# Patient Record
Sex: MALE | Race: WHITE | Employment: UNEMPLOYED | URBAN - METROPOLITAN AREA
[De-identification: names, ages, dates, MRNs, and addresses within clinical notes are randomized per-mention and may not be internally consistent; named-entity substitution may affect disease eponyms.]

---

## 2017-02-13 ENCOUNTER — OFFICE VISIT (OUTPATIENT)
Dept: INTERNAL MEDICINE CLINIC | Age: 37
End: 2017-02-13

## 2017-02-13 VITALS
BODY MASS INDEX: 37.57 KG/M2 | SYSTOLIC BLOOD PRESSURE: 124 MMHG | HEART RATE: 114 BPM | DIASTOLIC BLOOD PRESSURE: 80 MMHG | RESPIRATION RATE: 18 BRPM | WEIGHT: 277 LBS | OXYGEN SATURATION: 98 %

## 2017-02-13 DIAGNOSIS — F41.9 ANXIETY: Primary | ICD-10-CM

## 2017-02-13 PROCEDURE — 99213 OFFICE O/P EST LOW 20 MIN: CPT | Performed by: INTERNAL MEDICINE

## 2017-02-13 RX ORDER — VENLAFAXINE HYDROCHLORIDE 37.5 MG/1
37.5 CAPSULE, EXTENDED RELEASE ORAL DAILY
Qty: 7 CAPSULE | Refills: 0 | Status: SHIPPED | OUTPATIENT
Start: 2017-02-13 | End: 2017-04-10 | Stop reason: DRUGHIGH

## 2017-02-13 RX ORDER — VENLAFAXINE HYDROCHLORIDE 75 MG/1
75 CAPSULE, EXTENDED RELEASE ORAL DAILY
Qty: 30 CAPSULE | Refills: 1 | Status: SHIPPED | OUTPATIENT
Start: 2017-02-13 | End: 2017-04-10 | Stop reason: SDUPTHER

## 2017-04-10 ENCOUNTER — OFFICE VISIT (OUTPATIENT)
Dept: INTERNAL MEDICINE CLINIC | Age: 37
End: 2017-04-10

## 2017-04-10 VITALS
HEART RATE: 116 BPM | SYSTOLIC BLOOD PRESSURE: 144 MMHG | OXYGEN SATURATION: 98 % | RESPIRATION RATE: 18 BRPM | DIASTOLIC BLOOD PRESSURE: 100 MMHG

## 2017-04-10 DIAGNOSIS — I10 ESSENTIAL HYPERTENSION: ICD-10-CM

## 2017-04-10 DIAGNOSIS — F41.9 ANXIETY: ICD-10-CM

## 2017-04-10 DIAGNOSIS — J40 BRONCHITIS: Primary | ICD-10-CM

## 2017-04-10 PROCEDURE — 99213 OFFICE O/P EST LOW 20 MIN: CPT | Performed by: INTERNAL MEDICINE

## 2017-04-10 RX ORDER — LISINOPRIL AND HYDROCHLOROTHIAZIDE 25; 20 MG/1; MG/1
1 TABLET ORAL DAILY
Qty: 30 TABLET | Refills: 11 | Status: SHIPPED | OUTPATIENT
Start: 2017-04-10 | End: 2018-05-02 | Stop reason: SDUPTHER

## 2017-04-10 RX ORDER — AZITHROMYCIN 250 MG/1
TABLET, FILM COATED ORAL
Qty: 6 TABLET | Refills: 0 | Status: SHIPPED | OUTPATIENT
Start: 2017-04-10 | End: 2018-01-24 | Stop reason: ALTCHOICE

## 2017-04-10 RX ORDER — VENLAFAXINE HYDROCHLORIDE 150 MG/1
150 CAPSULE, EXTENDED RELEASE ORAL DAILY
Qty: 30 CAPSULE | Refills: 11 | Status: SHIPPED | OUTPATIENT
Start: 2017-04-10 | End: 2018-01-24

## 2017-04-10 RX ORDER — PREDNISONE 10 MG/1
TABLET ORAL
Qty: 20 TABLET | Refills: 0 | Status: SHIPPED | OUTPATIENT
Start: 2017-04-10 | End: 2017-04-20

## 2017-04-10 RX ORDER — FLUTICASONE PROPIONATE 50 MCG
2 SPRAY, SUSPENSION (ML) NASAL DAILY
Qty: 1 BOTTLE | Refills: 5 | Status: SHIPPED | OUTPATIENT
Start: 2017-04-10 | End: 2018-01-24 | Stop reason: ALTCHOICE

## 2017-05-09 ENCOUNTER — HOSPITAL ENCOUNTER (OUTPATIENT)
Dept: OTHER | Age: 37
Discharge: HOME | End: 2017-05-09
Attending: CLINIC/CENTER | Admitting: CLINIC/CENTER

## 2017-05-09 DIAGNOSIS — S60.222A CONTUSION OF LEFT HAND, INITIAL ENCOUNTER: ICD-10-CM

## 2018-01-24 ENCOUNTER — OFFICE VISIT (OUTPATIENT)
Dept: INTERNAL MEDICINE CLINIC | Age: 38
End: 2018-01-24

## 2018-01-24 VITALS
WEIGHT: 270.2 LBS | RESPIRATION RATE: 16 BRPM | BODY MASS INDEX: 36.6 KG/M2 | OXYGEN SATURATION: 93 % | HEART RATE: 108 BPM | HEIGHT: 72 IN | DIASTOLIC BLOOD PRESSURE: 80 MMHG | SYSTOLIC BLOOD PRESSURE: 134 MMHG

## 2018-01-24 DIAGNOSIS — B35.3 TINEA PEDIS OF RIGHT FOOT: ICD-10-CM

## 2018-01-24 DIAGNOSIS — I10 ESSENTIAL HYPERTENSION: ICD-10-CM

## 2018-01-24 DIAGNOSIS — Z00.00 PREVENTATIVE HEALTH CARE: Primary | ICD-10-CM

## 2018-01-24 DIAGNOSIS — F41.9 ANXIETY: ICD-10-CM

## 2018-01-24 DIAGNOSIS — G47.33 OBSTRUCTIVE SLEEP APNEA: ICD-10-CM

## 2018-01-24 DIAGNOSIS — K76.0 FATTY LIVER: ICD-10-CM

## 2018-01-24 PROCEDURE — 99395 PREV VISIT EST AGE 18-39: CPT | Performed by: INTERNAL MEDICINE

## 2018-01-24 RX ORDER — PAROXETINE 10 MG/1
10 TABLET, FILM COATED ORAL DAILY
Qty: 30 TABLET | Refills: 11 | Status: SHIPPED | OUTPATIENT
Start: 2018-01-24 | End: 2018-02-26 | Stop reason: SINTOL

## 2018-01-24 RX ORDER — VENLAFAXINE HYDROCHLORIDE 75 MG/1
75 CAPSULE, EXTENDED RELEASE ORAL DAILY
Qty: 7 CAPSULE | Refills: 0 | Status: SHIPPED | OUTPATIENT
Start: 2018-01-24 | End: 2018-02-26 | Stop reason: ALTCHOICE

## 2018-01-24 RX ORDER — FLUCONAZOLE 100 MG/1
100 TABLET ORAL DAILY
Qty: 10 TABLET | Refills: 0 | Status: SHIPPED | OUTPATIENT
Start: 2018-01-24 | End: 2018-02-03

## 2018-01-24 ASSESSMENT — PATIENT HEALTH QUESTIONNAIRE - PHQ9
SUM OF ALL RESPONSES TO PHQ QUESTIONS 1-9: 0
SUM OF ALL RESPONSES TO PHQ9 QUESTIONS 1 & 2: 0
2. FEELING DOWN, DEPRESSED OR HOPELESS: 0
1. LITTLE INTEREST OR PLEASURE IN DOING THINGS: 0

## 2018-01-24 NOTE — PROGRESS NOTES
Cecelia Aschoff  1980 01/24/18    SUBJECTIVE:    Pt is concerned that the effexor is causing twitching and somnolence. The twitching tends to occur when he is sleeping, not when he is awake. This is beneficial for the insomnia. The patient is taking hypertensive medications compliantly without side effects. Denies chest pain, dyspnea, edema, or TIA's. Pt with fatty liver. Pt is not using his CPAP. Pt with dry cracking skin on his feet. OBJECTIVE:    /80   Pulse 108   Resp 16   Ht 6' (1.829 m)   Wt 270 lb 3.2 oz (122.6 kg)   SpO2 93%   BMI 36.65 kg/m²     Physical Exam   Constitutional: He is oriented to person, place, and time. He appears well-developed and well-nourished. Eyes: Conjunctivae are normal. No scleral icterus. Neck: Neck supple. No JVD present. No tracheal deviation present. No thyromegaly present. Cardiovascular: Normal rate, regular rhythm, normal heart sounds and intact distal pulses. Pulmonary/Chest: Effort normal and breath sounds normal. No respiratory distress. Abdominal: Soft. He exhibits no distension and no mass. There is no hepatosplenomegaly. There is no tenderness. There is no rebound, no guarding and no CVA tenderness. Musculoskeletal: He exhibits no edema. Lymphadenopathy:     He has no cervical adenopathy. Neurological: He is alert and oriented to person, place, and time. Nonfocal   Skin: No cyanosis. Nails show no clubbing. Psychiatric: He has a normal mood and affect. His behavior is normal. Judgment normal.       ASSESSMENT:    1. Preventative health care    2. Essential hypertension    3. Fatty liver    4. Obstructive sleep apnea    5. Anxiety    6. Tinea pedis of right foot        PLAN:    Rachel Kaur was seen today for discuss medications and other.     Diagnoses and all orders for this visit:    Preventative health care - encourage exercise; BP at goal; encourage decreased alcohol use; check labs; encourage wt loss  - Comprehensive Metabolic Panel; Future  -     Lipid Panel; Future  -     CBC Auto Differential; Future  -     Hemoglobin A1C; Future    Essential hypertension - at goal, check labs  -     Comprehensive Metabolic Panel; Future  -     Lipid Panel; Future    Fatty liver - discussed importance of wt loss and avoiding alcohol for the fatty liver  -     Comprehensive Metabolic Panel; Future    Obstructive sleep apnea - pt not interested in going back to sleep eval. Encourage wt loss    Anxiety - stop effexor, start paxil  -     venlafaxine (EFFEXOR XR) 75 MG extended release capsule; Take 1 capsule by mouth daily  -     PARoxetine (PAXIL) 10 MG tablet; Take 1 tablet by mouth daily    Tinea pedis of right foot  -     fluconazole (DIFLUCAN) 100 MG tablet;  Take 1 tablet by mouth daily for 10 days

## 2018-01-29 ENCOUNTER — OFFICE VISIT (OUTPATIENT)
Dept: INTERNAL MEDICINE CLINIC | Age: 38
End: 2018-01-29

## 2018-01-29 VITALS
OXYGEN SATURATION: 97 % | RESPIRATION RATE: 18 BRPM | SYSTOLIC BLOOD PRESSURE: 136 MMHG | TEMPERATURE: 98.3 F | DIASTOLIC BLOOD PRESSURE: 80 MMHG | HEART RATE: 120 BPM

## 2018-01-29 DIAGNOSIS — K52.9 GASTROENTERITIS: Primary | ICD-10-CM

## 2018-01-29 PROCEDURE — 99213 OFFICE O/P EST LOW 20 MIN: CPT | Performed by: INTERNAL MEDICINE

## 2018-01-29 RX ORDER — PROMETHAZINE HYDROCHLORIDE 25 MG/1
25 TABLET ORAL EVERY 6 HOURS PRN
Qty: 20 TABLET | Refills: 1 | Status: SHIPPED | OUTPATIENT
Start: 2018-01-29 | End: 2018-02-05

## 2018-01-29 RX ORDER — DIPHENOXYLATE HYDROCHLORIDE AND ATROPINE SULFATE 2.5; .025 MG/1; MG/1
1 TABLET ORAL 4 TIMES DAILY PRN
Qty: 15 TABLET | Refills: 0 | Status: SHIPPED | OUTPATIENT
Start: 2018-01-29 | End: 2018-02-05

## 2018-01-29 NOTE — PROGRESS NOTES
Cecelia Aschoff  1980 01/29/18    SUBJECTIVE:      Pt woke last night with vomiting productive of food without blood, dry heaves, diarrhea x 10 productive of brown watery stool without blood, subjective F/C, diffuse abd pain. He has been able to drink a small amount of liquid today. He denies sick contacts. OBJECTIVE:    /80   Pulse 120   Temp 98.3 °F (36.8 °C) (Oral)   Resp 18   SpO2 97%     Physical Exam   Constitutional: He appears well-developed and well-nourished. Eyes: Conjunctivae are normal. No scleral icterus. Cardiovascular: Normal rate, regular rhythm and normal heart sounds. No murmur heard. Pulmonary/Chest: Effort normal and breath sounds normal. No respiratory distress. He has no wheezes. Abdominal: Soft. Normal appearance. There is generalized tenderness. There is no CVA tenderness. ASSESSMENT:    1. Gastroenteritis        PLAN:    Rachel Kaur was seen today for nausea & vomiting and chills. Diagnoses and all orders for this visit:    Gastroenteritis - no red flags. Tx with phenergan, lomotil, and fluids  -     promethazine (PHENERGAN) 25 MG tablet; Take 1 tablet by mouth every 6 hours as needed for Nausea  -     diphenoxylate-atropine (DIPHENATOL) 2.5-0.025 MG per tablet; Take 1 tablet by mouth 4 times daily as needed for Diarrhea for up to 7 days.

## 2018-02-26 ENCOUNTER — TELEPHONE (OUTPATIENT)
Dept: INTERNAL MEDICINE CLINIC | Age: 38
End: 2018-02-26

## 2018-02-26 RX ORDER — CITALOPRAM 10 MG/1
10 TABLET ORAL DAILY
Qty: 30 TABLET | Refills: 1 | Status: SHIPPED | OUTPATIENT
Start: 2018-02-26 | End: 2018-05-30

## 2018-03-09 ENCOUNTER — OFFICE VISIT (OUTPATIENT)
Dept: INTERNAL MEDICINE CLINIC | Age: 38
End: 2018-03-09

## 2018-03-09 VITALS — SYSTOLIC BLOOD PRESSURE: 130 MMHG | HEART RATE: 88 BPM | DIASTOLIC BLOOD PRESSURE: 80 MMHG | RESPIRATION RATE: 18 BRPM

## 2018-03-09 DIAGNOSIS — M79.671 RIGHT FOOT PAIN: Primary | ICD-10-CM

## 2018-03-09 PROCEDURE — 99213 OFFICE O/P EST LOW 20 MIN: CPT | Performed by: INTERNAL MEDICINE

## 2018-03-09 RX ORDER — TRAMADOL HYDROCHLORIDE 50 MG/1
50 TABLET ORAL EVERY 6 HOURS PRN
Qty: 28 TABLET | Refills: 0 | Status: SHIPPED | OUTPATIENT
Start: 2018-03-09 | End: 2018-03-16

## 2018-05-02 DIAGNOSIS — I10 ESSENTIAL HYPERTENSION: ICD-10-CM

## 2018-05-02 RX ORDER — LISINOPRIL AND HYDROCHLOROTHIAZIDE 25; 20 MG/1; MG/1
1 TABLET ORAL DAILY
Qty: 30 TABLET | Refills: 1 | Status: SHIPPED | OUTPATIENT
Start: 2018-05-02 | End: 2018-06-21 | Stop reason: SDUPTHER

## 2018-05-04 ENCOUNTER — OFFICE VISIT (OUTPATIENT)
Dept: INTERNAL MEDICINE CLINIC | Age: 38
End: 2018-05-04

## 2018-05-04 VITALS — DIASTOLIC BLOOD PRESSURE: 84 MMHG | SYSTOLIC BLOOD PRESSURE: 136 MMHG | HEART RATE: 110 BPM | OXYGEN SATURATION: 95 %

## 2018-05-04 DIAGNOSIS — R20.2 NUMBNESS AND TINGLING OF BOTH LOWER EXTREMITIES: ICD-10-CM

## 2018-05-04 DIAGNOSIS — M79.604 PAIN IN BOTH LOWER EXTREMITIES: ICD-10-CM

## 2018-05-04 DIAGNOSIS — R20.2 PARESTHESIA OF BOTH HANDS: ICD-10-CM

## 2018-05-04 DIAGNOSIS — R20.2 NUMBNESS AND TINGLING OF BOTH LOWER EXTREMITIES: Primary | ICD-10-CM

## 2018-05-04 DIAGNOSIS — M79.605 PAIN IN BOTH LOWER EXTREMITIES: ICD-10-CM

## 2018-05-04 DIAGNOSIS — R20.0 NUMBNESS AND TINGLING OF BOTH LOWER EXTREMITIES: ICD-10-CM

## 2018-05-04 DIAGNOSIS — R20.0 NUMBNESS AND TINGLING OF BOTH LOWER EXTREMITIES: Primary | ICD-10-CM

## 2018-05-04 LAB
C-REACTIVE PROTEIN: 21.1 MG/L (ref 0–5.1)
PROTEIN PROTEIN: 0.04 G/DL
PROTEIN PROTEIN: 35 MG/DL
RHEUMATOID FACTOR: <10 IU/ML
TSH SERPL DL<=0.05 MIU/L-ACNC: 1.18 UIU/ML (ref 0.27–4.2)

## 2018-05-04 PROCEDURE — 99214 OFFICE O/P EST MOD 30 MIN: CPT | Performed by: NURSE PRACTITIONER

## 2018-05-04 RX ORDER — GABAPENTIN 100 MG/1
100 CAPSULE ORAL 3 TIMES DAILY
Qty: 21 CAPSULE | Refills: 0 | Status: SHIPPED | OUTPATIENT
Start: 2018-05-04 | End: 2018-05-24 | Stop reason: SDUPTHER

## 2018-05-05 LAB
SEDIMENTATION RATE, ERYTHROCYTE: 93 MM/HR (ref 0–15)
VITAMIN B-12: 695 PG/ML (ref 211–911)

## 2018-05-07 DIAGNOSIS — M79.605 BILATERAL LOWER EXTREMITY PAIN: ICD-10-CM

## 2018-05-07 DIAGNOSIS — R79.82 ELEVATED C-REACTIVE PROTEIN (CRP): ICD-10-CM

## 2018-05-07 DIAGNOSIS — M79.604 BILATERAL LOWER EXTREMITY PAIN: ICD-10-CM

## 2018-05-07 DIAGNOSIS — R20.0 NUMBNESS IN BOTH HANDS: ICD-10-CM

## 2018-05-07 DIAGNOSIS — R20.2 NUMBNESS AND TINGLING OF BOTH LEGS: ICD-10-CM

## 2018-05-07 DIAGNOSIS — R70.0 ELEVATED SED RATE: Primary | ICD-10-CM

## 2018-05-07 DIAGNOSIS — R20.0 NUMBNESS AND TINGLING OF BOTH LEGS: ICD-10-CM

## 2018-05-07 LAB
ALBUMIN SERPL-MCNC: 3.9 G/DL (ref 3.1–4.9)
ALPHA-1-GLOBULIN: 0.3 G/DL (ref 0.2–0.4)
ALPHA-2-GLOBULIN: 0.8 G/DL (ref 0.4–1.1)
BETA GLOBULIN: 2.1 G/DL (ref 0.9–1.6)
GAMMA GLOBULIN: 1.6 G/DL (ref 0.6–1.8)
SPE/IFE INTERPRETATION: NORMAL
TOTAL PROTEIN: 8.8 G/DL (ref 6.4–8.2)
URINE ELECTROPHORESIS INTERP: NORMAL

## 2018-05-10 ENCOUNTER — TELEPHONE (OUTPATIENT)
Dept: INTERNAL MEDICINE CLINIC | Age: 38
End: 2018-05-10

## 2018-05-24 ENCOUNTER — TELEPHONE (OUTPATIENT)
Dept: INTERNAL MEDICINE CLINIC | Age: 38
End: 2018-05-24

## 2018-05-24 DIAGNOSIS — R20.0 NUMBNESS AND TINGLING OF BOTH LOWER EXTREMITIES: ICD-10-CM

## 2018-05-24 DIAGNOSIS — R20.2 NUMBNESS AND TINGLING OF BOTH LOWER EXTREMITIES: ICD-10-CM

## 2018-05-24 RX ORDER — GABAPENTIN 300 MG/1
300 CAPSULE ORAL 3 TIMES DAILY
Qty: 90 CAPSULE | Refills: 3 | Status: SHIPPED | OUTPATIENT
Start: 2018-05-24 | End: 2018-06-11 | Stop reason: SDUPTHER

## 2018-05-30 ENCOUNTER — TELEPHONE (OUTPATIENT)
Dept: INTERNAL MEDICINE CLINIC | Age: 38
End: 2018-05-30

## 2018-05-30 ENCOUNTER — OFFICE VISIT (OUTPATIENT)
Dept: INTERNAL MEDICINE CLINIC | Age: 38
End: 2018-05-30

## 2018-05-30 VITALS
WEIGHT: 246.2 LBS | RESPIRATION RATE: 18 BRPM | BODY MASS INDEX: 33.39 KG/M2 | SYSTOLIC BLOOD PRESSURE: 138 MMHG | HEART RATE: 106 BPM | OXYGEN SATURATION: 94 % | DIASTOLIC BLOOD PRESSURE: 82 MMHG

## 2018-05-30 DIAGNOSIS — R73.9 HYPERGLYCEMIA: Primary | ICD-10-CM

## 2018-05-30 PROCEDURE — 99214 OFFICE O/P EST MOD 30 MIN: CPT | Performed by: INTERNAL MEDICINE

## 2018-05-30 RX ORDER — BLOOD-GLUCOSE METER
1 KIT MISCELLANEOUS DAILY PRN
Qty: 1 KIT | Refills: 0 | Status: SHIPPED | OUTPATIENT
Start: 2018-05-30

## 2018-05-30 RX ORDER — LANCETS
1 EACH MISCELLANEOUS DAILY
Qty: 100 EACH | Refills: 3 | Status: SHIPPED | OUTPATIENT
Start: 2018-05-30

## 2018-05-31 DIAGNOSIS — R73.9 HYPERGLYCEMIA: ICD-10-CM

## 2018-06-06 ENCOUNTER — OFFICE VISIT (OUTPATIENT)
Dept: INTERNAL MEDICINE CLINIC | Age: 38
End: 2018-06-06

## 2018-06-06 VITALS
RESPIRATION RATE: 18 BRPM | WEIGHT: 247.9 LBS | DIASTOLIC BLOOD PRESSURE: 74 MMHG | HEART RATE: 101 BPM | BODY MASS INDEX: 33.62 KG/M2 | OXYGEN SATURATION: 95 % | SYSTOLIC BLOOD PRESSURE: 116 MMHG

## 2018-06-06 DIAGNOSIS — E11.40 TYPE 2 DIABETES, CONTROLLED, WITH NEUROPATHY (HCC): Primary | ICD-10-CM

## 2018-06-06 PROCEDURE — 99213 OFFICE O/P EST LOW 20 MIN: CPT | Performed by: INTERNAL MEDICINE

## 2018-06-06 RX ORDER — ASPIRIN 81 MG/1
81 TABLET ORAL DAILY
Qty: 30 TABLET | Refills: 3 | COMMUNITY
Start: 2018-06-06 | End: 2022-03-01

## 2018-06-06 RX ORDER — ATORVASTATIN CALCIUM 40 MG/1
40 TABLET, FILM COATED ORAL DAILY
Qty: 30 TABLET | Refills: 5 | Status: SHIPPED | OUTPATIENT
Start: 2018-06-06 | End: 2019-01-07 | Stop reason: SDUPTHER

## 2018-06-11 ENCOUNTER — TELEPHONE (OUTPATIENT)
Dept: INTERNAL MEDICINE CLINIC | Age: 38
End: 2018-06-11

## 2018-06-11 DIAGNOSIS — R20.0 NUMBNESS AND TINGLING OF BOTH LOWER EXTREMITIES: ICD-10-CM

## 2018-06-11 DIAGNOSIS — R20.2 NUMBNESS AND TINGLING: Primary | ICD-10-CM

## 2018-06-11 DIAGNOSIS — R20.2 NUMBNESS AND TINGLING OF BOTH LOWER EXTREMITIES: ICD-10-CM

## 2018-06-11 DIAGNOSIS — R20.0 NUMBNESS AND TINGLING: Primary | ICD-10-CM

## 2018-06-11 RX ORDER — GABAPENTIN 300 MG/1
300 CAPSULE ORAL 3 TIMES DAILY
Qty: 90 CAPSULE | Refills: 3
Start: 2018-06-11 | End: 2018-06-14 | Stop reason: ALTCHOICE

## 2018-06-11 RX ORDER — GABAPENTIN 400 MG/1
400 CAPSULE ORAL 3 TIMES DAILY
Qty: 90 CAPSULE | Refills: 3 | Status: SHIPPED | OUTPATIENT
Start: 2018-06-11 | End: 2018-06-11 | Stop reason: SDUPTHER

## 2018-06-14 RX ORDER — PREGABALIN 25 MG/1
25 CAPSULE ORAL 3 TIMES DAILY
Qty: 21 CAPSULE | Refills: 0 | Status: SHIPPED | OUTPATIENT
Start: 2018-06-14 | End: 2018-06-26 | Stop reason: SDUPTHER

## 2018-06-21 DIAGNOSIS — I10 ESSENTIAL HYPERTENSION: ICD-10-CM

## 2018-06-21 RX ORDER — LISINOPRIL AND HYDROCHLOROTHIAZIDE 25; 20 MG/1; MG/1
1 TABLET ORAL DAILY
Qty: 30 TABLET | Refills: 1 | Status: SHIPPED | OUTPATIENT
Start: 2018-06-21 | End: 2018-08-15 | Stop reason: SDUPTHER

## 2018-06-26 ENCOUNTER — OFFICE VISIT (OUTPATIENT)
Dept: INTERNAL MEDICINE CLINIC | Age: 38
End: 2018-06-26

## 2018-06-26 VITALS
HEART RATE: 108 BPM | WEIGHT: 244 LBS | SYSTOLIC BLOOD PRESSURE: 128 MMHG | DIASTOLIC BLOOD PRESSURE: 84 MMHG | BODY MASS INDEX: 33.09 KG/M2 | OXYGEN SATURATION: 94 % | RESPIRATION RATE: 16 BRPM

## 2018-06-26 DIAGNOSIS — R20.0 NUMBNESS AND TINGLING: ICD-10-CM

## 2018-06-26 DIAGNOSIS — R20.2 NUMBNESS AND TINGLING: ICD-10-CM

## 2018-06-26 DIAGNOSIS — E11.40 TYPE 2 DIABETES, CONTROLLED, WITH NEUROPATHY (HCC): Primary | ICD-10-CM

## 2018-06-26 PROCEDURE — 99213 OFFICE O/P EST LOW 20 MIN: CPT | Performed by: INTERNAL MEDICINE

## 2018-06-26 RX ORDER — PREGABALIN 50 MG/1
50 CAPSULE ORAL 3 TIMES DAILY
Qty: 90 CAPSULE | Refills: 2 | Status: SHIPPED | OUTPATIENT
Start: 2018-06-26 | End: 2018-11-15 | Stop reason: SDUPTHER

## 2018-07-17 ENCOUNTER — OFFICE VISIT (OUTPATIENT)
Dept: INTERNAL MEDICINE CLINIC | Age: 38
End: 2018-07-17

## 2018-07-17 VITALS
BODY MASS INDEX: 33.74 KG/M2 | WEIGHT: 248.8 LBS | RESPIRATION RATE: 16 BRPM | OXYGEN SATURATION: 97 % | HEART RATE: 96 BPM | SYSTOLIC BLOOD PRESSURE: 146 MMHG | DIASTOLIC BLOOD PRESSURE: 96 MMHG

## 2018-07-17 DIAGNOSIS — Z00.00 PREVENTATIVE HEALTH CARE: ICD-10-CM

## 2018-07-17 DIAGNOSIS — Z23 NEED FOR VACCINATION FOR PNEUMOCOCCUS: ICD-10-CM

## 2018-07-17 DIAGNOSIS — E11.40 TYPE 2 DIABETES, CONTROLLED, WITH NEUROPATHY (HCC): Primary | ICD-10-CM

## 2018-07-17 DIAGNOSIS — I10 ESSENTIAL HYPERTENSION: ICD-10-CM

## 2018-07-17 PROCEDURE — 90471 IMMUNIZATION ADMIN: CPT | Performed by: INTERNAL MEDICINE

## 2018-07-17 PROCEDURE — 90732 PPSV23 VACC 2 YRS+ SUBQ/IM: CPT | Performed by: INTERNAL MEDICINE

## 2018-07-17 PROCEDURE — 99213 OFFICE O/P EST LOW 20 MIN: CPT | Performed by: INTERNAL MEDICINE

## 2018-08-15 ENCOUNTER — OFFICE VISIT (OUTPATIENT)
Dept: INTERNAL MEDICINE CLINIC | Age: 38
End: 2018-08-15

## 2018-08-15 VITALS
DIASTOLIC BLOOD PRESSURE: 90 MMHG | SYSTOLIC BLOOD PRESSURE: 142 MMHG | OXYGEN SATURATION: 95 % | WEIGHT: 252.2 LBS | HEART RATE: 102 BPM | BODY MASS INDEX: 34.2 KG/M2 | RESPIRATION RATE: 16 BRPM

## 2018-08-15 DIAGNOSIS — E11.40 TYPE 2 DIABETES, CONTROLLED, WITH NEUROPATHY (HCC): Primary | ICD-10-CM

## 2018-08-15 PROCEDURE — 99213 OFFICE O/P EST LOW 20 MIN: CPT | Performed by: INTERNAL MEDICINE

## 2018-08-15 RX ORDER — LISINOPRIL AND HYDROCHLOROTHIAZIDE 25; 20 MG/1; MG/1
1 TABLET ORAL DAILY
Qty: 30 TABLET | Refills: 11 | Status: SHIPPED | OUTPATIENT
Start: 2018-08-15 | End: 2019-08-27 | Stop reason: SDUPTHER

## 2018-08-15 NOTE — PROGRESS NOTES
Stephen Triplett  1980  08/15/18    SUBJECTIVE:    Pt has had more swelling, tingling and pain in the feet. Pain is worse today than normal. He is on lyrica 50 TID.     sugars have been 150. OBJECTIVE:    BP (!) 142/90   Pulse 102   Resp 16   Wt 252 lb 3.2 oz (114.4 kg)   SpO2 95%   BMI 34.20 kg/m²     Physical Exam    ASSESSMENT:    1. Type 2 diabetes, controlled, with neuropathy ([address]    2. Essential hypertension        PLAN:    Merikameronmargaret Clayton was seen today for foot swelling. Diagnoses and all orders for this visit:    Type 2 diabetes, controlled, with neuropathy ([address] - Tx with aspercreme with lidocaine. Await final EMG. If not improving pt will call next week for increase in lyrica    Essential hypertension  -     lisinopril-hydrochlorothiazide (PRINZIDE;ZESTORETIC) 20-25 MG per tablet;  Take 1 tablet by mouth daily

## 2018-11-15 ENCOUNTER — OFFICE VISIT (OUTPATIENT)
Dept: INTERNAL MEDICINE CLINIC | Age: 38
End: 2018-11-15
Payer: COMMERCIAL

## 2018-11-15 VITALS
RESPIRATION RATE: 18 BRPM | SYSTOLIC BLOOD PRESSURE: 126 MMHG | DIASTOLIC BLOOD PRESSURE: 84 MMHG | HEART RATE: 110 BPM | OXYGEN SATURATION: 94 %

## 2018-11-15 DIAGNOSIS — E11.40 TYPE 2 DIABETES, CONTROLLED, WITH NEUROPATHY (HCC): Primary | ICD-10-CM

## 2018-11-15 DIAGNOSIS — I10 ESSENTIAL HYPERTENSION: ICD-10-CM

## 2018-11-15 DIAGNOSIS — F41.9 ANXIETY: ICD-10-CM

## 2018-11-15 DIAGNOSIS — R20.2 NUMBNESS AND TINGLING: ICD-10-CM

## 2018-11-15 DIAGNOSIS — R20.0 NUMBNESS AND TINGLING: ICD-10-CM

## 2018-11-15 DIAGNOSIS — F51.01 PRIMARY INSOMNIA: ICD-10-CM

## 2018-11-15 PROCEDURE — 90688 IIV4 VACCINE SPLT 0.5 ML IM: CPT | Performed by: INTERNAL MEDICINE

## 2018-11-15 PROCEDURE — 90471 IMMUNIZATION ADMIN: CPT | Performed by: INTERNAL MEDICINE

## 2018-11-15 PROCEDURE — 99214 OFFICE O/P EST MOD 30 MIN: CPT | Performed by: INTERNAL MEDICINE

## 2018-11-15 RX ORDER — PREGABALIN 75 MG/1
75 CAPSULE ORAL 3 TIMES DAILY
Qty: 90 CAPSULE | Refills: 2 | Status: SHIPPED | OUTPATIENT
Start: 2018-11-15 | End: 2019-04-08 | Stop reason: SDUPTHER

## 2018-11-15 RX ORDER — BUSPIRONE HYDROCHLORIDE 10 MG/1
10 TABLET ORAL 2 TIMES DAILY
Qty: 60 TABLET | Refills: 5 | Status: SHIPPED | OUTPATIENT
Start: 2018-11-15 | End: 2018-12-15

## 2018-11-15 RX ORDER — TRAZODONE HYDROCHLORIDE 50 MG/1
50-100 TABLET ORAL NIGHTLY PRN
Qty: 60 TABLET | Refills: 5 | Status: SHIPPED | OUTPATIENT
Start: 2018-11-15 | End: 2019-08-27 | Stop reason: SDUPTHER

## 2018-11-15 NOTE — PROGRESS NOTES
Kailash Arias  1980  11/15/18    SUBJECTIVE:    Pt is having more pain in his feet - this varies day to day, described as pins and needles. He is taking the lyrica. He is drinking 4-5 beers daily. He has not been sleeping well - he is staying up late, has EMA. He has been having more anxiety recently. Sugars have been 115-140. Patient denies any chest pain, shortness of breath, myalgias. Patient is tolerating cholesterol medications without difficulty. The patient is taking hypertensive medications compliantly without side effects. Denies chest pain, dyspnea, edema, or TIA's. OBJECTIVE:    /84   Pulse 110   Resp 18   SpO2 94%     Physical Exam   Constitutional: He is oriented to person, place, and time. He appears well-developed and well-nourished. Eyes: Conjunctivae are normal. No scleral icterus. Neck: Neck supple. No JVD present. No tracheal deviation present. No thyromegaly present. Cardiovascular: Normal rate, regular rhythm, normal heart sounds and intact distal pulses. Pulmonary/Chest: Effort normal and breath sounds normal. No respiratory distress. Abdominal: Soft. He exhibits no distension and no mass. There is no hepatosplenomegaly. There is no tenderness. There is no rebound, no guarding and no CVA tenderness. Musculoskeletal: He exhibits no edema. Lymphadenopathy:     He has no cervical adenopathy. Neurological: He is alert and oriented to person, place, and time. Nonfocal   Skin: No cyanosis. Nails show no clubbing. Psychiatric: He has a normal mood and affect. His behavior is normal. Judgment normal.       ASSESSMENT:    1. Type 2 diabetes, controlled, with neuropathy ([address]    2. Numbness and tingling    3. Essential hypertension    4. Anxiety    5. Primary insomnia        PLAN:    Varun Herr was seen today for foot pain and discuss medications.     Diagnoses and all orders for this visit:    Type 2 diabetes, controlled, with neuropathy ([facility]) - a1c 7.0

## 2018-11-30 ENCOUNTER — TELEPHONE (OUTPATIENT)
Dept: INTERNAL MEDICINE CLINIC | Age: 38
End: 2018-11-30

## 2018-11-30 DIAGNOSIS — M79.672 BILATERAL FOOT PAIN: Primary | ICD-10-CM

## 2018-11-30 DIAGNOSIS — M79.671 BILATERAL FOOT PAIN: Primary | ICD-10-CM

## 2018-11-30 RX ORDER — AMITRIPTYLINE HYDROCHLORIDE 10 MG/1
10 TABLET, FILM COATED ORAL EVERY MORNING
Qty: 30 TABLET | Refills: 6 | Status: SHIPPED | OUTPATIENT
Start: 2018-11-30 | End: 2019-05-22

## 2019-01-07 RX ORDER — ATORVASTATIN CALCIUM 40 MG/1
TABLET, FILM COATED ORAL
Qty: 90 TABLET | Refills: 4 | Status: SHIPPED | OUTPATIENT
Start: 2019-01-07 | End: 2020-01-08

## 2019-02-18 ENCOUNTER — OFFICE VISIT (OUTPATIENT)
Dept: INTERNAL MEDICINE CLINIC | Age: 39
End: 2019-02-18
Payer: COMMERCIAL

## 2019-02-18 VITALS
DIASTOLIC BLOOD PRESSURE: 80 MMHG | OXYGEN SATURATION: 95 % | SYSTOLIC BLOOD PRESSURE: 118 MMHG | RESPIRATION RATE: 18 BRPM | WEIGHT: 261 LBS | HEART RATE: 108 BPM | BODY MASS INDEX: 35.4 KG/M2

## 2019-02-18 DIAGNOSIS — R79.89 ELEVATED LFTS: ICD-10-CM

## 2019-02-18 DIAGNOSIS — I10 ESSENTIAL HYPERTENSION: ICD-10-CM

## 2019-02-18 DIAGNOSIS — E11.40 TYPE 2 DIABETES, CONTROLLED, WITH NEUROPATHY (HCC): Primary | ICD-10-CM

## 2019-02-18 DIAGNOSIS — K76.0 FATTY LIVER: ICD-10-CM

## 2019-02-18 PROBLEM — E78.00 HYPERCHOLESTEROLEMIA: Status: ACTIVE | Noted: 2019-02-18

## 2019-02-18 PROCEDURE — 99214 OFFICE O/P EST MOD 30 MIN: CPT | Performed by: INTERNAL MEDICINE

## 2019-02-18 ASSESSMENT — PATIENT HEALTH QUESTIONNAIRE - PHQ9
SUM OF ALL RESPONSES TO PHQ QUESTIONS 1-9: 1
2. FEELING DOWN, DEPRESSED OR HOPELESS: 0
1. LITTLE INTEREST OR PLEASURE IN DOING THINGS: 1
SUM OF ALL RESPONSES TO PHQ9 QUESTIONS 1 & 2: 1
SUM OF ALL RESPONSES TO PHQ QUESTIONS 1-9: 1

## 2019-03-04 ENCOUNTER — TELEPHONE (OUTPATIENT)
Dept: INTERNAL MEDICINE CLINIC | Age: 39
End: 2019-03-04

## 2019-04-08 DIAGNOSIS — R20.0 NUMBNESS AND TINGLING: ICD-10-CM

## 2019-04-08 DIAGNOSIS — R20.2 NUMBNESS AND TINGLING: ICD-10-CM

## 2019-05-20 DIAGNOSIS — R20.0 NUMBNESS AND TINGLING: Primary | ICD-10-CM

## 2019-05-20 DIAGNOSIS — R20.2 NUMBNESS AND TINGLING: Primary | ICD-10-CM

## 2019-05-20 RX ORDER — BUSPIRONE HYDROCHLORIDE 10 MG/1
TABLET ORAL
Qty: 60 TABLET | Refills: 3 | Status: SHIPPED | OUTPATIENT
Start: 2019-05-20 | End: 2019-05-22

## 2019-05-22 ENCOUNTER — OFFICE VISIT (OUTPATIENT)
Dept: INTERNAL MEDICINE CLINIC | Age: 39
End: 2019-05-22
Payer: COMMERCIAL

## 2019-05-22 VITALS
BODY MASS INDEX: 34.25 KG/M2 | RESPIRATION RATE: 18 BRPM | DIASTOLIC BLOOD PRESSURE: 80 MMHG | WEIGHT: 258.4 LBS | OXYGEN SATURATION: 96 % | SYSTOLIC BLOOD PRESSURE: 120 MMHG | HEART RATE: 116 BPM | HEIGHT: 73 IN

## 2019-05-22 DIAGNOSIS — E11.40 TYPE 2 DIABETES, CONTROLLED, WITH NEUROPATHY (HCC): Primary | ICD-10-CM

## 2019-05-22 DIAGNOSIS — M79.672 BILATERAL FOOT PAIN: ICD-10-CM

## 2019-05-22 DIAGNOSIS — E78.00 HYPERCHOLESTEROLEMIA: ICD-10-CM

## 2019-05-22 DIAGNOSIS — F41.9 ANXIETY: ICD-10-CM

## 2019-05-22 DIAGNOSIS — M79.671 BILATERAL FOOT PAIN: ICD-10-CM

## 2019-05-22 DIAGNOSIS — I10 ESSENTIAL HYPERTENSION: ICD-10-CM

## 2019-05-22 PROCEDURE — 99214 OFFICE O/P EST MOD 30 MIN: CPT | Performed by: INTERNAL MEDICINE

## 2019-05-22 RX ORDER — BUSPIRONE HYDROCHLORIDE 15 MG/1
15 TABLET ORAL 3 TIMES DAILY
Qty: 90 TABLET | Refills: 5 | Status: SHIPPED | OUTPATIENT
Start: 2019-05-22 | End: 2019-12-31

## 2019-05-22 RX ORDER — AMITRIPTYLINE HYDROCHLORIDE 25 MG/1
25 TABLET, FILM COATED ORAL NIGHTLY PRN
Qty: 30 TABLET | Refills: 5 | Status: SHIPPED | OUTPATIENT
Start: 2019-05-22 | End: 2020-01-27 | Stop reason: ALTCHOICE

## 2019-08-27 DIAGNOSIS — F51.01 PRIMARY INSOMNIA: ICD-10-CM

## 2019-08-27 RX ORDER — LISINOPRIL AND HYDROCHLOROTHIAZIDE 25; 20 MG/1; MG/1
1 TABLET ORAL DAILY
Qty: 30 TABLET | Refills: 2 | Status: SHIPPED | OUTPATIENT
Start: 2019-08-27 | End: 2019-12-05 | Stop reason: SDUPTHER

## 2019-08-27 RX ORDER — TRAZODONE HYDROCHLORIDE 50 MG/1
50-100 TABLET ORAL NIGHTLY PRN
Qty: 60 TABLET | Refills: 2 | Status: SHIPPED | OUTPATIENT
Start: 2019-08-27 | End: 2019-12-03 | Stop reason: SDUPTHER

## 2019-09-23 ENCOUNTER — OFFICE VISIT (OUTPATIENT)
Dept: INTERNAL MEDICINE CLINIC | Age: 39
End: 2019-09-23
Payer: COMMERCIAL

## 2019-09-23 VITALS
WEIGHT: 254 LBS | OXYGEN SATURATION: 94 % | RESPIRATION RATE: 18 BRPM | BODY MASS INDEX: 33.51 KG/M2 | DIASTOLIC BLOOD PRESSURE: 100 MMHG | HEART RATE: 99 BPM | SYSTOLIC BLOOD PRESSURE: 132 MMHG

## 2019-09-23 DIAGNOSIS — E11.40 TYPE 2 DIABETES, CONTROLLED, WITH NEUROPATHY (HCC): Primary | ICD-10-CM

## 2019-09-23 DIAGNOSIS — K76.0 FATTY LIVER: ICD-10-CM

## 2019-09-23 DIAGNOSIS — I10 ESSENTIAL HYPERTENSION: ICD-10-CM

## 2019-09-23 DIAGNOSIS — R20.0 NUMBNESS AND TINGLING: ICD-10-CM

## 2019-09-23 DIAGNOSIS — E78.00 HYPERCHOLESTEROLEMIA: ICD-10-CM

## 2019-09-23 DIAGNOSIS — S12.9XXD CLOSED FRACTURE OF CERVICAL VERTEBRA, UNSPECIFIED CERVICAL VERTEBRAL LEVEL, SUBSEQUENT ENCOUNTER: ICD-10-CM

## 2019-09-23 DIAGNOSIS — Z00.00 ENCOUNTER FOR PREVENTIVE CARE: ICD-10-CM

## 2019-09-23 DIAGNOSIS — R20.2 NUMBNESS AND TINGLING: ICD-10-CM

## 2019-09-23 PROCEDURE — 99214 OFFICE O/P EST MOD 30 MIN: CPT | Performed by: INTERNAL MEDICINE

## 2019-09-23 RX ORDER — PREGABALIN 100 MG/1
100 CAPSULE ORAL 2 TIMES DAILY
Qty: 60 CAPSULE | Refills: 5 | Status: SHIPPED | OUTPATIENT
Start: 2019-09-23 | End: 2020-05-22

## 2019-10-11 DIAGNOSIS — Z00.00 ENCOUNTER FOR PREVENTIVE CARE: Primary | ICD-10-CM

## 2019-10-16 ENCOUNTER — TELEPHONE (OUTPATIENT)
Dept: INTERNAL MEDICINE CLINIC | Age: 39
End: 2019-10-16

## 2019-10-16 DIAGNOSIS — S12.9XXD CLOSED FRACTURE OF CERVICAL VERTEBRA, UNSPECIFIED CERVICAL VERTEBRAL LEVEL, SUBSEQUENT ENCOUNTER: Primary | ICD-10-CM

## 2019-12-03 DIAGNOSIS — F51.01 PRIMARY INSOMNIA: ICD-10-CM

## 2019-12-03 RX ORDER — TRAZODONE HYDROCHLORIDE 50 MG/1
50-100 TABLET ORAL NIGHTLY PRN
Qty: 60 TABLET | Refills: 11 | Status: SHIPPED | OUTPATIENT
Start: 2019-12-03 | End: 2021-08-04

## 2019-12-05 RX ORDER — LISINOPRIL AND HYDROCHLOROTHIAZIDE 25; 20 MG/1; MG/1
1 TABLET ORAL DAILY
Qty: 30 TABLET | Refills: 2 | Status: SHIPPED | OUTPATIENT
Start: 2019-12-05 | End: 2020-01-27 | Stop reason: SINTOL

## 2019-12-20 ENCOUNTER — TELEPHONE (OUTPATIENT)
Dept: INTERNAL MEDICINE CLINIC | Age: 39
End: 2019-12-20

## 2019-12-20 RX ORDER — INSULIN GLARGINE 100 [IU]/ML
15 INJECTION, SOLUTION SUBCUTANEOUS NIGHTLY
Qty: 1 VIAL | Refills: 0
Start: 2019-12-20 | End: 2020-01-27

## 2019-12-23 ENCOUNTER — TELEPHONE (OUTPATIENT)
Dept: INTERNAL MEDICINE CLINIC | Age: 39
End: 2019-12-23

## 2019-12-23 DIAGNOSIS — E11.40 CONTROLLED TYPE 2 DIABETES WITH NEUROPATHY (HCC): Primary | ICD-10-CM

## 2019-12-31 DIAGNOSIS — F41.9 ANXIETY: ICD-10-CM

## 2019-12-31 RX ORDER — BUSPIRONE HYDROCHLORIDE 15 MG/1
TABLET ORAL
Qty: 90 TABLET | Refills: 5 | Status: SHIPPED | OUTPATIENT
Start: 2019-12-31 | End: 2020-06-19 | Stop reason: SDUPTHER

## 2020-01-08 RX ORDER — ATORVASTATIN CALCIUM 40 MG/1
TABLET, FILM COATED ORAL
Qty: 90 TABLET | Refills: 3 | Status: SHIPPED | OUTPATIENT
Start: 2020-01-08 | End: 2020-06-19 | Stop reason: SDUPTHER

## 2020-01-27 ENCOUNTER — OFFICE VISIT (OUTPATIENT)
Dept: INTERNAL MEDICINE CLINIC | Age: 40
End: 2020-01-27
Payer: COMMERCIAL

## 2020-01-27 VITALS
WEIGHT: 254.8 LBS | HEART RATE: 100 BPM | BODY MASS INDEX: 33.62 KG/M2 | RESPIRATION RATE: 16 BRPM | DIASTOLIC BLOOD PRESSURE: 76 MMHG | OXYGEN SATURATION: 96 % | SYSTOLIC BLOOD PRESSURE: 120 MMHG

## 2020-01-27 LAB
A/G RATIO: 1.4 (ref 1.1–2.2)
ALBUMIN SERPL-MCNC: 5 G/DL (ref 3.4–5)
ALP BLD-CCNC: 118 U/L (ref 40–129)
ALT SERPL-CCNC: 62 U/L (ref 10–40)
ANION GAP SERPL CALCULATED.3IONS-SCNC: 21 MMOL/L (ref 3–16)
AST SERPL-CCNC: 62 U/L (ref 15–37)
BASOPHILS ABSOLUTE: 0.1 K/UL (ref 0–0.2)
BASOPHILS RELATIVE PERCENT: 1.8 %
BILIRUB SERPL-MCNC: 1.1 MG/DL (ref 0–1)
BUN BLDV-MCNC: 6 MG/DL (ref 7–20)
CALCIUM SERPL-MCNC: 10.3 MG/DL (ref 8.3–10.6)
CHLORIDE BLD-SCNC: 73 MMOL/L (ref 99–110)
CHOLESTEROL, TOTAL: 172 MG/DL (ref 0–199)
CO2: 26 MMOL/L (ref 21–32)
CREAT SERPL-MCNC: 0.8 MG/DL (ref 0.9–1.3)
EOSINOPHILS ABSOLUTE: 0.2 K/UL (ref 0–0.6)
EOSINOPHILS RELATIVE PERCENT: 2.2 %
GFR AFRICAN AMERICAN: >60
GFR NON-AFRICAN AMERICAN: >60
GLOBULIN: 3.6 G/DL
GLUCOSE BLD-MCNC: 203 MG/DL (ref 70–99)
HCT VFR BLD CALC: 34.6 % (ref 40.5–52.5)
HDLC SERPL-MCNC: 58 MG/DL (ref 40–60)
HEMOGLOBIN: 12.5 G/DL (ref 13.5–17.5)
LDL CHOLESTEROL CALCULATED: 80 MG/DL
LYMPHOCYTES ABSOLUTE: 1.8 K/UL (ref 1–5.1)
LYMPHOCYTES RELATIVE PERCENT: 21.3 %
MCH RBC QN AUTO: 31.2 PG (ref 26–34)
MCHC RBC AUTO-ENTMCNC: 36.1 G/DL (ref 31–36)
MCV RBC AUTO: 86.4 FL (ref 80–100)
MONOCYTES ABSOLUTE: 1.1 K/UL (ref 0–1.3)
MONOCYTES RELATIVE PERCENT: 13.1 %
NEUTROPHILS ABSOLUTE: 5.1 K/UL (ref 1.7–7.7)
NEUTROPHILS RELATIVE PERCENT: 61.6 %
PDW BLD-RTO: 14.4 % (ref 12.4–15.4)
PLATELET # BLD: 341 K/UL (ref 135–450)
PMV BLD AUTO: 7.3 FL (ref 5–10.5)
POTASSIUM SERPL-SCNC: 3.7 MMOL/L (ref 3.5–5.1)
RBC # BLD: 4 M/UL (ref 4.2–5.9)
SODIUM BLD-SCNC: 120 MMOL/L (ref 136–145)
TRIGL SERPL-MCNC: 170 MG/DL (ref 0–150)
TSH SERPL DL<=0.05 MIU/L-ACNC: 2.05 UIU/ML (ref 0.27–4.2)
VITAMIN B-12: 368 PG/ML (ref 211–911)
VLDLC SERPL CALC-MCNC: 34 MG/DL
WBC # BLD: 8.3 K/UL (ref 4–11)

## 2020-01-27 PROCEDURE — 36415 COLL VENOUS BLD VENIPUNCTURE: CPT | Performed by: INTERNAL MEDICINE

## 2020-01-27 PROCEDURE — 99214 OFFICE O/P EST MOD 30 MIN: CPT | Performed by: INTERNAL MEDICINE

## 2020-01-27 PROCEDURE — 93000 ELECTROCARDIOGRAM COMPLETE: CPT | Performed by: INTERNAL MEDICINE

## 2020-01-27 RX ORDER — BUSPIRONE HYDROCHLORIDE 15 MG/1
TABLET ORAL 4 TIMES DAILY
Status: CANCELLED | OUTPATIENT
Start: 2020-01-27

## 2020-01-27 ASSESSMENT — PATIENT HEALTH QUESTIONNAIRE - PHQ9
SUM OF ALL RESPONSES TO PHQ9 QUESTIONS 1 & 2: 0
SUM OF ALL RESPONSES TO PHQ QUESTIONS 1-9: 0
SUM OF ALL RESPONSES TO PHQ QUESTIONS 1-9: 0
2. FEELING DOWN, DEPRESSED OR HOPELESS: 0
1. LITTLE INTEREST OR PLEASURE IN DOING THINGS: 0

## 2020-01-27 NOTE — PROGRESS NOTES
48 hour holter monitor is scheduled for Friday Jan 31st @ 11:30 @ [facility]     ECHO 2D WO Color Doppler Complete is scheduled for Friday Jan 31st @ 10 @[facility].

## 2020-01-27 NOTE — PROGRESS NOTES
Maddie Rg  1980 01/27/20    SUBJECTIVE:    \"Every time I sit up I've been blacking out. \" this has been present for one month, loreta severe the last week. At times he has a full syncopal episode - this has occurred 6 times. He is unconscious for seconds. He has noticed that he has no energy and is always tired. He denies CP, SOB, palpitations, F/C, focal N/W. He has had \"flat footed walking\" recently. He denies any blood in the stool, black stools, bruising/bleeding. He is drinking 6-8 beers daily. Sugars have been ~230. OBJECTIVE:    /76   Pulse 100   Resp 16   Wt 254 lb 12.8 oz (115.6 kg)   SpO2 96%   BMI 33.62 kg/m²     Physical Exam  Constitutional:       Appearance: He is well-developed. Eyes:      General: No scleral icterus. Conjunctiva/sclera: Conjunctivae normal.   Neck:      Musculoskeletal: Neck supple. Thyroid: No thyromegaly. Vascular: No JVD. Trachea: No tracheal deviation. Cardiovascular:      Rate and Rhythm: Normal rate and regular rhythm. Heart sounds: Normal heart sounds. Pulmonary:      Effort: Pulmonary effort is normal. No respiratory distress. Breath sounds: Normal breath sounds. Abdominal:      General: There is no distension. Palpations: Abdomen is soft. There is hepatomegaly. There is no mass. Tenderness: There is no abdominal tenderness. There is no guarding or rebound. Lymphadenopathy:      Cervical: No cervical adenopathy. Skin:     Nails: There is no clubbing. Neurological:      Mental Status: He is alert and oriented to person, place, and time. Comments: Nonfocal   Psychiatric:         Behavior: Behavior normal.         Judgment: Judgment normal.         ASSESSMENT:    1. Syncope, unspecified syncope type    2. Anxiety    3. Fatigue, unspecified type    4. Type 2 diabetes, controlled, with neuropathy ([address]    5.  Neuropathy        PLAN:    Sukhwinder Perry was seen today for loss of consciousness, dizziness,

## 2020-01-28 LAB
ESTIMATED AVERAGE GLUCOSE: 203 MG/DL
HBA1C MFR BLD: 8.7 %

## 2020-01-29 ENCOUNTER — HOSPITAL ENCOUNTER (OUTPATIENT)
Age: 40
Setting detail: SPECIMEN
Discharge: HOME | End: 2020-01-29
Payer: COMMERCIAL

## 2020-01-29 LAB
ALBUMIN SERPL-MCNC: 4.1 G/DL (ref 3.1–4.9)
ALPHA-1-GLOBULIN: 0.3 G/DL (ref 0.2–0.4)
ALPHA-2-GLOBULIN: 1 G/DL (ref 0.4–1.1)
ANION GAP SERPL CALCULATED.3IONS-SCNC: 18 MMOL/L (ref 4–16)
BETA GLOBULIN: 1.6 G/DL (ref 0.9–1.6)
BUN BLDV-MCNC: 6 MG/DL (ref 6–23)
CALCIUM SERPL-MCNC: 10.1 MG/DL (ref 8.3–10.6)
CHLORIDE BLD-SCNC: 81 MMOL/L (ref 99–110)
CO2: 27 MMOL/L (ref 21–32)
CREAT SERPL-MCNC: 0.9 MG/DL (ref 0.9–1.3)
GAMMA GLOBULIN: 1.6 G/DL (ref 0.6–1.8)
GFR AFRICAN AMERICAN: >60 ML/MIN/1.73M2
GFR NON-AFRICAN AMERICAN: >60 ML/MIN/1.73M2
GLUCOSE BLD-MCNC: 221 MG/DL (ref 70–99)
POTASSIUM SERPL-SCNC: 3.5 MMOL/L (ref 3.5–5.1)
PROTEIN PROTEIN: 0.01 G/DL
PROTEIN PROTEIN: 6 MG/DL
SODIUM BLD-SCNC: 126 MMOL/L (ref 135–145)
SPE/IFE INTERPRETATION: NORMAL
TOTAL PROTEIN: 8.6 G/DL (ref 6.4–8.2)

## 2020-01-29 PROCEDURE — 80048 BASIC METABOLIC PNL TOTAL CA: CPT

## 2020-01-31 LAB — URINE ELECTROPHORESIS INTERP: NORMAL

## 2020-06-19 ENCOUNTER — VIRTUAL VISIT (OUTPATIENT)
Dept: INTERNAL MEDICINE CLINIC | Age: 40
End: 2020-06-19
Payer: COMMERCIAL

## 2020-06-19 PROCEDURE — 99214 OFFICE O/P EST MOD 30 MIN: CPT | Performed by: INTERNAL MEDICINE

## 2020-06-19 PROCEDURE — 3052F HG A1C>EQUAL 8.0%<EQUAL 9.0%: CPT | Performed by: INTERNAL MEDICINE

## 2020-06-19 RX ORDER — PREGABALIN 100 MG/1
CAPSULE ORAL
Qty: 180 CAPSULE | Refills: 1 | Status: SHIPPED | OUTPATIENT
Start: 2020-06-19 | End: 2021-08-04

## 2020-06-19 RX ORDER — ATORVASTATIN CALCIUM 40 MG/1
TABLET, FILM COATED ORAL
Qty: 90 TABLET | Refills: 1 | Status: SHIPPED | OUTPATIENT
Start: 2020-06-19 | End: 2021-08-04

## 2020-06-19 RX ORDER — BUSPIRONE HYDROCHLORIDE 15 MG/1
TABLET ORAL
Qty: 270 TABLET | Refills: 1 | Status: SHIPPED | OUTPATIENT
Start: 2020-06-19 | End: 2021-08-04

## 2020-06-19 RX ORDER — CEPHALEXIN 500 MG/1
500 CAPSULE ORAL 3 TIMES DAILY
Qty: 21 CAPSULE | Refills: 0 | Status: SHIPPED | OUTPATIENT
Start: 2020-06-19 | End: 2020-08-14 | Stop reason: ALTCHOICE

## 2020-06-19 NOTE — PROGRESS NOTES
MISC 1 each by Does not apply route daily Yes Kirstie Anderson MD   aspirin EC 81 MG EC tablet Take 1 tablet by mouth daily Yes Kirstie Anderson MD   glucose blood VI test strips (GLUCOSE METER TEST) strip 1 each by In Vitro route daily As needed. Yes Kirstie Anderson MD   glucose monitoring kit (FREESTYLE) monitoring kit 1 kit by Does not apply route daily as needed (If blood sugars running high or low) Yes Kirstie Anderson MD   MICROLET LANCETS MISC 1 each by Does not apply route daily Yes Kirstie Anderson MD       Social History     Tobacco Use    Smoking status: Never Smoker    Smokeless tobacco: Never Used   Substance Use Topics    Alcohol use: Yes     Alcohol/week: 12.0 standard drinks     Types: 12 Standard drinks or equivalent per week     Comment: occasional    Drug use: No        PHYSICAL EXAMINATION:    Constitutional: [x] Appears well-developed and well-nourished [x] No apparent distress      [] Abnormal-   Mental status  [x] Alert and awake  [x] Oriented to person/place/time [x]Able to follow commands             Psychiatric:       [x] Normal Affect [x] No Hallucinations      Scabbing linear cracking of the foot; no drainage, minimal erythema. ASSESSMENT/PLAN:  1. Type 2 diabetes, controlled, with neuropathy ([facility] - check labs; because of the DM aqnd the foot sore I will start keflex, but if it does not continue to improve he will come to the office.   - Hemoglobin A1C; Future  - MICROALBUMIN / CREATININE URINE RATIO; Future    2. Essential hypertension - encourage pt to check BP at home  - Comprehensive Metabolic Panel; Future  - Lipid Panel; Future  - CBC Auto Differential; Future    3. Hypercholesterolemia - cont statin  - Comprehensive Metabolic Panel; Future  - Lipid Panel; Future    4. Fatty liver - chck labs  - Comprehensive Metabolic Panel; Future    5. Encounter for preventive care - check hiv  - HIV-1 AND HIV-2 ANTIBODIES; Future    6.  Anxiety - cont buspar; encourage him to avoid drinking to excess  - busPIRone (BUSPAR) 15 MG tablet; TAKE 1 TABLET BY MOUTH THREE TIMES DAILY  Dispense: 270 tablet; Refill: 1    7. Numbness and tingling - cont lyrica  - pregabalin (LYRICA) 100 MG capsule; TAKE 1 CAPSULE BY MOUTH TWICE DAILY  Dispense: 180 capsule; Refill: 1      Return in about 6 months (around 12/19/2020). Margy Melendez is a 42 y.o. male being evaluated by a Virtual Visit (video visit) encounter to address concerns as mentioned above. A caregiver was present when appropriate. Due to this being a TeleHealth encounter (During [address] emergency), evaluation of the following organ systems was limited: Vitals/Constitutional/EENT/Resp/CV/GI//MS/Neuro/Skin/Heme-Lymph-Imm. Pursuant to the emergency declaration under the [address] [place] [address] and the [employer] and [employer] Act, this Virtual Visit was conducted with patient's (and/or legal guardian's) consent, to reduce the patient's risk of exposure to COVID-19 and provide necessary medical care. The patient (and/or legal guardian) has also been advised to contact this office for worsening conditions or problems, and seek emergency medical treatment and/or call 911 if deemed necessary. Patient identification was verified at the start of the visit: Yes    Total time spent on this encounter: Not billed by time    Services were provided through a video synchronous discussion virtually to substitute for in-person clinic visit. Patient and provider were located at their individual homes. --Fabián Diane MD on 6/19/2020 at 12:55 PM    An electronic signature was used to authenticate this note.

## 2020-08-14 ENCOUNTER — TELEPHONE (OUTPATIENT)
Dept: INTERNAL MEDICINE CLINIC | Age: 40
End: 2020-08-14

## 2020-08-14 NOTE — TELEPHONE ENCOUNTER
The pt called in stating that he have tried a sample one time from [facility] and it had vials and needles and he picked this up from [facility] on [address] and is wanting to know can he try it again he said he has not had any insulin in two weeks and his feet are starting to feel tingling.

## 2021-07-31 ENCOUNTER — HOSPITAL ENCOUNTER (INPATIENT)
Age: 41
LOS: 2 days | Discharge: HOME | DRG: 897 | End: 2021-08-03
Attending: EMERGENCY MEDICINE
Payer: COMMERCIAL

## 2021-07-31 DIAGNOSIS — F14.10 COCAINE ABUSE (HCC): ICD-10-CM

## 2021-07-31 DIAGNOSIS — E80.6 HYPERBILIRUBINEMIA: ICD-10-CM

## 2021-07-31 DIAGNOSIS — F10.939 ALCOHOL WITHDRAWAL SYNDROME WITH COMPLICATION (HCC): Primary | ICD-10-CM

## 2021-07-31 DIAGNOSIS — R77.8 TROPONIN I ABOVE REFERENCE RANGE: ICD-10-CM

## 2021-07-31 PROBLEM — F10.931 ALCOHOL WITHDRAWAL DELIRIUM (HCC): Status: ACTIVE | Noted: 2021-07-31

## 2021-07-31 LAB
ALBUMIN SERPL-MCNC: 3.5 GM/DL (ref 3.4–5)
ALP BLD-CCNC: 173 IU/L (ref 40–129)
ALT SERPL-CCNC: 35 U/L (ref 10–40)
ANION GAP SERPL CALCULATED.3IONS-SCNC: 15 MMOL/L (ref 4–16)
AST SERPL-CCNC: 93 IU/L (ref 15–37)
BASOPHILS ABSOLUTE: 0.2 K/CU MM
BASOPHILS RELATIVE PERCENT: 1.3 % (ref 0–1)
BILIRUB SERPL-MCNC: 3.9 MG/DL (ref 0–1)
BILIRUB SERPL-MCNC: 4 MG/DL (ref 0–1)
BILIRUBIN DIRECT: 1.9 MG/DL (ref 0–0.3)
BILIRUBIN, INDIRECT: 2 MG/DL (ref 0–0.7)
BUN BLDV-MCNC: 3 MG/DL (ref 6–23)
CALCIUM SERPL-MCNC: 8.6 MG/DL (ref 8.3–10.6)
CHLORIDE BLD-SCNC: 96 MMOL/L (ref 99–110)
CO2: 23 MMOL/L (ref 21–32)
CREAT SERPL-MCNC: 0.6 MG/DL (ref 0.9–1.3)
DIFFERENTIAL TYPE: ABNORMAL
EOSINOPHILS ABSOLUTE: 0.2 K/CU MM
EOSINOPHILS RELATIVE PERCENT: 1.7 % (ref 0–3)
ESTIMATED AVERAGE GLUCOSE: 114 MG/DL
GFR AFRICAN AMERICAN: >60 ML/MIN/1.73M2
GFR NON-AFRICAN AMERICAN: >60 ML/MIN/1.73M2
GLUCOSE BLD-MCNC: 143 MG/DL (ref 70–99)
GLUCOSE BLD-MCNC: 158 MG/DL (ref 70–99)
HBA1C MFR BLD: 5.6 % (ref 4.2–6.3)
HCT VFR BLD CALC: 28.9 % (ref 42–52)
HEMOGLOBIN: 9.7 GM/DL (ref 13.5–18)
IMMATURE NEUTROPHIL %: 1.8 % (ref 0–0.43)
LIPASE: 34 IU/L (ref 13–60)
LYMPHOCYTES ABSOLUTE: 1.7 K/CU MM
LYMPHOCYTES RELATIVE PERCENT: 12.5 % (ref 24–44)
MAGNESIUM: 1.7 MG/DL (ref 1.8–2.4)
MCH RBC QN AUTO: 34.3 PG (ref 27–31)
MCHC RBC AUTO-ENTMCNC: 33.6 % (ref 32–36)
MCV RBC AUTO: 102.1 FL (ref 78–100)
MONOCYTES ABSOLUTE: 1 K/CU MM
MONOCYTES RELATIVE PERCENT: 7.7 % (ref 0–4)
NUCLEATED RBC %: 0 %
PDW BLD-RTO: 16.6 % (ref 11.7–14.9)
PLATELET # BLD: 85 K/CU MM (ref 140–440)
PMV BLD AUTO: 8.7 FL (ref 7.5–11.1)
POTASSIUM SERPL-SCNC: 3.7 MMOL/L (ref 3.5–5.1)
PRO-BNP: 40.83 PG/ML
RBC # BLD: 2.83 M/CU MM (ref 4.6–6.2)
SEGMENTED NEUTROPHILS ABSOLUTE COUNT: 10.1 K/CU MM
SEGMENTED NEUTROPHILS RELATIVE PERCENT: 75 % (ref 36–66)
SODIUM BLD-SCNC: 134 MMOL/L (ref 135–145)
TOTAL IMMATURE NEUTOROPHIL: 0.24 K/CU MM
TOTAL NUCLEATED RBC: 0 K/CU MM
TOTAL PROTEIN: 10 GM/DL (ref 6.4–8.2)
TROPONIN T: 0.01 NG/ML
WBC # BLD: 13.5 K/CU MM (ref 4–10.5)

## 2021-07-31 PROCEDURE — 96374 THER/PROPH/DIAG INJ IV PUSH: CPT

## 2021-07-31 PROCEDURE — 96376 TX/PRO/DX INJ SAME DRUG ADON: CPT

## 2021-07-31 PROCEDURE — G0378 HOSPITAL OBSERVATION PER HR: HCPCS

## 2021-07-31 PROCEDURE — 6360000002 HC RX W HCPCS: Performed by: EMERGENCY MEDICINE

## 2021-07-31 PROCEDURE — 85025 COMPLETE CBC W/AUTO DIFF WBC: CPT

## 2021-07-31 PROCEDURE — 96361 HYDRATE IV INFUSION ADD-ON: CPT

## 2021-07-31 PROCEDURE — 96375 TX/PRO/DX INJ NEW DRUG ADDON: CPT

## 2021-07-31 PROCEDURE — 84484 ASSAY OF TROPONIN QUANT: CPT

## 2021-07-31 PROCEDURE — 6360000002 HC RX W HCPCS: Performed by: INTERNAL MEDICINE

## 2021-07-31 PROCEDURE — 83036 HEMOGLOBIN GLYCOSYLATED A1C: CPT

## 2021-07-31 PROCEDURE — 93005 ELECTROCARDIOGRAM TRACING: CPT | Performed by: EMERGENCY MEDICINE

## 2021-07-31 PROCEDURE — 82962 GLUCOSE BLOOD TEST: CPT

## 2021-07-31 PROCEDURE — 6370000000 HC RX 637 (ALT 250 FOR IP): Performed by: INTERNAL MEDICINE

## 2021-07-31 PROCEDURE — 82248 BILIRUBIN DIRECT: CPT

## 2021-07-31 PROCEDURE — 83880 ASSAY OF NATRIURETIC PEPTIDE: CPT

## 2021-07-31 PROCEDURE — 80053 COMPREHEN METABOLIC PANEL: CPT

## 2021-07-31 PROCEDURE — 83690 ASSAY OF LIPASE: CPT

## 2021-07-31 PROCEDURE — 83735 ASSAY OF MAGNESIUM: CPT

## 2021-07-31 PROCEDURE — 82247 BILIRUBIN TOTAL: CPT

## 2021-07-31 PROCEDURE — 36415 COLL VENOUS BLD VENIPUNCTURE: CPT

## 2021-07-31 PROCEDURE — 2580000003 HC RX 258: Performed by: INTERNAL MEDICINE

## 2021-07-31 PROCEDURE — 2580000003 HC RX 258: Performed by: EMERGENCY MEDICINE

## 2021-07-31 PROCEDURE — 99285 EMERGENCY DEPT VISIT HI MDM: CPT

## 2021-07-31 RX ORDER — ASPIRIN 81 MG/1
81 TABLET ORAL DAILY
Status: DISCONTINUED | OUTPATIENT
Start: 2021-07-31 | End: 2021-08-03 | Stop reason: HOSPADM

## 2021-07-31 RX ORDER — ACETAMINOPHEN 325 MG/1
650 TABLET ORAL EVERY 6 HOURS PRN
Status: DISCONTINUED | OUTPATIENT
Start: 2021-07-31 | End: 2021-08-03 | Stop reason: HOSPADM

## 2021-07-31 RX ORDER — LORAZEPAM 1 MG/1
2 TABLET ORAL
Status: DISCONTINUED | OUTPATIENT
Start: 2021-07-31 | End: 2021-08-03 | Stop reason: HOSPADM

## 2021-07-31 RX ORDER — LORAZEPAM 2 MG/ML
1 INJECTION INTRAMUSCULAR
Status: DISCONTINUED | OUTPATIENT
Start: 2021-07-31 | End: 2021-08-03 | Stop reason: HOSPADM

## 2021-07-31 RX ORDER — SODIUM CHLORIDE 0.9 % (FLUSH) 0.9 %
5-40 SYRINGE (ML) INJECTION PRN
Status: DISCONTINUED | OUTPATIENT
Start: 2021-07-31 | End: 2021-08-03 | Stop reason: HOSPADM

## 2021-07-31 RX ORDER — SODIUM CHLORIDE 0.9 % (FLUSH) 0.9 %
5-40 SYRINGE (ML) INJECTION EVERY 12 HOURS SCHEDULED
Status: DISCONTINUED | OUTPATIENT
Start: 2021-07-31 | End: 2021-08-03 | Stop reason: HOSPADM

## 2021-07-31 RX ORDER — POLYETHYLENE GLYCOL 3350 17 G/17G
17 POWDER, FOR SOLUTION ORAL DAILY PRN
Status: DISCONTINUED | OUTPATIENT
Start: 2021-07-31 | End: 2021-08-03 | Stop reason: HOSPADM

## 2021-07-31 RX ORDER — SODIUM CHLORIDE 0.9 % (FLUSH) 0.9 %
5-40 SYRINGE (ML) INJECTION PRN
Status: DISCONTINUED | OUTPATIENT
Start: 2021-07-31 | End: 2021-07-31 | Stop reason: SDUPTHER

## 2021-07-31 RX ORDER — LORAZEPAM 2 MG/ML
2 INJECTION INTRAMUSCULAR
Status: DISCONTINUED | OUTPATIENT
Start: 2021-07-31 | End: 2021-08-03 | Stop reason: HOSPADM

## 2021-07-31 RX ORDER — LORAZEPAM 2 MG/ML
4 INJECTION INTRAMUSCULAR
Status: DISCONTINUED | OUTPATIENT
Start: 2021-07-31 | End: 2021-08-03 | Stop reason: HOSPADM

## 2021-07-31 RX ORDER — INSULIN GLARGINE 100 [IU]/ML
20 INJECTION, SOLUTION SUBCUTANEOUS NIGHTLY
Status: DISCONTINUED | OUTPATIENT
Start: 2021-07-31 | End: 2021-07-31 | Stop reason: SDUPTHER

## 2021-07-31 RX ORDER — ONDANSETRON 2 MG/ML
4 INJECTION INTRAMUSCULAR; INTRAVENOUS EVERY 6 HOURS PRN
Status: DISCONTINUED | OUTPATIENT
Start: 2021-07-31 | End: 2021-08-03 | Stop reason: HOSPADM

## 2021-07-31 RX ORDER — ACETAMINOPHEN 650 MG/1
650 SUPPOSITORY RECTAL EVERY 6 HOURS PRN
Status: DISCONTINUED | OUTPATIENT
Start: 2021-07-31 | End: 2021-08-03 | Stop reason: HOSPADM

## 2021-07-31 RX ORDER — NICOTINE POLACRILEX 4 MG
15 LOZENGE BUCCAL PRN
Status: DISCONTINUED | OUTPATIENT
Start: 2021-07-31 | End: 2021-08-03 | Stop reason: HOSPADM

## 2021-07-31 RX ORDER — DEXTROSE MONOHYDRATE 25 G/50ML
12.5 INJECTION, SOLUTION INTRAVENOUS PRN
Status: DISCONTINUED | OUTPATIENT
Start: 2021-07-31 | End: 2021-08-03 | Stop reason: HOSPADM

## 2021-07-31 RX ORDER — THIAMINE HYDROCHLORIDE 100 MG/ML
100 INJECTION, SOLUTION INTRAMUSCULAR; INTRAVENOUS DAILY
Status: DISCONTINUED | OUTPATIENT
Start: 2021-07-31 | End: 2021-08-03

## 2021-07-31 RX ORDER — LORAZEPAM 1 MG/1
3 TABLET ORAL
Status: DISCONTINUED | OUTPATIENT
Start: 2021-07-31 | End: 2021-08-03 | Stop reason: HOSPADM

## 2021-07-31 RX ORDER — ATORVASTATIN CALCIUM 40 MG/1
40 TABLET, FILM COATED ORAL DAILY
Status: DISCONTINUED | OUTPATIENT
Start: 2021-07-31 | End: 2021-08-03 | Stop reason: HOSPADM

## 2021-07-31 RX ORDER — SODIUM CHLORIDE 9 MG/ML
25 INJECTION, SOLUTION INTRAVENOUS PRN
Status: DISCONTINUED | OUTPATIENT
Start: 2021-07-31 | End: 2021-08-03 | Stop reason: HOSPADM

## 2021-07-31 RX ORDER — LORAZEPAM 2 MG/ML
3 INJECTION INTRAMUSCULAR
Status: DISCONTINUED | OUTPATIENT
Start: 2021-07-31 | End: 2021-08-03 | Stop reason: HOSPADM

## 2021-07-31 RX ORDER — DEXTROSE MONOHYDRATE 50 MG/ML
100 INJECTION, SOLUTION INTRAVENOUS PRN
Status: DISCONTINUED | OUTPATIENT
Start: 2021-07-31 | End: 2021-08-03 | Stop reason: HOSPADM

## 2021-07-31 RX ORDER — 0.9 % SODIUM CHLORIDE 0.9 %
1000 INTRAVENOUS SOLUTION INTRAVENOUS ONCE
Status: COMPLETED | OUTPATIENT
Start: 2021-07-31 | End: 2021-07-31

## 2021-07-31 RX ORDER — INSULIN GLARGINE 100 [IU]/ML
15 INJECTION, SOLUTION SUBCUTANEOUS NIGHTLY
Status: DISCONTINUED | OUTPATIENT
Start: 2021-07-31 | End: 2021-08-03 | Stop reason: HOSPADM

## 2021-07-31 RX ORDER — SODIUM CHLORIDE 0.9 % (FLUSH) 0.9 %
5-40 SYRINGE (ML) INJECTION EVERY 12 HOURS SCHEDULED
Status: DISCONTINUED | OUTPATIENT
Start: 2021-07-31 | End: 2021-07-31 | Stop reason: SDUPTHER

## 2021-07-31 RX ORDER — LORAZEPAM 1 MG/1
1 TABLET ORAL
Status: DISCONTINUED | OUTPATIENT
Start: 2021-07-31 | End: 2021-08-03 | Stop reason: HOSPADM

## 2021-07-31 RX ORDER — ONDANSETRON 4 MG/1
4 TABLET, ORALLY DISINTEGRATING ORAL EVERY 8 HOURS PRN
Status: DISCONTINUED | OUTPATIENT
Start: 2021-07-31 | End: 2021-08-03 | Stop reason: HOSPADM

## 2021-07-31 RX ORDER — BUSPIRONE HYDROCHLORIDE 7.5 MG/1
15 TABLET ORAL 3 TIMES DAILY
Status: DISCONTINUED | OUTPATIENT
Start: 2021-07-31 | End: 2021-08-03 | Stop reason: HOSPADM

## 2021-07-31 RX ORDER — PREGABALIN 100 MG/1
100 CAPSULE ORAL 2 TIMES DAILY
Status: DISCONTINUED | OUTPATIENT
Start: 2021-07-31 | End: 2021-08-01

## 2021-07-31 RX ORDER — LORAZEPAM 1 MG/1
4 TABLET ORAL
Status: DISCONTINUED | OUTPATIENT
Start: 2021-07-31 | End: 2021-08-03 | Stop reason: HOSPADM

## 2021-07-31 RX ADMIN — THIAMINE HYDROCHLORIDE 100 MG: 100 INJECTION, SOLUTION INTRAMUSCULAR; INTRAVENOUS at 16:54

## 2021-07-31 RX ADMIN — ASPIRIN 81 MG: 81 TABLET, COATED ORAL at 22:15

## 2021-07-31 RX ADMIN — ATORVASTATIN CALCIUM 40 MG: 40 TABLET, FILM COATED ORAL at 22:15

## 2021-07-31 RX ADMIN — LORAZEPAM 2 MG: 2 INJECTION INTRAMUSCULAR; INTRAVENOUS at 18:10

## 2021-07-31 RX ADMIN — PREGABALIN 100 MG: 100 CAPSULE ORAL at 22:15

## 2021-07-31 RX ADMIN — SODIUM CHLORIDE, PRESERVATIVE FREE 10 ML: 5 INJECTION INTRAVENOUS at 22:17

## 2021-07-31 RX ADMIN — LORAZEPAM 2 MG: 2 INJECTION INTRAMUSCULAR; INTRAVENOUS at 22:15

## 2021-07-31 RX ADMIN — INSULIN GLARGINE 15 UNITS: 100 INJECTION, SOLUTION SUBCUTANEOUS at 23:17

## 2021-07-31 RX ADMIN — BUSPIRONE HYDROCHLORIDE 15 MG: 7.5 TABLET ORAL at 22:15

## 2021-07-31 RX ADMIN — LORAZEPAM 2 MG: 2 INJECTION INTRAMUSCULAR; INTRAVENOUS at 19:23

## 2021-07-31 RX ADMIN — LORAZEPAM 2 MG: 2 INJECTION INTRAMUSCULAR; INTRAVENOUS at 16:55

## 2021-07-31 RX ADMIN — SODIUM CHLORIDE 1000 ML: 9 INJECTION, SOLUTION INTRAVENOUS at 16:54

## 2021-07-31 NOTE — CARE COORDINATION
CM received a consult on patient. Dr. Chandan Cleveland reported that patient is a 47 y.o. male that presents with alcohol concerns. Patient reports that he is \"drinking way too much\". Patient is requesting help with detox. Patient reports that he is drinking approximately 14 beers and innumerable shots of Florentin every day. Patient has been doing this for the last several weeks. Patient lost his sister unexpectedly proximally 2 months ago because her \"heart exploded\". Patient reports a stressor has caused him significant distress. Patient has history of alcohol abuse as well as prior rehab for this. Most recent rehabilitation was 10 years ago. Patient had scheduled himself rehab in [place] but missed his flight today. Patient currently reports his last drink was this morning at 5 AM and he is currently feeling tremulous and anxious. Patient reports \"I have not eaten anything in 3 days because of how much alcohol drinking\". CM went to patients' room and introduced self to patient and spouse that was at patients' bedside. Gained permission to speak to patient in front of spouse. Patient stated that he has been involved with almost all of the [facility] substance abuse treatment programs in the area. Patient tried to get in [facility] not long ago, but they turned him down b/c patient had cocaine residue in his nostrils and had been drinking at same time. [facility] had been planning on sending patient to [place] for rehab, but patient had the above happen and patient missed his flight. Patient stated that he would like to go to rehab and perhaps focus on the [facility], but patient and spouse did not have any idea where they would like to try or where they would really like to go. CM gave patient and spouse a list of Treatment and Substance Abuse centers in [facility] and [address] in [facility].  Patient and Spouse reported that they will decide over the evening where patient would like to go and they will let case management know tomorrow. CM went back to room and discussed with patient about talking to [employer] Phone: [phone] and fax: [phone]. Patient interested in [address] [place] [address]. CM called [facility] and faxed over referral to [phone]. [facility] will review referral and get back with CM once approved.

## 2021-07-31 NOTE — ED NOTES
1721 paged hospitalist     Sera Breaux  07/31/21 1721  1730 hospitalist returned call      Sera Breaux  07/31/21 1730

## 2021-07-31 NOTE — ED PROVIDER NOTES
Triage Chief Complaint:   Alcohol Intoxication    Nikolai:  Beckie Zepeda is a 47 y.o. male that presents with alcohol concerns. Patient reports that he is \"drinking way too much\". Patient is requesting help with detox. Patient reports that he is drinking approximately 14 beers and innumerable shots of Florentin every day. Patient has been doing this for the last several weeks. Patient lost his sister unexpectedly proximally 2 months ago because her \"heart exploded\". Patient reports a stressor has caused him significant distress. Patient has history of alcohol abuse as well as prior rehab for this. Most recent rehabilitation was 10 years ago. Patient had scheduled himself rehab in [place] but missed his flight today. Patient currently reports his last drink was this morning at 5 AM and he is currently feeling tremulous and anxious. Patient reports \"I have not eaten anything in 3 days because of how much alcohol drinking\". No abdominal pain. Patient does have bilateral foot swelling throughout the day. No known cirrhosis or hepatitis. Patient is diabetic and is not checking his blood sugars. Patient also uses daily cocaine.     ROS:  General:  No fevers, no chills, no weakness  Eyes:  No recent vison changes, no discharge  ENT:  No sore throat, no nasal congestion, no hearing changes  Cardiovascular:  No chest pain, no palpitations  Respiratory:  No shortness of breath, no cough, no wheezing  Gastrointestinal:  No pain, no nausea, no vomiting, no diarrhea  Musculoskeletal:  No muscle pain, no joint pain  Skin:  No rash, no pruritis, no easy bruising  Neurologic:  No speech problems, no headache, no extremity numbness, no extremity tingling, no extremity weakness, + tremors  Psychiatric:  + anxiety  Genitourinary:  No dysuria, no hematuria  Endocrine:  No unexpected weight gain, no unexpected weight loss  Extremities:  + edema, no pain    Past Medical History:   Diagnosis Date    Hypercholesterolemia 2/18/2019    Hypertension     Type 2 diabetes, controlled, with neuropathy ([facility] [address] 6/6/2018     History reviewed. No pertinent surgical history. Family History   Problem Relation Age of Onset    High Blood Pressure Sister      Social History     Socioeconomic History    Marital status:      Spouse name: Not on file    Number of children: Not on file    Years of education: Not on file    Highest education level: Not on file   Occupational History    Not on file   Tobacco Use    Smoking status: Never Smoker    Smokeless tobacco: Never Used   Substance and Sexual Activity    Alcohol use: Yes     Alcohol/week: 12.0 standard drinks     Types: 12 Standard drinks or equivalent per week     Comment: occasional    Drug use: No    Sexual activity: Yes     Partners: Male   Other Topics Concern    Not on file   Social History Narrative    [address] and receiving        Diet:Unrestricted    Exercise:work    Seat belts:Sometimes     Social Determinants of Health     Financial Resource Strain:     Difficulty of Paying Living Expenses:    Food Insecurity:     Worried About Running Out of Food in the Last Year:     Ran Out of Food in the Last Year:    Transportation Needs:     Lack of Transportation (Medical):      Lack of Transportation (Non-Medical):    Physical Activity:     Days of Exercise per Week:     Minutes of Exercise per Session:    Stress:     Feeling of Stress :    Social Connections:     Frequency of Communication with Friends and Family:     Frequency of Social Gatherings with Friends and Family:     Attends Pentecostalism Services:     Active Member of Clubs or Organizations:     Attends Club or Organization Meetings:     Marital Status:    Intimate Partner Violence:     Fear of Current or Ex-Partner:     Emotionally Abused:     Physically Abused:     Sexually Abused:      Current Facility-Administered Medications   Medication Dose Route Frequency Provider Last Rate Last Admin    0.9 % sodium chloride bolus  1,000 mL Intravenous Once Lazarus Avena,  mL/hr at 07/31/21 1654 1,000 mL at 07/31/21 1654    sodium chloride flush 0.9 % injection 5-40 mL  5-40 mL Intravenous 2 times per day Lazarus Avena, MD        sodium chloride flush 0.9 % injection 5-40 mL  5-40 mL Intravenous PRN Lazarus Avena, MD        0.9 % sodium chloride infusion  25 mL Intravenous PRN Lazarus Avena, MD        thiamine (B-1) injection 100 mg  100 mg Intravenous Daily Lazarus Avena, MD   100 mg at 07/31/21 1654    LORazepam (ATIVAN) tablet 1 mg  1 mg Oral Q1H PRN Lazarus Avena, MD        Or    LORazepam (ATIVAN) injection 1 mg  1 mg Intravenous Q1H PRN Lazarus Avena, MD        Or    LORazepam (ATIVAN) tablet 2 mg  2 mg Oral Q1H PRN Lazarus Avena, MD        Or    LORazepam (ATIVAN) injection 2 mg  2 mg Intravenous Q1H PRN Lazarus Avena, MD   2 mg at 07/31/21 1655    Or    LORazepam (ATIVAN) tablet 3 mg  3 mg Oral Q1H PRN Lazarus Avena, MD        Or    LORazepam (ATIVAN) injection 3 mg  3 mg Intravenous Q1H PRN Lazarus Avena, MD        Or    LORazepam (ATIVAN) tablet 4 mg  4 mg Oral Q1H PRN Lazarus Avena, MD        Or    LORazepam (ATIVAN) injection 4 mg  4 mg Intravenous Q1H PRN Lazarus Avena, MD         Current Outpatient Medications   Medication Sig Dispense Refill    insulin glargine (LANTUS;BASAGLAR) 100 UNIT/ML injection pen Inject 15 Units into the skin nightly 5 pen 2    metFORMIN (GLUCOPHAGE) 500 MG tablet TAKE 2 TABLETS BY MOUTH TWO TIMES A DAY WITH MEALS 360 tablet 1    busPIRone (BUSPAR) 15 MG tablet TAKE 1 TABLET BY MOUTH THREE TIMES DAILY 270 tablet 1    pregabalin (LYRICA) 100 MG capsule TAKE 1 CAPSULE BY MOUTH TWICE DAILY 180 capsule 1    atorvastatin (LIPITOR) 40 MG tablet TAKE 1 TABLET BY MOUTH ONE TIME A DAY 90 tablet 1    traZODone (DESYREL) 50 MG tablet Take 1-2 tablets by mouth nightly as needed for Sleep 60 tablet 11    Insulin Pen Needle (ADVOCATE INSULIN PEN NEEDLES) 31G X 5 MM MISC 1 each by Does not apply route daily 100 each 3    aspirin EC 81 MG EC tablet Take 1 tablet by mouth daily 30 tablet 3    glucose blood VI test strips (GLUCOSE METER TEST) strip 1 each by In Vitro route daily As needed. 100 each 3    glucose monitoring kit (FREESTYLE) monitoring kit 1 kit by Does not apply route daily as needed (If blood sugars running high or low) 1 kit 0    MICROLET LANCETS MISC 1 each by Does not apply route daily 100 each 3     Allergies   Allergen Reactions    Paxil [Paroxetine Hcl]      Caused \"weird\" things to happen in his sleep       Nursing Notes Reviewed    Physical Exam:  ED Triage Vitals [07/31/21 1542]   Enc Vitals Group      BP (!) 163/100      Pulse 108      Resp 18      Temp 97.9 °F (36.6 °C)      Temp Source Oral      SpO2 94 %      Weight 200 lb (90.7 kg)      Height 5' 8\" (1.727 m)      Head Circumference       Peak Flow       Pain Score       Pain Loc       Pain Edu? Excl. in [address]? My pulse ox interpretation is - normal    General appearance:   appears anxious. Appears deconditioned. Pleasant. Skin:  Warm. Dry. No diaphoresis. Eye:  Extraocular movements intact. Ears, nose, mouth and throat: Dry mucous membranes. Scalloping to edges of tongue. Neck:  Trachea midline. Extremity: 1+ pedal edema bilaterally that is symmetric. No calf tenderness to palpation. No palpable cords. Normal ROM     Heart: Tachycardic but regular, normal S1 & S2, no extra heart sounds. Perfusion:  intact  Respiratory:  Lungs clear to auscultation bilaterally. Respirations nonlabored. Abdominal:  Normal bowel sounds. Soft. Nontender. slightly distended/obese. Completely nontender. Back:  No CVA tenderness to palpation     Neurological:  Alert and oriented times 3. No focal neuro deficits. High-frequency low amplitude tremor that is worse with intention and is to bilateral arms and legs. No clonus. Psychiatric: Anxious. [place] thoughts.     I have reviewed and interpreted all of the currently available lab results from this visit (if applicable):  Results for orders placed or performed during the hospital encounter of 07/31/21   CBC auto diff   Result Value Ref Range    WBC 13.5 (H) 4.0 - 10.5 K/CU MM    RBC 2.83 (L) 4.6 - 6.2 M/CU MM    Hemoglobin 9.7 (L) 13.5 - 18.0 GM/DL    Hematocrit 28.9 (L) 42 - 52 %    .1 (H) 78 - 100 FL    MCH 34.3 (H) 27 - 31 PG    MCHC 33.6 32.0 - 36.0 %    RDW 16.6 (H) 11.7 - 14.9 %    Platelets 85 (L) 103 - 440 K/CU MM    MPV 8.7 7.5 - 11.1 FL    Differential Type AUTOMATED DIFFERENTIAL     Segs Relative 75.0 (H) 36 - 66 %    Lymphocytes % 12.5 (L) 24 - 44 %    Monocytes % 7.7 (H) 0 - 4 %    Eosinophils % 1.7 0 - 3 %    Basophils % 1.3 (H) 0 - 1 %    Segs Absolute 10.1 K/CU MM    Lymphocytes Absolute 1.7 K/CU MM    Monocytes Absolute 1.0 K/CU MM    Eosinophils Absolute 0.2 K/CU MM    Basophils Absolute 0.2 K/CU MM    Nucleated RBC % 0.0 %    Total Nucleated RBC 0.0 K/CU MM    Total Immature Neutrophil 0.24 K/CU MM    Immature Neutrophil % 1.8 (H) 0 - 0.43 %   CMP   Result Value Ref Range    Sodium 134 (L) 135 - 145 MMOL/L    Potassium 3.7 3.5 - 5.1 MMOL/L    Chloride 96 (L) 99 - 110 mMol/L    CO2 23 21 - 32 MMOL/L    BUN 3 (L) 6 - 23 MG/DL    CREATININE 0.6 (L) 0.9 - 1.3 MG/DL    Glucose 158 (H) 70 - 99 MG/DL    Calcium 8.6 8.3 - 10.6 MG/DL    Albumin 3.5 3.4 - 5.0 GM/DL    Total Protein 10.0 (H) 6.4 - 8.2 GM/DL    Total Bilirubin 4.0 (H) 0.0 - 1.0 MG/DL    ALT 35 10 - 40 U/L    AST 93 (H) 15 - 37 IU/L    Alkaline Phosphatase 173 (H) 40 - 129 IU/L    GFR Non-African American >60 >60 mL/min/1.73m2    GFR African American >60 >60 mL/min/1.73m2    Anion Gap 15 4 - 16   Lipase   Result Value Ref Range    Lipase 34 13 - 60 IU/L   Troponin   Result Value Ref Range    Troponin T 0.014 (H) <0.01 NG/ML   EKG 12 Lead   Result Value Ref Range    Ventricular Rate 103 BPM    Atrial Rate 103 BPM    P-R Interval 144 ms    QRS Duration 96 ms    Q-T Interval 382 ms    QTc Calculation (Bazett) 500 ms    P Axis 56 degrees    R Axis 58 degrees    T Axis 29 degrees    Diagnosis       Sinus tachycardia  Otherwise normal ECG  No previous ECGs available        Radiographs (if obtained):  [] The following radiograph was interpreted by myself in the absence of a radiologist:   [] Radiologist's Report Reviewed:  No orders to display         EKG (if obtained): (All EKG's are interpreted by myself in the absence of a cardiologist)  12 lead EKG per my interpretation:  Sinus Tachycardia at 103  Axis is   Normal  QTc is  prolonged at 500  There is no specific T wave changes appreciated. There is no specific ST wave changes appreciated. No STEMI    Prior EKG to compare with was available and no clinically significant change over morphology compared to prior. Chart review shows recent radiographs:  No results found. MDM:  Pt presents as above. Emergent conditions considered. Presentation prompted initial labs, EKG. IV fluid boluses initiated. Patient is placed on CIWA scale with as needed Ativan. Thiamine and folate are given. CBC is with leukocytosis, mild anemia and mild thrombocytopenia. CMP is with hyperglycemia without elevated anion gap metabolic acidosis. There is an alcohol transaminitis and there is a hyperbilirubinemia 4. Lipase not suggestive of pancreatitis. Troponin is abnormal at 0.014. Patient is with a CIWA of 14. Patient is requiring IV Ativan. Patient will be admitted for alcohol withdrawal, trending of troponin and further observation. Case management is involved in planning/assisting with discharge planning. Questions sought and answered with the patient and wife. They voice understanding and agree with plan.        CRITICAL CARE NOTE:  There was a high probability of clinically significant life-threatening deterioration of the patient's condition requiring my urgent intervention due to alcohol withdrawal and abnormal troponin. IV fluid resuscitation, IV Ativan administration, telemetry monitoring, and frequent reassessments patient counseling was performed to address this. Total critical care time is 35 minutes. This includes vital sign monitoring, pulse oximetry monitoring, telemetry monitoring, clinical response to the IV medications, reviewing the nursing notes, consultation time, dictation/documentation time, and interpretation of the lab work. This time excludes time spent performing procedures and separately billable procedures and family discussion time. Clinical Impression:  1. Alcohol withdrawal syndrome with complication ([facility] [address]    2. Cocaine abuse ([facility] [address]    3. Hyperbilirubinemia    4. Troponin I above reference range      Disposition referral (if applicable):  No follow-up provider specified. Disposition medications (if applicable):  New Prescriptions    No medications on file       Comment: Please note this report has been produced using speech recognition software and may contain errors related to that system including errors in grammar, punctuation, and spelling, as well as words and phrases that may be inappropriate. If there are any questions or concerns please feel free to contact the dictating provider for clarification.        Ira Castillo MD  07/31/21 5528

## 2021-07-31 NOTE — H&P
History and Physical      Name:  Chantell Gonsalez /Age/Sex: 1980  (47 y.o. male)   MRN & CSN:  [id number] & [id number] Admission Date/Time: 2021  3:35 PM   Location:  [place] [address] PCP: Antionette Albarran MD       Hospital Day: 1    Assessment and Plan:   Chantell Gonsalez is a 47 y.o.  male  who presents with alcohol intoxication, requesting help with detox. 1. Alcohol dependence, going through withdrawal. Pt is placed on CIWA protocol, ativan to be given per protocol. On iv hydration, multivitamin daily. Neurocheck. 2. Cocaine use. 3. Mildly elevated troponin, likely type II 2/2 alcohol withdrawal and tachycardia. Will follow with repeat trop levels. 4. Sinus tachycardia, likely related to alcohol withdrawal. Hydrate with normal saline, close monitoring. 5. DM II, none compliant with taking medications. Will order ISS, hgba1c, and will start on low dose lantus ( 15 units) nightly. 6. Diabetic neuropathy, on neurontin. 7. Thrombocytopenia and anemia, could be related to alcohol abuse and liver injury. Close monitoring. Daily check. 8. Leukocytosis, likely reactive, follow. Diet No diet orders on file   DVT Prophylaxis [] Lovenox, []  Heparin, [] SCDs, [] Ambulation   GI Prophylaxis [] PPI,  [] H2 Blocker,  [] Carafate,  [] Diet/Tube Feeds   Code Status No Order   Disposition Patient requires continued admission due to alcoholism   MDM [] Low, [x] Moderate,[]  High  Patient's risk as above due to alcoholism     History of Present Illness:     Chief Complaint: Chantell Gonsalez is a 47 y.o.  male with a history of alcohol dependence, cocaine use, history of diabetes mellitus type 2 and not compliant with taking medications, diabetic neuropathy, was supposed to fly to [place] to be enrolled in an alcohol rehab program but he missed a flight given that he was drinking alcohol overnight. Thus, patient presented to the ER requesting help with detoxification.   He did not have any specific symptoms or complaints on presentation. Vital signs were showing sinus tachycardia. Blood work was done showing marginally elevated troponin of 0.014 thrombocytopenia with a platelet of 85, hemoglobin of 9.7 and WBC of 13.5. Patient drinks over 10 beers a day, and drinks liquor [Amanda], and has been doing so since his sister passed away 2 months ago, but even prior to the patient has a history of recurrent alcoholism and rehabilitation. He denies suicidal ideation, no homicidal thoughts. No depression, no nausea or vomiting, no chest pain, no fever or chills, no constipation or diarrhea and no dyspnea. Ten point ROS reviewed negative, unless as noted above    Objective:   No intake or output data in the 24 hours ending 07/31/21 1740   Vitals:   Vitals:    07/31/21 1652   BP: (!) 163/100   Pulse: 108   Resp:    Temp:    SpO2:      Physical Exam:   GEN Obese, drowsy, arousable, no confusion. EYES Pupils are equally round. No scleral erythema, discharge, or conjunctivitis. HENT Mucous membranes are moist. Oral pharynx without exudates, no evidence of thrush. NECK Supple, no apparent thyromegaly or masses. RESP diminished to auscultation b/l, no wheezes, rales or rhonchi. Symmetric chest movement while on room air. CARDIO: sinus tachycardia without appreciable murmurs, rubs, or gallops. No JVD or carotid bruits. Peripheral pulses equal bilaterally and palpable. No peripheral edema. GI Abdomen is significantly distended, no tenderness, + hepatomegaly, no guarding. Bowel sounds are normoactive. .    No costovertebral angle tenderness. HEME: No palpable cervical lymphadenopathy and no hepatosplenomegaly. No petechiae or ecchymoses. MSK No gross joint deformities. SKIN Normal coloration, warm, dry. NEURO normal speech, no lateralizing weakness. PSYCH drowsy but arousable, no distress but fatigued.      Past Medical History:      Past Medical History:   Diagnosis Date    Hypercholesterolemia 2/18/2019    Hypertension     Type 2 diabetes, controlled, with neuropathy ([facility] 6/6/2018     PSHX:  has no past surgical history on file. Allergies: Allergies   Allergen Reactions    Paxil [Paroxetine Hcl]      Caused \"weird\" things to happen in his sleep       FAM HX: family history includes High Blood Pressure in his sister. Soc HX:   Social History     Socioeconomic History    Marital status:      Spouse name: None    Number of children: None    Years of education: None    Highest education level: None   Occupational History    None   Tobacco Use    Smoking status: Never Smoker    Smokeless tobacco: Never Used   Substance and Sexual Activity    Alcohol use: Yes     Alcohol/week: 12.0 standard drinks     Types: 12 Standard drinks or equivalent per week     Comment: occasional    Drug use: No    Sexual activity: Yes     Partners: Male   Other Topics Concern    None   Social History Narrative    [employer] and receiving        Diet:Unrestricted    Exercise:work    Seat belts:Sometimes     Social Determinants of Health     Financial Resource Strain:     Difficulty of Paying Living Expenses:    Food Insecurity:     Worried About Running Out of Food in the Last Year:     Ran Out of Food in the Last Year:    Transportation Needs:     Lack of Transportation (Medical):      Lack of Transportation (Non-Medical):    Physical Activity:     Days of Exercise per Week:     Minutes of Exercise per Session:    Stress:     Feeling of Stress :    Social Connections:     Frequency of Communication with Friends and Family:     Frequency of Social Gatherings with Friends and Family:     Attends Anabaptism Services:     Active Member of Clubs or Organizations:     Attends Club or Organization Meetings:     Marital Status:    Intimate Partner Violence:     Fear of Current or Ex-Partner:     Emotionally Abused:     Physically Abused:     Sexually Abused:        Medications: Medications:    sodium chloride  1,000 mL Intravenous Once    sodium chloride flush  5-40 mL Intravenous 2 times per day    thiamine  100 mg Intravenous Daily      Infusions:    sodium chloride       PRN Meds: sodium chloride flush, 5-40 mL, PRN  sodium chloride, 25 mL, PRN  LORazepam, 1 mg, Q1H PRN   Or  LORazepam, 1 mg, Q1H PRN   Or  LORazepam, 2 mg, Q1H PRN   Or  LORazepam, 2 mg, Q1H PRN   Or  LORazepam, 3 mg, Q1H PRN   Or  LORazepam, 3 mg, Q1H PRN   Or  LORazepam, 4 mg, Q1H PRN   Or  LORazepam, 4 mg, Q1H PRN          Electronically signed by Trung De La Cruz DO on 7/31/2021 at 5:40 PM

## 2021-08-01 ENCOUNTER — APPOINTMENT (OUTPATIENT)
Dept: GENERAL RADIOLOGY | Age: 41
DRG: 897 | End: 2021-08-01
Payer: COMMERCIAL

## 2021-08-01 ENCOUNTER — APPOINTMENT (OUTPATIENT)
Dept: ULTRASOUND IMAGING | Age: 41
DRG: 897 | End: 2021-08-01
Payer: COMMERCIAL

## 2021-08-01 LAB
ALBUMIN SERPL-MCNC: 3.3 GM/DL (ref 3.4–5)
ALP BLD-CCNC: 173 IU/L (ref 40–128)
ALT SERPL-CCNC: 32 U/L (ref 10–40)
AMMONIA: 85 UMOL/L (ref 16–60)
ANION GAP SERPL CALCULATED.3IONS-SCNC: 11 MMOL/L (ref 4–16)
AST SERPL-CCNC: 84 IU/L (ref 15–37)
BILIRUB SERPL-MCNC: 6.7 MG/DL (ref 0–1)
BUN BLDV-MCNC: 6 MG/DL (ref 6–23)
CALCIUM SERPL-MCNC: 8.9 MG/DL (ref 8.3–10.6)
CHLORIDE BLD-SCNC: 95 MMOL/L (ref 99–110)
CO2: 26 MMOL/L (ref 21–32)
CREAT SERPL-MCNC: 0.6 MG/DL (ref 0.9–1.3)
EKG ATRIAL RATE: 103 BPM
EKG DIAGNOSIS: NORMAL
EKG P AXIS: 56 DEGREES
EKG P-R INTERVAL: 144 MS
EKG Q-T INTERVAL: 382 MS
EKG QRS DURATION: 96 MS
EKG QTC CALCULATION (BAZETT): 500 MS
EKG R AXIS: 58 DEGREES
EKG T AXIS: 29 DEGREES
EKG VENTRICULAR RATE: 103 BPM
FOLATE: 7.8 NG/ML (ref 3.1–17.5)
GFR AFRICAN AMERICAN: >60 ML/MIN/1.73M2
GFR NON-AFRICAN AMERICAN: >60 ML/MIN/1.73M2
GLUCOSE BLD-MCNC: 118 MG/DL (ref 70–99)
GLUCOSE BLD-MCNC: 140 MG/DL (ref 70–99)
GLUCOSE BLD-MCNC: 146 MG/DL (ref 70–99)
GLUCOSE BLD-MCNC: 147 MG/DL (ref 70–99)
GLUCOSE BLD-MCNC: 176 MG/DL (ref 70–99)
HCT VFR BLD CALC: 29.5 % (ref 42–52)
HEMOGLOBIN: 9.8 GM/DL (ref 13.5–18)
LACTATE: 1 MMOL/L (ref 0.4–2)
MAGNESIUM: 1.6 MG/DL (ref 1.8–2.4)
MCH RBC QN AUTO: 34.1 PG (ref 27–31)
MCHC RBC AUTO-ENTMCNC: 33.2 % (ref 32–36)
MCV RBC AUTO: 102.8 FL (ref 78–100)
PDW BLD-RTO: 16.3 % (ref 11.7–14.9)
PLATELET # BLD: 87 K/CU MM (ref 140–440)
PMV BLD AUTO: 9.4 FL (ref 7.5–11.1)
POTASSIUM SERPL-SCNC: 3.7 MMOL/L (ref 3.5–5.1)
RBC # BLD: 2.87 M/CU MM (ref 4.6–6.2)
SODIUM BLD-SCNC: 132 MMOL/L (ref 135–145)
TOTAL PROTEIN: 10.2 GM/DL (ref 6.4–8.2)
TSH HIGH SENSITIVITY: 3.13 UIU/ML (ref 0.27–4.2)
VITAMIN B-12: 729.1 PG/ML (ref 211–911)
WBC # BLD: 17 K/CU MM (ref 4–10.5)

## 2021-08-01 PROCEDURE — 82140 ASSAY OF AMMONIA: CPT

## 2021-08-01 PROCEDURE — 1200000000 HC SEMI PRIVATE

## 2021-08-01 PROCEDURE — 36415 COLL VENOUS BLD VENIPUNCTURE: CPT

## 2021-08-01 PROCEDURE — 82746 ASSAY OF FOLIC ACID SERUM: CPT

## 2021-08-01 PROCEDURE — 6360000002 HC RX W HCPCS: Performed by: HOSPITALIST

## 2021-08-01 PROCEDURE — 94761 N-INVAS EAR/PLS OXIMETRY MLT: CPT

## 2021-08-01 PROCEDURE — 84443 ASSAY THYROID STIM HORMONE: CPT

## 2021-08-01 PROCEDURE — 82607 VITAMIN B-12: CPT

## 2021-08-01 PROCEDURE — 87040 BLOOD CULTURE FOR BACTERIA: CPT

## 2021-08-01 PROCEDURE — 83735 ASSAY OF MAGNESIUM: CPT

## 2021-08-01 PROCEDURE — 96376 TX/PRO/DX INJ SAME DRUG ADON: CPT

## 2021-08-01 PROCEDURE — 85027 COMPLETE CBC AUTOMATED: CPT

## 2021-08-01 PROCEDURE — 6370000000 HC RX 637 (ALT 250 FOR IP): Performed by: HOSPITALIST

## 2021-08-01 PROCEDURE — 2580000003 HC RX 258: Performed by: HOSPITALIST

## 2021-08-01 PROCEDURE — 6360000002 HC RX W HCPCS: Performed by: INTERNAL MEDICINE

## 2021-08-01 PROCEDURE — 6370000000 HC RX 637 (ALT 250 FOR IP): Performed by: INTERNAL MEDICINE

## 2021-08-01 PROCEDURE — G0378 HOSPITAL OBSERVATION PER HR: HCPCS

## 2021-08-01 PROCEDURE — 71045 X-RAY EXAM CHEST 1 VIEW: CPT

## 2021-08-01 PROCEDURE — 83605 ASSAY OF LACTIC ACID: CPT

## 2021-08-01 PROCEDURE — 82962 GLUCOSE BLOOD TEST: CPT

## 2021-08-01 PROCEDURE — 76705 ECHO EXAM OF ABDOMEN: CPT

## 2021-08-01 PROCEDURE — 80053 COMPREHEN METABOLIC PANEL: CPT

## 2021-08-01 PROCEDURE — 93010 ELECTROCARDIOGRAM REPORT: CPT | Performed by: INTERNAL MEDICINE

## 2021-08-01 RX ORDER — SODIUM CHLORIDE 9 MG/ML
INJECTION, SOLUTION INTRAVENOUS CONTINUOUS
Status: DISCONTINUED | OUTPATIENT
Start: 2021-08-01 | End: 2021-08-03 | Stop reason: HOSPADM

## 2021-08-01 RX ORDER — MAGNESIUM SULFATE 1 G/100ML
1000 INJECTION INTRAVENOUS PRN
Status: DISCONTINUED | OUTPATIENT
Start: 2021-08-01 | End: 2021-08-03 | Stop reason: HOSPADM

## 2021-08-01 RX ORDER — MAGNESIUM OXIDE 400 MG/1
400 TABLET ORAL 2 TIMES DAILY
Status: DISCONTINUED | OUTPATIENT
Start: 2021-08-01 | End: 2021-08-03 | Stop reason: HOSPADM

## 2021-08-01 RX ORDER — GABAPENTIN 100 MG/1
100 CAPSULE ORAL 3 TIMES DAILY
Status: DISCONTINUED | OUTPATIENT
Start: 2021-08-01 | End: 2021-08-03 | Stop reason: HOSPADM

## 2021-08-01 RX ORDER — LACTULOSE 10 G/15ML
10 SOLUTION ORAL 3 TIMES DAILY
Status: DISCONTINUED | OUTPATIENT
Start: 2021-08-01 | End: 2021-08-03 | Stop reason: HOSPADM

## 2021-08-01 RX ADMIN — SODIUM CHLORIDE: 9 INJECTION, SOLUTION INTRAVENOUS at 17:18

## 2021-08-01 RX ADMIN — BUSPIRONE HYDROCHLORIDE 15 MG: 7.5 TABLET ORAL at 08:17

## 2021-08-01 RX ADMIN — BUSPIRONE HYDROCHLORIDE 15 MG: 7.5 TABLET ORAL at 14:05

## 2021-08-01 RX ADMIN — ATORVASTATIN CALCIUM 40 MG: 40 TABLET, FILM COATED ORAL at 08:17

## 2021-08-01 RX ADMIN — THIAMINE HYDROCHLORIDE 100 MG: 100 INJECTION, SOLUTION INTRAMUSCULAR; INTRAVENOUS at 08:18

## 2021-08-01 RX ADMIN — CEFTRIAXONE 1000 MG: 1 INJECTION, POWDER, FOR SOLUTION INTRAMUSCULAR; INTRAVENOUS at 14:05

## 2021-08-01 RX ADMIN — LORAZEPAM 1 MG: 2 INJECTION INTRAMUSCULAR; INTRAVENOUS at 08:18

## 2021-08-01 RX ADMIN — LORAZEPAM 2 MG: 2 INJECTION INTRAMUSCULAR; INTRAVENOUS at 02:42

## 2021-08-01 RX ADMIN — LORAZEPAM 2 MG: 2 INJECTION INTRAMUSCULAR; INTRAVENOUS at 19:10

## 2021-08-01 RX ADMIN — PREGABALIN 100 MG: 100 CAPSULE ORAL at 08:17

## 2021-08-01 RX ADMIN — LACTULOSE 10 G: 10 SOLUTION ORAL at 17:19

## 2021-08-01 RX ADMIN — LORAZEPAM 3 MG: 2 INJECTION INTRAMUSCULAR; INTRAVENOUS at 07:14

## 2021-08-01 RX ADMIN — INSULIN LISPRO 2 UNITS: 100 INJECTION, SOLUTION INTRAVENOUS; SUBCUTANEOUS at 18:33

## 2021-08-01 RX ADMIN — ASPIRIN 81 MG: 81 TABLET, COATED ORAL at 08:18

## 2021-08-01 RX ADMIN — GABAPENTIN 100 MG: 100 CAPSULE ORAL at 17:19

## 2021-08-01 RX ADMIN — MAGNESIUM OXIDE 400 MG: 400 TABLET ORAL at 08:17

## 2021-08-01 RX ADMIN — LORAZEPAM 1 MG: 1 TABLET ORAL at 17:26

## 2021-08-01 NOTE — DISCHARGE INSTR - COC
Continuity of Care Form    Patient Name: Beckie Zepeda   :  1980  MRN:  [id number]    Admit date:  2021  Discharge date:  ***    Code Status Order: Full Code   Advance Directives:      Admitting Physician:  No admitting provider for patient encounter. PCP: Micheline Ahn MD    Discharging Nurse: [facility] Unit/Room#: 4107/4107-A  Discharging Unit Phone Number: ***    Emergency Contact:   Extended Emergency Contact Information  Primary Emergency Contact: Vivienne Vaughn  Address: [address], [address] Phone: [phone]  Relation: Spouse  Secondary Emergency Contact: Sanjiv Mckay  Mobile Phone: [phone]  Relation: Parent    Past Surgical History:  History reviewed. No pertinent surgical history.     Immunization History:   Immunization History   Administered Date(s) Administered    Influenza, Quadv, IM, (6 mo and older Fluzone, Flulaval, Fluarix and 3 yrs and older Afluria) 11/15/2018    Pneumococcal Polysaccharide (Tiokzaozt37) 2018    Tdap (Boostrix, Adacel) 2012       Active Problems:  Patient Active Problem List   Diagnosis Code    HTN (hypertension) I10    Fatty liver K76.0    Obstructive sleep apnea G47.33    Type 2 diabetes, controlled, with neuropathy (HCC) E11.40    Anxiety F41.9    Hypercholesterolemia E78.00    Alcohol withdrawal delirium (HCC) F10.231       Isolation/Infection:   Isolation            No Isolation          Patient Infection Status       None to display            Nurse Assessment:  Last Vital Signs: BP (!) 145/88   Pulse 111   Temp 98.5 °F (36.9 °C) (Oral)   Resp 18   Ht 5' 8\" (1.727 m)   Wt 200 lb (90.7 kg)   SpO2 91%   BMI 30.41 kg/m²     Last documented pain score (0-10 scale):    Last Weight:   Wt Readings from Last 1 Encounters:   21 200 lb (90.7 kg)     Mental Status:  {IP PT MENTAL STATUS::::0}    IV Access:  { WILMER IV ACCESS:588811329:::0}    Nursing Mobility/ADLs:  Walking   {CHP DME ADLs:350200320:::0}  Transfer  {CHP DME ADLs:484953418:::0}  Bathing  {CHP DME ADLs:652713883:::0}  Dressing  {CHP DME ADLs:943496485:::0}  Toileting  {CHP DME ADLs:403041174:::0}  Feeding  {CHP DME ADLs:591127580:::0}  Med Admin  {CHP DME ADLs:979340809:::0}  Med Delivery   { WILMER MED Delivery:016324289:::0}    Wound Care Documentation and Therapy:        Elimination:  Continence: Bowel: {YES / XD:32522}  Bladder: {YES / CW:28294}  Urinary Catheter: {Urinary Catheter:817673203:::0}   Colostomy/Ileostomy/Ileal Conduit: {YES / AM:89612}       Date of Last BM: ***    Intake/Output Summary (Last 24 hours) at 2021 1145  Last data filed at 2021 2217  Gross per 24 hour   Intake 10 ml   Output --   Net 10 ml     I/O last 3 completed shifts:   In: 10 [I.V.:10]  Out: -     Safety Concerns:     [address] [employer] Safety Concerns:590825894:::0}    Impairments/Disabilities:      [address] [employer] Impairments/Disabilities:440038227:::0}    Nutrition Therapy:  Current Nutrition Therapy:   [address] [employer] Diet List:238269784:::0}    Routes of Feeding: {CHP DME Other Feedings:802740193:::0}  Liquids: {Slp liquid thickness:25010}  Daily Fluid Restriction: {CHP DME Yes amt example:801510404:::0}  Last Modified Barium Swallow with Video (Video Swallowing Test): {Done Not Done WUTY:100210088:::8}    Treatments at the Time of Hospital Discharge:   Respiratory Treatments: ***  Oxygen Therapy:  {Therapy; copd oxygen:81913:::0}  Ventilator:    { CC Vent List:427450014:::0}    Rehab Therapies: {THERAPEUTIC INTERVENTION:8834404003}  Weight Bearing Status/Restrictions: [address] Weight Bearin:::0}  Other Medical Equipment (for information only, NOT a DME order):  {EQUIPMENT:669940343}  Other Treatments: ***    Patient's personal belongings (please select all that are sent with patient):  {CHP DME Belongings:273702583:::0}    RN SIGNATURE:  {Esignature:571511895:::0}    CASE MANAGEMENT/SOCIAL WORK SECTION    Inpatient Status Date: ***    Readmission Risk Assessment Score:  Readmission Risk              Risk of Unplanned Readmission:  0           Discharging to Facility/ Agency   Name:   Address:  Phone:  Fax:    Dialysis Facility (if applicable)   Name:  Address:  Dialysis Schedule:  Phone:  Fax:    / signature: {Esignature:911092866:::0}    PHYSICIAN SECTION    Prognosis: {Prognosis:8987842041:::0}    Condition at Discharge: [address] [place] [address] Patient Condition:046905009:::0}    Rehab Potential (if transferring to Rehab): {Prognosis:0552998272:::0}    Recommended Labs or Other Treatments After Discharge: ***    Physician Certification: I certify the above information and transfer of Camilo Alarcon  is necessary for the continuing treatment of the diagnosis listed and that he requires {Admit to Appropriate Level of Care:10870:::0} for {GREATER/LESS:687490566} 30 days.      Update Admission H&P: {CHP DME Changes in HandP:032162674:::0}    PHYSICIAN SIGNATURE:  {Esignature:692073020:::0}

## 2021-08-01 NOTE — PROGRESS NOTES
Progress Note  Date:2021       UCGX:2471/2895-G  Patient Hilda Wang     YOB: 1980     Age:40 y.o. Pt resting and on CIWA. Sleeping most of the time  Subjective    Subjective:  Diet:  Poor intake. Review of Systems  Objective         Vitals Last 24 Hours:  TEMPERATURE:  Temp  Av.2 °F (36.8 °C)  Min: 97.7 °F (36.5 °C)  Max: 98.7 °F (37.1 °C)  RESPIRATIONS RANGE: Resp  Av.6  Min: 16  Max: 18  PULSE OXIMETRY RANGE: SpO2  Av.8 %  Min: 91 %  Max: 96 %  PULSE RANGE: Pulse  Av.4  Min: 106  Max: 117  BLOOD PRESSURE RANGE: Systolic (36UKB), OKS:501 , Min:139 , AHH:973   ; Diastolic (27PBK), QTV:46, Min:69, Max:100    I/O (24Hr): Intake/Output Summary (Last 24 hours) at 2021 0730  Last data filed at 2021 2217  Gross per 24 hour   Intake 10 ml   Output --   Net 10 ml     Objective:  General Appearance:  Comfortable. Vital signs: (most recent): Blood pressure (!) 145/88, pulse 111, temperature 98.5 °F (36.9 °C), temperature source Oral, resp. rate 18, height 5' 8\" (1.727 m), weight 200 lb (90.7 kg), SpO2 91 %. (Tachy). Lungs:  Normal effort. Heart: Tachycardia. Abdomen: Abdomen is soft. Bowel sounds are normal.     Extremities: Decreased range of motion. Neurological: (Sleeping and resting). Pupils:  Pupils are equal.   Skin:  Warm. Labs/Imaging/Diagnostics    Labs:  CBC:  Recent Labs     21  1626   WBC 13.5*   RBC 2.83*   HGB 9.7*   HCT 28.9*   .1*   RDW 16.6*   PLT 85*     CHEMISTRIES:  Recent Labs     21  1626   *   K 3.7   CL 96*   CO2 23   BUN 3*   CREATININE 0.6*   GLUCOSE 158*   MG 1.7*     PT/INR:No results for input(s): PROTIME, INR in the last 72 hours. APTT:No results for input(s): APTT in the last 72 hours. LIVER PROFILE:  Recent Labs     21  1626   AST 93*   ALT 35   BILIDIR 1.9*   BILITOT 3.9*  4.0*   ALKPHOS 173*       Imaging Last 24 Hours:  No results found.   Assessment//Plan Hospital Problems         Last Modified POA    Alcohol withdrawal delirium ([facility] [address] 7/31/2021 Yes        Assessment & Plan  Etoh withdrawal  -CIWA  -thiamine  Hypomag   -suppment and replacement  Hyponatemia  -IVF  Elevated trop  -not in ACS range  -echo   -ASA, statin  Sinus tachy  -has leukocytosis also   -TSH wnl  -with current state as on CIWA will get bld cx, UA   -place on abx as fits SIRS criteria  -abx and lactic acid  DM  -SSI  Thrombocytoepnai and anemia  -B12 wnl    Has worsening leukocytosis and still tachy and could reflect etoh withdrawal but would not expect leukocytosis to worsen. Check CXR, UA, lactic acid, bld cx and UA. Place on rocephin. US with no cholecystitis.  Lipase wnl  Electronically signed by Bravo Keen MD on 8/1/21 at 7:30 AM EDT

## 2021-08-02 LAB
ANION GAP SERPL CALCULATED.3IONS-SCNC: 7 MMOL/L (ref 4–16)
BASOPHILS ABSOLUTE: 0.2 K/CU MM
BASOPHILS RELATIVE PERCENT: 1.3 % (ref 0–1)
BUN BLDV-MCNC: 8 MG/DL (ref 6–23)
CALCIUM SERPL-MCNC: 8.5 MG/DL (ref 8.3–10.6)
CHLORIDE BLD-SCNC: 99 MMOL/L (ref 99–110)
CO2: 23 MMOL/L (ref 21–32)
CREAT SERPL-MCNC: 0.6 MG/DL (ref 0.9–1.3)
DIFFERENTIAL TYPE: ABNORMAL
EOSINOPHILS ABSOLUTE: 0.3 K/CU MM
EOSINOPHILS RELATIVE PERCENT: 2 % (ref 0–3)
GFR AFRICAN AMERICAN: >60 ML/MIN/1.73M2
GFR NON-AFRICAN AMERICAN: >60 ML/MIN/1.73M2
GLUCOSE BLD-MCNC: 129 MG/DL (ref 70–99)
GLUCOSE BLD-MCNC: 131 MG/DL (ref 70–99)
GLUCOSE BLD-MCNC: 136 MG/DL (ref 70–99)
GLUCOSE BLD-MCNC: 143 MG/DL (ref 70–99)
GLUCOSE BLD-MCNC: 219 MG/DL (ref 70–99)
HCT VFR BLD CALC: 28.2 % (ref 42–52)
HEMOGLOBIN: 9.3 GM/DL (ref 13.5–18)
IMMATURE NEUTROPHIL %: 1.5 % (ref 0–0.43)
LV EF: 53 %
LVEF MODALITY: NORMAL
LYMPHOCYTES ABSOLUTE: 1.8 K/CU MM
LYMPHOCYTES RELATIVE PERCENT: 11.6 % (ref 24–44)
MAGNESIUM: 1.5 MG/DL (ref 1.8–2.4)
MCH RBC QN AUTO: 33.7 PG (ref 27–31)
MCHC RBC AUTO-ENTMCNC: 33 % (ref 32–36)
MCV RBC AUTO: 102.2 FL (ref 78–100)
MONOCYTES ABSOLUTE: 1.4 K/CU MM
MONOCYTES RELATIVE PERCENT: 9 % (ref 0–4)
NUCLEATED RBC %: 0.2 %
PDW BLD-RTO: 16.7 % (ref 11.7–14.9)
PLATELET # BLD: 75 K/CU MM (ref 140–440)
PMV BLD AUTO: 9.7 FL (ref 7.5–11.1)
POTASSIUM SERPL-SCNC: 3.2 MMOL/L (ref 3.5–5.1)
RBC # BLD: 2.76 M/CU MM (ref 4.6–6.2)
SEGMENTED NEUTROPHILS ABSOLUTE COUNT: 11.8 K/CU MM
SEGMENTED NEUTROPHILS RELATIVE PERCENT: 74.6 % (ref 36–66)
SODIUM BLD-SCNC: 129 MMOL/L (ref 135–145)
TOTAL IMMATURE NEUTOROPHIL: 0.23 K/CU MM
TOTAL NUCLEATED RBC: 0 K/CU MM
WBC # BLD: 15.8 K/CU MM (ref 4–10.5)

## 2021-08-02 PROCEDURE — 2580000003 HC RX 258: Performed by: HOSPITALIST

## 2021-08-02 PROCEDURE — 94761 N-INVAS EAR/PLS OXIMETRY MLT: CPT

## 2021-08-02 PROCEDURE — 85025 COMPLETE CBC W/AUTO DIFF WBC: CPT

## 2021-08-02 PROCEDURE — 2580000003 HC RX 258: Performed by: INTERNAL MEDICINE

## 2021-08-02 PROCEDURE — 36415 COLL VENOUS BLD VENIPUNCTURE: CPT

## 2021-08-02 PROCEDURE — 6360000002 HC RX W HCPCS: Performed by: STUDENT IN AN ORGANIZED HEALTH CARE EDUCATION/TRAINING PROGRAM

## 2021-08-02 PROCEDURE — 82962 GLUCOSE BLOOD TEST: CPT

## 2021-08-02 PROCEDURE — 93306 TTE W/DOPPLER COMPLETE: CPT

## 2021-08-02 PROCEDURE — 6370000000 HC RX 637 (ALT 250 FOR IP): Performed by: STUDENT IN AN ORGANIZED HEALTH CARE EDUCATION/TRAINING PROGRAM

## 2021-08-02 PROCEDURE — 83735 ASSAY OF MAGNESIUM: CPT

## 2021-08-02 PROCEDURE — 6360000002 HC RX W HCPCS: Performed by: HOSPITALIST

## 2021-08-02 PROCEDURE — 1200000000 HC SEMI PRIVATE

## 2021-08-02 PROCEDURE — 6370000000 HC RX 637 (ALT 250 FOR IP): Performed by: HOSPITALIST

## 2021-08-02 PROCEDURE — 6360000002 HC RX W HCPCS: Performed by: INTERNAL MEDICINE

## 2021-08-02 PROCEDURE — 6370000000 HC RX 637 (ALT 250 FOR IP): Performed by: INTERNAL MEDICINE

## 2021-08-02 PROCEDURE — 80048 BASIC METABOLIC PNL TOTAL CA: CPT

## 2021-08-02 RX ORDER — POTASSIUM CHLORIDE 20 MEQ/1
40 TABLET, EXTENDED RELEASE ORAL EVERY 4 HOURS
Status: COMPLETED | OUTPATIENT
Start: 2021-08-02 | End: 2021-08-02

## 2021-08-02 RX ORDER — MAGNESIUM SULFATE IN WATER 40 MG/ML
2000 INJECTION, SOLUTION INTRAVENOUS ONCE
Status: COMPLETED | OUTPATIENT
Start: 2021-08-02 | End: 2021-08-02

## 2021-08-02 RX ORDER — IBUPROFEN 400 MG/1
400 TABLET ORAL EVERY 8 HOURS PRN
Status: DISCONTINUED | OUTPATIENT
Start: 2021-08-02 | End: 2021-08-03 | Stop reason: HOSPADM

## 2021-08-02 RX ADMIN — BUSPIRONE HYDROCHLORIDE 15 MG: 7.5 TABLET ORAL at 15:02

## 2021-08-02 RX ADMIN — LORAZEPAM 2 MG: 2 INJECTION INTRAMUSCULAR; INTRAVENOUS at 12:54

## 2021-08-02 RX ADMIN — SODIUM CHLORIDE: 9 INJECTION, SOLUTION INTRAVENOUS at 01:21

## 2021-08-02 RX ADMIN — MAGNESIUM OXIDE 400 MG: 400 TABLET ORAL at 08:42

## 2021-08-02 RX ADMIN — GABAPENTIN 100 MG: 100 CAPSULE ORAL at 22:17

## 2021-08-02 RX ADMIN — BUSPIRONE HYDROCHLORIDE 15 MG: 7.5 TABLET ORAL at 22:17

## 2021-08-02 RX ADMIN — POTASSIUM CHLORIDE 40 MEQ: 1500 TABLET, EXTENDED RELEASE ORAL at 19:19

## 2021-08-02 RX ADMIN — LORAZEPAM 2 MG: 2 INJECTION INTRAMUSCULAR; INTRAVENOUS at 01:21

## 2021-08-02 RX ADMIN — MAGNESIUM SULFATE HEPTAHYDRATE 2000 MG: 40 INJECTION, SOLUTION INTRAVENOUS at 19:18

## 2021-08-02 RX ADMIN — MAGNESIUM OXIDE 400 MG: 400 TABLET ORAL at 22:18

## 2021-08-02 RX ADMIN — LORAZEPAM 2 MG: 2 INJECTION INTRAMUSCULAR; INTRAVENOUS at 19:19

## 2021-08-02 RX ADMIN — THIAMINE HYDROCHLORIDE 100 MG: 100 INJECTION, SOLUTION INTRAMUSCULAR; INTRAVENOUS at 09:10

## 2021-08-02 RX ADMIN — SODIUM CHLORIDE, PRESERVATIVE FREE 10 ML: 5 INJECTION INTRAVENOUS at 22:23

## 2021-08-02 RX ADMIN — INSULIN GLARGINE 15 UNITS: 100 INJECTION, SOLUTION SUBCUTANEOUS at 22:23

## 2021-08-02 RX ADMIN — LACTULOSE 10 G: 10 SOLUTION ORAL at 08:42

## 2021-08-02 RX ADMIN — GABAPENTIN 100 MG: 100 CAPSULE ORAL at 08:42

## 2021-08-02 RX ADMIN — LORAZEPAM 4 MG: 2 INJECTION INTRAMUSCULAR; INTRAVENOUS at 09:10

## 2021-08-02 RX ADMIN — POTASSIUM CHLORIDE 40 MEQ: 1500 TABLET, EXTENDED RELEASE ORAL at 22:17

## 2021-08-02 RX ADMIN — ATORVASTATIN CALCIUM 40 MG: 40 TABLET, FILM COATED ORAL at 08:42

## 2021-08-02 RX ADMIN — LORAZEPAM 1 MG: 1 TABLET ORAL at 22:19

## 2021-08-02 RX ADMIN — LORAZEPAM 2 MG: 2 INJECTION INTRAMUSCULAR; INTRAVENOUS at 06:05

## 2021-08-02 RX ADMIN — LACTULOSE 10 G: 10 SOLUTION ORAL at 15:02

## 2021-08-02 RX ADMIN — GABAPENTIN 100 MG: 100 CAPSULE ORAL at 15:02

## 2021-08-02 RX ADMIN — BUSPIRONE HYDROCHLORIDE 15 MG: 7.5 TABLET ORAL at 08:42

## 2021-08-02 RX ADMIN — CEFTRIAXONE 1000 MG: 1 INJECTION, POWDER, FOR SOLUTION INTRAMUSCULAR; INTRAVENOUS at 15:02

## 2021-08-02 RX ADMIN — ASPIRIN 81 MG: 81 TABLET, COATED ORAL at 08:42

## 2021-08-02 ASSESSMENT — ENCOUNTER SYMPTOMS
VOMITING: 0
SHORTNESS OF BREATH: 0
COUGH: 0
DIARRHEA: 0
ABDOMINAL PAIN: 0
ABDOMINAL DISTENTION: 1
WHEEZING: 0

## 2021-08-02 ASSESSMENT — PAIN DESCRIPTION - ONSET: ONSET: ON-GOING

## 2021-08-02 ASSESSMENT — PAIN DESCRIPTION - DESCRIPTORS: DESCRIPTORS: ACHING

## 2021-08-02 ASSESSMENT — PAIN DESCRIPTION - ORIENTATION: ORIENTATION: RIGHT;LEFT

## 2021-08-02 ASSESSMENT — PAIN - FUNCTIONAL ASSESSMENT: PAIN_FUNCTIONAL_ASSESSMENT: PREVENTS OR INTERFERES SOME ACTIVE ACTIVITIES AND ADLS

## 2021-08-02 ASSESSMENT — PAIN SCALES - GENERAL: PAINLEVEL_OUTOF10: 7

## 2021-08-02 ASSESSMENT — PAIN DESCRIPTION - FREQUENCY: FREQUENCY: CONTINUOUS

## 2021-08-02 ASSESSMENT — PAIN DESCRIPTION - PAIN TYPE: TYPE: CHRONIC PAIN

## 2021-08-02 ASSESSMENT — PAIN DESCRIPTION - LOCATION: LOCATION: FOOT

## 2021-08-02 NOTE — PLAN OF CARE
Problem: Discharge Planning:  Goal: Discharged to appropriate level of care  Description: Discharged to appropriate level of care  Outcome: Ongoing     Problem: Fluid Volume - Deficit:  Goal: Absence of fluid volume deficit signs and symptoms  Description: Absence of fluid volume deficit signs and symptoms  Outcome: Ongoing     Problem: Nutrition Deficit:  Goal: Ability to achieve adequate nutritional intake will improve  Description: Ability to achieve adequate nutritional intake will improve  Outcome: Ongoing     Problem: Sleep Pattern Disturbance:  Goal: Appears well-rested  Description: Appears well-rested  Outcome: Ongoing     Problem: Violence - Risk of, Self/Other-Directed:  Goal: Knowledge of developmental care interventions  Description: Absence of violence  Outcome: Ongoing     Problem: Pain:  Goal: Pain level will decrease  Description: Pain level will decrease  Outcome: Ongoing  Goal: Control of acute pain  Description: Control of acute pain  Outcome: Ongoing  Goal: Control of chronic pain  Description: Control of chronic pain  Outcome: Ongoing

## 2021-08-02 NOTE — CARE COORDINATION
CM called Lacie Oppenheim with [facility] @ [phone] and left  requesting a return call. CM received call back from Lacie Oppenheim w/[facility]. [facility] has been in touch with patient. Patient is interested in going to their facility. [facility] still needs to complete an Intake Application over the phone w/patient once patient is feeling better.

## 2021-08-02 NOTE — CARE COORDINATION
Pt's father stopped but Case Management desk stating pt needs assistance to get to a good rehab facility. He states pt has been to [facility] in past and does not feel they are a good facility. He wanted know where to go to find out if a facility is a good place or not. Encourage him to work with pt/wife with plan, that they already had a facility in [place] set up but he is not sure where that stands. I spoke with pt and he is not sure of plan. He states wife should be here shortly and I can discuss with her. CM will continue to follow.

## 2021-08-02 NOTE — PROGRESS NOTES
Progress Note  Date:2021       CYKK:0187/7413-U  Patient Ira Fee     YOB: 1980     Age:40 y.o. Subjective    Subjective:  Symptoms:  Improved. No shortness of breath, cough, chest pain, weakness or diarrhea. Diet:  Adequate intake. No vomiting. Activity level: Normal.    Pain:  He complains of pain that is mild. Pain is requiring pain medication. Review of Systems   Constitutional: Negative for fever. Respiratory: Negative for cough, shortness of breath and wheezing. Cardiovascular: Negative for chest pain and leg swelling. Gastrointestinal: Positive for abdominal distention. Negative for abdominal pain, diarrhea and vomiting. Genitourinary: Negative for difficulty urinating, dysuria and frequency. Neurological: Negative for weakness and headaches. Psychiatric/Behavioral: Negative for agitation. Objective         Vitals Last 24 Hours:  TEMPERATURE:  Temp  Av.8 °F (36.6 °C)  Min: 97.2 °F (36.2 °C)  Max: 98.6 °F (37 °C)  RESPIRATIONS RANGE: Resp  Av  Min: 18  Max: 20  PULSE OXIMETRY RANGE: SpO2  Av %  Min: 94 %  Max: 97 %  PULSE RANGE: Pulse  Av.5  Min: 96  Max: 119  BLOOD PRESSURE RANGE: Systolic (33KRT), ESX:498 , Min:121 , ZVE:635   ; Diastolic (90CMI), NVQ:91, Min:72, Max:96    I/O (24Hr): Intake/Output Summary (Last 24 hours) at 2021 1727  Last data filed at 2021 0916  Gross per 24 hour   Intake 120 ml   Output --   Net 120 ml     Objective:  General Appearance:  Comfortable and ill-appearing. Vital signs: (most recent): Blood pressure (!) 142/92, pulse 100, temperature 98.6 °F (37 °C), temperature source Oral, resp. rate 20, height 5' 8\" (1.727 m), weight 200 lb (90.7 kg), SpO2 97 %. Vital signs are normal.  No fever. Output: Producing urine. Lungs:  Normal effort. He is not in respiratory distress. No rhonchi. Heart: S1 normal and S2 normal.  No murmur. Abdomen: Abdomen is soft and distended. There is no abdominal tenderness. There is a mass. Extremities: Normal range of motion. Neurological: Patient is alert and oriented to person, place and time. Labs/Imaging/Diagnostics    Labs:  CBC:  Recent Labs     07/31/21 1626 08/01/21 0727 08/02/21  1147   WBC 13.5* 17.0* 15.8*   RBC 2.83* 2.87* 2.76*   HGB 9.7* 9.8* 9.3*   HCT 28.9* 29.5* 28.2*   .1* 102.8* 102.2*   RDW 16.6* 16.3* 16.7*   PLT 85* 87* 75*     CHEMISTRIES:  Recent Labs     07/31/21 1626 08/01/21 0727 08/02/21  1147   * 132* 129*   K 3.7 3.7 3.2*   CL 96* 95* 99   CO2 23 26 23   BUN 3* 6 8   CREATININE 0.6* 0.6* 0.6*   GLUCOSE 158* 140* 131*   MG 1.7* 1.6* 1.5*     PT/INR:No results for input(s): PROTIME, INR in the last 72 hours. APTT:No results for input(s): APTT in the last 72 hours. LIVER PROFILE:  Recent Labs     07/31/21 1626 08/01/21 0727   AST 93* 84*   ALT 35 32   BILIDIR 1.9*  --    BILITOT 3.9*  4.0* 6.7*   ALKPHOS 173* 173*       Imaging Last 24 Hours:  Echocardiogram complete 2D with doppler with color    Result Date: 8/2/2021  Transthoracic Echocardiography Report (TTE)  Demographics   Patient Name        Caryle Amato    Date of Study        08/02/2021   Date of Birth       1980      Gender               Male   Age                 47 year(s)      Race                    Patient Number      [id number]      Room Number          8196   Visit Number        [id number]   Corporate ID        [id number]   Accession Number    [id number]      Sasha Dawkins RVT, RDMS   Ordering Physician  Russell Lee MD  Interpreting         Matthew Bush MD  Procedure Type of Study   TTE procedure:ECHOCARDIOGRAM COMPLETE 2D W DOPPLER W COLOR.   Procedure Date Date: 08/02/2021 Start: 11:06 AM Study Location: Portable Technical Quality: Adequate visualization Indications:Elevated troponin. Patient Status: Routine Height: 68 inches Weight: 200 pounds BSA: 2.04 m2 BMI: 30.41 kg/m2 HR: 106 bpm BP: 136/86 mmHg  Conclusions   Summary  Left ventricular systolic function is normal.  Ejection fraction is visually estimated at 50-55%. No significant valvular abnormalities. Dilated aortic root (4.5 cm). No evidence of any pericardial effusion. Signature   ------------------------------------------------------------------  Electronically signed by Michael Bhandari MD  (Interpreting physician) on 08/02/2021 at 02:27 PM  ------------------------------------------------------------------   Findings   Left Ventricle  Left ventricular systolic function is normal.  Ejection fraction is visually estimated at 50-55%. Mild left ventricular hypertrophy. Left Atrium  Mildly dilated left atrium. Right Atrium  Essentially normal right atrium. Right Ventricle  Essentially normal right ventricle. Aortic Valve  Structurally normal aortic valve. Mitral Valve  Trace mitral regurgitation. Tricuspid Valve  Tricuspid valve is structurally normal.   Pulmonic Valve  The pulmonic valve was not well visualized. Pericardial Effusion  No evidence of any pericardial effusion. Pleural Effusion  No evidence of pleural effusion. Miscellaneous  Dilated aortic root (4.5 cm).   M-Mode/2D Measurements & Calculations   LV Diastolic Dimension:  LV Systolic Dimension:  LA Dimension: 3.5 cmAO Root  4.89 cm                  3.49 cm                 Dimension: 4.4 cmLA Area:  LV FS:28.6 %             LV Volume Diastolic: 74 51.8 cm2  LV PW Diastolic: 0.24 cm ml  LV PW Systolic: 4.39 cm  LV Volume Systolic: 45  Septum Diastolic: 9.15   ml  cm                       LV EDV/LV EDV Index: 74 RV Diastolic Dimension:  Septum Systolic: 1.19 cm ZP/08 D8ZV ESV/LV ESV   2.74 cm  CO: 9.18 l/min           Index: 45 ml/22 m2  CI: 4.5 l/m*m2           EF Calculated (A4C):    LA/Aorta: 0.8 39.2 %  LV Area Diastolic: 59.2  EF Calculated (2D):     LA volume/Index: 73 ml  cm2                      54.9 %                  /31D4  LV Area Systolic: 97.7  cm2                      LV Length: 9.19 cm                            LVOT: 2.2 cm  Doppler Measurements & Calculations   MV Peak E-Wave: 131     AV Peak Velocity: 183 cm/s   LVOT Peak Velocity: 138  cm/s                    AV Peak Gradient: 13.4 mmHg  cm/s  MV Peak A-Wave: 116     AV Mean Velocity: 140 cm/s   LVOT Mean Velocity:  cm/s                    AV Mean Gradient: 8 mmHg     98.6 cm/s  MV E/A Ratio: 1.13      AV VTI: 33.9 cm              LVOT Peak Gradient: 8  MV Peak Gradient: 6.86  AV Area (Continuity):2.56    mmHgLVOT Mean Gradient:  mmHg                    cm2                          4 mmHg                           LVOT VTI: 22.8 cm   MV E' Septal Velocity:  6.69 cm/s   MV E/E' septal: 19.58      XR CHEST 1 VIEW    Result Date: 8/1/2021  EXAMINATION: ONE XRAY VIEW OF THE CHEST 8/1/2021 1:47 pm COMPARISON: None HISTORY: ORDERING SYSTEM PROVIDED HISTORY: tachcyardia, leukocytosis TECHNOLOGIST PROVIDED HISTORY: Reason for exam:->tachcyardia, leukocytosis Reason for Exam: tachcyardia, leukocytosis Acuity: Acute Type of Exam: Initial FINDINGS: Overlying heart monitor leads. Patient rotation and angulation to the right. Questionable left perihilar airspace opacity. Bibasilar airspace opacities more prominent on the right. Diffuse interstitial prominence with indistinct pulmonary vasculature but no definite interlobular septal thickening. No definite findings of pneumothorax or pleural effusion. Prominence of the pulmonary trunk, a finding that can be seen pulmonary hypertension. Mildly to moderately prominent hilar and cardiac contours. No acute fracture. 1. Bibasilar airspace opacities appear more prominent on the right and may be due to atelectasis, pneumonia, and/or aspiration.  2. Pulmonary vascular congestion with questionable left perihilar edema, potentially congestive heart failure given mild to moderate cardiomegaly. US ABDOMEN LIMITED    Result Date: 8/1/2021  EXAMINATION: RIGHT UPPER QUADRANT ULTRASOUND 8/1/2021 6:58 am COMPARISON: CT abdomen/pelvis 09/16/2014 HISTORY: ORDERING SYSTEM PROVIDED HISTORY: abdominal distension and pain TECHNOLOGIST PROVIDED HISTORY: Reason for exam:->abdominal distension and pain Reason for exam:->check for liver cirrhosis, evaluate presence and amount of ascites if present. Reason for Exam: DISTENDED ABDOMEN Acuity: Acute Type of Exam: Initial FINDINGS: LIVER:  Liver is slightly heterogeneous in its appearance without focal lesion noted. No intrahepatic biliary ductal dilation noted. BILIARY SYSTEM:  Mild gallbladder wall thickening measuring 3-5 mm noted. No pericholecystic fluid noted. Polyps measuring up to 6-7 mm in size noted. No definite stones or sludge noted. No pericholecystic fluid noted. Patient reports no sonographic Lee's sign. Common bile duct is within normal limits measuring 6.1 mm. RIGHT KIDNEY: The right kidney is grossly unremarkable without evidence of hydronephrosis. PANCREAS:  Obscured by bowel gas. OTHER: No evidence of ascites. No evidence of ascites. Abnormal echogenicity of the liver compatible with diffuse hepatocellular disease. Please correlate with liver function test. Mild gallbladder wall thickening and polyps noted within the gallbladder. Polyps measure 6-7 mm in size. Recommend annual follow-up. No sonographic evidence of acute cholecystitis by sonographic criteria. Assessment//Plan           Hospital Problems         Last Modified POA    Alcohol withdrawal delirium ([facility] [address] 7/31/2021 Yes        Assessment:    Condition: Improving. Plan:   Consults: gastroenterology.   (Assessment & Plan  Alcohol withdrawal  -Continue CIWA  -Continue thiamine  -Cessation advised    Alcoholic liver disease with hepatomegaly and jaundice  -Total bili 6  -Enlarged and palpable liver  -Ultrasound abdomen shows diffuse hepatomegaly  -We will check alpha-fetoprotein  -GI consult    Hypomag   -suppment and replacement    Hypokalemia  -Replace KCl orally  -Monitor daily    Hyponatremia  -Mild  -IVF    Elevated trop  -not in ACS range  -echo normal ejection fraction  -Continue ASA, statin    Leukocytosis  X-ray no infiltrates  Plan urine sample  WBC 19 => 15  Continue Rocephin  Ultrasound shows no evidence of intra-abdominal infection    DM  -SSI  -Bilateral pain feet, likely 2ry diabetic neuropathy  -Gabapentin 200 mg 2 times daily    Thrombocytoepenia and anemia  -Likely due to EtOH abuse  -B12 wnl     Check CXR, UA, lactic acid, bld cx and UA. Place on rocephin. US with no cholecystitis. Lipase wnl).        Electronically signed by Price Meza MD on 8/2/21 at 5:27 PM EDT

## 2021-08-03 ENCOUNTER — APPOINTMENT (OUTPATIENT)
Dept: CT IMAGING | Age: 41
DRG: 897 | End: 2021-08-03
Payer: COMMERCIAL

## 2021-08-03 ENCOUNTER — APPOINTMENT (OUTPATIENT)
Dept: GENERAL RADIOLOGY | Age: 41
DRG: 897 | End: 2021-08-03
Payer: COMMERCIAL

## 2021-08-03 VITALS
HEIGHT: 68 IN | DIASTOLIC BLOOD PRESSURE: 106 MMHG | OXYGEN SATURATION: 96 % | HEART RATE: 106 BPM | WEIGHT: 224.1 LBS | SYSTOLIC BLOOD PRESSURE: 120 MMHG | TEMPERATURE: 97.9 F | RESPIRATION RATE: 18 BRPM | BODY MASS INDEX: 33.96 KG/M2

## 2021-08-03 LAB
ALBUMIN SERPL-MCNC: 3.2 GM/DL (ref 3.4–5)
ALP BLD-CCNC: 137 IU/L (ref 40–128)
ALT SERPL-CCNC: 25 U/L (ref 10–40)
AMMONIA: 55 UMOL/L (ref 16–60)
ANION GAP SERPL CALCULATED.3IONS-SCNC: 12 MMOL/L (ref 4–16)
AST SERPL-CCNC: 72 IU/L (ref 15–37)
BACTERIA: NEGATIVE /HPF
BASOPHILS ABSOLUTE: 0.2 K/CU MM
BASOPHILS RELATIVE PERCENT: 1.1 % (ref 0–1)
BILIRUB SERPL-MCNC: 5.5 MG/DL (ref 0–1)
BILIRUBIN URINE: ABNORMAL MG/DL
BLOOD, URINE: ABNORMAL
BUN BLDV-MCNC: 6 MG/DL (ref 6–23)
CALCIUM SERPL-MCNC: 8.6 MG/DL (ref 8.3–10.6)
CHLORIDE BLD-SCNC: 99 MMOL/L (ref 99–110)
CLARITY: CLEAR
CO2: 21 MMOL/L (ref 21–32)
COLOR: ABNORMAL
CREAT SERPL-MCNC: 0.4 MG/DL (ref 0.9–1.3)
DIFFERENTIAL TYPE: ABNORMAL
EOSINOPHILS ABSOLUTE: 0.5 K/CU MM
EOSINOPHILS RELATIVE PERCENT: 2.6 % (ref 0–3)
GFR AFRICAN AMERICAN: >60 ML/MIN/1.73M2
GFR NON-AFRICAN AMERICAN: >60 ML/MIN/1.73M2
GLUCOSE BLD-MCNC: 117 MG/DL (ref 70–99)
GLUCOSE BLD-MCNC: 120 MG/DL (ref 70–99)
GLUCOSE BLD-MCNC: 133 MG/DL (ref 70–99)
GLUCOSE BLD-MCNC: 147 MG/DL (ref 70–99)
GLUCOSE, URINE: NEGATIVE MG/DL
HAV IGM SER IA-ACNC: NON REACTIVE
HBV SURFACE AB TITR SER: <3.5 {TITER}
HCT VFR BLD CALC: 26.8 % (ref 42–52)
HEMOGLOBIN: 9.1 GM/DL (ref 13.5–18)
HEPATITIS B CORE IGM ANTIBODY: NON REACTIVE
HEPATITIS B SURFACE ANTIGEN: NON REACTIVE
HEPATITIS C ANTIBODY: NON REACTIVE
ICTOTEST: POSITIVE
IMMATURE NEUTROPHIL %: 1.1 % (ref 0–0.43)
KETONES, URINE: NEGATIVE MG/DL
LEUKOCYTE ESTERASE, URINE: NEGATIVE
LYMPHOCYTES ABSOLUTE: 2.8 K/CU MM
LYMPHOCYTES RELATIVE PERCENT: 15.6 % (ref 24–44)
MAGNESIUM: 1.8 MG/DL (ref 1.8–2.4)
MCH RBC QN AUTO: 35.3 PG (ref 27–31)
MCHC RBC AUTO-ENTMCNC: 34 % (ref 32–36)
MCV RBC AUTO: 103.9 FL (ref 78–100)
MONOCYTES ABSOLUTE: 1.4 K/CU MM
MONOCYTES RELATIVE PERCENT: 7.9 % (ref 0–4)
NITRITE URINE, QUANTITATIVE: POSITIVE
NUCLEATED RBC %: 0.2 %
PDW BLD-RTO: 16.7 % (ref 11.7–14.9)
PH, URINE: 6 (ref 5–8)
PLATELET # BLD: 90 K/CU MM (ref 140–440)
PMV BLD AUTO: 9.5 FL (ref 7.5–11.1)
POTASSIUM SERPL-SCNC: 3.9 MMOL/L (ref 3.5–5.1)
PROTEIN UA: 30 MG/DL
RBC # BLD: 2.58 M/CU MM (ref 4.6–6.2)
RBC URINE: 1 /HPF (ref 0–3)
SEGMENTED NEUTROPHILS ABSOLUTE COUNT: 12.7 K/CU MM
SEGMENTED NEUTROPHILS RELATIVE PERCENT: 71.7 % (ref 36–66)
SODIUM BLD-SCNC: 132 MMOL/L (ref 135–145)
SPECIFIC GRAVITY UA: 1.01 (ref 1–1.03)
SQUAMOUS EPITHELIAL: <1 /HPF
TOTAL IMMATURE NEUTOROPHIL: 0.19 K/CU MM
TOTAL NUCLEATED RBC: 0 K/CU MM
TOTAL PROTEIN: 9.9 GM/DL (ref 6.4–8.2)
TRICHOMONAS: ABNORMAL /HPF
UROBILINOGEN, URINE: 2 MG/DL (ref 0.2–1)
WBC # BLD: 17.8 K/CU MM (ref 4–10.5)
WBC UA: <1 /HPF (ref 0–2)

## 2021-08-03 PROCEDURE — 6370000000 HC RX 637 (ALT 250 FOR IP): Performed by: INTERNAL MEDICINE

## 2021-08-03 PROCEDURE — 71045 X-RAY EXAM CHEST 1 VIEW: CPT

## 2021-08-03 PROCEDURE — 82105 ALPHA-FETOPROTEIN SERUM: CPT

## 2021-08-03 PROCEDURE — 81001 URINALYSIS AUTO W/SCOPE: CPT

## 2021-08-03 PROCEDURE — 2580000003 HC RX 258: Performed by: STUDENT IN AN ORGANIZED HEALTH CARE EDUCATION/TRAINING PROGRAM

## 2021-08-03 PROCEDURE — 86709 HEPATITIS A IGM ANTIBODY: CPT

## 2021-08-03 PROCEDURE — 86803 HEPATITIS C AB TEST: CPT

## 2021-08-03 PROCEDURE — 6370000000 HC RX 637 (ALT 250 FOR IP): Performed by: STUDENT IN AN ORGANIZED HEALTH CARE EDUCATION/TRAINING PROGRAM

## 2021-08-03 PROCEDURE — 2580000003 HC RX 258: Performed by: HOSPITALIST

## 2021-08-03 PROCEDURE — 80053 COMPREHEN METABOLIC PANEL: CPT

## 2021-08-03 PROCEDURE — 86708 HEPATITIS A ANTIBODY: CPT

## 2021-08-03 PROCEDURE — 36415 COLL VENOUS BLD VENIPUNCTURE: CPT

## 2021-08-03 PROCEDURE — 87340 HEPATITIS B SURFACE AG IA: CPT

## 2021-08-03 PROCEDURE — 6370000000 HC RX 637 (ALT 250 FOR IP): Performed by: HOSPITALIST

## 2021-08-03 PROCEDURE — 83735 ASSAY OF MAGNESIUM: CPT

## 2021-08-03 PROCEDURE — 86705 HEP B CORE ANTIBODY IGM: CPT

## 2021-08-03 PROCEDURE — 6360000002 HC RX W HCPCS: Performed by: INTERNAL MEDICINE

## 2021-08-03 PROCEDURE — 80048 BASIC METABOLIC PNL TOTAL CA: CPT

## 2021-08-03 PROCEDURE — 85025 COMPLETE CBC W/AUTO DIFF WBC: CPT

## 2021-08-03 PROCEDURE — 86706 HEP B SURFACE ANTIBODY: CPT

## 2021-08-03 PROCEDURE — 86704 HEP B CORE ANTIBODY TOTAL: CPT

## 2021-08-03 PROCEDURE — 6360000002 HC RX W HCPCS: Performed by: HOSPITALIST

## 2021-08-03 PROCEDURE — 82962 GLUCOSE BLOOD TEST: CPT

## 2021-08-03 PROCEDURE — 6360000004 HC RX CONTRAST MEDICATION: Performed by: STUDENT IN AN ORGANIZED HEALTH CARE EDUCATION/TRAINING PROGRAM

## 2021-08-03 PROCEDURE — 74178 CT ABD&PLV WO CNTR FLWD CNTR: CPT

## 2021-08-03 PROCEDURE — 82140 ASSAY OF AMMONIA: CPT

## 2021-08-03 PROCEDURE — 94761 N-INVAS EAR/PLS OXIMETRY MLT: CPT

## 2021-08-03 RX ORDER — FOLIC ACID 1 MG/1
1 TABLET ORAL DAILY
Status: DISCONTINUED | OUTPATIENT
Start: 2021-08-03 | End: 2021-08-03 | Stop reason: HOSPADM

## 2021-08-03 RX ORDER — LANOLIN ALCOHOL/MO/W.PET/CERES
100 CREAM (GRAM) TOPICAL DAILY
Status: DISCONTINUED | OUTPATIENT
Start: 2021-08-04 | End: 2021-08-03 | Stop reason: HOSPADM

## 2021-08-03 RX ORDER — AZITHROMYCIN 250 MG/1
250 TABLET, FILM COATED ORAL DAILY
Status: DISCONTINUED | OUTPATIENT
Start: 2021-08-04 | End: 2021-08-03 | Stop reason: HOSPADM

## 2021-08-03 RX ORDER — AZITHROMYCIN 250 MG/1
500 TABLET, FILM COATED ORAL ONCE
Status: COMPLETED | OUTPATIENT
Start: 2021-08-03 | End: 2021-08-03

## 2021-08-03 RX ADMIN — LORAZEPAM 2 MG: 2 INJECTION INTRAMUSCULAR; INTRAVENOUS at 13:50

## 2021-08-03 RX ADMIN — ASPIRIN 81 MG: 81 TABLET, COATED ORAL at 08:34

## 2021-08-03 RX ADMIN — LORAZEPAM 2 MG: 2 INJECTION INTRAMUSCULAR; INTRAVENOUS at 11:39

## 2021-08-03 RX ADMIN — MAGNESIUM OXIDE 400 MG: 400 TABLET ORAL at 08:35

## 2021-08-03 RX ADMIN — SODIUM CHLORIDE: 9 INJECTION, SOLUTION INTRAVENOUS at 05:36

## 2021-08-03 RX ADMIN — AZITHROMYCIN MONOHYDRATE 500 MG: 250 TABLET ORAL at 10:11

## 2021-08-03 RX ADMIN — LORAZEPAM 1 MG: 1 TABLET ORAL at 00:51

## 2021-08-03 RX ADMIN — BUSPIRONE HYDROCHLORIDE 15 MG: 7.5 TABLET ORAL at 13:50

## 2021-08-03 RX ADMIN — GABAPENTIN 100 MG: 100 CAPSULE ORAL at 13:50

## 2021-08-03 RX ADMIN — CEFTRIAXONE 1000 MG: 1 INJECTION, POWDER, FOR SOLUTION INTRAMUSCULAR; INTRAVENOUS at 13:50

## 2021-08-03 RX ADMIN — BUSPIRONE HYDROCHLORIDE 15 MG: 7.5 TABLET ORAL at 08:34

## 2021-08-03 RX ADMIN — THIAMINE HYDROCHLORIDE 100 MG: 100 INJECTION, SOLUTION INTRAMUSCULAR; INTRAVENOUS at 08:35

## 2021-08-03 RX ADMIN — GABAPENTIN 100 MG: 100 CAPSULE ORAL at 08:35

## 2021-08-03 RX ADMIN — LORAZEPAM 2 MG: 2 INJECTION INTRAMUSCULAR; INTRAVENOUS at 10:23

## 2021-08-03 RX ADMIN — IOPAMIDOL 75 ML: 755 INJECTION, SOLUTION INTRAVENOUS at 14:25

## 2021-08-03 RX ADMIN — FOLIC ACID 1 MG: 1 TABLET ORAL at 10:24

## 2021-08-03 RX ADMIN — ATORVASTATIN CALCIUM 40 MG: 40 TABLET, FILM COATED ORAL at 08:35

## 2021-08-03 NOTE — DISCHARGE SUMMARY
Discharge Summary    Date: 8/3/2021  Patient Name: Cathleen Garcia YOB: 1980 Age: 47 y.o. Admit Date: 7/31/2021  Discharge Date:  Discharge Condition: Poor    Admission Diagnosis  Alcohol withdrawal delirium ([facility] (F10.231); Hyperbilirubinemia (E80.6); Cocaine abuse ([facility] [facility] (F14.10); Troponin I above reference range (R77.8); Alcohol withdrawal syndrome with complication (HCC) (R81.569)     Discharge Diagnosis  Active Problems: Alcohol withdrawal delirium (HCC)Resolved Problems: * No resolved hospital problems. [facility] Stay  Narrative of Hospital Course:  Patient has history of alcohol dependence, cocaine use, history of diabetes mellitus type 2 and not compliant with taking medications, diabetic neuropathy. Patient drinks over 10 beers a day, and drinks liquor [employer]s), and has been doing so since his sister passed away 2 months ago, but even prior to the patient has a history of recurrent alcoholism and rehabilitation. he was recently hospitalized and supposed be enrolled in an alcohol rehab program but he missed a flight to [place] given that he was drinking alcohol overnight. Thus, patient presented to the ER with alcohol intoxication and sinus tachycardia. Blood work showed platelet of 85, hemoglobin of 9.7 and WBC of 13.5. Chest imaging showed possible infiltrates likely from aspiration pneumonia and he was getting IV antibiotics. His abdomen is distended with a palpable liver. He is jaundiced with elevated bilirubin and LFTs. GI was consulted who recommended CT scan and viral hepatitis profile. Patient patient decided to go home with signing out AMA. He had the capacity to make the decision and I attempted to dissuade him but he decided to leave. He needs outpatient GI follow-up and help with quitting alcohol altogether to prevent liver cirrhosis. He understands the risks to his health.     Consultants:  IP CONSULT TO SOCIAL WORKIP CONSULT TO HOSPITALISTIP CONSULT TO GI    Surgeries/procedures Performed:       Treatments:            Discharge Plan/Disposition:  [facility]/Incidental Findings Requiring Follow Up:    Patient Instructions:    Diet:    Activity:  For number of days (if applicable): Other Instructions:    Provider Follow-Up:   No follow-ups on file. Significant Diagnostic Studies:    Recent Labs:  Admission on 07/31/2021, Discharged on 08/03/2021No results displayed because visit has over 200 results. ------------    Radiology last 7 days:  CT ABDOMEN PELVIS W WO CONTRASTResult Date: 8/3/86507. Nonspecific inflammatory changes along the left and right pericolic gutters with trace effusion and mild wall thickening of the ascending and descending colon which is nonspecific and may be related to sequela from hepatic cirrhosis versus acute colitis. 2. Hepatosplenomegaly with sequela of portosystemic shunting and portal hypertension. 3. No definite CT morphologic features commonly associated with hepatic cirrhosis. No discrete hepatic lesion evident. 4. Hepatic steatosis. 5. Trace abdominal ascites. 6. Prominent posterior disc osteophyte complex L2-3 result in mild lumbar spinal canal stenosis. 7. Mild lymph node enlargement at the rich hepatis is very likely reactive. XR CHEST PORTABLEResult Date: 8/3/2021Cardiomegaly. Less congestion when compared to the prior study. The right lung base appears clear. There is suspicion for infiltrate in the left lung base. Follow up to resolution is suggested. XR CHEST 1 VIEWResult Date: 8/1/20211. Bibasilar airspace opacities appear more prominent on the right and may be due to atelectasis, pneumonia, and/or aspiration. 2. Pulmonary vascular congestion with questionable left perihilar edema, potentially congestive heart failure given mild to moderate cardiomegaly. US ABDOMEN [facility] Date: 8/1/2021No evidence of ascites. Abnormal echogenicity of the liver compatible with diffuse hepatocellular disease.   Please correlate with liver function test. Mild gallbladder wall thickening and polyps noted within the gallbladder. Polyps measure 6-7 mm in size. Recommend annual follow-up. No sonographic evidence of acute cholecystitis by sonographic criteria. Pending Labs   Order Current Status  Urinalysis Reflex to Culture Collected (08/03/21 0045)  AFP Tumor Marker In process  AFP tumor marker In process  Hepatitis A Antibody, Total In process  Hepatitis B Core Antibody, Total In process  Culture, Blood 1 Preliminary result  Culture, Blood 2 Preliminary result      Discharge Medications    Discharge Medication List as of 8/3/2021  3:42 PM    Discharge Medication List as of 8/3/2021  3:42 PM    Discharge Medication List as of 8/3/2021  3:42 PMCONTINUE these medications which have NOT CHANGEDinsulin glargine (LANTUS;BASAGLAR) 100 UNIT/ML injection penInject 15 Units into the skin nightly, Disp-5 pen, R-2NormalmetFORMIN (GLUCOPHAGE) 500 MG tabletTAKE 2 TABLETS BY MOUTH TWO TIMES A DAY WITH MEALS, Disp-360 tablet, R-1NormalbusPIRone (BUSPAR) 15 MG tabletTAKE 1 TABLET BY MOUTH THREE TIMES DAILY, Disp-270 tablet, R-1Normalpregabalin (LYRICA) 100 MG capsuleTAKE 1 CAPSULE BY MOUTH TWICE DAILY, Disp-180 capsule, R-1Normalatorvastatin (LIPITOR) 40 MG tabletTAKE 1 TABLET BY MOUTH ONE TIME A DAY, Disp-90 tablet, R-1NormaltraZODone (DESYREL) 50 MG tabletTake 1-2 tablets by mouth nightly as needed for Sleep, Disp-60 tablet, R-11NormalInsulin Pen Needle (ADVOCATE INSULIN PEN NEEDLES) 31G X 5 MM MISCDAILY Starting Thu 11/15/2018, Disp-100 each, R-3, Normalaspirin EC 81 MG EC tabletTake 1 tablet by mouth daily, Disp-30 tablet, R-3OTCglucose blood VI test strips (GLUCOSE METER TEST) stripDAILY Starting Wed 5/30/2018, Disp-100 each, R-3, NormalAs needed. glucose monitoring kit (FREESTYLE) monitoring kitDAILY PRN Starting Wed 5/30/2018, Disp-1 kit, R-0, NormalMICROLET LANCETS MISCDAILY Starting Wed 5/30/2018, Disp-100 each, R-3, Normal    Discharge Medication List as

## 2021-08-03 NOTE — CONSULTS
[address], [address] [facility]                                  CONSULTATION    PATIENT NAME: Daryle Greaves                       :        1980  MED REC NO:   [id number]                          ROOM:       [id number]  ACCOUNT NO:   [de-identified]                           ADMIT DATE: 2021  PROVIDER:     Mickey Diego MD    DATE OF CONSULTATION:  2021    A patient of Dr. Alen Key. The patient is in room 4107. CHIEF COMPLAINT:  History of EtOH abuse with abnormal LFTs, rule out  alcoholic liver disease. HISTORY OF PRESENT ILLNESS:  As follows: The patient is a 26-year-old  white gentleman, a patient of Dr. Alen Key with past medical history  significant for EtOH abuse, hypertension, diabetes mellitus,  hyperlipidemia, who presented to the hospital on 2021 for alcohol  intoxication. The patient denies abdominal pain, nausea, vomiting, hematemesis,  melena, or hematochezia. The blood workup done upon admission comprised  a Chem profile which is remarkable for LFTs to be abnormal with a total  bilirubin of 5.5, AST 72, ALT 25, alkaline phosphatase of 137. CBC  shows a WBC count of 17.8, hemoglobin 9.1, platelet count of 01,362. The patient's INR is pending. Ultrasound of the right upper quadrant  was done, which showed an abnormal echogenicity of the liver compatible  with diffuse hepatocellular disease, and there was no evidence of  extrahepatic obstruction, common bile duct was 6.1 mm, but mild  gallbladder wall thickening was noted with polyps noted within the  gallbladder measuring 6 to 7 mm. The patient denies having had an EGD  or colonoscopy in the past.  The patient is hemodynamically stable. The  patient's last drink was, in fact, 1 day prior to coming to the  hospital.  There is also history of recreational drug use. The patient  smokes marijuana.     REVIEW OF SYSTEMS:  CENTRAL NERVOUS SYSTEM:  The

## 2021-08-03 NOTE — PROGRESS NOTES
Pt wife would like to be updated on POC by the doctor. I informed Dr. Thompson Bachelor. Yaneli: [phone].  I

## 2021-08-03 NOTE — CARE COORDINATION
Met c pt to continue discharge planning, advised that I received vm from [place] at [facility] that they have been trying to reach him on cell and hospital phone to complete assessment without answer. She requested he call them directly to complete intake. I provided him with the number ([phone]) and advised he needs to call asap to start intake process if he would like to go there. He states he plans on returning home, getting his affairs in order, then will call them to start intake. Advised he can call from here and we can arrange for direct transfer but he states he will need to return home first.  Pt provided with contact info for [facility] and my card, advised if he calls the [facility], he can provide them with my contact info to send them info if needed. At this time plan remains for pt to return home and will pursue treatment after home.

## 2021-08-03 NOTE — PLAN OF CARE
Problem: Discharge Planning:  Goal: Discharged to appropriate level of care  Description: Discharged to appropriate level of care  8/3/2021 1054 by Deena Seaman RN  Outcome: Ongoing  8/3/2021 0007 by Tila Arredondo RN  Outcome: Not Met This Shift     Problem: Fluid Volume - Deficit:  Goal: Absence of fluid volume deficit signs and symptoms  Description: Absence of fluid volume deficit signs and symptoms  8/3/2021 1054 by Deena Seaman RN  Outcome: Ongoing  8/3/2021 0007 by Tila Arredondo RN  Outcome: Not Met This Shift     Problem: Nutrition Deficit:  Goal: Ability to achieve adequate nutritional intake will improve  Description: Ability to achieve adequate nutritional intake will improve  8/3/2021 1054 by Deena Seaman RN  Outcome: Ongoing  8/3/2021 0007 by Tila Arredondo RN  Outcome: Not Met This Shift     Problem: Sleep Pattern Disturbance:  Goal: Appears well-rested  Description: Appears well-rested  8/3/2021 1054 by Deena Seaman RN  Outcome: Ongoing  8/3/2021 0007 by Tila Arredondo RN  Outcome: Not Met This Shift     Problem: Violence - Risk of, Self/Other-Directed:  Goal: Knowledge of developmental care interventions  Description: Absence of violence  8/3/2021 1054 by Deena Seaman RN  Outcome: Ongoing  8/3/2021 0007 by Tila Arredondo RN  Outcome: Not Met This Shift     Problem: Pain:  Goal: Pain level will decrease  Description: Pain level will decrease  8/3/2021 1054 by Deena Seaman RN  Outcome: Ongoing  8/3/2021 0007 by Tila Arredondo RN  Outcome: Not Met This Shift  Goal: Control of acute pain  Description: Control of acute pain  8/3/2021 1054 by Deena Seaman RN  Outcome: Ongoing  8/3/2021 0007 by Tila Arredondo RN  Outcome: Not Met This Shift  Goal: Control of chronic pain  Description: Control of chronic pain  8/3/2021 1054 by Deena Seaman RN  Outcome: Ongoing  8/3/2021 0007 by Tila Arredondo RN  Outcome: Not Met This Shift

## 2021-08-04 ENCOUNTER — VIRTUAL VISIT (OUTPATIENT)
Dept: INTERNAL MEDICINE CLINIC | Age: 41
End: 2021-08-04
Payer: COMMERCIAL

## 2021-08-04 ENCOUNTER — TELEPHONE (OUTPATIENT)
Dept: INTERNAL MEDICINE CLINIC | Age: 41
End: 2021-08-04

## 2021-08-04 DIAGNOSIS — K70.10 ALCOHOLIC HEPATITIS, UNSPECIFIED WHETHER ASCITES PRESENT: Primary | ICD-10-CM

## 2021-08-04 DIAGNOSIS — D69.6 THROMBOCYTOPENIA (HCC): ICD-10-CM

## 2021-08-04 DIAGNOSIS — D72.829 LEUKOCYTOSIS, UNSPECIFIED TYPE: ICD-10-CM

## 2021-08-04 DIAGNOSIS — F10.931 ALCOHOL WITHDRAWAL DELIRIUM (HCC): ICD-10-CM

## 2021-08-04 DIAGNOSIS — E87.1 HYPONATREMIA: ICD-10-CM

## 2021-08-04 DIAGNOSIS — E11.40 CONTROLLED TYPE 2 DIABETES WITH NEUROPATHY (HCC): ICD-10-CM

## 2021-08-04 LAB
HAV AB SERPL IA-ACNC: POSITIVE
HEPATITIS B CORE TOTAL ANTIBODY: NEGATIVE

## 2021-08-04 PROCEDURE — 1111F DSCHRG MED/CURRENT MED MERGE: CPT | Performed by: INTERNAL MEDICINE

## 2021-08-04 PROCEDURE — 99496 TRANSJ CARE MGMT HIGH F2F 7D: CPT | Performed by: INTERNAL MEDICINE

## 2021-08-04 RX ORDER — PREGABALIN 100 MG/1
CAPSULE ORAL
Qty: 180 CAPSULE | Refills: 1 | Status: CANCELLED | OUTPATIENT
Start: 2021-08-04 | End: 2022-02-03

## 2021-08-04 RX ORDER — TRAZODONE HYDROCHLORIDE 50 MG/1
50-100 TABLET ORAL NIGHTLY PRN
Qty: 60 TABLET | Refills: 11 | Status: CANCELLED | OUTPATIENT
Start: 2021-08-04

## 2021-08-04 RX ORDER — ATORVASTATIN CALCIUM 40 MG/1
TABLET, FILM COATED ORAL
Qty: 90 TABLET | Refills: 1 | Status: CANCELLED | OUTPATIENT
Start: 2021-08-04

## 2021-08-04 RX ORDER — BUSPIRONE HYDROCHLORIDE 15 MG/1
TABLET ORAL
Qty: 270 TABLET | Refills: 1 | Status: CANCELLED | OUTPATIENT
Start: 2021-08-04

## 2021-08-04 NOTE — PROGRESS NOTES
Post-Discharge Transitional Care Management Services or Hospital Follow Up      Karlee Schroeder   YOB: 1980    Date of Office Visit:  8/4/2021  Date of Hospital Admission: 7/31/21  Date of Hospital Discharge: 8/3/21  Risk of hospital readmission (high >=14%. Medium >=10%) :Readmission Risk Score: 12      Care management risk score Rising risk (score 2-5) and Complex Care (Scores >=6): 2     Non face to face  following discharge, date last encounter closed (first attempt may have been earlier): *No documented post hospital discharge outreach found in the last 14 days    Call initiated 2 business days of discharge: *No response recorded in the last 14 days    Patient Active Problem List   Diagnosis    HTN (hypertension)    Fatty liver    Obstructive sleep apnea    Type 2 diabetes, controlled, with neuropathy ([facility] [address]    Anxiety    Hypercholesterolemia    Alcohol withdrawal delirium ([facility] [address]       Allergies   Allergen Reactions    Paxil [Paroxetine Hcl]      Caused \"weird\" things to happen in his sleep       Medications listed as ordered at the time of discharge from hospital   [place], [address] Medication Instructions ERIN:    Printed on:08/04/21 1650   Medication Information                      aspirin EC 81 MG EC tablet  Take 1 tablet by mouth daily             glucose blood VI test strips (GLUCOSE METER TEST) strip  1 each by In Vitro route daily As needed.              glucose monitoring kit (FREESTYLE) monitoring kit  1 kit by Does not apply route daily as needed (If blood sugars running high or low)             insulin glargine (LANTUS;BASAGLAR) 100 UNIT/ML injection pen  Inject 5 Units into the skin nightly             Insulin Pen Needle (ADVOCATE INSULIN PEN NEEDLES) 31G X 5 MM MISC  1 each by Does not apply route daily             MICROLET LANCETS MISC  1 each by Does not apply route daily                   Medications marked \"taking\" at this time  Outpatient Medications Marked as Taking for the 8/4/21 encounter (Virtual Visit) with Chas Gutierrez MD   Medication Sig Dispense Refill    insulin glargine (LANTUS;BASAGLAR) 100 UNIT/ML injection pen Inject 5 Units into the skin nightly 5 pen 2    aspirin EC 81 MG EC tablet Take 1 tablet by mouth daily 30 tablet 3    glucose blood VI test strips (GLUCOSE METER TEST) strip 1 each by In Vitro route daily As needed. 100 each 3    glucose monitoring kit (FREESTYLE) monitoring kit 1 kit by Does not apply route daily as needed (If blood sugars running high or low) 1 kit 0        Medications patient taking as of now reconciled against medications ordered at time of hospital discharge: Yes    Chief Complaint   Patient presents with   [address] from [facility]    Medication Refill    Discuss Medications       History of Present illness - Follow up of Hospital diagnosis(es):     Pt was admitted to the hospital after he ad been drinking heavily for 2 weeks. He was started on CIWA, required ativan. He developed hyponatremia, low magnesium. He had sinus tachy and leukocytosis while admitted    He was found to have low plts, anemia    CT with steatosis, signs of portal HTN. Pt left AMA    Inpatient course: Discharge summary reviewed- see chart. Interval history/Current status:     Pt is planning to leave tomorrow for rehab - he will be going to [facility] to a rehab facility. He has been abusing alcohol,     He has not been drinking since he left the hospital.    He has been taking insulin at home, sugars have been 130s. He denies any hypoglycemia. He has not been taking any of his other meds. States he has been feeling very well since he has been home      A comprehensive review of systems was negative except for what was noted in the HPI. There were no vitals filed for this visit. There is no height or weight on file to calculate BMI.    Wt Readings from Last 3 Encounters:   08/03/21 224 lb 1.6 oz (101.7 kg)   01/27/20 254 lb 12.8 oz (115.6 kg)   09/23/19 254 lb (115.2 kg)     BP Readings from Last 3 Encounters:   08/03/21 (!) 120/106   01/27/20 120/76   09/23/19 (!) 132/100          Assessment/Plan:  1. Alcoholic hepatitis, unspecified whether ascites present - would like to recheck labs to prove that the LFTs are improving, but pt leaving tomorrow morning. I am concerned that he needs to be back in the hospital, but pt refuses this. I am not sure the rehab in [facility] can care for him with his current medical conditions, but he is adamant that he go. - [place] DISCHARGE MEDS RECONCILED W/ CURRENT OUTPATIENT MED LIST    2. Controlled type 2 diabetes with neuropathy (HCC) - a1c low despite only taking insulin - I wonder if he has lost the gluconeogenesis of the liver. Will decrease lantus, watch sugars. Will hold other meds eg metformin, lyrica, lipitor  - insulin glargine (LANTUS;BASAGLAR) 100 UNIT/ML injection pen; Inject 5 Units into the skin nightly  Dispense: 5 pen; Refill: 2  - [place] DISCHARGE MEDS RECONCILED W/ CURRENT OUTPATIENT MED LIST    3. Thrombocytopenia ([address] - likely secondary to hypersplenism; need to follow  - [place] DISCHARGE MEDS RECONCILED W/ CURRENT OUTPATIENT MED LIST    4. Leukocytosis, unspecified type - this was worsening when pt left the hospital. Unclear why. Again advised eval in the ER, pt declines  - [place] DISCHARGE MEDS RECONCILED W/ CURRENT OUTPATIENT MED LIST    5. Hyponatremia - - as above    6. Alcohol withdrawal delirium ([address] - hopefully he is through his withdrawal    Also elevated total protein, low magnesium, anemia. Expressed to pt that I am very concerned about his health. Alcohol cessation is very important, but he first needs to be sure he is medically stable and I am not sure he is. Nevertheless he is planning on going to [address] [place] [address]: high complexity      Justin Lamar is a 47 y.o. male evaluated via telephone on 8/4/2021.       Consent:  He and/or health care decision maker is aware that that he may receive a bill for this telephone service, depending on his insurance coverage, and has provided verbal consent to proceed: Yes    I affirm this is a Patient Initiated Episode with a Patient who has not had a related appointment within my department in the past 7 days or scheduled within the next 24 hours. Patient identification was verified at the start of the visit: Yes    The visit was conducted pursuant to the emergency declaration under the [address] [place] [address], [address] [place] [address] and the [employer] and [employer] General Act. Patient identification was verified, and a caregiver was present when appropriate. The patient was located in a state where the provider was credentialed to provide care.     Note: not billable if this call serves to triage the patient into an appointment for the relevant concern      Osmin Altman MD

## 2021-08-05 LAB
MS ALPHA-FETOPROTEIN: 7 NG/ML (ref 0–9)
MS ALPHA-FETOPROTEIN: 8 NG/ML (ref 0–9)

## 2021-08-06 LAB
CULTURE: NORMAL
CULTURE: NORMAL
Lab: NORMAL
Lab: NORMAL
SPECIMEN: NORMAL
SPECIMEN: NORMAL

## 2021-08-26 ENCOUNTER — TELEPHONE (OUTPATIENT)
Dept: INTERNAL MEDICINE CLINIC | Age: 41
End: 2021-08-26

## 2021-08-26 NOTE — TELEPHONE ENCOUNTER
----- Message from Luiskisha Orion sent at 8/26/2021  1:07 PM EDT -----  Subject: Appointment Request    Reason for Call: Ear Problem    QUESTIONS  Type of Appointment? Established Patient  Reason for appointment request? No appointments available during search  Additional Information for Provider? PT having difficulty hearing. PT   would like his ears cleaned out. PT was told to call Dr. Kristen Stevens when he   got back from [place]. No appts avail. Please call PT to schedule appt.     ---------------------------------------------------------------------------  --------------  CALL BACK INFO  What is the best way for the office to contact you? OK to leave message on   voicemail  Preferred Call Back Phone Number? [phone]  ---------------------------------------------------------------------------  --------------  SCRIPT ANSWERS  Relationship to Patient? Self  Specialty Confirmation? Primary Care  Did you have an injury or trauma within the past three days? No  Is your pain affecting your daily activities? Yes  Have you been diagnosed with, awaiting test results for, or told that you   are suspected of having COVID-19 (Coronavirus)? (If patient has tested   negative or was tested as a requirement for work, school, or travel and   not based on symptoms, answer no)? No  Do you currently have flu-like symptoms including fever or chills, cough,   shortness of breath, difficulty breathing, or new loss of taste or smell? No  Have you had close contact with someone with COVID-19 in the last 14 days? No  (Service Expert  click yes below to proceed with [email or web address] As Usual   Scheduling)?  Yes

## 2021-08-27 ENCOUNTER — OFFICE VISIT (OUTPATIENT)
Dept: INTERNAL MEDICINE CLINIC | Age: 41
End: 2021-08-27
Payer: COMMERCIAL

## 2021-08-27 VITALS
DIASTOLIC BLOOD PRESSURE: 62 MMHG | HEIGHT: 68 IN | WEIGHT: 229 LBS | BODY MASS INDEX: 34.71 KG/M2 | SYSTOLIC BLOOD PRESSURE: 116 MMHG | OXYGEN SATURATION: 98 % | HEART RATE: 110 BPM

## 2021-08-27 DIAGNOSIS — K70.10 ALCOHOLIC HEPATITIS, UNSPECIFIED WHETHER ASCITES PRESENT: Primary | ICD-10-CM

## 2021-08-27 DIAGNOSIS — H61.23 BILATERAL IMPACTED CERUMEN: ICD-10-CM

## 2021-08-27 DIAGNOSIS — F51.01 PRIMARY INSOMNIA: ICD-10-CM

## 2021-08-27 DIAGNOSIS — R20.0 NUMBNESS AND TINGLING: ICD-10-CM

## 2021-08-27 DIAGNOSIS — R20.2 NUMBNESS AND TINGLING: ICD-10-CM

## 2021-08-27 PROCEDURE — 69210 REMOVE IMPACTED EAR WAX UNI: CPT | Performed by: INTERNAL MEDICINE

## 2021-08-27 PROCEDURE — 99213 OFFICE O/P EST LOW 20 MIN: CPT | Performed by: INTERNAL MEDICINE

## 2021-08-27 RX ORDER — PREGABALIN 100 MG/1
CAPSULE ORAL
Qty: 180 CAPSULE | Refills: 1 | Status: SHIPPED | OUTPATIENT
Start: 2021-08-27 | End: 2022-03-09

## 2021-08-27 RX ORDER — TRAZODONE HYDROCHLORIDE 50 MG/1
50-100 TABLET ORAL NIGHTLY PRN
Qty: 60 TABLET | Refills: 11 | Status: SHIPPED | OUTPATIENT
Start: 2021-08-27

## 2021-08-27 NOTE — PROGRESS NOTES
Vanessa Azevedo  1980 08/27/21    SUBJECTIVE:      Foot pain is much improved with stopping alcohol. He did have 2 drinks on his birthday    Pt developed ear fullness after he got out of a pool earlier this week. He used an ear drop with no benefit; H2O2 with no benefit. This is causing him to have a headache. He denies any discharge. Pt with alcoholic hepatitis last month, left AMA and has not had f/u labs    OBJECTIVE:    /62   Pulse 110   Ht 5' 8\" (1.727 m)   Wt 229 lb (103.9 kg)   SpO2 98%   BMI 34.82 kg/m²     Physical Exam  Bilat cerumen impaction  ASSESSMENT:    1. Alcoholic hepatitis, unspecified whether ascites present    2. Primary insomnia    3. Numbness and tingling        PLAN:    Lovejean paule Son was seen today for hearing loss. Diagnoses and all orders for this visit:    Alcoholic hepatitis, unspecified whether ascites present - recheck labs  -     Comprehensive Metabolic Panel; Future  -     CBC Auto Differential; Future    Primary insomnia  -     traZODone (DESYREL) 50 MG tablet; Take 1-2 tablets by mouth nightly as needed for Sleep    Numbness and tingling - resume lyrica  -     pregabalin (LYRICA) 100 MG capsule; TAKE 1 CAPSULE BY MOUTH TWICE DAILY    Cerumen impaction  Patient with a cerumen impaction. External auditory canal cleaned with water under pressure, then cerumen disimpacted with cuvette. Patient tolerated the procedure well.

## 2021-10-05 ENCOUNTER — OFFICE VISIT (OUTPATIENT)
Dept: INTERNAL MEDICINE CLINIC | Age: 41
End: 2021-10-05
Payer: COMMERCIAL

## 2021-10-05 VITALS
OXYGEN SATURATION: 97 % | DIASTOLIC BLOOD PRESSURE: 78 MMHG | RESPIRATION RATE: 16 BRPM | SYSTOLIC BLOOD PRESSURE: 138 MMHG | HEART RATE: 100 BPM

## 2021-10-05 DIAGNOSIS — M79.662 PAIN OF LEFT CALF: Primary | ICD-10-CM

## 2021-10-05 PROCEDURE — 99213 OFFICE O/P EST LOW 20 MIN: CPT | Performed by: INTERNAL MEDICINE

## 2021-10-05 RX ORDER — IBUPROFEN 800 MG/1
800 TABLET ORAL
Qty: 90 TABLET | Refills: 0 | Status: SHIPPED | OUTPATIENT
Start: 2021-10-05 | End: 2021-11-22 | Stop reason: SDUPTHER

## 2021-10-05 RX ORDER — TRAMADOL HYDROCHLORIDE 50 MG/1
50 TABLET ORAL EVERY 6 HOURS PRN
Qty: 28 TABLET | Refills: 0 | Status: SHIPPED | OUTPATIENT
Start: 2021-10-05 | End: 2021-10-12

## 2021-10-05 ASSESSMENT — PATIENT HEALTH QUESTIONNAIRE - PHQ9
1. LITTLE INTEREST OR PLEASURE IN DOING THINGS: 0
SUM OF ALL RESPONSES TO PHQ QUESTIONS 1-9: 0
2. FEELING DOWN, DEPRESSED OR HOPELESS: 0
SUM OF ALL RESPONSES TO PHQ9 QUESTIONS 1 & 2: 0
SUM OF ALL RESPONSES TO PHQ QUESTIONS 1-9: 0
SUM OF ALL RESPONSES TO PHQ QUESTIONS 1-9: 0

## 2021-10-05 NOTE — PROGRESS NOTES
Barrett Gould  1980  10/05/21    SUBJECTIVE:    Last Thursday pt made a turning motion and felt instant pain in the left mid calf. This has progressed, and now he has developed pain in the left knee, swelling in the knee. He denies    He has used ibuprofen with little benefit. OBJECTIVE:    /78   Pulse 100   Resp 16   SpO2 97%     Physical Exam  Severe pain with palpation of mid gastrocnemius; pain with dorsiflexion and plantarflexion of foot. Knee: Pt exhibits normal range of motion, no swelling, no effusion, no erythema. There is no LCL laxity, no lateral joint line tenderness, no LCL tenderness. There is no  MCL laxity, (+) medial joint line tenderness, no MCL tenderness. [facility]'s sign negative. ASSESSMENT:    1. Pain of left calf        PLAN:    Yoseph Rinaldi was seen today for fall, leg pain and knee pain. Diagnoses and all orders for this visit:    Pain of left calf - I suspect a tear of the gastrocnemius. I will refer the pt to ortho; wear a knee brace (he may have an MCL strain as well), minimize foot movement until seen by ortho; cont motrin; use ultram PRN  -     ibuprofen (ADVIL;MOTRIN) 800 MG tablet; Take 1 tablet by mouth 3 times daily (with meals)  -     traMADol (ULTRAM) 50 MG tablet; Take 1 tablet by mouth every 6 hours as needed for Pain for up to 7 days. Intended supply: 7 days.  Take lowest dose possible to manage pain  -     Symone Koo DO, Orthopedic Surgery, [facility]

## 2021-10-14 ENCOUNTER — TELEPHONE (OUTPATIENT)
Dept: INTERNAL MEDICINE CLINIC | Age: 41
End: 2021-10-14

## 2021-10-14 NOTE — TELEPHONE ENCOUNTER
Patient states the swelling has gone down in his calf, his knee is worse. It is 2x as big as his L knee and is starting to bruise. He has an appt with ortho next Wed 10/22. Please advise.

## 2021-10-20 ENCOUNTER — OFFICE VISIT (OUTPATIENT)
Dept: ORTHOPEDIC SURGERY | Age: 41
End: 2021-10-20
Payer: COMMERCIAL

## 2021-10-20 VITALS
WEIGHT: 229 LBS | BODY MASS INDEX: 31.02 KG/M2 | OXYGEN SATURATION: 98 % | HEIGHT: 72 IN | RESPIRATION RATE: 16 BRPM | HEART RATE: 107 BPM

## 2021-10-20 DIAGNOSIS — S82.862A CLOSED DISPLACED MAISONNEUVE FRACTURE OF LEFT LOWER EXTREMITY, INITIAL ENCOUNTER: Primary | ICD-10-CM

## 2021-10-20 PROCEDURE — 99203 OFFICE O/P NEW LOW 30 MIN: CPT | Performed by: STUDENT IN AN ORGANIZED HEALTH CARE EDUCATION/TRAINING PROGRAM

## 2021-10-20 ASSESSMENT — ENCOUNTER SYMPTOMS
VOICE CHANGE: 0
SORE THROAT: 0
COLOR CHANGE: 0
NAUSEA: 0
COUGH: 0
WHEEZING: 0
SHORTNESS OF BREATH: 0
VOMITING: 0
BACK PAIN: 0

## 2021-10-20 NOTE — PROGRESS NOTES
10/20/2021   No chief complaint on file. History of Present Illness:                             Prerna Pierre is a 46 y.o. male currently not working,  referred by PCP for evaluation and treatment of left ankle and knee pain. This is evaluated as a personal injury. Patient states that the initial injury occurred approximately 3 weeks ago. He was turning on a planted left foot and felt a significant pop and painful sensation in the left knee. He states that he developed left ankle pain as well over the following several days. He denies any prior injury to either the knee or ankle. He did not seek immediate treatment but was seen by his primary care who recommended he be seen by orthopedist.  He is here today for his first visit with me. He has had no treatment for the left knee or ankle pain since then. He is not ambulating with any type of assistive device or brace. The pain's location is lateral left knee over the fibular head neck junction as well as the syndesmosis of the left ankle. he describes the symptoms as aching, sharp and stabbing. Symptoms improve with rest. Symptoms worsen with deep knee bending, getting up from a chair, weight bearing, sitting for prolonged periods of time, twisting activities. Patient denies prior injury to knee or ankle, denies numbness, tingling, fever, chills. Mild swelling over lateral knee at known fracture site but patient denies ankle swelling or effusions. No prominent mechanical symptoms. Denies instability. Worse with prolonged ambulation or twisting activities. Better with rest,     Treatment thus far has included rest, activity modifications,  oral medications without significant relief. Here today to discuss diagnosis and treatment options. Is affecting ADLs. Pain is 7/10 at it's worst.    No advanced imaging thus far    Outside reports reviewed: none. MA HPI: Patient is a 41 year old male.  Patient is in the office today with left knee, calf and ankle pain. Patient states that he/she injured themselves by turning while his foot was planted. He felt a pop and had immediate pain. Patient states that the injury happened about 3 weeks ago. Pain scale  7/10. His pain started on the lateral side of his lower leg but in now all over his left lower leg. He denies any previous injuries, surgeries or injections to his left leg  Occupation: none    Patient's medications, allergies, past medical, surgical, social and family histories were reviewed and updated as appropriate. Medical History  Patient's medications, allergies, past medical, surgical, social and family histories were reviewed and updated as appropriate. Past Medical History:   Diagnosis Date    Hypercholesterolemia 2/18/2019    Hypertension     Type 2 diabetes, controlled, with neuropathy ([facility] [address] 6/6/2018     History reviewed. No pertinent surgical history. Family History   Problem Relation Age of Onset    High Blood Pressure Sister      Social History     Socioeconomic History    Marital status:      Spouse name: None    Number of children: None    Years of education: None    Highest education level: None   Occupational History    None   Tobacco Use    Smoking status: Never Smoker    Smokeless tobacco: Never Used   Substance and Sexual Activity    Alcohol use:  Yes     Alcohol/week: 12.0 standard drinks     Types: 12 Standard drinks or equivalent per week     Comment: occasional    Drug use: No    Sexual activity: Yes     Partners: Male   Other Topics Concern    None   Social History Narrative    [employer] and receiving        Diet:Unrestricted    Exercise:work    Seat belts:Sometimes     Social Determinants of Health     Financial Resource Strain:     Difficulty of Paying Living Expenses:    Food Insecurity:     Worried About Running Out of Food in the Last Year:     Ran Out of Food in the Last Year:    Transportation Needs:     Lack of Transportation (Medical):  Lack of Transportation (Non-Medical):    Physical Activity:     Days of Exercise per Week:     Minutes of Exercise per Session:    Stress:     Feeling of Stress :    Social Connections:     Frequency of Communication with Friends and Family:     Frequency of Social Gatherings with Friends and Family:     Attends Jew Services:     Active Member of Clubs or Organizations:     Attends Club or Organization Meetings:     Marital Status:    Intimate Partner Violence:     Fear of Current or Ex-Partner:     Emotionally Abused:     Physically Abused:     Sexually Abused:      Current Outpatient Medications   Medication Sig Dispense Refill    ibuprofen (ADVIL;MOTRIN) 800 MG tablet Take 1 tablet by mouth 3 times daily (with meals) 90 tablet 0    traZODone (DESYREL) 50 MG tablet Take 1-2 tablets by mouth nightly as needed for Sleep 60 tablet 11    pregabalin (LYRICA) 100 MG capsule TAKE 1 CAPSULE BY MOUTH TWICE DAILY 180 capsule 1    insulin glargine (LANTUS;BASAGLAR) 100 UNIT/ML injection pen Inject 5 Units into the skin nightly 5 pen 2    Insulin Pen Needle (ADVOCATE INSULIN PEN NEEDLES) 31G X 5 MM MISC 1 each by Does not apply route daily 100 each 3    aspirin EC 81 MG EC tablet Take 1 tablet by mouth daily (Patient not taking: Reported on 10/5/2021) 30 tablet 3    glucose blood VI test strips (GLUCOSE METER TEST) strip 1 each by In Vitro route daily As needed. 100 each 3    glucose monitoring kit (FREESTYLE) monitoring kit 1 kit by Does not apply route daily as needed (If blood sugars running high or low) 1 kit 0    MICROLET LANCETS MISC 1 each by Does not apply route daily 100 each 3     No current facility-administered medications for this visit. Allergies   Allergen Reactions    Paxil [Paroxetine Hcl]      Caused \"weird\" things to happen in his sleep         Review of Systems   Constitutional: Positive for activity change.  Negative for fatigue and fever.   HENT: Negative for sneezing, sore throat and voice change. Respiratory: Negative for cough, shortness of breath and wheezing. Cardiovascular: Negative for leg swelling. Gastrointestinal: Negative for nausea and vomiting. Musculoskeletal: Positive for arthralgias, joint swelling and myalgias. Negative for back pain, gait problem, neck pain and neck stiffness. Skin: Negative for color change, rash and wound. Neurological: Negative for weakness and numbness. Psychiatric/Behavioral: Negative for behavioral problems, confusion and self-injury. Examination:  General Exam:  Vitals: There were no vitals taken for this visit. Physical Exam  Constitutional:       General: He is not in acute distress. Appearance: Normal appearance. He is obese. HENT:      Head: Normocephalic and atraumatic. Eyes:      General:         Right eye: No discharge. Left eye: No discharge. Extraocular Movements: Extraocular movements intact. Cardiovascular:      Pulses: Normal pulses. Pulmonary:      Effort: Pulmonary effort is normal.      Breath sounds: Normal breath sounds. Musculoskeletal:         General: Swelling, tenderness and signs of injury present. No deformity. Cervical back: Normal range of motion. Right hip: Normal.      Left hip: Normal.      Right upper leg: Normal.      Left upper leg: Normal.      Left knee: Swelling, bony tenderness and crepitus present. No deformity, effusion, erythema, ecchymosis or lacerations. Normal range of motion. Tenderness present over the lateral joint line. No medial joint line, MCL, LCL, ACL, PCL or patellar tendon tenderness. No LCL laxity, MCL laxity, ACL laxity or PCL laxity. Normal alignment, normal meniscus and normal patellar mobility. Normal pulse. Instability Tests: Anterior drawer test negative. Posterior drawer test negative. Anterior Lachman test negative.  Medial Arlin test negative and lateral Arlin test negative. Right lower leg: Normal. No edema. Left lower leg: Normal. No edema. Right ankle: Normal.      Right Achilles Tendon: Normal.      Left ankle: Swelling present. No deformity, ecchymosis or lacerations. Tenderness present over the lateral malleolus. Normal range of motion. Anterior drawer test negative. Normal pulse. Left Achilles Tendon: Normal.      Right foot: Normal.      Left foot: Normal.   Skin:     Capillary Refill: Capillary refill takes less than 2 seconds. Neurological:      General: No focal deficit present. Mental Status: He is alert and oriented to person, place, and time. Mental status is at baseline. Gait: Gait normal.   Psychiatric:         Mood and Affect: Mood normal.         Behavior: Behavior normal.        LEFT KNEE EXAMINATION       OBSERVATION / INSPECTION     Gait: Mildly antalgic    Alignment: Neutral     Scars: None     Muscle atrophy: Mild    Effusion: None     Warmth: None     Discoloration: none       TENDERNESS / CREPITUS (T / C): Patella - / -     Lateral joint line + / -  Medial joint line  - / -     Peripatellar lateral - / -  Peripatellar medial  - / -     Medial plica - / -  Lateral plica - / -    Patellar tendon - / -   Prepatellar Bursa - / -     Popliteal fossa - / -    Gastrocnemius - / -    Quadricep - / -   Quad tendon - / -      Tibial tubercle - / -     Thigh/hamstring - / -  Pes anserine/HS - / -     ITB - / -     Tibia - / -   Fibula + / +    Tib/fib joint + / +     MFC - / -    LFC - / -     MCL: Proximal - / -  Distal - / -    LCL - / -    MCL - / -      ROM: (* = pain)  PASSIVE  ACTIVE     Left :    5 / 0 / 145  5 / 0 / 145      Right :    5 / 0 / 145  5 / 0 / 145      PATELLOFEMORAL EXAMINATION:    See above noted areas of tenderness.      Patella position     Subluxation / dislocation: Centered     Sup. / Inf; Normal     Crepitus (PF): Absent     Patellar Mobility:     Medial-lateral: Normal     Superior-inferior: Normal     Inferior tilt Normal     Patellar tendon: Normal     Lateral tilt: Normal     J-sign: None     Patellofemoral grind: No pain         MENISCAL SIGNS:     Pain on terminal extension: -    Pain on terminal flexion: -    Arlins maneuver: Negative    Squat: -     LIGAMENT EXAMINATION:    ACL / Lachman: normal (-1 to 2mm)      PCL-Post.  drawer: normal 0 to 2mm    MCL- Valgus: normal 0 to 2mm    LCL- Varus:  normal 0 to 2mm        STRENGTH: (* = with pain) PAINFUL SIDE    Quadricep 5/5    Hamstrin/5      EXTREMITY NEURO-VASCULAR EXAMINATION:     Sensation:  Grossly intact to light touch all dermatomal regions. Motor Function:  Fully intact motor function at hip, knee, foot and ankle      DTRs;  quadriceps and  achilles 2+. No clonus and downgoing Babinski. Vascular status:  DP and PT pulses 2+, brisk capillary refill, symmetric. LEFT Foot and Ankle Exam      INSPECTION: ALIGNMENT:    Gait: Antalgic      Alignment:     Hindfoot: Normal       Midfoot: Normal     Forefoot: Normal    Scars: None   Color: Normal     Swelling: Mild swelling over lateral malleolus as well as proximal to the ankle joint at the syndesmosis    Atrophy: None Collective     Heel / Toe Walking: Mildly difficult    Ankle-Hindfoot Alignment:    Good plantigrade (PG), well aligned      TENDERNESS:    Sinus tarsi: None   Anteromedial joint line: none    Syndesmosis: none   Anterolateral joint line: none    ATFL: -    Talonavicular: none     CFL: -     Anterior tibialis: none    Anterolateral gutter: none  Extensor tendons: none    Fibula: Positive at level of ankle joint and just proximal    Peroneal tendons: none     Peroneal tubercle.  None     Medial/lateral achilles: none    Medial/lateral achilles insertion: none      Deltoid: none        Malleolus: none   Retrocalcaneal: none    PTT: none    Medial achilles: none    Navicular: none   Lateral achilles: none    Calcaneal tuberosity: none            RANGE OF MOTION:  RIGHT   LEFT    STRENGTH: (Left) (* = pain)     Ankle DF/PF:    15/45    15/45    Anterior tibialis: 5/5    Eversion/Inversion:   15/25    15/25   Posterior tibialis: 5/5    Midfoot ABD/ADD:   10/10    10/10   Gastroc-soleus: 5/5    First MTP DF/PF:   60/25    60/25   Peroneals: 5/5     EHL: 5/5             FHL: 5/5        SPECIAL TESTS:    Anterior drawer:  Grade 1      (C-W contralateral side)     Inversion: 30°     Eversion 10°      Collective Instability: (Ant-post and varus-valgus)    Stable      PROVOCATIVE TESTING:    Forced DF/ER: Pain and syndesmosis. Mid-leg squeeze: Pain at syndesmosis    Forced DF: No pain anterior joint line. Forced PF: No pain posterior ankle. Forced INV: Pain present laterally    Forced EV: Pinna syndesmosis    Vasquezs sign: Normal ankle plantar flexion. Resisted peroneal No subluxation or pain        NEUROLOGIC TESTING:    All dermatomes foot, ankle and leg have normal sensation light touch    Ankle Reflexes 2+, symmetric     Negative Babinski and No Clonus      VASCULAR:  2+ pulses PT/DT with brisk capillary refill toes. Diagnostic testing:  X-ray images were reviewed by myself and discussed with the patient:  4 view of the left knee in a skeletally mature patient shows healing fibular neck fracture, spiral in nature with abundant callus formation. No significant displacement of fracture site. 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 No other osseous abnormalities. No significant arthritic changes. No sign of any soft tissue avulsion type injury. 4 view of a left ankle including stress view in a skeletally mature patient shows no acute osseous normalities. Patient does appear to have an ossicle versus os formation at the posterior malleolus. No sign of any soft tissue injury. Clear space and tib-fib overlap as well as more to space remain appropriate throughout all views.   No sign of any fracture or OCD injury. Office Procedures:  No orders of the defined types were placed in this encounter. Assessment and Plan    A: Left knee fibular neck fracture with Maisonneuve injury and syndesmotic injury of the left ankle. P:     I had a thorough discussion with the patient regarding his left knee and ankle pathology. I explained that the fracture is healing well at the fibular neck and should not be a long-term issue although it will cause knee pain for several weeks if not months. He should continue ice and over-the-counter anti-inflammatories for pain control. I did encourage range of motion exercises as well. I also explained that the nature of his knee injury is correlated to a syndesmotic injury of the ankle which he does display today. However, he does not gap on stress views and therefore we can treat this injury conservatively at the ankle. He was placed in a walking boot and we will protected weightbearing over next 2 weeks with progression to full weightbearing as tolerated in the boot by follow-up in 2 weeks. He will be made 25% today using crutches which was provided. He will attempt to increase by 25% daily as long as he remains pain-free. He will also be allowed to do gentle range of motion exercises of the ankle which I demonstrated for him in office today. He will also ice and elevate this area to help with swelling and pain. All questions were answered and patient will follow up in 2 weeks for reevaluation.     Electronically signed by Ck Gibbs DO on 10/20/2021 at 1:18 PM

## 2021-10-20 NOTE — PATIENT INSTRUCTIONS
Boot and crutches given today. Partial weight bearing for 1 week, then progress to full weight bearing a pain allows.   Follow up in 2 weeks

## 2021-10-20 NOTE — PROGRESS NOTES
Patient is a 41 year old male. Patient is in the office today with left knee, calf and ankle pain. Patient states that he/she injured themselves by turning while his foot was planted. He felt a pop and had immediate pain. Patient states that the injury happened about 3 weeks ago. Pain scale  7/10. His pain started on the lateral side of his lower leg but in now all over his left lower leg.  He denies any previous injuries, surgeries or injections to his left leg  Occupation: none

## 2021-11-03 ENCOUNTER — OFFICE VISIT (OUTPATIENT)
Dept: ORTHOPEDIC SURGERY | Age: 41
End: 2021-11-03
Payer: COMMERCIAL

## 2021-11-03 VITALS
OXYGEN SATURATION: 98 % | HEIGHT: 72 IN | HEART RATE: 103 BPM | BODY MASS INDEX: 31.02 KG/M2 | RESPIRATION RATE: 16 BRPM | WEIGHT: 229 LBS

## 2021-11-03 DIAGNOSIS — S82.862A CLOSED DISPLACED MAISONNEUVE FRACTURE OF LEFT LOWER EXTREMITY, INITIAL ENCOUNTER: Primary | ICD-10-CM

## 2021-11-03 PROCEDURE — 99213 OFFICE O/P EST LOW 20 MIN: CPT | Performed by: STUDENT IN AN ORGANIZED HEALTH CARE EDUCATION/TRAINING PROGRAM

## 2021-11-03 ASSESSMENT — ENCOUNTER SYMPTOMS
VOICE CHANGE: 0
WHEEZING: 0
SHORTNESS OF BREATH: 0
COUGH: 0
COLOR CHANGE: 0
VOMITING: 0
SORE THROAT: 0
NAUSEA: 0
BACK PAIN: 0

## 2021-11-03 NOTE — PROGRESS NOTES
11/3/2021   Chief Complaint   Patient presents with    Fracture     Closed displaced Maisonneuve fracture of left lower extremity        History of Present Illness:   Patient is here for reevaluation of his left lower extremity injury. He continues with pain around the fibular head but states this is minimally improving. He states his ankle pain has been improving as well. He states he has been compliant in the walking boot and nonweightbearing. He has no new injuries or complaints his last being seen. Previous HPI (10-):                     Tello Orellana is a 46 y.o. male currently not working,  referred by PCP for evaluation and treatment of left ankle and knee pain. This is evaluated as a personal injury. Patient states that the initial injury occurred approximately 3 weeks ago. He was turning on a planted left foot and felt a significant pop and painful sensation in the left knee. He states that he developed left ankle pain as well over the following several days. He denies any prior injury to either the knee or ankle. He did not seek immediate treatment but was seen by his primary care who recommended he be seen by orthopedist.  He is here today for his first visit with me. He has had no treatment for the left knee or ankle pain since then. He is not ambulating with any type of assistive device or brace. The pain's location is lateral left knee over the fibular head neck junction as well as the syndesmosis of the left ankle. he describes the symptoms as aching, sharp and stabbing. Symptoms improve with rest. Symptoms worsen with deep knee bending, getting up from a chair, weight bearing, sitting for prolonged periods of time, twisting activities. Patient denies prior injury to knee or ankle, denies numbness, tingling, fever, chills. Mild swelling over lateral knee at known fracture site but patient denies ankle swelling or effusions. No prominent mechanical symptoms.  Denies instability. Worse with prolonged ambulation or twisting activities. Better with rest,     Treatment thus far has included rest, activity modifications,  oral medications without significant relief. Here today to discuss diagnosis and treatment options. Is affecting ADLs. Pain is 7/10 at it's worst.    No advanced imaging thus far    Outside reports reviewed: none. MA HPI: Patient is a 41 year old male. Patient is in the office today with left knee, calf and ankle pain. Patient states that he/she injured themselves by turning while his foot was planted. He felt a pop and had immediate pain. Patient states that the injury happened about 3 weeks ago. Pain scale  7/10. His pain started on the lateral side of his lower leg but in now all over his left lower leg. He denies any previous injuries, surgeries or injections to his left leg  Occupation: none    Patient's medications, allergies, past medical, surgical, social and family histories were reviewed and updated as appropriate. Medical History  Patient's medications, allergies, past medical, surgical, social and family histories were reviewed and updated as appropriate. Past Medical History:   Diagnosis Date    Hypercholesterolemia 2/18/2019    Hypertension     Type 2 diabetes, controlled, with neuropathy ([facility] [address] 6/6/2018     No past surgical history on file. Family History   Problem Relation Age of Onset    High Blood Pressure Sister      Social History     Socioeconomic History    Marital status:      Spouse name: Not on file    Number of children: Not on file    Years of education: Not on file    Highest education level: Not on file   Occupational History    Not on file   Tobacco Use    Smoking status: Never Smoker    Smokeless tobacco: Never Used   Substance and Sexual Activity    Alcohol use:  Yes     Alcohol/week: 12.0 standard drinks     Types: 12 Standard drinks or equivalent per week     Comment: occasional    Drug use: No    Sexual activity: Yes     Partners: Male   Other Topics Concern    Not on file   Social History Narrative    [address] and receiving        Diet:Unrestricted    Exercise:work    Seat belts:Sometimes     Social Determinants of Health     Financial Resource Strain:     Difficulty of Paying Living Expenses:    Food Insecurity:     Worried About Running Out of Food in the Last Year:     [address] in the Last Year:    Transportation Needs:     Lack of Transportation (Medical):  Lack of Transportation (Non-Medical):    Physical Activity:     Days of Exercise per Week:     Minutes of Exercise per Session:    Stress:     Feeling of Stress :    Social Connections:     Frequency of Communication with Friends and Family:     Frequency of Social Gatherings with Friends and Family:     Attends [employer] Services:     Active Member of Clubs or Organizations:     Attends Club or Organization Meetings:     Marital Status:    Intimate Partner Violence:     Fear of Current or Ex-Partner:     Emotionally Abused:     Physically Abused:     Sexually Abused:      Current Outpatient Medications   Medication Sig Dispense Refill    ibuprofen (ADVIL;MOTRIN) 800 MG tablet Take 1 tablet by mouth 3 times daily (with meals) 90 tablet 0    traZODone (DESYREL) 50 MG tablet Take 1-2 tablets by mouth nightly as needed for Sleep 60 tablet 11    pregabalin (LYRICA) 100 MG capsule TAKE 1 CAPSULE BY MOUTH TWICE DAILY 180 capsule 1    insulin glargine (LANTUS;BASAGLAR) 100 UNIT/ML injection pen Inject 5 Units into the skin nightly 5 pen 2    Insulin Pen Needle (ADVOCATE INSULIN PEN NEEDLES) 31G X 5 MM MISC 1 each by Does not apply route daily 100 each 3    aspirin EC 81 MG EC tablet Take 1 tablet by mouth daily (Patient not taking: Reported on 10/5/2021) 30 tablet 3    glucose blood VI test strips (GLUCOSE METER TEST) strip 1 each by In Vitro route daily As needed.  100 each 3    glucose monitoring kit (FREESTYLE) monitoring kit 1 kit by Does not apply route daily as needed (If blood sugars running high or low) 1 kit 0    MICROLET LANCETS MISC 1 each by Does not apply route daily 100 each 3     No current facility-administered medications for this visit. Allergies   Allergen Reactions    Paxil [Paroxetine Hcl]      Caused \"weird\" things to happen in his sleep         Review of Systems   Constitutional: Positive for activity change. Negative for fatigue and fever. HENT: Negative for sneezing, sore throat and voice change. Respiratory: Negative for cough, shortness of breath and wheezing. Cardiovascular: Negative for leg swelling. Gastrointestinal: Negative for nausea and vomiting. Musculoskeletal: Positive for arthralgias, joint swelling and myalgias. Negative for back pain, gait problem, neck pain and neck stiffness. Skin: Negative for color change, rash and wound. Neurological: Negative for weakness and numbness. Psychiatric/Behavioral: Negative for behavioral problems, confusion and self-injury. Examination:  General Exam:  Vitals: There were no vitals taken for this visit. Physical Exam  Constitutional:       General: He is not in acute distress. Appearance: Normal appearance. He is obese. HENT:      Head: Normocephalic and atraumatic. Eyes:      General:         Right eye: No discharge. Left eye: No discharge. Extraocular Movements: Extraocular movements intact. Cardiovascular:      Pulses: Normal pulses. Pulmonary:      Effort: Pulmonary effort is normal.      Breath sounds: Normal breath sounds. Musculoskeletal:         General: Swelling, tenderness and signs of injury present. No deformity. Cervical back: Normal range of motion.       Right hip: Normal.      Left hip: Normal.      Right upper leg: Normal.      Left upper leg: Normal.      Left knee: Swelling, bony tenderness and crepitus present. No deformity, effusion, erythema, ecchymosis or lacerations. Normal range of motion. Tenderness present over the lateral joint line. No medial joint line, MCL, LCL, ACL, PCL or patellar tendon tenderness. No LCL laxity, MCL laxity, ACL laxity or PCL laxity. Normal alignment, normal meniscus and normal patellar mobility. Normal pulse. Instability Tests: Anterior drawer test negative. Posterior drawer test negative. Anterior Lachman test negative. Medial Arlin test negative and lateral Arlin test negative. Right lower leg: Normal. No edema. Left lower leg: Normal. No edema. Right ankle: Normal.      Right Achilles Tendon: Normal.      Left ankle: Swelling present. No deformity, ecchymosis or lacerations. Tenderness present over the lateral malleolus. Normal range of motion. Anterior drawer test negative. Normal pulse. Left Achilles Tendon: Normal.      Right foot: Normal.      Left foot: Normal.   Skin:     Capillary Refill: Capillary refill takes less than 2 seconds. Neurological:      General: No focal deficit present. Mental Status: He is alert and oriented to person, place, and time. Mental status is at baseline. Gait: Gait normal.   Psychiatric:         Mood and Affect: Mood normal.         Behavior: Behavior normal.        LEFT KNEE EXAMINATION       OBSERVATION / INSPECTION     Gait: Mildly antalgic    Alignment: Neutral     Scars: None     Muscle atrophy: Mild    Effusion: None     Warmth: None     Discoloration: none       TENDERNESS / CREPITUS (T / C):       Patella - / -     Lateral joint line + / -  Medial joint line  - / -     Peripatellar lateral - / -  Peripatellar medial  - / -     Medial plica - / -  Lateral plica - / -    Patellar tendon - / -   Prepatellar Bursa - / -     Popliteal fossa - / -    Gastrocnemius - / -    Quadricep - / -   Quad tendon - / -      Tibial tubercle - / -     Thigh/hamstring - / -  Pes anserine/HS - / -     ITB - / - Tibia - / -   Fibula + / +    Tib/fib joint + / +     MFC - / -    LFC - / -     MCL: Proximal - / -  Distal - / -    LCL - / -    MCL - / -      ROM: (* = pain)  PASSIVE  ACTIVE     Left :    5 / 0 / 145  5 / 0 / 145      Right :    5 / 0 / 145  5 / 0 / 145      PATELLOFEMORAL EXAMINATION:    See above noted areas of tenderness. Patella position     Subluxation / dislocation: Centered     Sup. / Inf; Normal     Crepitus (PF): Absent     Patellar Mobility:     Medial-lateral: Normal     Superior-inferior: Normal     Inferior tilt Normal     Patellar tendon: Normal     Lateral tilt: Normal     J-sign: None     Patellofemoral grind: No pain         MENISCAL SIGNS:     Pain on terminal extension: -    Pain on terminal flexion: -    Arlins maneuver: Negative    Squat: -     LIGAMENT EXAMINATION:    ACL / Lachman: normal (-1 to 2mm)      PCL-Post.  drawer: normal 0 to 2mm    MCL- Valgus: normal 0 to 2mm    LCL- Varus:  normal 0 to 2mm        STRENGTH: (* = with pain) PAINFUL SIDE    Quadricep 5/5    Hamstrin/5      EXTREMITY NEURO-VASCULAR EXAMINATION:     Sensation:  Grossly intact to light touch all dermatomal regions. Motor Function:  Fully intact motor function at hip, knee, foot and ankle      DTRs;  quadriceps and  achilles 2+. No clonus and downgoing Babinski. Vascular status:  DP and PT pulses 2+, brisk capillary refill, symmetric.              LEFT Foot and Ankle Exam      INSPECTION: ALIGNMENT:    Gait: Antalgic      Alignment:     Hindfoot: Normal       Midfoot: Normal     Forefoot: Normal    Scars: None   Color: Normal     Swelling: Mild swelling over lateral malleolus as well as proximal to the ankle joint at the syndesmosis    Atrophy: None Collective     Heel / Toe Walking: Mildly difficult    Ankle-Hindfoot Alignment:    Good plantigrade (PG), well aligned      TENDERNESS:    Sinus tarsi: None   Anteromedial joint line: none    Syndesmosis: none   Anterolateral joint line: none    ATFL: -    Talonavicular: none     CFL: -     Anterior tibialis: none    Anterolateral gutter: none  Extensor tendons: none    Fibula: Positive at level of ankle joint and just proximal    Peroneal tendons: none     Peroneal tubercle. None     Medial/lateral achilles: none    Medial/lateral achilles insertion: none      Deltoid: none        Malleolus: none   Retrocalcaneal: none    PTT: none    Medial achilles: none    Navicular: none   Lateral achilles: none    Calcaneal tuberosity: none            RANGE OF MOTION:  RIGHT   LEFT    STRENGTH: (Left) (* = pain)     Ankle DF/PF:    15/45    15/45    Anterior tibialis: 5/5    Eversion/Inversion:   15/25    15/25   Posterior tibialis: 5/5    Midfoot ABD/ADD:   10/10    10/10   Gastroc-soleus: 5/5    First MTP DF/PF:   60/25 60/25   Peroneals: 5/5     EHL: 5/5             FHL: 5/5        SPECIAL TESTS:    Anterior drawer:  Grade 1      (C-W contralateral side)     Inversion: 30°     Eversion 10°      Collective Instability: (Ant-post and varus-valgus)    Stable      PROVOCATIVE TESTING:    Forced DF/ER: Pain and syndesmosis. Improved    Mid-leg squeeze: Pain at syndesmosis improved    Forced DF: No pain anterior joint line. Forced PF: No pain posterior ankle. Forced INV: Pain present laterally    Forced EV: Pinna syndesmosis    Vasquezs sign: Normal ankle plantar flexion. Resisted peroneal No subluxation or pain        NEUROLOGIC TESTING:    All dermatomes foot, ankle and leg have normal sensation light touch    Ankle Reflexes 2+, symmetric     Negative Babinski and No Clonus      VASCULAR:  2+ pulses PT/DT with brisk capillary refill toes. Diagnostic testing:  X-ray images were reviewed by myself and discussed with the patient:  2V left tib-fib in a skeletally mature patient shows continued healing of fibular neck fracture, spiral in nature with abundant callus formation. No change in alignment from previous imaging.   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 No other osseous abnormalities. No significant arthritic changes. No sign of any soft tissue avulsion type injury. 3 view of a left ankle including stress view in a skeletally mature patient shows no acute osseous normalities. Minor calcification at the deltoid ligament from likely prior injury. No sign of any soft tissue injury. Clear space and tib-fib overlap as well as more to space remain appropriate throughout all views. No sign of any fracture or OCD injury. Office Procedures:  No orders of the defined types were placed in this encounter. Assessment and Plan    A: Left knee fibular neck fracture with Maisonneuve injury and syndesmotic injury of the left ankle. P:   I had a thorough discussion with the patient regarding his left ankle and tib-fib x-rays as well as his treatment course. I explained he can progress his weightbearing by 25% as tolerated. He will begin at 25% and if he remains nonpainful he can progress to 50% and then 75% and then 100% and thereafter. I explained that he should stay at each level of weightbearing for at least 2 days to ensure that he is not having pain. A home exercise program was provided today. Patient will follow-up in 4 weeks for new x-rays and possible out of the boot at that time. He should continue to ice and elevate for pain control use over-the-counter anti-inflammatories. All questions were answered and patient voiced understanding.     Electronically signed by Elisabeth Monroe DO on 11/3/2021 at 1:27 PM

## 2021-11-03 NOTE — PATIENT INSTRUCTIONS
Continue progression to full weight bearing as pain allows. Follow up in 4 weeks. Ankle exercises given today. Patient Education        Ankle: Exercises  Introduction  Here are some examples of exercises for you to try. The exercises may be suggested for a condition or for rehabilitation. Start each exercise slowly. Ease off the exercises if you start to have pain. You will be told when to start these exercises and which ones will work best for you. How to do the exercises  'Alphabet' exercise    1. Trace the alphabet with your toe. This helps your ankle move in all directions. Side-to-side knee swing exercise    1. Sit in a chair with your foot flat on the floor. 2. Slowly move your knee from side to side while keeping your foot pressed flat. 3. Continue this exercise for 2 to 3 minutes. Towel curl    1. While sitting, place your foot on a towel on the floor and scrunch the towel toward you with your toes. 2. Then use your toes to push the towel away from you. 3. Make this exercise more challenging by placing a weighted object, such as a soup can, on the other end of the towel. Towel stretch    1. Sit with your legs extended and knees straight. 2. Place a towel around your foot just under the toes. 3. Hold each end of the towel in each hand, with your hands above your knees. 4. Pull back with the towel so that your foot stretches toward you. 5. Hold the position for at least 15 to 30 seconds. 6. Repeat 2 to 4 times a session, up to 5 sessions a day. Ankle eversion exercise    1. Start by sitting with your foot flat on the floor and pushing it outward against an immovable object such as the wall or heavy furniture. Hold for about 6 seconds, then relax. Repeat 8 to 12 times. 2. After you feel comfortable with this, try using rubber tubing looped around the outside of your feet for resistance.  Push your foot out to the side against the tubing, and then count to 10 as you slowly bring your foot back to the middle. Repeat 8 to 12 times. Isometric opposition exercises    1. While sitting, put your feet together flat on the floor. 2. Press your injured foot inward against your other foot. Hold for about 6 seconds, and relax. Repeat 8 to 12 times. 3. Then place the heel of your other foot on top of the injured one. Push down with the top heel while trying to push up with your injured foot. Hold for about 6 seconds, and relax. Repeat 8 to 12 times. Follow-up care is a key part of your treatment and safety. Be sure to make and go to all appointments, and call your doctor if you are having problems. It's also a good idea to know your test results and keep a list of the medicines you take. Where can you learn more? Go to https://[email or web address].[employer]. org and sign in to your [employer] account. Enter Q302 in the LearnVest box to learn more about \"Ankle: Exercises. \"     If you do not have an account, please click on the \"Sign Up Now\" link. Current as of: July 1, 2021               Content Version: 13.0  © 4904-8545 [employer], [employer]. Care instructions adapted under license by [facility] ([facility]). If you have questions about a medical condition or this instruction, always ask your healthcare professional. [address] any warranty or liability for your use of this information.

## 2021-11-03 NOTE — PROGRESS NOTES
Patient comes in today for a follow up of a left fibula fracture and syndesmosis injury. He rates his pain at 7-8/10 today. He states that his pain is constant. He denies any numbness or tingling. He has developed pain on the medial side of his left ankle and foot. Denies any new falls or injuries since his last visit on 10/20/21. He states that his left leg feels the same. He feels less pain with boot on.    Occupation: none

## 2021-11-22 ENCOUNTER — TELEPHONE (OUTPATIENT)
Dept: INTERNAL MEDICINE CLINIC | Age: 41
End: 2021-11-22

## 2021-11-22 DIAGNOSIS — M79.662 PAIN OF LEFT CALF: ICD-10-CM

## 2021-11-22 RX ORDER — IBUPROFEN 800 MG/1
800 TABLET ORAL
Qty: 90 TABLET | Refills: 0 | Status: SHIPPED | OUTPATIENT
Start: 2021-11-22 | End: 2022-02-07

## 2021-11-22 NOTE — TELEPHONE ENCOUNTER
----- Message from Thang Adamson sent at 11/22/2021  4:27 PM EST -----  Subject: Refill Request    QUESTIONS  Name of Medication? ibuprofen (ADVIL;MOTRIN) 800 MG tablet  Patient-reported dosage and instructions? Take 1 tablet by mouth 3 times   daily (with meals)  How many days do you have left? 0  Preferred Pharmacy? [id number]  Pharmacy phone number (if available)? [phone]  Additional Information for Provider? Patient needs a refill please.  ---------------------------------------------------------------------------  --------------  CALL BACK INFO  What is the best way for the office to contact you? OK to leave message on   voicemail  Preferred Call Back Phone Number?  [phone]

## 2021-11-22 NOTE — TELEPHONE ENCOUNTER
----- Message from Joe Cline sent at 11/22/2021  2:58 PM EST -----  Subject: Message to Provider    QUESTIONS  Information for Provider? PT looking for medication to help with leg pain   from broken leg.  ---------------------------------------------------------------------------  --------------  CALL BACK INFO  What is the best way for the office to contact you? OK to leave message on   voicemail  Preferred Call Back Phone Number? [phone]  ---------------------------------------------------------------------------  --------------  SCRIPT ANSWERS  Relationship to Patient?  Self

## 2021-11-23 ENCOUNTER — TELEPHONE (OUTPATIENT)
Dept: ORTHOPEDIC SURGERY | Age: 41
End: 2021-11-23

## 2021-11-23 ENCOUNTER — TELEPHONE (OUTPATIENT)
Dept: INTERNAL MEDICINE CLINIC | Age: 41
End: 2021-11-23

## 2021-11-23 DIAGNOSIS — S82.862A CLOSED DISPLACED MAISONNEUVE FRACTURE OF LEFT LOWER EXTREMITY, INITIAL ENCOUNTER: Primary | ICD-10-CM

## 2021-11-23 RX ORDER — OXYCODONE HYDROCHLORIDE AND ACETAMINOPHEN 5; 325 MG/1; MG/1
1 TABLET ORAL EVERY 6 HOURS PRN
Qty: 20 TABLET | Refills: 0 | Status: SHIPPED | OUTPATIENT
Start: 2021-11-23 | End: 2021-11-28

## 2021-11-23 NOTE — TELEPHONE ENCOUNTER
Please advise patient wants something called in stronger than ibuprofen to his pharmacy. Patient states that he has taken another fall over the weekend on Sunday. Patient took his boot off and went to tried to get into bed. Patient states that he slipped and fell and felt a pop in the ankle. Please advise     Looked at other Telephone encounter that the patient can not take Tramadol due to a urinary issues.

## 2021-11-23 NOTE — TELEPHONE ENCOUNTER
----- Message from Valery Laurent sent at 11/22/2021  4:30 PM EST -----  Subject: Medication Problem    QUESTIONS  Name of Medication? traMADol (ULTRAM) 50 MG tablet  Patient-reported dosage and instructions? Take 1 tablet by mouth every 6   hours as needed for Pain for up to 7 days. Intended supply? 7 days. Take   lowest dose possible to manage pain  What question or problem do you have with the medication? Patient said   that he did not like this medication, could not control his urination, wet   himself 3 times and has thrown them away so he does need another pain pill   please besides the Ibuprofen. Preferred Pharmacy? [facility] [address] [place] [address], 1 [phone]  Pharmacy phone number (if available)? [phone]  Additional Information for Provider?   ---------------------------------------------------------------------------  --------------  [address]  What is the best way for the office to contact you? OK to leave message on   voicemail  Preferred Call Back Phone Number? [phone]  ---------------------------------------------------------------------------  --------------  SCRIPT ANSWERS  Relationship to Patient?  Self

## 2021-11-23 NOTE — TELEPHONE ENCOUNTER
Percocet prescription provided. Patient should keep his follow-up appointment. He should continue to ice and use the boot and he should use his crutches as needed to limit his weightbearing if his pain occurs when ambulating.

## 2021-11-23 NOTE — TELEPHONE ENCOUNTER
Pt stated that he does not see the orthopedist until December 1st and he said he had another fall over the weekend and is in so much pain .

## 2021-12-01 ENCOUNTER — OFFICE VISIT (OUTPATIENT)
Dept: ORTHOPEDIC SURGERY | Age: 41
End: 2021-12-01
Payer: COMMERCIAL

## 2021-12-01 VITALS
BODY MASS INDEX: 31.02 KG/M2 | HEART RATE: 90 BPM | OXYGEN SATURATION: 98 % | HEIGHT: 72 IN | WEIGHT: 229 LBS | RESPIRATION RATE: 17 BRPM

## 2021-12-01 DIAGNOSIS — S93.432A ANKLE SYNDESMOSIS DISRUPTION, LEFT, INITIAL ENCOUNTER: Primary | ICD-10-CM

## 2021-12-01 PROCEDURE — 99213 OFFICE O/P EST LOW 20 MIN: CPT | Performed by: STUDENT IN AN ORGANIZED HEALTH CARE EDUCATION/TRAINING PROGRAM

## 2021-12-01 ASSESSMENT — ENCOUNTER SYMPTOMS
NAUSEA: 0
COLOR CHANGE: 0
COUGH: 0
SORE THROAT: 0
BACK PAIN: 0
VOMITING: 0
SHORTNESS OF BREATH: 0
WHEEZING: 0
VOICE CHANGE: 0

## 2021-12-01 NOTE — PATIENT INSTRUCTIONS
Continue wearing the boot  Ice and elevate as needed. Tylenol or Motrin for pain. MRI ordered. Central scheduling will call to schedule the MRI.  [phone]  Follow up for MRI results

## 2021-12-01 NOTE — PROGRESS NOTES
12/1/2021   Chief Complaint   Patient presents with    Ankle Injury     Left       Update HPI: Patient is here for reevaluation of his left ankle. He states that he was doing well but then had a tripping and twisting injury to the left ankle several days prior. He states that he has had increased pain since then at the distal fibula as well as at the syndesmosis. He also has pain at the Achilles insertion. No other complaints at this time. Previous HPI (11-3-2021): Patient is here for reevaluation of his left lower extremity injury. He continues with pain around the fibular head but states this is minimally improving. He states his ankle pain has been improving as well. He states he has been compliant in the walking boot and nonweightbearing. He has no new injuries or complaints his last being seen. Previous HPI (10-):                     Tawana Martinez is a 46 y.o. male currently not working,  referred by PCP for evaluation and treatment of left ankle and knee pain. This is evaluated as a personal injury. Patient states that the initial injury occurred approximately 3 weeks ago. He was turning on a planted left foot and felt a significant pop and painful sensation in the left knee. He states that he developed left ankle pain as well over the following several days. He denies any prior injury to either the knee or ankle. He did not seek immediate treatment but was seen by his primary care who recommended he be seen by orthopedist.  He is here today for his first visit with me. He has had no treatment for the left knee or ankle pain since then. He is not ambulating with any type of assistive device or brace. The pain's location is lateral left knee over the fibular head neck junction as well as the syndesmosis of the left ankle. he describes the symptoms as aching, sharp and stabbing.  Symptoms improve with rest. Symptoms worsen with deep knee bending, getting up from a chair, weight bearing, sitting for prolonged periods of time, twisting activities. Patient denies prior injury to knee or ankle, denies numbness, tingling, fever, chills. Mild swelling over lateral knee at known fracture site but patient denies ankle swelling or effusions. No prominent mechanical symptoms. Denies instability. Worse with prolonged ambulation or twisting activities. Better with rest,     Treatment thus far has included rest, activity modifications,  oral medications without significant relief. Here today to discuss diagnosis and treatment options. Is affecting ADLs. Pain is 7/10 at it's worst.    No advanced imaging thus far    Outside reports reviewed: none. MA HPI: Patient is a 41 year old male. Patient is in the office today with left knee, calf and ankle pain. Patient states that he/she injured themselves by turning while his foot was planted. He felt a pop and had immediate pain. Patient states that the injury happened about 3 weeks ago. Pain scale  7/10. His pain started on the lateral side of his lower leg but in now all over his left lower leg. He denies any previous injuries, surgeries or injections to his left leg  Occupation: none    Patient's medications, allergies, past medical, surgical, social and family histories were reviewed and updated as appropriate. Medical History  Patient's medications, allergies, past medical, surgical, social and family histories were reviewed and updated as appropriate. Past Medical History:   Diagnosis Date    Hypercholesterolemia 2/18/2019    Hypertension     Type 2 diabetes, controlled, with neuropathy ([facility] [address] 6/6/2018     No past surgical history on file.   Family History   Problem Relation Age of Onset    High Blood Pressure Sister      Social History     Socioeconomic History    Marital status:      Spouse name: None    Number of children: None    Years of education: None    Highest education level: None   Occupational History    None   Tobacco Use    Smoking status: Never Smoker    Smokeless tobacco: Never Used   Substance and Sexual Activity    Alcohol use: Yes     Alcohol/week: 12.0 standard drinks     Types: 12 Standard drinks or equivalent per week     Comment: occasional    Drug use: No    Sexual activity: Yes     Partners: Male   Other Topics Concern    None   Social History Narrative    [employer] and receiving        Diet:Unrestricted    Exercise:work    Seat belts:Sometimes     Social Determinants of Health     Financial Resource Strain:     Difficulty of Paying Living Expenses: Not on file   Food Insecurity:     Worried About Running Out of Food in the Last Year: Not on file    Desmond of Food in the Last Year: Not on file   Transportation Needs:     Lack of Transportation (Medical): Not on file    Lack of Transportation (Non-Medical):  Not on file   Physical Activity:     Days of Exercise per Week: Not on file    Minutes of Exercise per Session: Not on file   Stress:     Feeling of Stress : Not on file   Social Connections:     Frequency of Communication with Friends and Family: Not on file    Frequency of Social Gatherings with Friends and Family: Not on file    Attends [employer] Services: Not on file    Active Member of [address] [place] [address] [employer] or Organizations: Not on file    Attends Club or Organization Meetings: Not on file    Marital Status: Not on file   Intimate Partner Violence:     Fear of Current or Ex-Partner: Not on file    Emotionally Abused: Not on file    Physically Abused: Not on file    Sexually Abused: Not on file   Housing Stability:     Unable to Pay for Housing in the Last Year: Not on file    Number of [place] in the Last Year: Not on file    Unstable Housing in the Last Year: Not on file     Current Outpatient Medications   Medication Sig Dispense Refill    ibuprofen (ADVIL;MOTRIN) 800 MG tablet Take 1 tablet by mouth 3 times daily (with meals) 90 tablet 0  traZODone (DESYREL) 50 MG tablet Take 1-2 tablets by mouth nightly as needed for Sleep 60 tablet 11    pregabalin (LYRICA) 100 MG capsule TAKE 1 CAPSULE BY MOUTH TWICE DAILY 180 capsule 1    insulin glargine (LANTUS;BASAGLAR) 100 UNIT/ML injection pen Inject 5 Units into the skin nightly 5 pen 2    Insulin Pen Needle (ADVOCATE INSULIN PEN NEEDLES) 31G X 5 MM MISC 1 each by Does not apply route daily 100 each 3    aspirin EC 81 MG EC tablet Take 1 tablet by mouth daily 30 tablet 3    glucose blood VI test strips (GLUCOSE METER TEST) strip 1 each by In Vitro route daily As needed. 100 each 3    glucose monitoring kit (FREESTYLE) monitoring kit 1 kit by Does not apply route daily as needed (If blood sugars running high or low) 1 kit 0    MICROLET LANCETS MISC 1 each by Does not apply route daily 100 each 3     No current facility-administered medications for this visit. Allergies   Allergen Reactions    Paxil [Paroxetine Hcl]      Caused \"weird\" things to happen in his sleep         Review of Systems   Constitutional: Positive for activity change. Negative for fatigue and fever. HENT: Negative for sneezing, sore throat and voice change. Respiratory: Negative for cough, shortness of breath and wheezing. Cardiovascular: Negative for leg swelling. Gastrointestinal: Negative for nausea and vomiting. Musculoskeletal: Positive for arthralgias, joint swelling and myalgias. Negative for back pain, gait problem, neck pain and neck stiffness. Skin: Negative for color change, rash and wound. Neurological: Negative for weakness and numbness. Psychiatric/Behavioral: Negative for behavioral problems, confusion and self-injury. Examination:  General Exam:  Vitals: Resp 17   Ht 6' (1.829 m)   Wt 229 lb (103.9 kg)   BMI 31.06 kg/m²    Physical Exam  Constitutional:       General: He is not in acute distress. Appearance: Normal appearance.  He is obese.   HENT:      Head: Normocephalic and atraumatic. Eyes:      General:         Right eye: No discharge. Left eye: No discharge. Extraocular Movements: Extraocular movements intact. Cardiovascular:      Pulses: Normal pulses. Pulmonary:      Effort: Pulmonary effort is normal.      Breath sounds: Normal breath sounds. Musculoskeletal:         General: Swelling, tenderness and signs of injury present. No deformity. Cervical back: Normal range of motion. Right hip: Normal.      Left hip: Normal.      Right upper leg: Normal.      Left upper leg: Normal.      Left knee: Swelling, bony tenderness and crepitus present. No deformity, effusion, erythema, ecchymosis or lacerations. Normal range of motion. Tenderness present over the lateral joint line. No medial joint line, MCL, LCL, ACL, PCL or patellar tendon tenderness. No LCL laxity, MCL laxity, ACL laxity or PCL laxity. Normal alignment, normal meniscus and normal patellar mobility. Normal pulse. Instability Tests: Anterior drawer test negative. Posterior drawer test negative. Anterior Lachman test negative. Medial Arlin test negative and lateral Arlin test negative. Right lower leg: Normal. No edema. Left lower leg: Normal. No edema. Right ankle: Normal.      Right Achilles Tendon: Normal.      Left ankle: Swelling present. No deformity, ecchymosis or lacerations. Tenderness present over the lateral malleolus. Normal range of motion. Anterior drawer test negative. Normal pulse. Left Achilles Tendon: Normal.      Right foot: Normal.      Left foot: Normal.   Skin:     Capillary Refill: Capillary refill takes less than 2 seconds. Neurological:      General: No focal deficit present. Mental Status: He is alert and oriented to person, place, and time. Mental status is at baseline.       Gait: Gait normal.   Psychiatric:         Mood and Affect: Mood normal.         Behavior: Behavior normal. LEFT KNEE EXAMINATION       OBSERVATION / INSPECTION     Gait: Mildly antalgic    Alignment: Neutral     Scars: None     Muscle atrophy: Mild    Effusion: None     Warmth: None     Discoloration: none       TENDERNESS / CREPITUS (T / C): Patella - / -     Lateral joint line + / -  Medial joint line  - / -     Peripatellar lateral - / -  Peripatellar medial  - / -     Medial plica - / -  Lateral plica - / -    Patellar tendon - / -   Prepatellar Bursa - / -     Popliteal fossa - / -    Gastrocnemius - / -    Quadricep - / -   Quad tendon - / -      Tibial tubercle - / -     Thigh/hamstring - / -  Pes anserine/HS - / -     ITB - / -     Tibia - / -   Fibula + / +    Tib/fib joint + / +     MFC - / -    LFC - / -     MCL: Proximal - / -  Distal - / -    LCL - / -    MCL - / -      ROM: (* = pain)  PASSIVE  ACTIVE     Left :    5 / 0 / 145  5 / 0 / 145      Right :    5 / 0 / 145  5 / 0 / 145      PATELLOFEMORAL EXAMINATION:    See above noted areas of tenderness. Patella position     Subluxation / dislocation: Centered     Sup. / Inf; Normal     Crepitus (PF): Absent     Patellar Mobility:     Medial-lateral: Normal     Superior-inferior: Normal     Inferior tilt Normal     Patellar tendon: Normal     Lateral tilt: Normal     J-sign: None     Patellofemoral grind: No pain         MENISCAL SIGNS:     Pain on terminal extension: -    Pain on terminal flexion: -    Arlins maneuver: Negative    Squat: -     LIGAMENT EXAMINATION:    ACL / Lachman: normal (-1 to 2mm)      PCL-Post.  drawer: normal 0 to 2mm    MCL- Valgus: normal 0 to 2mm    LCL- Varus:  normal 0 to 2mm        STRENGTH: (* = with pain) PAINFUL SIDE    Quadricep 5/5    Hamstrin/5      EXTREMITY NEURO-VASCULAR EXAMINATION:     Sensation:  Grossly intact to light touch all dermatomal regions. Motor Function:  Fully intact motor function at hip, knee, foot and ankle      DTRs;  quadriceps and  achilles 2+.   No clonus and downgoing Babinski. Vascular status:  DP and PT pulses 2+, brisk capillary refill, symmetric. LEFT Foot and Ankle Exam      INSPECTION: ALIGNMENT:    Gait: Antalgic      Alignment:     Hindfoot: Normal       Midfoot: Normal     Forefoot: Normal    Scars: None   Color: Normal     Swelling: Mild swelling over lateral malleolus as well as proximal to the ankle joint at the syndesmosis    Atrophy: None Collective     Heel / Toe Walking: Mildly difficult    Ankle-Hindfoot Alignment:    Good plantigrade (PG), well aligned      TENDERNESS:    Sinus tarsi: None   Anteromedial joint line: none    Syndesmosis: Positive  anterolateral joint line: none    ATFL: Positive   talonavicular: none     CFL: Positive   anterior tibialis: none    Anterolateral gutter: none  Extensor tendons: none    Fibula: Positive at level of ankle joint and just proximal    Peroneal tendons: none     Peroneal tubercle. None     Medial/lateral achilles: Positive    Medial/lateral achilles insertion: Positive without defect      Deltoid: none        Malleolus: none   Retrocalcaneal: none    PTT: none    Medial achilles: Positive    Navicular: none   Lateral achilles: Positive    Calcaneal tuberosity: none            RANGE OF MOTION:  RIGHT   LEFT    STRENGTH: (Left) (* = pain)     Ankle DF/PF:    15/45    15/45    Anterior tibialis: 5/5    Eversion/Inversion:   15/25    15/25   Posterior tibialis: 5/5    Midfoot ABD/ADD:   10/10    10/10   Gastroc-soleus: 5/5    First MTP DF/PF:   60/25    60/25   Peroneals: 5/5     EHL: 5/5             FHL: 5/5        SPECIAL TESTS:    Anterior drawer:  Grade 1      (C-W contralateral side)     Inversion: 30°     Eversion 10°      Collective Instability: (Ant-post and varus-valgus)    Stable      PROVOCATIVE TESTING:    Forced DF/ER: Pain and syndesmosis worsened    Mid-leg squeeze: Pain at syndesmosis worsened    Forced DF: No pain anterior joint line. Forced PF: No pain posterior ankle.      Forced INV: Pain present laterally    Forced EV: Pinna syndesmosis    Vasquezs sign: Normal ankle plantar flexion. Resisted peroneal No subluxation or pain        NEUROLOGIC TESTING:    All dermatomes foot, ankle and leg have normal sensation light touch    Ankle Reflexes 2+, symmetric     Negative Babinski and No Clonus      VASCULAR:  2+ pulses PT/DT with brisk capillary refill toes. Diagnostic testing:  New imaginV left tib-fib in a skeletally mature patient shows continued healing of fibular neck fracture, spiral in nature with abundant callus formation. No change in alignment from previous imaging. No change in tib-fib overlap or clear space at ankle. 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 No other osseous abnormalities. No significant arthritic changes. No sign of any soft tissue avulsion type injury. Previous imaging  X-ray images were reviewed by myself and discussed with the patient:  2V left tib-fib in a skeletally mature patient shows continued healing of fibular neck fracture, spiral in nature with abundant callus formation. No change in alignment from previous imaging. 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 No other osseous abnormalities. No significant arthritic changes. No sign of any soft tissue avulsion type injury. 3 view of a left ankle including stress view in a skeletally mature patient shows no acute osseous normalities. Minor calcification at the deltoid ligament from likely prior injury. No sign of any soft tissue injury. Clear space and tib-fib overlap as well as more to space remain appropriate throughout all views. No sign of any fracture or OCD injury. Office Procedures:  No orders of the defined types were placed in this encounter. Assessment and Plan    A: Left knee fibular neck fracture with Maisonneuve injury and syndesmotic injury of the left ankle.     P:   I had a thorough discussion

## 2021-12-01 NOTE — PROGRESS NOTES
Patient returns to the office with a left fibula fx. Pt states he does not feel like the pain is decreasing and is increasing. Pt states he is feeling the pain on the proximal aspect of his fibula and it is now radiating to his heel. Pt presents using the boot and crutches. Pt states he has been wearing the boot all day and resting to help with the pain. Pt states he has been icing as he needs and was prescribed percocets but medication was to strong so he discontinued.

## 2022-02-07 ENCOUNTER — OFFICE VISIT (OUTPATIENT)
Dept: INTERNAL MEDICINE CLINIC | Age: 42
End: 2022-02-07
Payer: COMMERCIAL

## 2022-02-07 VITALS
RESPIRATION RATE: 18 BRPM | OXYGEN SATURATION: 98 % | SYSTOLIC BLOOD PRESSURE: 164 MMHG | DIASTOLIC BLOOD PRESSURE: 92 MMHG | BODY MASS INDEX: 34.31 KG/M2 | HEART RATE: 109 BPM | WEIGHT: 253 LBS

## 2022-02-07 DIAGNOSIS — L97.521 DIABETIC ULCER OF TOE OF LEFT FOOT ASSOCIATED WITH TYPE 2 DIABETES MELLITUS, LIMITED TO BREAKDOWN OF SKIN (HCC): ICD-10-CM

## 2022-02-07 DIAGNOSIS — E11.621 DIABETIC ULCER OF TOE OF LEFT FOOT ASSOCIATED WITH TYPE 2 DIABETES MELLITUS, LIMITED TO BREAKDOWN OF SKIN (HCC): ICD-10-CM

## 2022-02-07 DIAGNOSIS — E11.40 CONTROLLED TYPE 2 DIABETES WITH NEUROPATHY (HCC): Primary | ICD-10-CM

## 2022-02-07 DIAGNOSIS — F41.9 ANXIETY: ICD-10-CM

## 2022-02-07 DIAGNOSIS — R60.0 LOWER EXTREMITY EDEMA: Primary | ICD-10-CM

## 2022-02-07 DIAGNOSIS — R60.0 LOWER EXTREMITY EDEMA: ICD-10-CM

## 2022-02-07 DIAGNOSIS — E11.40 CONTROLLED TYPE 2 DIABETES WITH NEUROPATHY (HCC): ICD-10-CM

## 2022-02-07 LAB
A/G RATIO: 0.4 (ref 1.1–2.2)
ALBUMIN SERPL-MCNC: 2.9 G/DL (ref 3.4–5)
ALP BLD-CCNC: 173 U/L (ref 40–129)
ALT SERPL-CCNC: 32 U/L (ref 10–40)
ANION GAP SERPL CALCULATED.3IONS-SCNC: 14 MMOL/L (ref 3–16)
AST SERPL-CCNC: 109 U/L (ref 15–37)
BASOPHILS ABSOLUTE: 0.1 K/UL (ref 0–0.2)
BASOPHILS RELATIVE PERCENT: 0.3 %
BILIRUB SERPL-MCNC: 6.9 MG/DL (ref 0–1)
BUN BLDV-MCNC: 4 MG/DL (ref 7–20)
C-REACTIVE PROTEIN: 46.6 MG/L (ref 0–5.1)
CALCIUM SERPL-MCNC: 8.3 MG/DL (ref 8.3–10.6)
CHLORIDE BLD-SCNC: 94 MMOL/L (ref 99–110)
CHOLESTEROL, TOTAL: 106 MG/DL (ref 0–199)
CO2: 23 MMOL/L (ref 21–32)
CREAT SERPL-MCNC: <0.5 MG/DL (ref 0.9–1.3)
D DIMER: 519 NG/ML DDU (ref 0–229)
EOSINOPHILS ABSOLUTE: 0 K/UL (ref 0–0.6)
EOSINOPHILS RELATIVE PERCENT: 0.2 %
GFR AFRICAN AMERICAN: >60
GFR NON-AFRICAN AMERICAN: >60
GLUCOSE BLD-MCNC: 170 MG/DL (ref 70–99)
HCT VFR BLD CALC: 26.6 % (ref 40.5–52.5)
HDLC SERPL-MCNC: 30 MG/DL (ref 40–60)
HEMOGLOBIN: 8.8 G/DL (ref 13.5–17.5)
LDL CHOLESTEROL CALCULATED: 58 MG/DL
LYMPHOCYTES ABSOLUTE: 1.1 K/UL (ref 1–5.1)
LYMPHOCYTES RELATIVE PERCENT: 5.5 %
MCH RBC QN AUTO: 32 PG (ref 26–34)
MCHC RBC AUTO-ENTMCNC: 33.2 G/DL (ref 31–36)
MCV RBC AUTO: 96.4 FL (ref 80–100)
MONOCYTES ABSOLUTE: 1.2 K/UL (ref 0–1.3)
MONOCYTES RELATIVE PERCENT: 6.4 %
NEUTROPHILS ABSOLUTE: 17 K/UL (ref 1.7–7.7)
NEUTROPHILS RELATIVE PERCENT: 87.6 %
PDW BLD-RTO: 19.4 % (ref 12.4–15.4)
PLATELET # BLD: 175 K/UL (ref 135–450)
PMV BLD AUTO: 7.3 FL (ref 5–10.5)
POTASSIUM SERPL-SCNC: 4.1 MMOL/L (ref 3.5–5.1)
RBC # BLD: 2.76 M/UL (ref 4.2–5.9)
SEDIMENTATION RATE, ERYTHROCYTE: 38 MM/HR (ref 0–15)
SODIUM BLD-SCNC: 131 MMOL/L (ref 136–145)
TOTAL PROTEIN: 9.6 G/DL (ref 6.4–8.2)
TRIGL SERPL-MCNC: 91 MG/DL (ref 0–150)
VLDLC SERPL CALC-MCNC: 18 MG/DL
WBC # BLD: 19.4 K/UL (ref 4–11)

## 2022-02-07 PROCEDURE — 99214 OFFICE O/P EST MOD 30 MIN: CPT | Performed by: INTERNAL MEDICINE

## 2022-02-07 PROCEDURE — 36415 COLL VENOUS BLD VENIPUNCTURE: CPT | Performed by: INTERNAL MEDICINE

## 2022-02-07 RX ORDER — NAPROXEN 500 MG/1
500 TABLET ORAL 2 TIMES DAILY WITH MEALS
Qty: 60 TABLET | Refills: 0 | Status: SHIPPED | OUTPATIENT
Start: 2022-02-07 | End: 2022-02-15

## 2022-02-07 RX ORDER — AMOXICILLIN AND CLAVULANATE POTASSIUM 875; 125 MG/1; MG/1
1 TABLET, FILM COATED ORAL 2 TIMES DAILY
Qty: 14 TABLET | Refills: 0 | Status: SHIPPED | OUTPATIENT
Start: 2022-02-07 | End: 2022-02-14

## 2022-02-07 RX ORDER — BUSPIRONE HYDROCHLORIDE 15 MG/1
TABLET ORAL
Qty: 270 TABLET | Refills: 1 | Status: SHIPPED | OUTPATIENT
Start: 2022-02-07

## 2022-02-07 NOTE — PROGRESS NOTES
I think the swelling is from the infx  -     Cancel: D-DIMER, QUANTITATIVE; Future    Anxiety - resume buspar  -     busPIRone (BUSPAR) 15 MG tablet; TAKE 1 TABLET BY MOUTH THREE TIMES DAILY    Other orders  -     naproxen (NAPROSYN) 500 MG tablet;  Take 1 tablet by mouth 2 times daily (with meals)

## 2022-02-08 ENCOUNTER — HOSPITAL ENCOUNTER (OUTPATIENT)
Dept: GENERAL RADIOLOGY | Age: 42
Discharge: HOME | End: 2022-02-08
Payer: COMMERCIAL

## 2022-02-08 ENCOUNTER — HOSPITAL ENCOUNTER (OUTPATIENT)
Dept: ULTRASOUND IMAGING | Age: 42
Discharge: HOME | End: 2022-02-08
Payer: COMMERCIAL

## 2022-02-08 DIAGNOSIS — R60.0 LOWER EXTREMITY EDEMA: ICD-10-CM

## 2022-02-08 DIAGNOSIS — R60.0 LOWER EXTREMITY EDEMA: Primary | ICD-10-CM

## 2022-02-08 DIAGNOSIS — R79.89 POSITIVE D DIMER: ICD-10-CM

## 2022-02-08 LAB
ESTIMATED AVERAGE GLUCOSE: 65.2 MG/DL
HBA1C MFR BLD: 3.9 %

## 2022-02-08 PROCEDURE — 93971 EXTREMITY STUDY: CPT

## 2022-02-08 PROCEDURE — 73630 X-RAY EXAM OF FOOT: CPT

## 2022-02-15 ENCOUNTER — OFFICE VISIT (OUTPATIENT)
Dept: INTERNAL MEDICINE CLINIC | Age: 42
End: 2022-02-15
Payer: COMMERCIAL

## 2022-02-15 VITALS
BODY MASS INDEX: 34.92 KG/M2 | HEART RATE: 108 BPM | DIASTOLIC BLOOD PRESSURE: 76 MMHG | HEIGHT: 72 IN | WEIGHT: 257.8 LBS | OXYGEN SATURATION: 95 % | SYSTOLIC BLOOD PRESSURE: 140 MMHG

## 2022-02-15 DIAGNOSIS — K70.10 ALCOHOLIC HEPATITIS, UNSPECIFIED WHETHER ASCITES PRESENT: ICD-10-CM

## 2022-02-15 DIAGNOSIS — R60.0 LOWER EXTREMITY EDEMA: ICD-10-CM

## 2022-02-15 DIAGNOSIS — R77.9 ELEVATED BLOOD PROTEIN: ICD-10-CM

## 2022-02-15 DIAGNOSIS — R17 ELEVATED BILIRUBIN: ICD-10-CM

## 2022-02-15 DIAGNOSIS — K70.10 ALCOHOLIC HEPATITIS, UNSPECIFIED WHETHER ASCITES PRESENT: Primary | ICD-10-CM

## 2022-02-15 LAB
A/G RATIO: 0.4 (ref 1.1–2.2)
ALBUMIN SERPL-MCNC: 2.8 G/DL (ref 3.4–5)
ALP BLD-CCNC: 186 U/L (ref 40–129)
ALT SERPL-CCNC: 31 U/L (ref 10–40)
ANION GAP SERPL CALCULATED.3IONS-SCNC: 14 MMOL/L (ref 3–16)
AST SERPL-CCNC: 149 U/L (ref 15–37)
BASOPHILS ABSOLUTE: 0.1 K/UL (ref 0–0.2)
BASOPHILS RELATIVE PERCENT: 1.1 %
BILIRUB SERPL-MCNC: 4.4 MG/DL (ref 0–1)
BUN BLDV-MCNC: 2 MG/DL (ref 7–20)
CALCIUM SERPL-MCNC: 8.2 MG/DL (ref 8.3–10.6)
CHLORIDE BLD-SCNC: 100 MMOL/L (ref 99–110)
CO2: 23 MMOL/L (ref 21–32)
CREAT SERPL-MCNC: 0.5 MG/DL (ref 0.9–1.3)
EOSINOPHILS ABSOLUTE: 0.3 K/UL (ref 0–0.6)
EOSINOPHILS RELATIVE PERCENT: 2.8 %
GFR AFRICAN AMERICAN: >60
GFR NON-AFRICAN AMERICAN: >60
GLUCOSE BLD-MCNC: 120 MG/DL (ref 70–99)
HCT VFR BLD CALC: 23.8 % (ref 40.5–52.5)
HEMOGLOBIN: 8.1 G/DL (ref 13.5–17.5)
LYMPHOCYTES ABSOLUTE: 2.1 K/UL (ref 1–5.1)
LYMPHOCYTES RELATIVE PERCENT: 17.4 %
MCH RBC QN AUTO: 32.3 PG (ref 26–34)
MCHC RBC AUTO-ENTMCNC: 33.9 G/DL (ref 31–36)
MCV RBC AUTO: 95.2 FL (ref 80–100)
MONOCYTES ABSOLUTE: 1.3 K/UL (ref 0–1.3)
MONOCYTES RELATIVE PERCENT: 11.2 %
NEUTROPHILS ABSOLUTE: 8 K/UL (ref 1.7–7.7)
NEUTROPHILS RELATIVE PERCENT: 67.5 %
PDW BLD-RTO: 19.5 % (ref 12.4–15.4)
PLATELET # BLD: 176 K/UL (ref 135–450)
PMV BLD AUTO: 7.1 FL (ref 5–10.5)
POTASSIUM SERPL-SCNC: 3.9 MMOL/L (ref 3.5–5.1)
RBC # BLD: 2.5 M/UL (ref 4.2–5.9)
SODIUM BLD-SCNC: 137 MMOL/L (ref 136–145)
WBC # BLD: 11.9 K/UL (ref 4–11)

## 2022-02-15 PROCEDURE — 36415 COLL VENOUS BLD VENIPUNCTURE: CPT | Performed by: INTERNAL MEDICINE

## 2022-02-15 PROCEDURE — 99214 OFFICE O/P EST MOD 30 MIN: CPT | Performed by: INTERNAL MEDICINE

## 2022-02-15 RX ORDER — FUROSEMIDE 40 MG/1
40 TABLET ORAL DAILY
Qty: 60 TABLET | Refills: 3 | Status: ON HOLD | OUTPATIENT
Start: 2022-02-15 | End: 2022-02-21 | Stop reason: HOSPADM

## 2022-02-15 RX ORDER — TRAMADOL HYDROCHLORIDE 50 MG/1
50 TABLET ORAL EVERY 6 HOURS PRN
Qty: 28 TABLET | Refills: 0 | Status: SHIPPED | OUTPATIENT
Start: 2022-02-15 | End: 2022-02-22

## 2022-02-15 RX ORDER — POTASSIUM CHLORIDE 750 MG/1
10 TABLET, EXTENDED RELEASE ORAL DAILY
Qty: 30 TABLET | Refills: 5 | Status: ON HOLD | OUTPATIENT
Start: 2022-02-15 | End: 2022-02-21 | Stop reason: HOSPADM

## 2022-02-15 NOTE — PROGRESS NOTES
Yesenia Hickey  1980  02/15/22    SUBJECTIVE:    Leg does not seem that it is getting any better. He has not been able to see Dr Zulma Lomeli. He continues to have a sore on the toe. He continues to have pain in the calf loreta from the edema. He has been elevating the leg, using ice. Pt had been drinking 10 beers daily, 2 half gallon of jagermeister each weekend. He has decreased the drinking - no more liquor but still drinking beers. He has been taking ibuprofen with naprosyn for the leg pain    OBJECTIVE:    BP (!) 140/76   Pulse 108   Ht 6' (1.829 m)   Wt 257 lb 12.8 oz (116.9 kg)   SpO2 95%   BMI 34.96 kg/m²     3+ bila edema L>R, mild erythema on left. Ulcer left 1st toe with less erythema, minimal tenderness, no exudate    (+) jaundice    ASSESSMENT:    1. Alcoholic hepatitis, unspecified whether ascites present    2. Elevated bilirubin    3. Elevated blood protein    4. Lower extremity edema        PLAN:    Claudeen Grange was seen today for edema. Diagnoses and all orders for this visit:    Alcoholic hepatitis, unspecified whether ascites present - strongly encourage cessation from all alcohol; I am very worried about his liver function, though I have not checked the synthetic function. Albumin not too bad. Will check labs, start lasix and potassium  -     Comprehensive Metabolic Panel; Future  -     CBC Auto Differential; Future  -     Amb External Referral To Gastroenterology    Elevated bilirubin - as above; will refer to Dr Yung Yousif  -     Comprehensive Metabolic Panel; Future  -     CBC Auto Differential; Future  -     Amb External Referral To Gastroenterology    Elevated blood protein - unclear etiology; check labs  -     Electrophoresis Protein, Serum without Reflex to Immunofixation; Future  -     Protein Electrophoresis, Urine; Future    Lower extremity edema - Tx with lasix, ultram for pain. Pt will contact Dr Zulma Lomeli for eval of the ulcer  -     furosemide (LASIX) 40 MG tablet;  Take 1 tablet by mouth daily  -     potassium chloride (KLOR-CON M) 10 MEQ extended release tablet; Take 1 tablet by mouth daily  -     traMADol (ULTRAM) 50 MG tablet; Take 1 tablet by mouth every 6 hours as needed for Pain for up to 7 days. Intended supply: 7 days.  Take lowest dose possible to manage pain      Stop insulin - a1c low

## 2022-02-16 DIAGNOSIS — D64.9 ANEMIA, UNSPECIFIED TYPE: Primary | ICD-10-CM

## 2022-02-16 LAB
ALBUMIN SERPL-MCNC: 2.6 G/DL (ref 3.1–4.9)
ALPHA-1-GLOBULIN: 0.4 G/DL (ref 0.2–0.4)
ALPHA-2-GLOBULIN: 0.5 G/DL (ref 0.4–1.1)
BETA GLOBULIN: 1.7 G/DL (ref 0.9–1.6)
GAMMA GLOBULIN: 4.3 G/DL (ref 0.6–1.8)
SPE/IFE INTERPRETATION: NORMAL
TOTAL PROTEIN: 9.4 G/DL (ref 6.4–8.2)

## 2022-02-17 DIAGNOSIS — D64.9 ANEMIA, UNSPECIFIED TYPE: ICD-10-CM

## 2022-02-17 LAB
FERRITIN: 259.7 NG/ML (ref 30–400)
FOLATE: >20 NG/ML (ref 4.78–24.2)
HCT VFR BLD CALC: 24.6 % (ref 40.5–52.5)
IMMATURE RETIC FRACT: 0.53 (ref 0.21–0.37)
IRON SATURATION: 47 % (ref 20–50)
IRON: 131 UG/DL (ref 59–158)
RETICULOCYTE ABSOLUTE COUNT: 0.13 M/UL
RETICULOCYTE COUNT PCT: 5.27 % (ref 0.5–2.18)
TOTAL IRON BINDING CAPACITY: 281 UG/DL (ref 260–445)
VITAMIN B-12: 793 PG/ML (ref 211–911)

## 2022-02-18 ENCOUNTER — HOSPITAL ENCOUNTER (INPATIENT)
Age: 42
LOS: 2 days | Discharge: HOME | DRG: 603 | End: 2022-02-21
Attending: EMERGENCY MEDICINE | Admitting: INTERNAL MEDICINE
Payer: COMMERCIAL

## 2022-02-18 ENCOUNTER — APPOINTMENT (OUTPATIENT)
Dept: CT IMAGING | Age: 42
DRG: 603 | End: 2022-02-18
Payer: COMMERCIAL

## 2022-02-18 DIAGNOSIS — R00.0 TACHYCARDIA: ICD-10-CM

## 2022-02-18 DIAGNOSIS — A41.9 SEPTICEMIA (HCC): ICD-10-CM

## 2022-02-18 DIAGNOSIS — D72.829 LEUKOCYTOSIS, UNSPECIFIED TYPE: Primary | ICD-10-CM

## 2022-02-18 DIAGNOSIS — L03.116 CELLULITIS OF LEFT LOWER EXTREMITY: ICD-10-CM

## 2022-02-18 DIAGNOSIS — R79.89 ELEVATED LACTIC ACID LEVEL: ICD-10-CM

## 2022-02-18 LAB
ALBUMIN SERPL-MCNC: 2.6 GM/DL (ref 3.4–5)
ALCOHOL SCREEN SERUM: 0.33 %WT/VOL
ALP BLD-CCNC: 212 IU/L (ref 40–129)
ALT SERPL-CCNC: 26 U/L (ref 10–40)
ANION GAP SERPL CALCULATED.3IONS-SCNC: 14 MMOL/L (ref 4–16)
AST SERPL-CCNC: 111 IU/L (ref 15–37)
BASOPHILS ABSOLUTE: 0.2 K/CU MM
BASOPHILS RELATIVE PERCENT: 0.7 % (ref 0–1)
BILIRUB SERPL-MCNC: 5.1 MG/DL (ref 0–1)
BILIRUBIN DIRECT: 3.1 MG/DL (ref 0–0.3)
BILIRUBIN, INDIRECT: 2 MG/DL (ref 0–0.7)
BUN BLDV-MCNC: 2 MG/DL (ref 6–23)
CALCIUM SERPL-MCNC: 7.8 MG/DL (ref 8.3–10.6)
CHLORIDE BLD-SCNC: 87 MMOL/L (ref 99–110)
CO2: 26 MMOL/L (ref 21–32)
CREAT SERPL-MCNC: 0.5 MG/DL (ref 0.9–1.3)
DIFFERENTIAL TYPE: ABNORMAL
EOSINOPHILS ABSOLUTE: 0.1 K/CU MM
EOSINOPHILS RELATIVE PERCENT: 0.3 % (ref 0–3)
GFR AFRICAN AMERICAN: >60 ML/MIN/1.73M2
GFR NON-AFRICAN AMERICAN: >60 ML/MIN/1.73M2
GLUCOSE BLD-MCNC: 117 MG/DL (ref 70–99)
HCT VFR BLD CALC: 26 % (ref 42–52)
HEMOGLOBIN: 8.6 GM/DL (ref 13.5–18)
IMMATURE NEUTROPHIL %: 1.3 % (ref 0–0.43)
LACTIC ACID, SEPSIS: 2.5 MMOL/L (ref 0.5–1.9)
LYMPHOCYTES ABSOLUTE: 3.1 K/CU MM
LYMPHOCYTES RELATIVE PERCENT: 10.3 % (ref 24–44)
MCH RBC QN AUTO: 32.1 PG (ref 27–31)
MCHC RBC AUTO-ENTMCNC: 33.1 % (ref 32–36)
MCV RBC AUTO: 97 FL (ref 78–100)
MONOCYTES ABSOLUTE: 2.1 K/CU MM
MONOCYTES RELATIVE PERCENT: 6.9 % (ref 0–4)
NUCLEATED RBC %: 0.1 %
PDW BLD-RTO: 19.6 % (ref 11.7–14.9)
PLATELET # BLD: 178 K/CU MM (ref 140–440)
PMV BLD AUTO: 9.1 FL (ref 7.5–11.1)
POTASSIUM SERPL-SCNC: 3.2 MMOL/L (ref 3.5–5.1)
RBC # BLD: 2.68 M/CU MM (ref 4.6–6.2)
SEGMENTED NEUTROPHILS ABSOLUTE COUNT: 24.1 K/CU MM
SEGMENTED NEUTROPHILS RELATIVE PERCENT: 80.5 % (ref 36–66)
SODIUM BLD-SCNC: 127 MMOL/L (ref 135–145)
TOTAL IMMATURE NEUTOROPHIL: 0.39 K/CU MM
TOTAL NUCLEATED RBC: 0 K/CU MM
TOTAL PROTEIN: 9.1 GM/DL (ref 6.4–8.2)
WBC # BLD: 29.9 K/CU MM (ref 4–10.5)

## 2022-02-18 PROCEDURE — 83036 HEMOGLOBIN GLYCOSYLATED A1C: CPT

## 2022-02-18 PROCEDURE — 6360000002 HC RX W HCPCS: Performed by: EMERGENCY MEDICINE

## 2022-02-18 PROCEDURE — 96367 TX/PROPH/DG ADDL SEQ IV INF: CPT

## 2022-02-18 PROCEDURE — 99283 EMERGENCY DEPT VISIT LOW MDM: CPT

## 2022-02-18 PROCEDURE — 6360000004 HC RX CONTRAST MEDICATION: Performed by: EMERGENCY MEDICINE

## 2022-02-18 PROCEDURE — 85025 COMPLETE CBC W/AUTO DIFF WBC: CPT

## 2022-02-18 PROCEDURE — 87040 BLOOD CULTURE FOR BACTERIA: CPT

## 2022-02-18 PROCEDURE — 2580000003 HC RX 258: Performed by: EMERGENCY MEDICINE

## 2022-02-18 PROCEDURE — 80053 COMPREHEN METABOLIC PANEL: CPT

## 2022-02-18 PROCEDURE — G0480 DRUG TEST DEF 1-7 CLASSES: HCPCS

## 2022-02-18 PROCEDURE — 82248 BILIRUBIN DIRECT: CPT

## 2022-02-18 PROCEDURE — 96375 TX/PRO/DX INJ NEW DRUG ADDON: CPT

## 2022-02-18 PROCEDURE — 83605 ASSAY OF LACTIC ACID: CPT

## 2022-02-18 PROCEDURE — 82330 ASSAY OF CALCIUM: CPT

## 2022-02-18 PROCEDURE — 73706 CT ANGIO LWR EXTR W/O&W/DYE: CPT

## 2022-02-18 PROCEDURE — 2500000003 HC RX 250 WO HCPCS: Performed by: EMERGENCY MEDICINE

## 2022-02-18 PROCEDURE — 96365 THER/PROPH/DIAG IV INF INIT: CPT

## 2022-02-18 RX ORDER — KETOROLAC TROMETHAMINE 30 MG/ML
15 INJECTION, SOLUTION INTRAMUSCULAR; INTRAVENOUS ONCE
Status: COMPLETED | OUTPATIENT
Start: 2022-02-18 | End: 2022-02-18

## 2022-02-18 RX ORDER — 0.9 % SODIUM CHLORIDE 0.9 %
1000 INTRAVENOUS SOLUTION INTRAVENOUS ONCE
Status: COMPLETED | OUTPATIENT
Start: 2022-02-18 | End: 2022-02-18

## 2022-02-18 RX ORDER — ONDANSETRON 2 MG/ML
4 INJECTION INTRAMUSCULAR; INTRAVENOUS ONCE
Status: COMPLETED | OUTPATIENT
Start: 2022-02-18 | End: 2022-02-18

## 2022-02-18 RX ORDER — CLINDAMYCIN PHOSPHATE 600 MG/50ML
600 INJECTION INTRAVENOUS ONCE
Status: COMPLETED | OUTPATIENT
Start: 2022-02-18 | End: 2022-02-18

## 2022-02-18 RX ADMIN — ONDANSETRON 4 MG: 2 INJECTION INTRAMUSCULAR; INTRAVENOUS at 20:32

## 2022-02-18 RX ADMIN — SODIUM CHLORIDE 1000 ML: 9 INJECTION, SOLUTION INTRAVENOUS at 22:05

## 2022-02-18 RX ADMIN — IOPAMIDOL 100 ML: 755 INJECTION, SOLUTION INTRAVENOUS at 21:19

## 2022-02-18 RX ADMIN — KETOROLAC TROMETHAMINE 15 MG: 30 INJECTION, SOLUTION INTRAMUSCULAR at 20:32

## 2022-02-18 RX ADMIN — PIPERACILLIN AND TAZOBACTAM 4500 MG: 4; .5 INJECTION, POWDER, FOR SOLUTION INTRAVENOUS at 23:25

## 2022-02-18 RX ADMIN — CLINDAMYCIN PHOSPHATE 600 MG: 600 INJECTION, SOLUTION INTRAVENOUS at 22:06

## 2022-02-18 ASSESSMENT — PAIN DESCRIPTION - ORIENTATION: ORIENTATION: RIGHT;LEFT

## 2022-02-18 ASSESSMENT — PAIN SCALES - GENERAL: PAINLEVEL_OUTOF10: 7

## 2022-02-18 ASSESSMENT — PAIN DESCRIPTION - LOCATION: LOCATION: LEG

## 2022-02-18 ASSESSMENT — PAIN - FUNCTIONAL ASSESSMENT: PAIN_FUNCTIONAL_ASSESSMENT: 0-10

## 2022-02-18 NOTE — ED NOTES
This RN asks if pt is suppose to be a direct admit and was told by other triage team members that it was already discussed with pt that he was suppose to come to ED      Jaymie Galeano RN  02/18/22 1535

## 2022-02-18 NOTE — ED PROVIDER NOTES
EMERGENCY DEPARTMENT ENCOUNTER    Patient: Saud Cho  MRN: [id number]  : 1980  Date of Evaluation: 2022  ED Provider:  Edwin Walsh MD    CHIEF COMPLAINT  Chief Complaint   Patient presents with    Other     sent by PCP for IV antibiotics for leg infections       HPI  Saud Cho is a 46 y.o. male who presents with an area of pain, redness and swelling localized to the medial left foot and medial and posterior left leg which is developed over the last couple weeks. He has been seen by his primary care provider for this and had an ultrasound obtained 10 days ago which was negative for DVT. Patient is also alleging that he had been treated with antibiotics recently but is not currently on them. He is alleging that he believes he took Augmentin for this. He has continued to have redness and swelling in pain of the left leg and foot. Denies fever. Denies any loss of sensation or focal weakness. He believes this all began secondary to a blister on the plantar aspect of the left big toe which he sustained while wearing a cast for a tibial fracture within the last several months. The duration has been constant since the onset. The pain is mild to moderate in severity and constant and aggravated with palpation. There are no alleviating factors. Denies any other associated symptoms or complaints or concerns. I have noted and pointed out to the patient that he appears to have scleral icterus with yellowing of the sclera of the eyes. He states that he has had this for the last several weeks and has seen his primary care provider for this who has run laboratory tests allegedly. Initially when I asked him if he drank alcohol he responded with \"no\" however his wife stated that he does. He then admitted that he drinks \"too much\" daily. Last alcohol consumption was about an hour and a half prior to arrival to the emergency department.       REVIEW OF SYSTEMS    Constitutional: negative for fever, chills  Neurological: negative for HA, focal weakness, loss of sensation  Ophthalmic: negative for vision change  ENT: negative for congestion, rhinorrhea  Cardiovascular: negative for chest pain  Respiratory: negative for SOB, cough  GI: negative for abdominal pain, nausea, vomiting, diarrhea, constipation  : negative for dysuria, hematuria  Musculoskeletal: negative for myalgias, decreased ROM  Dermatological: negative for itching  Heme: Negative for bleeding, bruising      PAST MEDICAL HISTORY  Past Medical History:   Diagnosis Date    Hypercholesterolemia 2/18/2019    Hypertension     Type 2 diabetes, controlled, with neuropathy ([facility] 6/6/2018       CURRENT MEDICATIONS  [unfilled]    ALLERGIES  Allergies   Allergen Reactions    Paxil [Paroxetine Hcl]      Caused \"weird\" things to happen in his sleep       SURGICAL HISTORY  History reviewed. No pertinent surgical history. FAMILY HISTORY  Family History   Problem Relation Age of Onset    High Blood Pressure Sister        SOCIAL HISTORY  Social History     Socioeconomic History    Marital status:      Spouse name: None    Number of children: None    Years of education: None    Highest education level: None   Occupational History    None   Tobacco Use    Smoking status: Never Smoker    Smokeless tobacco: Never Used   Substance and Sexual Activity    Alcohol use:  Yes     Alcohol/week: 12.0 standard drinks     Types: 12 Standard drinks or equivalent per week     Comment: occasional    Drug use: No    Sexual activity: Yes     Partners: Male   Other Topics Concern    None   Social History Narrative    [employer] and receiving        Diet:Unrestricted    Exercise:work    Seat belts:Sometimes     Social Determinants of Health     Financial Resource Strain:     Difficulty of Paying Living Expenses: Not on file   Food Insecurity:     Worried About Running Out of Food in the Last Year: Not on file    Desmond disla Food in the Last Year: Not on file   Transportation Needs:     Lack of Transportation (Medical): Not on file    Lack of Transportation (Non-Medical): Not on file   Physical Activity:     Days of Exercise per Week: Not on file    Minutes of Exercise per Session: Not on file   Stress:     Feeling of Stress : Not on file   Social Connections:     Frequency of Communication with Friends and Family: Not on file    Frequency of Social Gatherings with Friends and Family: Not on file    Attends Rastafari Services: Not on file    Active Member of [address] [place] [address] [employer] or Organizations: Not on file    Attends Club or Organization Meetings: Not on file    Marital Status: Not on file   Intimate Partner Violence:     Fear of Current or Ex-Partner: Not on file    Emotionally Abused: Not on file    Physically Abused: Not on file    Sexually Abused: Not on file   Housing Stability:     Unable to Pay for Housing in the Last Year: Not on file    Number of [place] in the Last Year: Not on file    Unstable Housing in the Last Year: Not on file         **Past medical, family and social histories, and nursing notes reviewed and verified by me**      PHYSICAL EXAM  VITAL SIGNS:   ED Triage Vitals [02/18/22 1643]   Enc Vitals Group      BP (!) 146/88      Pulse 110      Resp 18      Temp 98.8 °F (37.1 °C)      Temp Source Oral      SpO2 97 %      Weight       Height       Head Circumference       Peak Flow       Pain Score       Pain Loc       Pain Edu? Excl. in [address]? Vitals during ED course were reviewed and are as charted.     Constitutional: Minimal distress, Non-toxic appearance  Eyes: Sclerae icterus bilaterally, no discharge  HENT: Normocephalic, Atraumatic, oropharynx moist  Neck: Supple, no stridor, no grossly visible or palpable masses  Cardiovascular: Regular rate and rhythm, No murmurs, No rubs, No gallops, 2+ DP and PT pulses  Pulmonary/Chest: Normal breath sounds, No respiratory distress or accessory muscle use, No wheezing, crackles or rhonchi. Abdomen: Soft, nondistended and nonrigid, No tenderness or peritoneal signs, No masses,   Back: No midline point tenderness, No paraspinous muscle tenderness. No CVA tenderness  Extremities: No gross deformities, no edema, no tenderness  Neurologic: Normal motor function, Normal sensory function, No focal deficits  Skin: Erythema and edema and tenderness palpation along medial and plantar aspect of the left foot and the medial and posterior left leg to about the mid left calf. No induration or fluctuance or crepitus. No pustules or vesicles appreciated. No bullae. No petechia, purpura or ecchymosis. Otherwise skin elsewhere warm, Dry, No erythema, No rash, No cyanosis, No mottling  Lymphatic: No lymphadenopathy in the following location(s): Inguinal  Psychiatric: Alert and oriented x3, Affect normal        RADIOLOGY/PROCEDURES/LABS/MEDICATIONS ADMINISTERED:    I have reviewed and interpreted all of the currently available lab results from this visit (if applicable):  No results found for this visit on 02/18/22. ABNORMAL LABS:  Labs Reviewed   CBC WITH AUTO DIFFERENTIAL   ETHANOL   BASIC METABOLIC PANEL   HEPATIC FUNCTION PANEL         IMAGING STUDIES ORDERED:  CTA LOWER EXTREMITY LEFT W CONTRAST      CTA LOWER EXTREMITY LEFT W CONTRAST    (Results Pending)         MEDICATIONS ADMINISTERED:  Medications   clindamycin (CLEOCIN) 600 mg in dextrose 5 % 50 mL IVPB (has no administration in time range)   ketorolac (TORADOL) injection 15 mg (has no administration in time range)           COURSE & MEDICAL DECISION MAKING  Last vitals: /81   Pulse 110   Temp 98.8 °F (37.1 °C) (Oral)   Resp 18   SpO2 95%     45 yo male with extensive cellulitis of left lower extremity with profound leukocytosis.     Differential diagnoses included, but were not limited to, cellulitis, erysipelas, necrotizing fasciitis, suppurative thrombophlebitis, aseptic superficial thrombophlebitis, DVT, gout, compartment syndrome, erythema migrans (lyme disease), contact dermatitis, lymphedema, lymphangitis, other deep space soft tissue bacterial skin infection, viral rash, systemic infectious rash, aseptic cyst, and malignancy. Additionally noted to have a chronic hyperbilirubinemia which is actually somewhat improved from recent previous level and likely secondary to alcoholic liver disease. CT of the left lower extremity still pending at the end of my shift and the patient has been signed out to the overnight attending, Dr. Patrice Mckeon, who will follow up on this and determine final disposition. Clinical Impression:  1. Leukocytosis, unspecified type    2. Elevated lactic acid level    3. Tachycardia    4. Septicemia ([address]    5. Cellulitis of left lower extremity          Disposition referral (if applicable):  No follow-up provider specified. Disposition medications (if applicable):  New Prescriptions    No medications on file       ED Provider Disposition Time  DISPOSITION            Electronically signed by: Osmin Be M.D., 2/18/2022 8:16 PM      This dictation was created with voice recognition software. While attempts have been made to review the dictation as it is transcribed, on occasion the spoken word can be misinterpreted by the technology leading to omissions or inappropriate words, phrases or sentences.         Guadelupe Prader, MD  02/23/22 4866

## 2022-02-19 PROBLEM — L03.90 CELLULITIS: Status: ACTIVE | Noted: 2022-02-19

## 2022-02-19 LAB
ANION GAP SERPL CALCULATED.3IONS-SCNC: 11 MMOL/L (ref 4–16)
ANION GAP SERPL CALCULATED.3IONS-SCNC: 9 MMOL/L (ref 4–16)
CHLORIDE BLD-SCNC: 91 MMOL/L (ref 99–110)
CHLORIDE BLD-SCNC: 92 MMOL/L (ref 99–110)
CHLORIDE URINE RANDOM: 10 MMOL/L (ref 43–210)
CO2: 28 MMOL/L (ref 21–32)
CO2: 29 MMOL/L (ref 21–32)
CREATININE URINE: 104.5 MG/DL (ref 39–259)
ESTIMATED AVERAGE GLUCOSE: 88 MG/DL
GLUCOSE BLD-MCNC: 138 MG/DL (ref 70–99)
GLUCOSE BLD-MCNC: 141 MG/DL (ref 70–99)
GLUCOSE BLD-MCNC: 174 MG/DL (ref 70–99)
GLUCOSE BLD-MCNC: 204 MG/DL (ref 70–99)
GLUCOSE BLD-MCNC: 227 MG/DL (ref 70–99)
HBA1C MFR BLD: 4.7 % (ref 4.2–6.3)
LACTIC ACID, SEPSIS: 2.1 MMOL/L (ref 0.5–1.9)
MAGNESIUM: 1.2 MG/DL (ref 1.8–2.4)
OSMOLALITY URINE: 305 MOS/L (ref 292–1090)
POTASSIUM SERPL-SCNC: 2.9 MMOL/L (ref 3.5–5.1)
POTASSIUM SERPL-SCNC: 3.3 MMOL/L (ref 3.5–5.1)
POTASSIUM, UR: 33.5 MMOL/L (ref 22–119)
PROT/CREAT RATIO, UR: 0.6
SODIUM BLD-SCNC: 129 MMOL/L (ref 135–145)
SODIUM BLD-SCNC: 131 MMOL/L (ref 135–145)
SODIUM URINE: 14 MMOL/L (ref 35–167)
URINE TOTAL PROTEIN: 63.1 MG/DL

## 2022-02-19 PROCEDURE — 6370000000 HC RX 637 (ALT 250 FOR IP): Performed by: INTERNAL MEDICINE

## 2022-02-19 PROCEDURE — 82436 ASSAY OF URINE CHLORIDE: CPT

## 2022-02-19 PROCEDURE — 2580000003 HC RX 258: Performed by: INTERNAL MEDICINE

## 2022-02-19 PROCEDURE — 83935 ASSAY OF URINE OSMOLALITY: CPT

## 2022-02-19 PROCEDURE — 80051 ELECTROLYTE PANEL: CPT

## 2022-02-19 PROCEDURE — 87186 SC STD MICRODIL/AGAR DIL: CPT

## 2022-02-19 PROCEDURE — 87077 CULTURE AEROBIC IDENTIFY: CPT

## 2022-02-19 PROCEDURE — 84156 ASSAY OF PROTEIN URINE: CPT

## 2022-02-19 PROCEDURE — 36415 COLL VENOUS BLD VENIPUNCTURE: CPT

## 2022-02-19 PROCEDURE — 87147 CULTURE TYPE IMMUNOLOGIC: CPT

## 2022-02-19 PROCEDURE — 87040 BLOOD CULTURE FOR BACTERIA: CPT

## 2022-02-19 PROCEDURE — 2580000003 HC RX 258: Performed by: EMERGENCY MEDICINE

## 2022-02-19 PROCEDURE — 2500000003 HC RX 250 WO HCPCS: Performed by: INTERNAL MEDICINE

## 2022-02-19 PROCEDURE — 84300 ASSAY OF URINE SODIUM: CPT

## 2022-02-19 PROCEDURE — 6360000002 HC RX W HCPCS: Performed by: INTERNAL MEDICINE

## 2022-02-19 PROCEDURE — 84133 ASSAY OF URINE POTASSIUM: CPT

## 2022-02-19 PROCEDURE — 87075 CULTR BACTERIA EXCEPT BLOOD: CPT

## 2022-02-19 PROCEDURE — 2140000000 HC CCU INTERMEDIATE R&B

## 2022-02-19 PROCEDURE — 94761 N-INVAS EAR/PLS OXIMETRY MLT: CPT

## 2022-02-19 PROCEDURE — 87070 CULTURE OTHR SPECIMN AEROBIC: CPT

## 2022-02-19 PROCEDURE — 82962 GLUCOSE BLOOD TEST: CPT

## 2022-02-19 PROCEDURE — 99222 1ST HOSP IP/OBS MODERATE 55: CPT | Performed by: INTERNAL MEDICINE

## 2022-02-19 PROCEDURE — 83735 ASSAY OF MAGNESIUM: CPT

## 2022-02-19 PROCEDURE — 82570 ASSAY OF URINE CREATININE: CPT

## 2022-02-19 PROCEDURE — 6360000002 HC RX W HCPCS: Performed by: EMERGENCY MEDICINE

## 2022-02-19 RX ORDER — SODIUM CHLORIDE 0.9 % (FLUSH) 0.9 %
5-40 SYRINGE (ML) INJECTION PRN
Status: DISCONTINUED | OUTPATIENT
Start: 2022-02-19 | End: 2022-02-21 | Stop reason: HOSPADM

## 2022-02-19 RX ORDER — CLINDAMYCIN PHOSPHATE 600 MG/50ML
600 INJECTION INTRAVENOUS EVERY 8 HOURS
Status: DISCONTINUED | OUTPATIENT
Start: 2022-02-19 | End: 2022-02-19

## 2022-02-19 RX ORDER — ONDANSETRON 2 MG/ML
4 INJECTION INTRAMUSCULAR; INTRAVENOUS EVERY 6 HOURS PRN
Status: DISCONTINUED | OUTPATIENT
Start: 2022-02-19 | End: 2022-02-21 | Stop reason: HOSPADM

## 2022-02-19 RX ORDER — ONDANSETRON 4 MG/1
4 TABLET, ORALLY DISINTEGRATING ORAL EVERY 8 HOURS PRN
Status: DISCONTINUED | OUTPATIENT
Start: 2022-02-19 | End: 2022-02-21 | Stop reason: HOSPADM

## 2022-02-19 RX ORDER — LORAZEPAM 1 MG/1
2 TABLET ORAL
Status: DISCONTINUED | OUTPATIENT
Start: 2022-02-19 | End: 2022-02-21 | Stop reason: HOSPADM

## 2022-02-19 RX ORDER — LORAZEPAM 2 MG/ML
3 INJECTION INTRAMUSCULAR
Status: DISCONTINUED | OUTPATIENT
Start: 2022-02-19 | End: 2022-02-21 | Stop reason: HOSPADM

## 2022-02-19 RX ORDER — LORAZEPAM 1 MG/1
1 TABLET ORAL
Status: DISCONTINUED | OUTPATIENT
Start: 2022-02-19 | End: 2022-02-21 | Stop reason: HOSPADM

## 2022-02-19 RX ORDER — ASPIRIN 81 MG/1
81 TABLET ORAL DAILY
Status: DISCONTINUED | OUTPATIENT
Start: 2022-02-19 | End: 2022-02-21 | Stop reason: HOSPADM

## 2022-02-19 RX ORDER — ACETAMINOPHEN 325 MG/1
650 TABLET ORAL EVERY 6 HOURS PRN
Status: DISCONTINUED | OUTPATIENT
Start: 2022-02-19 | End: 2022-02-21 | Stop reason: HOSPADM

## 2022-02-19 RX ORDER — SODIUM CHLORIDE 9 MG/ML
25 INJECTION, SOLUTION INTRAVENOUS PRN
Status: DISCONTINUED | OUTPATIENT
Start: 2022-02-19 | End: 2022-02-21 | Stop reason: HOSPADM

## 2022-02-19 RX ORDER — ACETAMINOPHEN 650 MG/1
650 SUPPOSITORY RECTAL EVERY 6 HOURS PRN
Status: DISCONTINUED | OUTPATIENT
Start: 2022-02-19 | End: 2022-02-21 | Stop reason: HOSPADM

## 2022-02-19 RX ORDER — DEXTROSE MONOHYDRATE 50 MG/ML
100 INJECTION, SOLUTION INTRAVENOUS PRN
Status: DISCONTINUED | OUTPATIENT
Start: 2022-02-19 | End: 2022-02-21 | Stop reason: HOSPADM

## 2022-02-19 RX ORDER — LORAZEPAM 2 MG/ML
2 INJECTION INTRAMUSCULAR
Status: DISCONTINUED | OUTPATIENT
Start: 2022-02-19 | End: 2022-02-21 | Stop reason: HOSPADM

## 2022-02-19 RX ORDER — LORAZEPAM 1 MG/1
4 TABLET ORAL
Status: DISCONTINUED | OUTPATIENT
Start: 2022-02-19 | End: 2022-02-21 | Stop reason: HOSPADM

## 2022-02-19 RX ORDER — INSULIN GLARGINE 100 [IU]/ML
20 INJECTION, SOLUTION SUBCUTANEOUS NIGHTLY
Status: DISCONTINUED | OUTPATIENT
Start: 2022-02-19 | End: 2022-02-21 | Stop reason: HOSPADM

## 2022-02-19 RX ORDER — TRAZODONE HYDROCHLORIDE 50 MG/1
50 TABLET ORAL NIGHTLY PRN
Status: DISCONTINUED | OUTPATIENT
Start: 2022-02-19 | End: 2022-02-21 | Stop reason: HOSPADM

## 2022-02-19 RX ORDER — LORAZEPAM 2 MG/ML
1 INJECTION INTRAMUSCULAR
Status: DISCONTINUED | OUTPATIENT
Start: 2022-02-19 | End: 2022-02-21 | Stop reason: HOSPADM

## 2022-02-19 RX ORDER — BUSPIRONE HYDROCHLORIDE 5 MG/1
15 TABLET ORAL 3 TIMES DAILY
Status: DISCONTINUED | OUTPATIENT
Start: 2022-02-19 | End: 2022-02-21 | Stop reason: HOSPADM

## 2022-02-19 RX ORDER — LORAZEPAM 2 MG/ML
4 INJECTION INTRAMUSCULAR
Status: DISCONTINUED | OUTPATIENT
Start: 2022-02-19 | End: 2022-02-21 | Stop reason: HOSPADM

## 2022-02-19 RX ORDER — FUROSEMIDE 20 MG/1
40 TABLET ORAL DAILY
Status: DISCONTINUED | OUTPATIENT
Start: 2022-02-19 | End: 2022-02-20

## 2022-02-19 RX ORDER — SODIUM CHLORIDE 0.9 % (FLUSH) 0.9 %
10 SYRINGE (ML) INJECTION EVERY 12 HOURS SCHEDULED
Status: DISCONTINUED | OUTPATIENT
Start: 2022-02-19 | End: 2022-02-21 | Stop reason: HOSPADM

## 2022-02-19 RX ORDER — PREGABALIN 50 MG/1
100 CAPSULE ORAL 2 TIMES DAILY
Status: DISCONTINUED | OUTPATIENT
Start: 2022-02-19 | End: 2022-02-21 | Stop reason: HOSPADM

## 2022-02-19 RX ORDER — NICOTINE POLACRILEX 4 MG
15 LOZENGE BUCCAL PRN
Status: DISCONTINUED | OUTPATIENT
Start: 2022-02-19 | End: 2022-02-21 | Stop reason: HOSPADM

## 2022-02-19 RX ORDER — SODIUM CHLORIDE 0.9 % (FLUSH) 0.9 %
10 SYRINGE (ML) INJECTION PRN
Status: DISCONTINUED | OUTPATIENT
Start: 2022-02-19 | End: 2022-02-21 | Stop reason: HOSPADM

## 2022-02-19 RX ORDER — DEXTROSE MONOHYDRATE 25 G/50ML
12.5 INJECTION, SOLUTION INTRAVENOUS PRN
Status: DISCONTINUED | OUTPATIENT
Start: 2022-02-19 | End: 2022-02-19 | Stop reason: CLARIF

## 2022-02-19 RX ORDER — POTASSIUM CHLORIDE 7.45 MG/ML
10 INJECTION INTRAVENOUS PRN
Status: DISCONTINUED | OUTPATIENT
Start: 2022-02-19 | End: 2022-02-21 | Stop reason: HOSPADM

## 2022-02-19 RX ORDER — SODIUM CHLORIDE 0.9 % (FLUSH) 0.9 %
5-40 SYRINGE (ML) INJECTION EVERY 12 HOURS SCHEDULED
Status: DISCONTINUED | OUTPATIENT
Start: 2022-02-19 | End: 2022-02-21 | Stop reason: HOSPADM

## 2022-02-19 RX ORDER — MAGNESIUM SULFATE 1 G/100ML
1000 INJECTION INTRAVENOUS PRN
Status: DISCONTINUED | OUTPATIENT
Start: 2022-02-19 | End: 2022-02-21 | Stop reason: HOSPADM

## 2022-02-19 RX ORDER — LORAZEPAM 1 MG/1
3 TABLET ORAL
Status: DISCONTINUED | OUTPATIENT
Start: 2022-02-19 | End: 2022-02-21 | Stop reason: HOSPADM

## 2022-02-19 RX ADMIN — PIPERACILLIN AND TAZOBACTAM 3375 MG: 3; .375 INJECTION, POWDER, LYOPHILIZED, FOR SOLUTION INTRAVENOUS at 23:05

## 2022-02-19 RX ADMIN — SODIUM CHLORIDE, PRESERVATIVE FREE 10 ML: 5 INJECTION INTRAVENOUS at 09:28

## 2022-02-19 RX ADMIN — PREGABALIN 100 MG: 50 CAPSULE ORAL at 22:38

## 2022-02-19 RX ADMIN — ENOXAPARIN SODIUM 40 MG: 100 INJECTION SUBCUTANEOUS at 09:18

## 2022-02-19 RX ADMIN — SODIUM CHLORIDE, PRESERVATIVE FREE 10 ML: 5 INJECTION INTRAVENOUS at 23:06

## 2022-02-19 RX ADMIN — ACETAMINOPHEN 650 MG: 325 TABLET ORAL at 16:12

## 2022-02-19 RX ADMIN — BUSPIRONE HYDROCHLORIDE 15 MG: 5 TABLET ORAL at 22:38

## 2022-02-19 RX ADMIN — ASPIRIN 81 MG: 81 TABLET, COATED ORAL at 09:18

## 2022-02-19 RX ADMIN — VANCOMYCIN HYDROCHLORIDE 2000 MG: 1 INJECTION, POWDER, LYOPHILIZED, FOR SOLUTION INTRAVENOUS at 00:27

## 2022-02-19 RX ADMIN — SODIUM CHLORIDE 25 ML: 9 INJECTION, SOLUTION INTRAVENOUS at 09:27

## 2022-02-19 RX ADMIN — BUSPIRONE HYDROCHLORIDE 15 MG: 5 TABLET ORAL at 15:05

## 2022-02-19 RX ADMIN — PIPERACILLIN AND TAZOBACTAM 3375 MG: 3; .375 INJECTION, POWDER, LYOPHILIZED, FOR SOLUTION INTRAVENOUS at 15:10

## 2022-02-19 RX ADMIN — TRAZODONE HYDROCHLORIDE 50 MG: 50 TABLET ORAL at 22:38

## 2022-02-19 RX ADMIN — PIPERACILLIN AND TAZOBACTAM 3375 MG: 3; .375 INJECTION, POWDER, LYOPHILIZED, FOR SOLUTION INTRAVENOUS at 09:28

## 2022-02-19 RX ADMIN — VANCOMYCIN HYDROCHLORIDE 1250 MG: 5 INJECTION, POWDER, LYOPHILIZED, FOR SOLUTION INTRAVENOUS at 12:35

## 2022-02-19 RX ADMIN — CLINDAMYCIN PHOSPHATE 600 MG: 600 INJECTION, SOLUTION INTRAVENOUS at 06:57

## 2022-02-19 RX ADMIN — BUSPIRONE HYDROCHLORIDE 15 MG: 5 TABLET ORAL at 09:18

## 2022-02-19 RX ADMIN — SODIUM CHLORIDE 25 ML: 9 INJECTION, SOLUTION INTRAVENOUS at 15:09

## 2022-02-19 RX ADMIN — LORAZEPAM 1 MG: 2 INJECTION INTRAMUSCULAR; INTRAVENOUS at 07:04

## 2022-02-19 RX ADMIN — PREGABALIN 100 MG: 50 CAPSULE ORAL at 09:18

## 2022-02-19 RX ADMIN — SODIUM CHLORIDE 25 ML: 9 INJECTION, SOLUTION INTRAVENOUS at 23:04

## 2022-02-19 RX ADMIN — INSULIN GLARGINE 20 UNITS: 100 INJECTION, SOLUTION SUBCUTANEOUS at 22:40

## 2022-02-19 ASSESSMENT — PAIN SCALES - GENERAL
PAINLEVEL_OUTOF10: 7
PAINLEVEL_OUTOF10: 0
PAINLEVEL_OUTOF10: 6
PAINLEVEL_OUTOF10: 7

## 2022-02-19 ASSESSMENT — PAIN DESCRIPTION - LOCATION
LOCATION: FOOT;LEG
LOCATION: LEG

## 2022-02-19 ASSESSMENT — PAIN DESCRIPTION - ORIENTATION
ORIENTATION: RIGHT;LEFT
ORIENTATION: RIGHT;LEFT

## 2022-02-19 ASSESSMENT — PAIN - FUNCTIONAL ASSESSMENT: PAIN_FUNCTIONAL_ASSESSMENT: 0-10

## 2022-02-19 ASSESSMENT — PAIN DESCRIPTION - DESCRIPTORS
DESCRIPTORS: ACHING
DESCRIPTORS: ACHING

## 2022-02-19 NOTE — PROGRESS NOTES
Labs resulted providers notified and aware of hyponatremia no change in care at this time. Report called to [address] on [address] [facility] will place pt for transport when bed is ready.

## 2022-02-19 NOTE — ED PROVIDER NOTES
10:55 PM   Care patient transferred from Dr. Bipin Banks to me. Patient is a 57-year-old male who presents with area of redness and swelling in the medial left foot and into the medial and posterior left leg has been getting progressively worse over the last 2 weeks. Patient with leukocytosis of 29.9, lactic acid of 2.5, and was tachycardic with heart rate of 110. He is given vancomycin, clindamycin, Zosyn here in the ED. Plan to await CT for further evaluation of extension of infection and disposition patient appropriately. Dr. Bipin Banks has consulted, and discussed case with podiatry, Dr. Aurora Bateman. 12:03 AM  CT reviewed, no acute vascular abnormality identified. Soft tissue edema within both calfs, left greater than right, no definitive DVT. Cirrhotic morphology of the liver. Case discussed with Dr. Adelaida Ramires, hospitalist, who agrees with admission. Patient is agreeable with plan of care.        [id number] [facility]  02/19/22 0015

## 2022-02-19 NOTE — CONSULTS
Endocrinology   Consult Note      Dear Doctor Indiana Fitting for the Consult     Pt. Was Admitted for : Left big toe cellulitis    Reason for Consult: Better control of blood glucose    History Obtained From:  Patient/ EMR       HISTORY OF PRESENT ILLNESS:                The patient is a 46 y.o. male with significant past medical history of type 2 diabetes mellitus, compliant with taking medicines or monitoring blood glucose is diabetic history of alcohol dependence, cocaine hypertension, hyperlipidemia and obesity comes in left foot ulcer nonhealing I was  consulted for better control of blood glucose. Possibility of DTs, patient was placed on CIWA scale      ROS:   Pt's ROS done in detail. Abnormal ROS are noted in Medical and Surgical History Section below: Other Medical History:        Diagnosis Date    Hypercholesterolemia 2/18/2019    Hypertension     Type 2 diabetes, controlled, with neuropathy ([address] 6/6/2018         Alcohol dependence    Surgical History:    History reviewed. No pertinent surgical history. Allergies:  Paxil [paroxetine hcl]    Family History:       Problem Relation Age of Onset    High Blood Pressure Sister      REVIEW OF SYSTEMS:  Review of System Done as noted above     PHYSICAL EXAM:      Vitals:    /66   Pulse 113   Temp 98.8 °F (37.1 °C) (Oral)   Resp 22   SpO2 91%     CONSTITUTIONAL:  awake, alert, cooperative, appears stated age   EYES:  vision intact Fundoscopic Exam not performed   ENT:Normal  NECK:  Supple, No JVD.    Thyroid Exam:Normal   LUNGS:  Has Vesicular Breath Sounds,   CARDIOVASCULAR:  Normal apical impulse, regular rate and rhythm, normal S1 and S2, no S3 or S4, and has no  murmur   ABDOMEN:  No scars, normal bowel sounds, soft, non-distended, non-tender, no masses palpated, no hepatolienomegaly  Musculoskeletal: Normal  Extremities: Normal, peripheral pulses normal, , has no edema//left foot big toe ulcer cellulitis  NEUROLOGIC:  Awake, alert, oriented to name, place and time. Cranial nerves II-XII are grossly intact. Motor is  intact. Sensory neuropathy,  and gait is normal.    DATA:    CBC:   Recent Labs     02/18/22 1959   WBC 29.9*   HGB 8.6*       CMP:  Recent Labs     02/18/22 1959   *   K 3.2*   CL 87*   CO2 26   BUN 2*   CREATININE 0.5*   CALCIUM 7.8*   PROT 9.1*   LABALBU 2.6*   BILITOT 5.1*   ALKPHOS 212*   *   ALT 26     Lipids:   Lab Results   Component Value Date    CHOL 106 02/07/2022    HDL 30 02/07/2022    TRIG 91 02/07/2022     Glucose:   Recent Labs     02/19/22  0756   POCGLU 141*     Hemoglobin A1C:   Lab Results   Component Value Date    LABA1C 4.7 02/18/2022     Free T4: No results found for: T4FREE  Free T3: No results found for: FT3  TSH High Sensitivity:   Lab Results   Component Value Date    TSHHS 3.130 08/01/2021       CTA LOWER EXTREMITY LEFT W CONTRAST   Final Result   No acute vascular abnormality is identified within the lower extremities. Three-vessel runoff bilaterally with mild atherosclerotic plaque. Soft tissue edema seen within both calves, left greater than right. No   definite DVT seen by CT imaging. Cirrhotic morphology of the liver, with sequela of cirrhosis as above. Findings appear similar to the previous CT study done in August of 2021.                 Scheduled Medicines   Medications:    aspirin EC  81 mg Oral Daily    busPIRone  15 mg Oral TID    [Held by provider] furosemide  40 mg Oral Daily    pregabalin  100 mg Oral BID    sodium chloride flush  10 mL IntraVENous 2 times per day    enoxaparin  40 mg SubCUTAneous Daily    insulin lispro  0-6 Units SubCUTAneous TID     insulin lispro  0-3 Units SubCUTAneous Nightly    piperacillin-tazobactam  3,375 mg IntraVENous Q8H    sodium chloride flush  5-40 mL IntraVENous 2 times per day    vancomycin  1,250 mg IntraVENous Q12H    clindamycin (CLEOCIN) IV  600 mg IntraVENous Once      Infusions:    sodium chloride      dextrose      sodium chloride 25 mL (02/19/22 0927)         IMPRESSION    Patient Active Problem List   Diagnosis    HTN (hypertension)    Fatty liver    Obstructive sleep apnea    Type 2 diabetes, controlled, with neuropathy [facility]    Anxiety    Hypercholesterolemia    Alcohol withdrawal delirium ([facility]    Closed displaced Maisonneuve's fracture of left leg    Cellulitis         Normal liver function test possibly related to alcoholism              RECOMMENDATIONS:      1. Reviewed POC blood glucose . Labs and X ray results   2. Reviewed Home and Current Medicines   3. Will Start On Correction bolus Humalog and Lantus insulin regime    4. Monitor Blood glucose frequently   5. Modify  the dose of Insulin  as needed        Will follow with you  Again thank you for sharing pt's care with me.      Truly yours,       Nakul Blakely MD

## 2022-02-19 NOTE — ED NOTES
Reddened area and wound areas marked with sharpie at this time      Deborah Pereira RN  02/19/22 1996

## 2022-02-19 NOTE — H&P
History and Physical      Name:  Stacey Rowe /Age/Sex: 1980  (46 y.o. male)   MRN & CSN:  [id number] & [id number] Admission Date/Time: 2022  6:52 PM   Location:  ED16/ED-16 PCP: Alessandro Bucio MD       Hospital Day: 2    Assessment and Plan:   Stacey Rowe is a 46 y.o.  male  who presents with alcohol intoxication, requesting help with detox. And left lower extremity skin infection        cellulitis of  L foot, with leukocytosis  -Monitor leukocytosis to see that it improves and becomes within normal limits, if continue to be elevated consider hematology consult, has had unexplained leukocytosis in the past before  - marked via body marker   - vancomycin and zoyn, clinda given by ED, will continue  - Consider CT scan of left lower extremity if pain/swelling worsens  - follow-up blood cultures  - wound care consult   -Podiatry consulted by ED provider  - wound Cx    Alcohol dependence, going through withdrawal. Pt is placed on CIWA protocol, ativan to be given per protocol. On iv hydration, multivitamin daily. Neurocheck. H/o substance use      Sinus tachycardia, likely related to alcohol withdrawal. Hydrate with normal saline, close monitoring. DM II, none compliant with taking medications. Will order ISS, hgba1c, and will start on low dose lantus ( 15 units) nightly. Diabetic neuropathy, on neurontin. H/o Thrombocytopenia and anemia, could be related to alcohol abuse and liver injury. Close monitoring. Daily check. Diet ADULT DIET;  Regular   DVT Prophylaxis [] Lovenox, []  Heparin, [] SCDs, [] Ambulation   GI Prophylaxis [] PPI,  [] H2 Blocker,  [] Carafate,  [] Diet/Tube Feeds   Code Status Full Code   Disposition Patient requires continued admission due to alcoholism   MDM [] Low, [x] Moderate,[]  High  Patient's risk as above due to alcoholism     History of Present Illness:     Chief Complaint: Stacey Rowe is a 46 y.o.  male with a history of alcohol dependence, cocaine use, history of diabetes mellitus type 2 and not compliant with taking medications, diabetic neuropathy, presents with redness and swelling of left lower extremity has been present for the last couple weeks, also has open wound on his toe, with some bleeding now. Denies any associated fevers, myalgias, dizziness, lightheadedness, and has no complaints of nausea/vomiting/constipation/diarrhea, otherwise has no complaints of cough or shortness of breath. Patient denies any chest pain at this time. Ten point ROS reviewed negative, unless as noted above    Objective:   No intake or output data in the 24 hours ending 02/19/22 0633   Vitals:   Vitals:    02/18/22 1930   BP: 133/81   Pulse:    Resp:    Temp:    SpO2: 95%     Physical Exam:   GEN Obese, drowsy, arousable, no confusion. EYES Pupils are equally round. No scleral erythema, discharge, or conjunctivitis. HENT Mucous membranes are moist. Oral pharynx without exudates, no evidence of thrush. NECK Supple, no apparent thyromegaly or masses. RESP diminished to auscultation b/l, no wheezes, rales or rhonchi. Symmetric chest movement while on room air. CARDIO: sinus tachycardia without appreciable murmurs, rubs, or gallops. No JVD or carotid bruits. Peripheral pulses equal bilaterally and palpable. No peripheral edema. GI Abdomen is significantly distended, no tenderness, + hepatomegaly, no guarding. Bowel sounds are normoactive. .    No costovertebral angle tenderness. HEME: No palpable cervical lymphadenopathy and no hepatosplenomegaly. No petechiae or ecchymoses. MSK No gross joint deformities. SKIN Erythema and edema, along medial and plantar aspect of the left foot and the medial and posterior left leg to about the, open wound at the toe  NEURO normal speech, no lateralizing weakness. PSYCH drowsy but arousable, no distress but fatigued.      Past Medical History:      Past Medical History:   Diagnosis Date    Hypercholesterolemia 2/18/2019    Hypertension     Type 2 diabetes, controlled, with neuropathy ([facility] 6/6/2018     PSHX:  has no past surgical history on file. Allergies: Allergies   Allergen Reactions    Paxil [Paroxetine Hcl]      Caused \"weird\" things to happen in his sleep       FAM HX: family history includes High Blood Pressure in his sister. Soc HX:   Social History     Socioeconomic History    Marital status:      Spouse name: None    Number of children: None    Years of education: None    Highest education level: None   Occupational History    None   Tobacco Use    Smoking status: Never Smoker    Smokeless tobacco: Never Used   Substance and Sexual Activity    Alcohol use: Yes     Alcohol/week: 12.0 standard drinks     Types: 12 Standard drinks or equivalent per week     Comment: occasional    Drug use: No    Sexual activity: Yes     Partners: Male   Other Topics Concern    None   Social History Narrative    [employer] and receiving        Diet:Unrestricted    Exercise:work    Seat belts:Sometimes     Social Determinants of Health     Financial Resource Strain:     Difficulty of Paying Living Expenses: Not on file   Food Insecurity:     Worried About Running Out of Food in the Last Year: Not on file    Desmond of Food in the Last Year: Not on file   Transportation Needs:     Lack of Transportation (Medical): Not on file    Lack of Transportation (Non-Medical):  Not on file   Physical Activity:     Days of Exercise per Week: Not on file    Minutes of Exercise per Session: Not on file   Stress:     Feeling of Stress : Not on file   Social Connections:     Frequency of Communication with Friends and Family: Not on file    Frequency of Social Gatherings with Friends and Family: Not on file    Attends Amish Services: Not on file    Active Member of Clubs or Organizations: Not on file    Attends Club or Organization Meetings: Not on file    Marital Status: Not on file Intimate Partner Violence:     Fear of Current or Ex-Partner: Not on file    Emotionally Abused: Not on file    Physically Abused: Not on file    Sexually Abused: Not on file   Housing Stability:     Unable to Pay for Housing in the Last Year: Not on file    Number of Places Lived in the Last Year: Not on file    Unstable Housing in the Last Year: Not on file       Medications:   Medications:    aspirin EC  81 mg Oral Daily    busPIRone  15 mg Oral TID    [Held by provider] furosemide  40 mg Oral Daily    pregabalin  100 mg Oral BID    sodium chloride flush  10 mL IntraVENous 2 times per day    enoxaparin  40 mg SubCUTAneous Daily    insulin lispro  0-6 Units SubCUTAneous TID WC    insulin lispro  0-3 Units SubCUTAneous Nightly    piperacillin-tazobactam  3,375 mg IntraVENous Q8H    clindamycin  600 mg IntraVENous Q8H    sodium chloride flush  5-40 mL IntraVENous 2 times per day    vancomycin  1,250 mg IntraVENous Q12H    clindamycin (CLEOCIN) IV  600 mg IntraVENous Once      Infusions:    sodium chloride      dextrose      sodium chloride       PRN Meds: traZODone, 50 mg, Nightly PRN  sodium chloride flush, 10 mL, PRN  sodium chloride, 25 mL, PRN  ondansetron, 4 mg, Q8H PRN   Or  ondansetron, 4 mg, Q6H PRN  bisacodyl, 5 mg, Daily PRN  acetaminophen, 650 mg, Q6H PRN   Or  acetaminophen, 650 mg, Q6H PRN  glucose, 15 g, PRN  glucagon (rDNA), 1 mg, PRN  dextrose, 100 mL/hr, PRN  sodium chloride flush, 5-40 mL, PRN  sodium chloride, 25 mL, PRN  LORazepam, 1 mg, Q1H PRN   Or  LORazepam, 1 mg, Q1H PRN   Or  LORazepam, 2 mg, Q1H PRN   Or  LORazepam, 2 mg, Q1H PRN   Or  LORazepam, 3 mg, Q1H PRN   Or  LORazepam, 3 mg, Q1H PRN   Or  LORazepam, 4 mg, Q1H PRN   Or  LORazepam, 4 mg, Q1H PRN  dextrose bolus (hypoglycemia), 125 mL, PRN   Or  dextrose bolus (hypoglycemia), 250 mL, PRN        [address], Internal Medicine  2/19/2022 at 6:33 AM

## 2022-02-19 NOTE — PROGRESS NOTES
[address] [facility]  consulted by Dr. Adama Arellano for monitoring and adjustment. Indication for treatment: Cellulitis of left foot  Goal trough: [x] 10-15 mcg/mL or [] 15-20 mcg/ml  AUC/BOY: [x] <500 or [] 400-600    Pertinent Laboratory Values:   Temp Readings from Last 3 Encounters:   02/18/22 98.8 °F (37.1 °C) (Oral)   08/03/21 97.9 °F (36.6 °C) (Oral)   01/29/18 98.3 °F (36.8 °C) (Oral)     Recent Labs     02/18/22 1959   WBC 29.9*     Recent Labs     02/18/22 1959   BUN 2*   CREATININE 0.5*     Estimated Creatinine Clearance: 262 mL/min (A) (based on SCr of 0.5 mg/dL (L)). No intake or output data in the 24 hours ending 02/19/22 0532    Pertinent Cultures:  Date    Source    Results  2/18               Blood x 2   Ordered      Vancomycin level:   TROUGH:  No results for input(s): VANCOTROUGH in the last 72 hours. RANDOM:  No results for input(s): VANCORANDOM in the last 72 hours. Assessment:  SCr, BUN, and urine output: at baseline  Day(s) of therapy: 1  Vancomycin concentration: Pending    Plan:  Received vancomycin 2000 mg x 1 in the ED  Start vancomycin 1250 mg IVPB q12h  Predicted trough of 14.6 and AUC: 459 at steady-state  Pharmacy will continue to monitor patient and adjust therapy as indicated    VANCOMYCIN CONCENTRATION SCHEDULED FOR 02/20/22 @0600    Thank you for the consult.   Lynette Chapin, [address] [place] [address]  2/19/2022 5:32 AM

## 2022-02-19 NOTE — ED NOTES
CTA LOWER EXTREMITY LEFT W CONTRAST    Status: Final result       Order Providers    Authorizing Billing   MD Yamini Garcia MD            Signed by    Signed Date/Time Phone Pager   Madeline Mcgrath 2/18/2022 11:24 [phone]      Reading Providers    Read Date Phone Pager   Madeline Mcgrath Fri Feb 18, 2022 11:24 [phone]        CTA LOWER EXTREMITY LEFT W CONTRAST: Patient Communication     Released  Not seen     Radiation Dose Estimates    No radiation information found for this patient  Narrative   EXAMINATION:   CTA OF THE LEFT LOWER EXTREMITY 2/18/2022 9:18 pm       TECHNIQUE:   CTA of the left lower extremity was performed after the administration of   intravenous contrast. Multiplanar reformatted images are provided for review. MIP images are provided for review.  Dose modulation, iterative   reconstruction, and/or weight based adjustment of the mA/kV was utilized to   reduce the radiation dose to as low as reasonably achievable.       COMPARISON:   None.       HISTORY   ORDERING SYSTEM PROVIDED HISTORY: left leg and foot pain, redness and swelling   TECHNOLOGIST PROVIDED HISTORY:   Reason for exam:->left leg and foot pain, redness and swelling   Reason for Exam: left leg and foot pain, redness and swelling       FINDINGS:   Nonvascular: Cirrhotic morphology of the liver, partially imaged.  Grossly   unremarkable gallbladder.  No obstructive hydroureteronephrosis.  No definite   ileus or obstruction is identified.  Nonspecific edematous change noted along   the peritoneal reflections as well as adjacent to the rectum, though not   significantly changed from the previous examination and possibly related to   underlying cirrhosis.  Recanalized umbilical vein with multiple venous   collaterals noted in the region of the umbilicus.  Within the lower   extremities, subcutaneous edematous changes are identified within the calves   bilaterally extending into the feet, left greater than right.       Vascular: No abdominal aortic aneurysm or dissection.  No iliac artery   stenosis is identified.       Right lower extremity: The right SFA and deep femoral artery are patent.  No   SFA stenosis, popliteal artery stenosis, or severe stenosis within the   trifurcation arteries.  There is a 3 vessel runoff.  Some venous mixing is   noted.       Left lower extremity: The left SFA, deep and superficial femoral arteries are   patent.  No SFA or popliteal stenosis is identified.  The trifurcation   arteries appear patent, though visualization somewhat limited given the   degree of venous mixing.  Trifurcation atherosclerotic disease is identified. Three-vessel runoff.       No definite venous thrombosis seen by CT imaging, though best assessed with   vascular ultrasound of clinically concern.       No acute osseous abnormality is identified.           Impression   No acute vascular abnormality is identified within the lower extremities. Three-vessel runoff bilaterally with mild atherosclerotic plaque.       Soft tissue edema seen within both calves, left greater than right.  No   definite DVT seen by CT imaging.       Cirrhotic morphology of the liver, with sequela of cirrhosis as above. Findings appear similar to the previous CT study done in August of 2021.           Katty lEias RN  02/19/22 9550

## 2022-02-19 NOTE — PROGRESS NOTES
Present Illness:     Chief Complaint: Cellulitis  Saud Cho is a 46 y.o.  male  who presents with cellulitis and uncontrolled diabetes. Subjective-patient examined at bedside. He is comfortable in no distress. Labs reviewed. Continue vancomycin and Zosyn will discontinue clindamycin. Discussed his lab work and never plans to get endocrinology involved so he can have outpatient follow-up. Ten point ROS reviewed negative, unless as noted above    Objective:   No intake or output data in the 24 hours ending 02/19/22 0812   Vitals:   Vitals:    02/19/22 0747   BP: 124/66   Pulse: 113   Resp:    Temp:    SpO2:        Vent Information  SpO2: 91 %  No results for input(s): PH, UQI6MNZ, PO2ART, BE, PNZ5SQQ, CO2CT, O2SAT, LABCARB in the last 72 hours. Invalid input(s): METHGBART           Physical Exam:   GEN Awake male, sitting upright in bed in no apparent distress. Appears given age. EYES Pupils are equally round. No scleral erythema, discharge, or conjunctivitis. HENT Mucous membranes are moist. Oral pharynx without exudates, no evidence of thrush. NECK Supple, no apparent thyromegaly or masses. RESP Clear to auscultation, no wheezes, rales or rhonchi. Symmetric chest movement while on room air. CARDIO/VASC S1/S2 auscultated. Regular rate without appreciable murmurs, rubs, or gallops. No JVD or carotid bruits. Peripheral pulses equal bilaterally and palpable. No peripheral edema. GI Abdomen is soft without significant tenderness, masses, or guarding. Bowel sounds are normoactive. Rectal exam deferred.  No costovertebral angle tenderness. Normal appearing external genitalia. Walsh catheter is not present. HEME/LYMPH No palpable cervical lymphadenopathy and no hepatosplenomegaly. No petechiae or ecchymoses. MSK No gross joint deformities. SKIN Normal coloration, warm, dry.  Extensive cellulitis of the legs especially the left  NEURO Cranial nerves appear grossly intact, normal speech, no lateralizing weakness. PSYCH Awake, alert, oriented x 4. Affect appropriate. Data:   CBC with Differential:    Lab Results   Component Value Date    WBC 29.9 02/18/2022    RBC 2.68 02/18/2022    HGB 8.6 02/18/2022    HCT 26.0 02/18/2022     02/18/2022    MCV 97.0 02/18/2022    MCH 32.1 02/18/2022    MCHC 33.1 02/18/2022    RDW 19.6 02/18/2022    SEGSPCT 80.5 02/18/2022    LYMPHOPCT 10.3 02/18/2022    MONOPCT 6.9 02/18/2022    EOSPCT 3.1 05/01/2011    BASOPCT 0.7 02/18/2022    MONOSABS 2.1 02/18/2022    LYMPHSABS 3.1 02/18/2022    EOSABS 0.1 02/18/2022    BASOSABS 0.2 02/18/2022    DIFFTYPE AUTOMATED DIFFERENTIAL 02/18/2022       CMP:     Lab Results   Component Value Date     02/18/2022    K 3.2 02/18/2022    CL 87 02/18/2022    CO2 26 02/18/2022    BUN 2 02/18/2022    CREATININE 0.5 02/18/2022    GFRAA >60 02/18/2022    AGRATIO 0.4 02/15/2022    LABGLOM >60 02/18/2022    GLUCOSE 117 02/18/2022    PROT 9.1 02/18/2022    PROT 7.9 05/01/2011    LABALBU 2.6 02/18/2022    CALCIUM 7.8 02/18/2022    BILITOT 5.1 02/18/2022    ALKPHOS 212 02/18/2022     02/18/2022    ALT 26 02/18/2022       Troponin:  Lab Results   Component Value Date    TROPONINT 0.014 07/31/2021       U/A:    Lab Results   Component Value Date    COLORU TREVOR 08/03/2021    PROTEINU 30 08/03/2021    WBCUA <1 08/03/2021    RBCUA 1 08/03/2021    MUCUS RARE 09/16/2014    TRICHOMONAS NONE SEEN 08/03/2021    BACTERIA NEGATIVE 08/03/2021    CLARITYU CLEAR 08/03/2021    SPECGRAV 1.015 08/03/2021    LEUKOCYTESUR NEGATIVE 08/03/2021    UROBILINOGEN 2.0 08/03/2021    BILIRUBINUR SMALL 08/03/2021    BLOODU SMALL 08/03/2021       Urine Culture:  No components found for: CURINE    Radiology results:  CTA LOWER EXTREMITY LEFT W CONTRAST   Final Result   No acute vascular abnormality is identified within the lower extremities. Three-vessel runoff bilaterally with mild atherosclerotic plaque.       Soft tissue edema seen within both calves, left greater than right. No   definite DVT seen by CT imaging. Cirrhotic morphology of the liver, with sequela of cirrhosis as above. Findings appear similar to the previous CT study done in August of 2021.              Medications:   Medications:    aspirin EC  81 mg Oral Daily    busPIRone  15 mg Oral TID    [Held by provider] furosemide  40 mg Oral Daily    pregabalin  100 mg Oral BID    sodium chloride flush  10 mL IntraVENous 2 times per day    enoxaparin  40 mg SubCUTAneous Daily    insulin lispro  0-6 Units SubCUTAneous TID WC    insulin lispro  0-3 Units SubCUTAneous Nightly    piperacillin-tazobactam  3,375 mg IntraVENous Q8H    clindamycin  600 mg IntraVENous Q8H    sodium chloride flush  5-40 mL IntraVENous 2 times per day    vancomycin  1,250 mg IntraVENous Q12H    clindamycin (CLEOCIN) IV  600 mg IntraVENous Once      Infusions:    sodium chloride      dextrose      sodium chloride       PRN Meds: traZODone, 50 mg, Nightly PRN  sodium chloride flush, 10 mL, PRN  sodium chloride, 25 mL, PRN  ondansetron, 4 mg, Q8H PRN   Or  ondansetron, 4 mg, Q6H PRN  bisacodyl, 5 mg, Daily PRN  acetaminophen, 650 mg, Q6H PRN   Or  acetaminophen, 650 mg, Q6H PRN  glucose, 15 g, PRN  glucagon (rDNA), 1 mg, PRN  dextrose, 100 mL/hr, PRN  sodium chloride flush, 5-40 mL, PRN  sodium chloride, 25 mL, PRN  LORazepam, 1 mg, Q1H PRN   Or  LORazepam, 1 mg, Q1H PRN   Or  LORazepam, 2 mg, Q1H PRN   Or  LORazepam, 2 mg, Q1H PRN   Or  LORazepam, 3 mg, Q1H PRN   Or  LORazepam, 3 mg, Q1H PRN   Or  LORazepam, 4 mg, Q1H PRN   Or  LORazepam, 4 mg, Q1H PRN  dextrose bolus (hypoglycemia), 125 mL, PRN   Or  dextrose bolus (hypoglycemia), 250 mL, PRN          Electronically signed by Michelle Hargrove MD on 2/19/2022 at 8:12 AM

## 2022-02-19 NOTE — ED NOTES
Pharmacy sent message about missing dose of Cleocin at this time     Shelly Milton RN  02/18/22 7733

## 2022-02-19 NOTE — CONSULTS
[address] [place] [address], [address] [place] [address]  Phone: [phone]  Office Hours: 8:30AM - 4:30PM  Monday - Friday     Nephrology Service Consultation    Patient:  Zachariah Sharp  MRN: [id number]  Consulting physician:  Madison Bell MD  Reason for Consult: Hyponatremia  History Obtained From:  patient, electronic medical record  PCP: Courtney Haskins MD    HISTORY OF PRESENT ILLNESS:   The patient is a 46 y.o. male who presents with cellulitis/skin soft tissue infection and need for alcohol detox. He is  lives at home with wife. He drinks 8 or more beer daily  Appetite is good but does not eat as he used to  Denies any oliguria  Has a history of neuropathy? Has a hist of HTN and DM2 sees Dr George Schroeder    Past Medical History:        Diagnosis Date    Hypercholesterolemia 2/18/2019    Hypertension     Type 2 diabetes, controlled, with neuropathy ([address] 6/6/2018       Past Surgical History:    History reviewed. No pertinent surgical history.     Medications:   Scheduled Meds:   aspirin EC  81 mg Oral Daily    busPIRone  15 mg Oral TID    [Held by provider] furosemide  40 mg Oral Daily    pregabalin  100 mg Oral BID    sodium chloride flush  10 mL IntraVENous 2 times per day    enoxaparin  40 mg SubCUTAneous Daily    insulin lispro  0-6 Units SubCUTAneous TID WC    insulin lispro  0-3 Units SubCUTAneous Nightly    piperacillin-tazobactam  3,375 mg IntraVENous Q8H    sodium chloride flush  5-40 mL IntraVENous 2 times per day    vancomycin  1,250 mg IntraVENous Q12H    clindamycin (CLEOCIN) IV  600 mg IntraVENous Once     Continuous Infusions:   sodium chloride      dextrose      sodium chloride 25 mL (02/19/22 0927)     PRN Meds:.traZODone, sodium chloride flush, sodium chloride, ondansetron **OR** ondansetron, bisacodyl, acetaminophen **OR** acetaminophen, glucose, glucagon (rDNA), dextrose, sodium chloride flush, sodium chloride, LORazepam **OR** LORazepam **OR** LORazepam **OR** LORazepam **OR** LORazepam **OR** LORazepam **OR** LORazepam **OR** LORazepam, dextrose bolus (hypoglycemia) **OR** dextrose bolus (hypoglycemia)    Allergies:  Paxil [paroxetine hcl]    Social History:   TOBACCO:   reports that he has never smoked. He has never used smokeless tobacco.  ETOH:   reports current alcohol use of about 12.0 standard drinks of alcohol per week. OCCUPATION:      Family History:       Problem Relation Age of Onset    High Blood Pressure Sister        REVIEW OF SYSTEMS:  Negative except for skin infection and need to have rehab    Physical Exam:    Vitals: /66   Pulse 113   Temp 98.8 °F (37.1 °C) (Oral)   Resp 22   SpO2 91%   General appearance: alert, appears stated age and cooperative  Skin: Skin color, texture, turgor normal. No rashes or lesions  HEENT: Head: Normal, normocephalic, atraumatic. Neck: no adenopathy, no carotid bruit, no JVD, supple, symmetrical, trachea midline and thyroid not enlarged, symmetric, no tenderness/mass/nodules  Lungs: clear to auscultation bilaterally  Heart: regular rate and rhythm, S1, S2 normal, no murmur, click, rub or gallop  Abdomen: soft, non-tender; bowel sounds normal; no masses,  no organomegaly  Extremities: extremities normal, atraumatic, no cyanosis or edema and skin infection of left leg marked  Neurologic: Mental status: alertness: alert    CBC:   Recent Labs     02/18/22 1959   WBC 29.9*   HGB 8.6*        BMP:    Recent Labs     02/18/22 1959   *   K 3.2*   CL 87*   CO2 26   BUN 2*   CREATININE 0.5*   GLUCOSE 117*     Troponin: No results for input(s): TROPONINI in the last 72 hours. BNP: No results for input(s): BNP in the last 72 hours. Lipids: No results for input(s): CHOL, HDL in the last 72 hours.     Invalid input(s): LDLCALCU  ABGs: No results found for: PHART, PO2ART, XUP3VMV  -----------------------------------------------------------------      Assessment and Recommendations Patient Active Problem List   Diagnosis Code    HTN (hypertension) I10    Fatty liver K76.0    Obstructive sleep apnea G47.33    Type 2 diabetes, controlled, with neuropathy (HCC) E11.40    Anxiety F41.9    Hypercholesterolemia E78.00    Alcohol withdrawal delirium (HCC) F10.231    Closed displaced Maisonneuve's fracture of left leg S82.862A    Cellulitis L03.90   IMP  Subacute Hyponatremia asymptomatic Na 127- etiology volume depletion and Beer potomania  Hypokalemia - he was on diuretics and K supplements  Volume depletion   Proteinuria UA 30 mg with history of DM2  Anemia disproportionate  Neuropathy- Dm vs other nutr deficiencies including B12 folate def  Suggest  Urine lytes and osmolality  Urine protein creat ratio  Close follow up of Na- can drop with IVFin the setting of Beer potomania or increase too fast-correct to 6-8 mEq over the next 24-36 hrs  Supplement K  Check B12 and folate levels  Will follow        Chantal Castro MD, MD

## 2022-02-20 LAB
ALBUMIN SERPL-MCNC: 2.6 GM/DL (ref 3.4–5)
ALP BLD-CCNC: 164 IU/L (ref 40–128)
ALT SERPL-CCNC: 18 U/L (ref 10–40)
ANION GAP SERPL CALCULATED.3IONS-SCNC: 7 MMOL/L (ref 4–16)
ANION GAP SERPL CALCULATED.3IONS-SCNC: 9 MMOL/L (ref 4–16)
ANION GAP SERPL CALCULATED.3IONS-SCNC: 9 MMOL/L (ref 4–16)
AST SERPL-CCNC: 74 IU/L (ref 15–37)
BILIRUB SERPL-MCNC: 5 MG/DL (ref 0–1)
BUN BLDV-MCNC: 5 MG/DL (ref 6–23)
CALCIUM SERPL-MCNC: 7.5 MG/DL (ref 8.3–10.6)
CHLORIDE BLD-SCNC: 92 MMOL/L (ref 99–110)
CHLORIDE BLD-SCNC: 93 MMOL/L (ref 99–110)
CHLORIDE BLD-SCNC: 93 MMOL/L (ref 99–110)
CO2: 26 MMOL/L (ref 21–32)
CO2: 28 MMOL/L (ref 21–32)
CO2: 28 MMOL/L (ref 21–32)
CREAT SERPL-MCNC: 0.6 MG/DL (ref 0.9–1.3)
DIFFERENTIAL TYPE: ABNORMAL
DOSE AMOUNT: NORMAL
DOSE TIME: NORMAL
FERRITIN: 177 NG/ML (ref 30–400)
FOLATE: 14.1 NG/ML (ref 3.1–17.5)
GFR AFRICAN AMERICAN: >60 ML/MIN/1.73M2
GFR NON-AFRICAN AMERICAN: >60 ML/MIN/1.73M2
GLUCOSE BLD-MCNC: 112 MG/DL (ref 70–99)
GLUCOSE BLD-MCNC: 118 MG/DL (ref 70–99)
GLUCOSE BLD-MCNC: 142 MG/DL (ref 70–99)
GLUCOSE BLD-MCNC: 146 MG/DL (ref 70–99)
GLUCOSE BLD-MCNC: 147 MG/DL (ref 70–99)
GLUCOSE BLD-MCNC: 154 MG/DL (ref 70–99)
HCT VFR BLD CALC: 23.5 % (ref 42–52)
HEMOGLOBIN: 7.5 GM/DL (ref 13.5–18)
IRON: 55 UG/DL (ref 59–158)
LYMPHOCYTES ABSOLUTE: 1.8 K/CU MM
LYMPHOCYTES RELATIVE PERCENT: 8 % (ref 24–44)
MAGNESIUM: 1.3 MG/DL (ref 1.8–2.4)
MAGNESIUM: 1.9 MG/DL (ref 1.8–2.4)
MAGNESIUM: 2.2 MG/DL (ref 1.8–2.4)
MCH RBC QN AUTO: 32.2 PG (ref 27–31)
MCHC RBC AUTO-ENTMCNC: 31.9 % (ref 32–36)
MCV RBC AUTO: 100.9 FL (ref 78–100)
MONOCYTES ABSOLUTE: 0.4 K/CU MM
MONOCYTES RELATIVE PERCENT: 2 % (ref 0–4)
PCT TRANSFERRIN: 27 % (ref 10–44)
PDW BLD-RTO: 19.3 % (ref 11.7–14.9)
PLATELET # BLD: 174 K/CU MM (ref 140–440)
PMV BLD AUTO: 9.5 FL (ref 7.5–11.1)
POTASSIUM SERPL-SCNC: 3 MMOL/L (ref 3.5–5.1)
POTASSIUM SERPL-SCNC: 3.1 MMOL/L (ref 3.5–5.1)
POTASSIUM SERPL-SCNC: 3.8 MMOL/L (ref 3.5–5.1)
RBC # BLD: 2.33 M/CU MM (ref 4.6–6.2)
SEGMENTED NEUTROPHILS ABSOLUTE COUNT: 19.8 K/CU MM
SEGMENTED NEUTROPHILS RELATIVE PERCENT: 90 % (ref 36–66)
SODIUM BLD-SCNC: 128 MMOL/L (ref 135–145)
SODIUM BLD-SCNC: 129 MMOL/L (ref 135–145)
SODIUM BLD-SCNC: 130 MMOL/L (ref 135–145)
TOTAL IRON BINDING CAPACITY: 205 UG/DL (ref 250–450)
TOTAL PROTEIN: 8.6 GM/DL (ref 6.4–8.2)
UNSATURATED IRON BINDING CAPACITY: 150 UG/DL (ref 110–370)
VANCOMYCIN RANDOM: 16.6 UG/ML
VITAMIN B-12: 946 PG/ML (ref 211–911)
WBC # BLD: 22 K/CU MM (ref 4–10.5)

## 2022-02-20 PROCEDURE — 2140000000 HC CCU INTERMEDIATE R&B

## 2022-02-20 PROCEDURE — 80053 COMPREHEN METABOLIC PANEL: CPT

## 2022-02-20 PROCEDURE — 83540 ASSAY OF IRON: CPT

## 2022-02-20 PROCEDURE — 80051 ELECTROLYTE PANEL: CPT

## 2022-02-20 PROCEDURE — 82728 ASSAY OF FERRITIN: CPT

## 2022-02-20 PROCEDURE — 83735 ASSAY OF MAGNESIUM: CPT

## 2022-02-20 PROCEDURE — 87449 NOS EACH ORGANISM AG IA: CPT

## 2022-02-20 PROCEDURE — 36415 COLL VENOUS BLD VENIPUNCTURE: CPT

## 2022-02-20 PROCEDURE — 99232 SBSQ HOSP IP/OBS MODERATE 35: CPT | Performed by: INTERNAL MEDICINE

## 2022-02-20 PROCEDURE — 83550 IRON BINDING TEST: CPT

## 2022-02-20 PROCEDURE — 6360000002 HC RX W HCPCS: Performed by: NURSE PRACTITIONER

## 2022-02-20 PROCEDURE — 6370000000 HC RX 637 (ALT 250 FOR IP): Performed by: INTERNAL MEDICINE

## 2022-02-20 PROCEDURE — 85027 COMPLETE CBC AUTOMATED: CPT

## 2022-02-20 PROCEDURE — 6370000000 HC RX 637 (ALT 250 FOR IP): Performed by: NURSE PRACTITIONER

## 2022-02-20 PROCEDURE — 80202 ASSAY OF VANCOMYCIN: CPT

## 2022-02-20 PROCEDURE — 94761 N-INVAS EAR/PLS OXIMETRY MLT: CPT

## 2022-02-20 PROCEDURE — 87324 CLOSTRIDIUM AG IA: CPT

## 2022-02-20 PROCEDURE — 82746 ASSAY OF FOLIC ACID SERUM: CPT

## 2022-02-20 PROCEDURE — 2580000003 HC RX 258: Performed by: INTERNAL MEDICINE

## 2022-02-20 PROCEDURE — 6360000002 HC RX W HCPCS: Performed by: INTERNAL MEDICINE

## 2022-02-20 PROCEDURE — 82607 VITAMIN B-12: CPT

## 2022-02-20 PROCEDURE — 85007 BL SMEAR W/DIFF WBC COUNT: CPT

## 2022-02-20 PROCEDURE — 6370000000 HC RX 637 (ALT 250 FOR IP): Performed by: HOSPITALIST

## 2022-02-20 PROCEDURE — 82962 GLUCOSE BLOOD TEST: CPT

## 2022-02-20 RX ORDER — HYDROCODONE BITARTRATE AND ACETAMINOPHEN 5; 325 MG/1; MG/1
1 TABLET ORAL EVERY 6 HOURS PRN
Status: COMPLETED | OUTPATIENT
Start: 2022-02-20 | End: 2022-02-20

## 2022-02-20 RX ORDER — POTASSIUM CHLORIDE 20 MEQ/1
40 TABLET, EXTENDED RELEASE ORAL
Status: COMPLETED | OUTPATIENT
Start: 2022-02-20 | End: 2022-02-20

## 2022-02-20 RX ADMIN — MAGNESIUM SULFATE HEPTAHYDRATE 1000 MG: 1 INJECTION, SOLUTION INTRAVENOUS at 03:16

## 2022-02-20 RX ADMIN — PREGABALIN 100 MG: 50 CAPSULE ORAL at 21:40

## 2022-02-20 RX ADMIN — POTASSIUM CHLORIDE 10 MEQ: 7.45 INJECTION INTRAVENOUS at 06:11

## 2022-02-20 RX ADMIN — INSULIN GLARGINE 20 UNITS: 100 INJECTION, SOLUTION SUBCUTANEOUS at 21:40

## 2022-02-20 RX ADMIN — VANCOMYCIN HYDROCHLORIDE 1250 MG: 5 INJECTION, POWDER, LYOPHILIZED, FOR SOLUTION INTRAVENOUS at 00:25

## 2022-02-20 RX ADMIN — ASPIRIN 81 MG: 81 TABLET, COATED ORAL at 08:37

## 2022-02-20 RX ADMIN — BUSPIRONE HYDROCHLORIDE 15 MG: 5 TABLET ORAL at 08:37

## 2022-02-20 RX ADMIN — POTASSIUM CHLORIDE 10 MEQ: 7.45 INJECTION INTRAVENOUS at 07:35

## 2022-02-20 RX ADMIN — POTASSIUM CHLORIDE 40 MEQ: 1500 TABLET, EXTENDED RELEASE ORAL at 17:13

## 2022-02-20 RX ADMIN — PIPERACILLIN AND TAZOBACTAM 3375 MG: 3; .375 INJECTION, POWDER, LYOPHILIZED, FOR SOLUTION INTRAVENOUS at 15:32

## 2022-02-20 RX ADMIN — VANCOMYCIN HYDROCHLORIDE 1250 MG: 5 INJECTION, POWDER, LYOPHILIZED, FOR SOLUTION INTRAVENOUS at 13:04

## 2022-02-20 RX ADMIN — BUSPIRONE HYDROCHLORIDE 15 MG: 5 TABLET ORAL at 15:13

## 2022-02-20 RX ADMIN — PIPERACILLIN AND TAZOBACTAM 3375 MG: 3; .375 INJECTION, POWDER, LYOPHILIZED, FOR SOLUTION INTRAVENOUS at 06:08

## 2022-02-20 RX ADMIN — POTASSIUM CHLORIDE 10 MEQ: 7.45 INJECTION INTRAVENOUS at 03:57

## 2022-02-20 RX ADMIN — PREGABALIN 100 MG: 50 CAPSULE ORAL at 08:37

## 2022-02-20 RX ADMIN — SODIUM CHLORIDE 25 ML: 9 INJECTION, SOLUTION INTRAVENOUS at 15:31

## 2022-02-20 RX ADMIN — HYDROCODONE BITARTRATE AND ACETAMINOPHEN 1 TABLET: 5; 325 TABLET ORAL at 03:53

## 2022-02-20 RX ADMIN — SODIUM CHLORIDE, PRESERVATIVE FREE 10 ML: 5 INJECTION INTRAVENOUS at 21:39

## 2022-02-20 RX ADMIN — BUSPIRONE HYDROCHLORIDE 15 MG: 5 TABLET ORAL at 21:40

## 2022-02-20 RX ADMIN — HYDROCODONE BITARTRATE AND ACETAMINOPHEN 1 TABLET: 5; 325 TABLET ORAL at 15:21

## 2022-02-20 RX ADMIN — SODIUM CHLORIDE, PRESERVATIVE FREE 10 ML: 5 INJECTION INTRAVENOUS at 08:40

## 2022-02-20 RX ADMIN — POTASSIUM CHLORIDE 10 MEQ: 7.45 INJECTION INTRAVENOUS at 05:12

## 2022-02-20 RX ADMIN — MAGNESIUM SULFATE HEPTAHYDRATE 1000 MG: 1 INJECTION, SOLUTION INTRAVENOUS at 05:10

## 2022-02-20 RX ADMIN — POTASSIUM CHLORIDE 10 MEQ: 7.45 INJECTION INTRAVENOUS at 00:43

## 2022-02-20 RX ADMIN — POTASSIUM CHLORIDE 10 MEQ: 7.45 INJECTION INTRAVENOUS at 02:11

## 2022-02-20 RX ADMIN — VANCOMYCIN HYDROCHLORIDE 1250 MG: 5 INJECTION, POWDER, LYOPHILIZED, FOR SOLUTION INTRAVENOUS at 23:58

## 2022-02-20 RX ADMIN — TRAZODONE HYDROCHLORIDE 50 MG: 50 TABLET ORAL at 21:40

## 2022-02-20 RX ADMIN — POTASSIUM CHLORIDE 40 MEQ: 1500 TABLET, EXTENDED RELEASE ORAL at 08:36

## 2022-02-20 RX ADMIN — PIPERACILLIN AND TAZOBACTAM 3375 MG: 3; .375 INJECTION, POWDER, LYOPHILIZED, FOR SOLUTION INTRAVENOUS at 23:06

## 2022-02-20 RX ADMIN — ENOXAPARIN SODIUM 40 MG: 100 INJECTION SUBCUTANEOUS at 08:40

## 2022-02-20 RX ADMIN — POTASSIUM CHLORIDE 40 MEQ: 1500 TABLET, EXTENDED RELEASE ORAL at 12:57

## 2022-02-20 RX ADMIN — SODIUM CHLORIDE 25 ML: 9 INJECTION, SOLUTION INTRAVENOUS at 00:39

## 2022-02-20 RX ADMIN — MAGNESIUM SULFATE HEPTAHYDRATE 1000 MG: 1 INJECTION, SOLUTION INTRAVENOUS at 07:38

## 2022-02-20 RX ADMIN — SODIUM CHLORIDE, PRESERVATIVE FREE 10 ML: 5 INJECTION INTRAVENOUS at 08:41

## 2022-02-20 RX ADMIN — SODIUM CHLORIDE 25 ML: 9 INJECTION, SOLUTION INTRAVENOUS at 13:02

## 2022-02-20 RX ADMIN — MAGNESIUM SULFATE HEPTAHYDRATE 1000 MG: 1 INJECTION, SOLUTION INTRAVENOUS at 00:42

## 2022-02-20 ASSESSMENT — PAIN SCALES - GENERAL
PAINLEVEL_OUTOF10: 6
PAINLEVEL_OUTOF10: 7
PAINLEVEL_OUTOF10: 8
PAINLEVEL_OUTOF10: 6
PAINLEVEL_OUTOF10: 7

## 2022-02-20 NOTE — PROGRESS NOTES
Hospitalist Progress Note      Name:  El Level /Age/Sex: 1980  (46 y.o. male)   MRN & CSN:  [id number] & [id number] Admission Date/Time: 2022  6:52 PM   Location:  [address] [place] [address] PCP: Cathy Mena MD         Hospital Day: 3    Assessment and Plan:   El Level is a 46 y.o.  male  who presents with Cellulitis    El Level is a 46 y.o.  male  who presents with alcohol intoxication, requesting help with detox. And left lower extremity skin infection           cellulitis of  L foot, with leukocytosis  -Monitor leukocytosis to see that it improves and becomes within normal limits, if continue to be elevated consider hematology consult, has had unexplained leukocytosis in the past before  - marked via body marker. Erythema has significantly improved  - vancomycin and zoyn, clinda given by ED, will continue  - Consider CT scan of left lower extremity if pain/swelling worsens  - follow-up blood cultures which are negative till date  - wound care consult   -Podiatry consulted by ED provider  - wound Cx     Alcohol dependence, going through withdrawal. Pt is placed on CIWA protocol, ativan to be given per protocol. On iv hydration, multivitamin daily. Neurocheck. H/o substance use        Sinus tachycardia, likely related to alcohol withdrawal. Hydrate with normal saline, close monitoring.      DM II, none compliant with taking medications. Will order ISS, hgba1c, and will start on low dose lantus ( 15 units) nightly. Diabetic neuropathy, on neurontin. Endocrinology on board. H/o Thrombocytopenia and anemia, could be related to alcohol abuse and liver injury. Close monitoring. Periodic checks. Hyponatremia  -Secondary to beer potomania. Also drinking a lot of hypotonic fluids like Gatorade. Advised to limit oral intake. Nephrology on board.          Diet ADULT DIET;  Regular; 4 carb choices (60 gm/meal)   DVT Prophylaxis [x] Lovenox, []  Heparin, [] SCDs, [] Ambulation   GI Prophylaxis [] PPI,  [] H2 Blocker,  [] Carafate,  [] Diet/Tube Feeds   Code Status Full Code   Disposition Patient requires continued admission due to cellulitis and uncontrolled diabetes   MDM [] Low, [] Moderate,[x]  High  Patient's risk as above due to cellulitis     History of Present Illness:     Chief Complaint: Cellulitis  Erika Palomo is a 46 y.o.  male  who presents with cellulitis and uncontrolled diabetes. Subjective-patient examined at bedside. He is comfortable in no distress. Labs reviewed. Continue vancomycin and Zosyn. Labs reviewed. Will replace potassium. Will check iron indices as well. Ten point ROS reviewed negative, unless as noted above    Objective: Intake/Output Summary (Last 24 hours) at 2/20/2022 0816  Last data filed at 2/20/2022 0533  Gross per 24 hour   Intake 360 ml   Output 1000 ml   Net -640 ml      Vitals:   Vitals:    02/20/22 0745   BP: 98/76   Pulse: 92   Resp: 24   Temp: 97.7 °F (36.5 °C)   SpO2: 100%       Vent Information  SpO2: 100 %  No results for input(s): PH, YZC4VQL, PO2ART, BE, LWI3WIV, CO2CT, O2SAT, LABCARB in the last 72 hours. Invalid input(s): METHGBART           Physical Exam:   GEN Awake male, sitting upright in bed in no apparent distress. Appears given age. EYES Pupils are equally round. No scleral erythema, discharge, or conjunctivitis. HENT Mucous membranes are moist. Oral pharynx without exudates, no evidence of thrush. NECK Supple, no apparent thyromegaly or masses. RESP Clear to auscultation, no wheezes, rales or rhonchi. Symmetric chest movement while on room air. CARDIO/VASC S1/S2 auscultated. Regular rate without appreciable murmurs, rubs, or gallops. No JVD or carotid bruits. Peripheral pulses equal bilaterally and palpable. No peripheral edema. GI Abdomen is soft without significant tenderness, masses, or guarding. Bowel sounds are normoactive. Rectal exam deferred.  No costovertebral angle tenderness.  Normal appearing external genitalia. Walsh catheter is not present. HEME/LYMPH No palpable cervical lymphadenopathy and no hepatosplenomegaly. No petechiae or ecchymoses. MSK No gross joint deformities. SKIN Normal coloration, warm, dry. Extensive cellulitis of the legs especially the left has improved  NEURO Cranial nerves appear grossly intact, normal speech, no lateralizing weakness. PSYCH Awake, alert, oriented x 4. Affect appropriate.     Data:   CBC with Differential:    Lab Results   Component Value Date    WBC 22.0 02/20/2022    RBC 2.33 02/20/2022    HGB 7.5 02/20/2022    HCT 23.5 02/20/2022     02/20/2022    .9 02/20/2022    MCH 32.2 02/20/2022    MCHC 31.9 02/20/2022    RDW 19.3 02/20/2022    SEGSPCT 90.0 02/20/2022    LYMPHOPCT 8.0 02/20/2022    MONOPCT 2.0 02/20/2022    EOSPCT 3.1 05/01/2011    BASOPCT 0.7 02/18/2022    MONOSABS 0.4 02/20/2022    LYMPHSABS 1.8 02/20/2022    EOSABS 0.1 02/18/2022    BASOSABS 0.2 02/18/2022    DIFFTYPE MANUAL DIFFERENTIAL 02/20/2022       CMP:     Lab Results   Component Value Date     02/20/2022    K 3.0 02/20/2022    CL 93 02/20/2022    CO2 28 02/20/2022    BUN 5 02/20/2022    CREATININE 0.6 02/20/2022    GFRAA >60 02/20/2022    AGRATIO 0.4 02/15/2022    LABGLOM >60 02/20/2022    GLUCOSE 112 02/20/2022    PROT 8.6 02/20/2022    PROT 7.9 05/01/2011    LABALBU 2.6 02/20/2022    CALCIUM 7.5 02/20/2022    BILITOT 5.0 02/20/2022    ALKPHOS 164 02/20/2022    AST 74 02/20/2022    ALT 18 02/20/2022       Troponin:  Lab Results   Component Value Date    TROPONINT 0.014 07/31/2021       U/A:    Lab Results   Component Value Date    COLORU TREVOR 08/03/2021    PROTEINU 30 08/03/2021    WBCUA <1 08/03/2021    RBCUA 1 08/03/2021    MUCUS RARE 09/16/2014    TRICHOMONAS NONE SEEN 08/03/2021    BACTERIA NEGATIVE 08/03/2021    CLARITYU CLEAR 08/03/2021    SPECGRAV 1.015 08/03/2021    LEUKOCYTESUR NEGATIVE 08/03/2021    UROBILINOGEN 2.0 08/03/2021    BILIRUBINUR SMALL 08/03/2021    BLOODU SMALL 08/03/2021       Urine Culture:  No components found for: ORALIA    Radiology results:  CTA LOWER EXTREMITY LEFT W CONTRAST   Final Result   No acute vascular abnormality is identified within the lower extremities. Three-vessel runoff bilaterally with mild atherosclerotic plaque. Soft tissue edema seen within both calves, left greater than right. No   definite DVT seen by CT imaging. Cirrhotic morphology of the liver, with sequela of cirrhosis as above. Findings appear similar to the previous CT study done in August of 2021.          VL KENYA BILATERAL LIMITED 1-2 LEVELS    (Results Pending)       Medications:   Medications:    aspirin EC  81 mg Oral Daily    busPIRone  15 mg Oral TID    [Held by provider] furosemide  40 mg Oral Daily    pregabalin  100 mg Oral BID    sodium chloride flush  10 mL IntraVENous 2 times per day    enoxaparin  40 mg SubCUTAneous Daily    piperacillin-tazobactam  3,375 mg IntraVENous Q8H    sodium chloride flush  5-40 mL IntraVENous 2 times per day    vancomycin  1,250 mg IntraVENous Q12H    insulin glargine  20 Units SubCUTAneous Nightly    insulin lispro  0-12 Units SubCUTAneous TID WC    insulin lispro  0-6 Units SubCUTAneous 2 times per day    clindamycin (CLEOCIN) IV  600 mg IntraVENous Once      Infusions:    sodium chloride 100 mL/hr at 02/20/22 0024    dextrose      sodium chloride 25 mL (02/20/22 0039)     PRN Meds: HYDROcodone-acetaminophen, 1 tablet, Q6H PRN  traZODone, 50 mg, Nightly PRN  sodium chloride flush, 10 mL, PRN  sodium chloride, 25 mL, PRN  ondansetron, 4 mg, Q8H PRN   Or  ondansetron, 4 mg, Q6H PRN  bisacodyl, 5 mg, Daily PRN  acetaminophen, 650 mg, Q6H PRN   Or  acetaminophen, 650 mg, Q6H PRN  glucose, 15 g, PRN  glucagon (rDNA), 1 mg, PRN  dextrose, 100 mL/hr, PRN  sodium chloride flush, 5-40 mL, PRN  sodium chloride, 25 mL, PRN  LORazepam, 1 mg, Q1H PRN   Or  LORazepam, 1 mg, Q1H PRN   Or  LORazepam, 2 mg, Q1H PRN   Or  LORazepam, 2 mg, Q1H PRN   Or  LORazepam, 3 mg, Q1H PRN   Or  LORazepam, 3 mg, Q1H PRN   Or  LORazepam, 4 mg, Q1H PRN   Or  LORazepam, 4 mg, Q1H PRN  dextrose bolus (hypoglycemia), 125 mL, PRN   Or  dextrose bolus (hypoglycemia), 250 mL, PRN  potassium chloride, 10 mEq, PRN  magnesium sulfate, 1,000 mg, PRN          Electronically signed by Conrado Tao MD on 2/20/2022 at 8:16 AM

## 2022-02-20 NOTE — PROGRESS NOTES
[address] [place] [address] [address] [place] [address]  Phone: [phone]  Office Hours: 8:30AM - 4:30PM  Monday - Friday     Nephrology Progress Note  2/20/2022 11:27 AM  Subjective:   Admit Date: 2/18/2022  PCP: Jose Rivera MD  Interval History: awake alert  Oriented  No oliguria  Drinking and eating good  Diet: ADULT DIET; Regular; 4 carb choices (60 gm/meal)      Data:   Scheduled Meds:   potassium chloride  40 mEq Oral TID WC    aspirin EC  81 mg Oral Daily    busPIRone  15 mg Oral TID    pregabalin  100 mg Oral BID    sodium chloride flush  10 mL IntraVENous 2 times per day    enoxaparin  40 mg SubCUTAneous Daily    piperacillin-tazobactam  3,375 mg IntraVENous Q8H    sodium chloride flush  5-40 mL IntraVENous 2 times per day    vancomycin  1,250 mg IntraVENous Q12H    insulin glargine  20 Units SubCUTAneous Nightly    insulin lispro  0-12 Units SubCUTAneous TID WC    insulin lispro  0-6 Units SubCUTAneous 2 times per day    clindamycin (CLEOCIN) IV  600 mg IntraVENous Once     Continuous Infusions:   sodium chloride 100 mL/hr at 02/20/22 0024    dextrose      sodium chloride 25 mL (02/20/22 0039)     PRN Meds:HYDROcodone-acetaminophen, traZODone, sodium chloride flush, sodium chloride, ondansetron **OR** ondansetron, bisacodyl, acetaminophen **OR** acetaminophen, glucose, glucagon (rDNA), dextrose, sodium chloride flush, sodium chloride, LORazepam **OR** LORazepam **OR** LORazepam **OR** LORazepam **OR** LORazepam **OR** LORazepam **OR** LORazepam **OR** LORazepam, dextrose bolus (hypoglycemia) **OR** dextrose bolus (hypoglycemia), potassium chloride, magnesium sulfate  I/O last 3 completed shifts: In: 360 [P.O.:360]  Out: 1000 [Urine:1000]  No intake/output data recorded.     Intake/Output Summary (Last 24 hours) at 2/20/2022 1127  Last data filed at 2/20/2022 0533  Gross per 24 hour   Intake 360 ml   Output 1000 ml   Net -640 ml     CBC:   Recent Labs     02/18/22 1959 02/20/22  0441   WBC 29.9* 22.0*   HGB 8.6* 7.5*    174     BMP:    Recent Labs     02/18/22 1959 02/19/22  1602 02/19/22 2123 02/20/22 0441 02/20/22  0845   *   < > 131* 130* 129*   K 3.2*   < > 2.9* 3.0* 3.1*   CL 87*   < > 92* 93* 92*   CO2 26   < > 28 28 28   BUN 2*  --   --  5*  --    CREATININE 0.5*  --   --  0.6*  --    GLUCOSE 117*  --   --  112*  --     < > = values in this interval not displayed. Troponin: No results for input(s): TROPONINI in the last 72 hours. BNP: No results for input(s): BNP in the last 72 hours. Lipids: No results for input(s): CHOL, HDL in the last 72 hours. Invalid input(s): LDLCALCU  ABGs: No results found for: PHART, PO2ART, WVX9HPZ    Objective:   Vitals: BP 98/76   Pulse 92   Temp 97.7 °F (36.5 °C) (Oral)   Resp 24   Wt 235 lb 3.2 oz (106.7 kg)   SpO2 100%   BMI 31.90 kg/m²   General appearance: alert and cooperative with exam  HEENT: Head: Normal, normocephalic, atraumatic.   Neck: no adenopathy, no carotid bruit, no JVD, supple, symmetrical, trachea midline and thyroid not enlarged, symmetric, no tenderness/mass/nodules  Lungs: clear to auscultation bilaterally  Heart: regular rate and rhythm, S1, S2 normal, no murmur, click, rub or gallop  Abdomen: soft, non-tender; bowel sounds normal; no masses,  no organomegaly  Extremities: extremities normal, atraumatic, no cyanosis or edema  Neurologic: Mental status: Alert, oriented, thought content appropriate    Assessment and Plan:     Patient Active Problem List:     HTN (hypertension)     Fatty liver     Obstructive sleep apnea     Type 2 diabetes, controlled, with neuropathy (HCC)     Anxiety     Hypercholesterolemia     Alcohol withdrawal delirium (HCC)     Closed displaced Maisonneuve's fracture of left leg     Cellulitis     Leukocytosis     Hyponatremia     Hypokalemia     Isolated proteinuria with minor glomerular abnormality      Plan   K supplementation and mag supplementation in progress  Na 130  Regular diet  Check Na and K  Dr Nanda Carey will follow in am  Counseled him regarding ETOH use and hyponatremia and electrolyte imbalances and the effect on CNS  He voiced understanding      Glenn Waters MD, MD

## 2022-02-20 NOTE — PLAN OF CARE
PS to Physician  There is an order in for KENYA/ limited -  segmental pressures. These studies are perfomred on an Outpatient Basis at the [facility] where the equipment is located. The patient can receive an order for this and an appointment can be made by calling [phone]. Thank you in advance.

## 2022-02-20 NOTE — PROGRESS NOTES
Progress Note( Dr. Virgen Lane)  2/20/2022  Subjective:   Admit Date: 2/18/2022  PCP: Dorothy Rodgers MD    Admitted For :Left big toe cellulitis    Consulted For: Better control of blood glucose    Interval History: Better left toe cellulitis was debrided  Hemoglobin hematocrit dropped to 7.5      Denies any chest pains,   Denies SOB . Denies nausea or vomiting. No new bowel or bladder symptoms. Intake/Output Summary (Last 24 hours) at 2/20/2022 1635  Last data filed at 2/20/2022 0533  Gross per 24 hour   Intake 360 ml   Output 1000 ml   Net -640 ml       DATA    CBC:   Recent Labs     02/18/22 1959 02/20/22 0441   WBC 29.9* 22.0*   HGB 8.6* 7.5*    174    CMP:  Recent Labs     02/18/22 1959 02/19/22  1602 02/19/22 2123 02/20/22  0441 02/20/22  0845   *   < > 131* 130* 129*   K 3.2*   < > 2.9* 3.0* 3.1*   CL 87*   < > 92* 93* 92*   CO2 26   < > 28 28 28   BUN 2*  --   --  5*  --    CREATININE 0.5*  --   --  0.6*  --    CALCIUM 7.8*  --   --  7.5*  --    PROT 9.1*  --   --  8.6*  --    LABALBU 2.6*  --   --  2.6*  --    BILITOT 5.1*  --   --  5.0*  --    ALKPHOS 212*  --   --  164*  --    *  --   --  74*  --    ALT 26  --   --  18  --     < > = values in this interval not displayed. Lipids:   Lab Results   Component Value Date    CHOL 106 02/07/2022    HDL 30 02/07/2022    TRIG 91 02/07/2022     Glucose:  Recent Labs     02/20/22  0728 02/20/22  1136 02/20/22  1616   POCGLU 154* 118* 142*     [place]we[place]2O:  Lab Results   Component Value Date    LABA1C 4.7 02/18/2022     High Sensitivity TSH:   Lab Results   Component Value Date    TSHHS 3.130 08/01/2021     Free T3: No results found for: FT3  Free T4:No results found for: T4FREE    CTA LOWER EXTREMITY LEFT W CONTRAST   Final Result   No acute vascular abnormality is identified within the lower extremities. Three-vessel runoff bilaterally with mild atherosclerotic plaque.       Soft tissue edema seen within both calves, left greater than right. No   definite DVT seen by CT imaging. Cirrhotic morphology of the liver, with sequela of cirrhosis as above. Findings appear similar to the previous CT study done in August of 2021. Scheduled Medicines   Medications:    potassium chloride  40 mEq Oral TID WC    aspirin EC  81 mg Oral Daily    busPIRone  15 mg Oral TID    pregabalin  100 mg Oral BID    sodium chloride flush  10 mL IntraVENous 2 times per day    enoxaparin  40 mg SubCUTAneous Daily    piperacillin-tazobactam  3,375 mg IntraVENous Q8H    sodium chloride flush  5-40 mL IntraVENous 2 times per day    vancomycin  1,250 mg IntraVENous Q12H    insulin glargine  20 Units SubCUTAneous Nightly    insulin lispro  0-12 Units SubCUTAneous TID WC    insulin lispro  0-6 Units SubCUTAneous 2 times per day    clindamycin (CLEOCIN) IV  600 mg IntraVENous Once      Infusions:    sodium chloride 100 mL/hr at 02/20/22 0024    dextrose      sodium chloride 25 mL (02/20/22 1531)         Objective:   Vitals: /79   Pulse 94   Temp 97.9 °F (36.6 °C) (Oral)   Resp 24   Wt 235 lb 3.2 oz (106.7 kg)   SpO2 100%   BMI 31.90 kg/m²   General appearance: alert and cooperative with exam  Neck: no JVD or bruit  Thyroid : Normal lobes   Lungs: Has Vesicular Breath sounds   Heart:  regular rate and rhythm  Abdomen: soft, non-tender; bowel sounds normal; no masses,  no organomegaly  Musculoskeletal: Normal  Extremities: extremities normal, , no edema//left big toe cellulitis  Neurologic:  Awake, alert, oriented to name, place and time. Cranial nerves II-XII are grossly intact. Motor is  intact. Sensory neuropathy. ,  and gait is abnormal.    Assessment:     Patient Active Problem List:     HTN (hypertension)     Fatty liver     Obstructive sleep apnea     Type 2 diabetes, controlled, with neuropathy ([address]     Anxiety     Hypercholesterolemia     Alcohol withdrawal delirium ([address]     Closed displaced Maisonneuve's fracture of left leg     Cellulitis     Leukocytosis     Hyponatremia     Hypokalemia     Isolated proteinuria with minor glomerular abnormality      Plan:     1. Reviewed POC blood glucose . Labs and X ray results   2. Reviewed Current Medicines   3. On meal/ Correction bolus Humalog/ Basal Lantus Insulin regime  4. Monitor Blood glucose frequently   5. Modified  the dose of Insulin/ other medicines as needed   6. Will follow     .      Radha Dowell MD, MD

## 2022-02-20 NOTE — PROGRESS NOTES
4 Eyes Skin Assessment     NAME:  Shahla Todd  YOB: 1980  MEDICAL RECORD NUMBER:  [id number]    The patient is being assess for  Admission    I agree that 2 RN's have performed a thorough Head to Toe Skin Assessment on the patient. ALL assessment sites listed below have been assessed. Areas assessed by both nurses:    Head, Face, Ears, Shoulders, Back, Chest, Arms, Elbows, Hands, Sacrum. Buttock, Coccyx, Ischium and Legs. Feet and Heels        Does the Patient have a Wound? Yes wound(s) were present on assessment.  LDA wound assessment was Initiated and completed        Bon Prevention initiated:  NA   Wound Care Orders initiated:  Yes    Pressure Injury (Stage 3,4, Unstageable, DTI, NWPT, and Complex wounds) if present place consult order under [de-identified] NA    New and Established Ostomies if present place consult order under : No      Nurse 1 eSignature: Electronically signed by Alicia Carrillo RN on 2/19/22 at 7:02 PM EST    **SHARE this note so that the co-signing nurse is able to place an eSignature**    Nurse 2 eSignature: Electronically signed by Emerita Villela, Student nurse on 2/19/22 at 7:02 PM EST

## 2022-02-20 NOTE — PROGRESS NOTES
[address] [facility]  consulted by Dr. James Arellano for monitoring and adjustment. Indication for treatment: Cellulitis of left foot  Goal trough: [x] 10-15 mcg/mL or [] 15-20 mcg/ml  AUC/BOY: [x] <500 or [] 400-600    Pertinent Laboratory Values:   Temp Readings from Last 3 Encounters:   02/20/22 97.7 °F (36.5 °C) (Oral)   08/03/21 97.9 °F (36.6 °C) (Oral)   01/29/18 98.3 °F (36.8 °C) (Oral)     Recent Labs     02/18/22 1959 02/20/22  0441   WBC 29.9* 22.0*     Recent Labs     02/18/22 1959 02/20/22  0441   BUN 2* 5*   CREATININE 0.5* 0.6*     Estimated Creatinine Clearance: 209 mL/min (A) (based on SCr of 0.6 mg/dL (L)). Intake/Output Summary (Last 24 hours) at 2/20/2022 1202  Last data filed at 2/20/2022 0533  Gross per 24 hour   Intake 360 ml   Output 1000 ml   Net -640 ml       Pertinent Cultures:  Date    Source    Results  2/18    Blood x 2   NGTD  2/19                             Wound                                    Staph      Vancomycin level:   TROUGH:  No results for input(s): VANCOTROUGH in the last 72 hours. RANDOM:    Recent Labs     02/20/22  0441   VANCORANDOM 16.6       Assessment:  · SCr, BUN, and urine output: at baseline  · Day(s) of therapy: 2  · Vancomycin concentration:   · 2/20:  16.6, appropriate to re-dose    Plan:  · Continue on Vancomycin 1250 mg IVPB q12h  · Random level is therapeutic @ 16.6, predicted AUC < 500  · Repeat level in 2 days  · Pharmacy will continue to monitor patient and adjust therapy as indicated    VANCOMYCIN CONCENTRATION SCHEDULED FOR 02/22/22 @0600    Thank you for the consult.   Oriana Dent, [facility]  2/20/2022 12:02 PM

## 2022-02-20 NOTE — CARE COORDINATION
ANABELLAW received consult re: consideration for etho rehab. ANABELLAW reviewed chart and met with pt to initiate dc planning. Pt reported prior to admission living with wife in bi-level home. Pt reported tripping over his cat sustaining a foot fx approx 3 mos ago. Pt reported he ambulates fine at this time and denies need for SNF. Pt reported he is indp with ADLs and mobility; has crutches, cane and walker. Pt reported he is unemployed d/t a confrontation with a coworker and relies on wife for finances and health ins. Pt admitted to h/o etho abuse with most recent stay at in facility in [place] [id number]. Pt denied current etho addiction and denied need for counseling or resources at this time. LSW left substance abuse resources at bedside. Dc plan is to return home with wife. ANABELLAW available PRN.   Electronically signed by IZA Daniels on 2/20/2022 at 3:49 PM

## 2022-02-20 NOTE — PLAN OF CARE
Problem: Pain:  Goal: Pain level will decrease  Description: Pain level will decrease  2/20/2022 1834 by Genoveva Aschoff  Outcome: Ongoing  2/20/2022 1833 by Genoveva Aschoff  Outcome: Ongoing  Goal: Control of acute pain  Description: Control of acute pain  2/20/2022 1834 by Genoveva Aschoff  Outcome: Ongoing  2/20/2022 1833 by Genoveva Aschoff  Outcome: Ongoing  Goal: Control of chronic pain  Description: Control of chronic pain  2/20/2022 1834 by Genoveva Aschoff  Outcome: Ongoing  2/20/2022 1833 by Genoveva Aschoff  Outcome: Ongoing

## 2022-02-21 ENCOUNTER — APPOINTMENT (OUTPATIENT)
Dept: ULTRASOUND IMAGING | Age: 42
DRG: 603 | End: 2022-02-21
Payer: COMMERCIAL

## 2022-02-21 VITALS
WEIGHT: 235.4 LBS | HEART RATE: 97 BPM | SYSTOLIC BLOOD PRESSURE: 137 MMHG | OXYGEN SATURATION: 98 % | DIASTOLIC BLOOD PRESSURE: 89 MMHG | TEMPERATURE: 98 F | HEIGHT: 72 IN | RESPIRATION RATE: 16 BRPM | BODY MASS INDEX: 31.89 KG/M2

## 2022-02-21 LAB
ALBUMIN SERPL-MCNC: 2.3 GM/DL (ref 3.4–5)
ALP BLD-CCNC: 158 IU/L (ref 40–129)
ALT SERPL-CCNC: 18 U/L (ref 10–40)
ANION GAP SERPL CALCULATED.3IONS-SCNC: 7 MMOL/L (ref 4–16)
AST SERPL-CCNC: 77 IU/L (ref 15–37)
BASOPHILS ABSOLUTE: 0.2 K/CU MM
BASOPHILS RELATIVE PERCENT: 1.1 % (ref 0–1)
BILIRUB SERPL-MCNC: 4.7 MG/DL (ref 0–1)
BUN BLDV-MCNC: 4 MG/DL (ref 6–23)
C DIFF AG + TOXIN: NORMAL
CALCIUM SERPL-MCNC: 7.6 MG/DL (ref 8.3–10.6)
CHLORIDE BLD-SCNC: 99 MMOL/L (ref 99–110)
CO2: 26 MMOL/L (ref 21–32)
CREAT SERPL-MCNC: 0.5 MG/DL (ref 0.9–1.3)
DIFFERENTIAL TYPE: ABNORMAL
EOSINOPHILS ABSOLUTE: 0.5 K/CU MM
EOSINOPHILS RELATIVE PERCENT: 3.2 % (ref 0–3)
GFR AFRICAN AMERICAN: >60 ML/MIN/1.73M2
GFR NON-AFRICAN AMERICAN: >60 ML/MIN/1.73M2
GLUCOSE BLD-MCNC: 100 MG/DL (ref 70–99)
GLUCOSE BLD-MCNC: 104 MG/DL (ref 70–99)
GLUCOSE BLD-MCNC: 119 MG/DL (ref 70–99)
HCT VFR BLD CALC: 25.2 % (ref 42–52)
HEMOGLOBIN: 7.8 GM/DL (ref 13.5–18)
IMMATURE NEUTROPHIL %: 1.2 % (ref 0–0.43)
LYMPHOCYTES ABSOLUTE: 2.3 K/CU MM
LYMPHOCYTES RELATIVE PERCENT: 15.2 % (ref 24–44)
MCH RBC QN AUTO: 32.2 PG (ref 27–31)
MCHC RBC AUTO-ENTMCNC: 31 % (ref 32–36)
MCV RBC AUTO: 104.1 FL (ref 78–100)
MONOCYTES ABSOLUTE: 1.7 K/CU MM
MONOCYTES RELATIVE PERCENT: 10.8 % (ref 0–4)
NUCLEATED RBC %: 0.3 %
PDW BLD-RTO: 19.1 % (ref 11.7–14.9)
PLATELET # BLD: 196 K/CU MM (ref 140–440)
PMV BLD AUTO: 9.5 FL (ref 7.5–11.1)
POTASSIUM SERPL-SCNC: 3.9 MMOL/L (ref 3.5–5.1)
RBC # BLD: 2.42 M/CU MM (ref 4.6–6.2)
SEGMENTED NEUTROPHILS ABSOLUTE COUNT: 10.6 K/CU MM
SEGMENTED NEUTROPHILS RELATIVE PERCENT: 68.5 % (ref 36–66)
SODIUM BLD-SCNC: 132 MMOL/L (ref 135–145)
SOURCE: NORMAL
TOTAL IMMATURE NEUTOROPHIL: 0.18 K/CU MM
TOTAL NUCLEATED RBC: 0.1 K/CU MM
TOTAL PROTEIN: 7.6 GM/DL (ref 6.4–8.2)
WBC # BLD: 15.4 K/CU MM (ref 4–10.5)

## 2022-02-21 PROCEDURE — 82962 GLUCOSE BLOOD TEST: CPT

## 2022-02-21 PROCEDURE — 93926 LOWER EXTREMITY STUDY: CPT

## 2022-02-21 PROCEDURE — 99232 SBSQ HOSP IP/OBS MODERATE 35: CPT | Performed by: INTERNAL MEDICINE

## 2022-02-21 PROCEDURE — 85025 COMPLETE CBC W/AUTO DIFF WBC: CPT

## 2022-02-21 PROCEDURE — 6370000000 HC RX 637 (ALT 250 FOR IP): Performed by: INTERNAL MEDICINE

## 2022-02-21 PROCEDURE — 80051 ELECTROLYTE PANEL: CPT

## 2022-02-21 PROCEDURE — 80053 COMPREHEN METABOLIC PANEL: CPT

## 2022-02-21 PROCEDURE — 99211 OFF/OP EST MAY X REQ PHY/QHP: CPT

## 2022-02-21 PROCEDURE — 36415 COLL VENOUS BLD VENIPUNCTURE: CPT

## 2022-02-21 PROCEDURE — 6360000002 HC RX W HCPCS: Performed by: INTERNAL MEDICINE

## 2022-02-21 PROCEDURE — 80048 BASIC METABOLIC PNL TOTAL CA: CPT

## 2022-02-21 PROCEDURE — 2580000003 HC RX 258: Performed by: INTERNAL MEDICINE

## 2022-02-21 PROCEDURE — 94761 N-INVAS EAR/PLS OXIMETRY MLT: CPT

## 2022-02-21 RX ORDER — CEPHALEXIN 500 MG/1
500 CAPSULE ORAL 4 TIMES DAILY
Qty: 20 CAPSULE | Refills: 0 | Status: SHIPPED | OUTPATIENT
Start: 2022-02-21 | End: 2022-03-30

## 2022-02-21 RX ADMIN — BUSPIRONE HYDROCHLORIDE 15 MG: 5 TABLET ORAL at 12:41

## 2022-02-21 RX ADMIN — PREGABALIN 100 MG: 50 CAPSULE ORAL at 12:41

## 2022-02-21 RX ADMIN — PIPERACILLIN AND TAZOBACTAM 3375 MG: 3; .375 INJECTION, POWDER, LYOPHILIZED, FOR SOLUTION INTRAVENOUS at 06:52

## 2022-02-21 RX ADMIN — ASPIRIN 81 MG: 81 TABLET, COATED ORAL at 12:41

## 2022-02-21 RX ADMIN — SODIUM CHLORIDE, PRESERVATIVE FREE 10 ML: 5 INJECTION INTRAVENOUS at 09:15

## 2022-02-21 RX ADMIN — BUSPIRONE HYDROCHLORIDE 15 MG: 5 TABLET ORAL at 09:15

## 2022-02-21 ASSESSMENT — PAIN DESCRIPTION - ORIENTATION: ORIENTATION: LEFT

## 2022-02-21 ASSESSMENT — PAIN DESCRIPTION - PROGRESSION: CLINICAL_PROGRESSION: NOT CHANGED

## 2022-02-21 ASSESSMENT — PAIN DESCRIPTION - LOCATION: LOCATION: FOOT

## 2022-02-21 ASSESSMENT — PAIN DESCRIPTION - ONSET: ONSET: ON-GOING

## 2022-02-21 ASSESSMENT — PAIN DESCRIPTION - FREQUENCY: FREQUENCY: CONTINUOUS

## 2022-02-21 ASSESSMENT — PAIN SCALES - GENERAL: PAINLEVEL_OUTOF10: 2

## 2022-02-21 ASSESSMENT — PAIN - FUNCTIONAL ASSESSMENT: PAIN_FUNCTIONAL_ASSESSMENT: ACTIVITIES ARE NOT PREVENTED

## 2022-02-21 ASSESSMENT — PAIN DESCRIPTION - DESCRIPTORS: DESCRIPTORS: ACHING

## 2022-02-21 ASSESSMENT — PAIN DESCRIPTION - PAIN TYPE: TYPE: CHRONIC PAIN

## 2022-02-21 NOTE — PROGRESS NOTES
Patient IV accesses discontinued at bedside. Catheters intact at time of removal. Site WNL. Patient given discharge instructions at bedside. Verbalized understanding of all instructions given. Instructed to retrieve outpatient prescriptions from OP pharmacy on 1st floor at departure. Copy of AVS given to patient. Verified all personal belongings present prior to leaving. Assisted to spouses vehicle via wheelchair by this RN at time of departing.

## 2022-02-21 NOTE — PROGRESS NOTES
Hospitalist Progress Note      Name:  Kate Espinoza /Age/Sex: 1980  (46 y.o. male)   MRN & CSN:  [id number] & [id number] Admission Date/Time: 2022  6:52 PM   Location:  [address] [place] [address] PCP: Maximino Lundberg MD         Hospital Day: 4    Assessment and Plan:   Kate Espinoza is a 46 y.o.  male  who presents with Cellulitis    Kate Espinoza is a 46 y.o.  male  who presents with alcohol intoxication, requesting help with detox. And left lower extremity skin infection           cellulitis of  L foot, with leukocytosis  -Monitor leukocytosis to see that it improves and becomes within normal limits, if continue to be elevated consider hematology consult, has had unexplained leukocytosis in the past before  - marked via body marker. Erythema has significantly improved  - vancomycin and zoyn, cellulitis is markedly improved. - Consider CT scan of left lower extremity is within normal limits. -Venous duplex is negative to  - follow-up blood cultures which are negative till date  - wound care consult   -Podiatry consulted by ED provider  - wound Cx growing staph which is pansensitive     Alcohol dependence, going through withdrawal. Pt is placed on CIWA protocol, ativan to be given per protocol. On iv hydration, multivitamin daily. Neurocheck. H/o substance use        Sinus tachycardia, likely related to alcohol withdrawal. Hydrate with normal saline, close monitoring.      DM II, none compliant with taking medications. Will order ISS, hgba1c, and will start on low dose lantus ( 15 units) nightly. Diabetic neuropathy, on neurontin. Endocrinology on board. H/o Thrombocytopenia and anemia, could be related to alcohol abuse and liver injury. Close monitoring. Periodic checks. Hyponatremia  -Secondary to beer potomania. Also drinking a lot of hypotonic fluids like Gatorade. Advised to limit oral intake. Nephrology on board. Patient is neurologically intact.   Labs are awaited or have not been drawn as yet.          Diet ADULT DIET; Regular; 4 carb choices (60 gm/meal)   DVT Prophylaxis [x] Lovenox, []  Heparin, [] SCDs, [] Ambulation   GI Prophylaxis [] PPI,  [] H2 Blocker,  [] Carafate,  [] Diet/Tube Feeds   Code Status Full Code   Disposition Patient requires continued admission due to cellulitis and uncontrolled diabetes   MDM [] Low, [] Moderate,[x]  High  Patient's risk as above due to cellulitis     History of Present Illness:     Chief Complaint: Cellulitis  Lee Sánchez is a 46 y.o.  male  who presents with cellulitis and uncontrolled diabetes. Subjective-patient examined at bedside. He is comfortable in no distress. Labs reviewed. Iron indices are within normal limits. His cellulitis has considerably improved and he wishes a desire to go home. We will discharge him on oral antibiotics. Follow-up with podiatry as an outpatient. Advised not to drink alcohol and follow-up with endocrinology. Ten point ROS reviewed negative, unless as noted above    Objective: Intake/Output Summary (Last 24 hours) at 2/21/2022 0849  Last data filed at 2/20/2022 2359  Gross per 24 hour   Intake 240 ml   Output 400 ml   Net -160 ml      Vitals:   Vitals:    02/20/22 2355   BP: 126/81   Pulse: 99   Resp: 18   Temp: 98 °F (36.7 °C)   SpO2:        Vent Information  SpO2: 98 %  No results for input(s): PH, RCD5ZIL, PO2ART, BE, SDT8ESN, CO2CT, O2SAT, LABCARB in the last 72 hours. Invalid input(s): METHGBART           Physical Exam:   GEN Awake male, sitting upright in bed in no apparent distress. Appears given age. EYES Pupils are equally round. No scleral erythema, discharge, or conjunctivitis. HENT Mucous membranes are moist. Oral pharynx without exudates, no evidence of thrush. NECK Supple, no apparent thyromegaly or masses. RESP Clear to auscultation, no wheezes, rales or rhonchi. Symmetric chest movement while on room air. CARDIO/VASC S1/S2 auscultated.  Regular rate without appreciable murmurs, rubs, or gallops. No JVD or carotid bruits. Peripheral pulses equal bilaterally and palpable. No peripheral edema. GI Abdomen is soft without significant tenderness, masses, or guarding. Bowel sounds are normoactive. Rectal exam deferred.  No costovertebral angle tenderness. Normal appearing external genitalia. Walsh catheter is not present. HEME/LYMPH No palpable cervical lymphadenopathy and no hepatosplenomegaly. No petechiae or ecchymoses. MSK No gross joint deformities. SKIN Normal coloration, warm, dry. Extensive cellulitis of the legs especially the left has improved  NEURO Cranial nerves appear grossly intact, normal speech, no lateralizing weakness. PSYCH Awake, alert, oriented x 4. Affect appropriate.     Data:   CBC with Differential:    Lab Results   Component Value Date    WBC 22.0 02/20/2022    RBC 2.33 02/20/2022    HGB 7.5 02/20/2022    HCT 23.5 02/20/2022     02/20/2022    .9 02/20/2022    MCH 32.2 02/20/2022    MCHC 31.9 02/20/2022    RDW 19.3 02/20/2022    SEGSPCT 90.0 02/20/2022    LYMPHOPCT 8.0 02/20/2022    MONOPCT 2.0 02/20/2022    EOSPCT 3.1 05/01/2011    BASOPCT 0.7 02/18/2022    MONOSABS 0.4 02/20/2022    LYMPHSABS 1.8 02/20/2022    EOSABS 0.1 02/18/2022    BASOSABS 0.2 02/18/2022    DIFFTYPE MANUAL DIFFERENTIAL 02/20/2022       CMP:     Lab Results   Component Value Date     02/20/2022    K 3.8 02/20/2022    CL 93 02/20/2022    CO2 26 02/20/2022    BUN 5 02/20/2022    CREATININE 0.6 02/20/2022    GFRAA >60 02/20/2022    AGRATIO 0.4 02/15/2022    LABGLOM >60 02/20/2022    GLUCOSE 112 02/20/2022    PROT 8.6 02/20/2022    PROT 7.9 05/01/2011    LABALBU 2.6 02/20/2022    CALCIUM 7.5 02/20/2022    BILITOT 5.0 02/20/2022    ALKPHOS 164 02/20/2022    AST 74 02/20/2022    ALT 18 02/20/2022       Troponin:  Lab Results   Component Value Date    TROPONINT 0.014 07/31/2021       U/A:    Lab Results   Component Value Date    COLORU TREVOR 08/03/2021    PROTEINU 30 08/03/2021    WBCUA <1 08/03/2021    RBCUA 1 08/03/2021    MUCUS RARE 09/16/2014    TRICHOMONAS NONE SEEN 08/03/2021    BACTERIA NEGATIVE 08/03/2021    CLARITYU CLEAR 08/03/2021    SPECGRAV 1.015 08/03/2021    LEUKOCYTESUR NEGATIVE 08/03/2021    UROBILINOGEN 2.0 08/03/2021    BILIRUBINUR SMALL 08/03/2021    BLOODU SMALL 08/03/2021       Urine Culture:  No components found for: ORALIA    Radiology results:  VL DUP LOWER EXTREMITY ARTERIES LEFT   Final Result   Multiphasic appropriate waveforms are seen within the left lower extremity,   without focal stenosis or significant atherosclerotic burden seen   sonographically. CTA LOWER EXTREMITY LEFT W CONTRAST   Final Result   No acute vascular abnormality is identified within the lower extremities. Three-vessel runoff bilaterally with mild atherosclerotic plaque. Soft tissue edema seen within both calves, left greater than right. No   definite DVT seen by CT imaging. Cirrhotic morphology of the liver, with sequela of cirrhosis as above. Findings appear similar to the previous CT study done in August of 2021.              Medications:   Medications:    aspirin EC  81 mg Oral Daily    busPIRone  15 mg Oral TID    pregabalin  100 mg Oral BID    sodium chloride flush  10 mL IntraVENous 2 times per day    enoxaparin  40 mg SubCUTAneous Daily    piperacillin-tazobactam  3,375 mg IntraVENous Q8H    sodium chloride flush  5-40 mL IntraVENous 2 times per day    vancomycin  1,250 mg IntraVENous Q12H    insulin glargine  20 Units SubCUTAneous Nightly    insulin lispro  0-12 Units SubCUTAneous TID WC    insulin lispro  0-6 Units SubCUTAneous 2 times per day    clindamycin (CLEOCIN) IV  600 mg IntraVENous Once      Infusions:    sodium chloride 100 mL/hr at 02/20/22 0024    dextrose      sodium chloride 25 mL (02/20/22 1531)     PRN Meds: traZODone, 50 mg, Nightly PRN  sodium chloride flush, 10 mL, PRN  sodium chloride, 25 mL, PRN  ondansetron, 4 mg, Q8H PRN   Or  ondansetron, 4 mg, Q6H PRN  bisacodyl, 5 mg, Daily PRN  acetaminophen, 650 mg, Q6H PRN   Or  acetaminophen, 650 mg, Q6H PRN  glucose, 15 g, PRN  glucagon (rDNA), 1 mg, PRN  dextrose, 100 mL/hr, PRN  sodium chloride flush, 5-40 mL, PRN  sodium chloride, 25 mL, PRN  LORazepam, 1 mg, Q1H PRN   Or  LORazepam, 1 mg, Q1H PRN   Or  LORazepam, 2 mg, Q1H PRN   Or  LORazepam, 2 mg, Q1H PRN   Or  LORazepam, 3 mg, Q1H PRN   Or  LORazepam, 3 mg, Q1H PRN   Or  LORazepam, 4 mg, Q1H PRN   Or  LORazepam, 4 mg, Q1H PRN  dextrose bolus (hypoglycemia), 125 mL, PRN   Or  dextrose bolus (hypoglycemia), 250 mL, PRN  potassium chloride, 10 mEq, PRN  magnesium sulfate, 1,000 mg, PRN          Electronically signed by Judy Muhammad MD on 2/21/2022 at 8:49 AM

## 2022-02-21 NOTE — CONSULTS
[facility] Continence Nurse  Consult Note       Sridhar Layton  AGE: 46 y.o. GENDER: male  : 1980  TODAY'S DATE:  2022    Subjective:     Reason for  Evaluation and Assessment: wound care evalBeto Layton is a 46 y.o. male referred by:   [x] Physician  [] Nursing  [] Other:     Wound Identification:  Wound Type: diabetic and pressure  Contributing Factors: diabetes and chronic pressure        PAST MEDICAL HISTORY        Diagnosis Date    Hypercholesterolemia 2019    Hypertension     Type 2 diabetes, controlled, with neuropathy ([facility] 2018       PAST SURGICAL HISTORY    History reviewed. No pertinent surgical history. FAMILY HISTORY    Family History   Problem Relation Age of Onset    High Blood Pressure Sister        SOCIAL HISTORY    Social History     Tobacco Use    Smoking status: Never Smoker    Smokeless tobacco: Never Used   Substance Use Topics    Alcohol use: Yes     Alcohol/week: 12.0 standard drinks     Types: 12 Standard drinks or equivalent per week     Comment: occasional    Drug use: No       ALLERGIES    Allergies   Allergen Reactions    Paxil [Paroxetine Hcl]      Caused \"weird\" things to happen in his sleep       MEDICATIONS    No current facility-administered medications on file prior to encounter. Current Outpatient Medications on File Prior to Encounter   Medication Sig Dispense Refill    traMADol (ULTRAM) 50 MG tablet Take 1 tablet by mouth every 6 hours as needed for Pain for up to 7 days. Intended supply: 7 days.  Take lowest dose possible to manage pain 28 tablet 0    busPIRone (BUSPAR) 15 MG tablet TAKE 1 TABLET BY MOUTH THREE TIMES DAILY 270 tablet 1    traZODone (DESYREL) 50 MG tablet Take 1-2 tablets by mouth nightly as needed for Sleep 60 tablet 11    pregabalin (LYRICA) 100 MG capsule TAKE 1 CAPSULE BY MOUTH TWICE DAILY 180 capsule 1    aspirin EC 81 MG EC tablet Take 1 tablet by mouth daily 30 tablet 3    glucose blood VI test strips (GLUCOSE METER TEST) strip 1 each by In Vitro route daily As needed.  100 each 3    glucose monitoring kit (FREESTYLE) monitoring kit 1 kit by Does not apply route daily as needed (If blood sugars running high or low) 1 kit 0    MICROLET LANCETS MISC 1 each by Does not apply route daily 100 each 3         Objective:      /89   Pulse 97   Temp 98 °F (36.7 °C) (Oral)   Resp 16   Ht 6' (1.829 m)   Wt 235 lb 6.4 oz (106.8 kg)   SpO2 98%   BMI 31.93 kg/m²   Bon Risk Score: Bon Scale Score: 23    LABS    CBC:   Lab Results   Component Value Date    WBC 15.4 02/21/2022    RBC 2.42 02/21/2022    HGB 7.8 02/21/2022    HCT 25.2 02/21/2022    .1 02/21/2022    MCH 32.2 02/21/2022    MCHC 31.0 02/21/2022    RDW 19.1 02/21/2022     02/21/2022    MPV 9.5 02/21/2022     CMP:    Lab Results   Component Value Date     02/21/2022    K 3.9 02/21/2022    CL 99 02/21/2022    CO2 26 02/21/2022    BUN 4 02/21/2022    CREATININE 0.5 02/21/2022    GFRAA >60 02/21/2022    AGRATIO 0.4 02/15/2022    LABGLOM >60 02/21/2022    GLUCOSE 100 02/21/2022    PROT 7.6 02/21/2022    PROT 7.9 05/01/2011    LABALBU 2.3 02/21/2022    CALCIUM 7.6 02/21/2022    BILITOT 4.7 02/21/2022    ALKPHOS 158 02/21/2022    AST 77 02/21/2022    ALT 18 02/21/2022     Albumin:    Lab Results   Component Value Date    LABALBU 2.3 02/21/2022     PT/INR:  No results found for: PROTIME, INR  HgBA1c:    Lab Results   Component Value Date    LABA1C 4.7 02/18/2022         Assessment:     Patient Active Problem List   Diagnosis    HTN (hypertension)    Fatty liver    Obstructive sleep apnea    Type 2 diabetes, controlled, with neuropathy ([facility]    Anxiety    Hypercholesterolemia    Alcohol withdrawal delirium ([facility]    Closed displaced Maisonneuve's fracture of left leg    Cellulitis    Leukocytosis    Hyponatremia    Hypokalemia    Isolated proteinuria with minor glomerular abnormality       Measurements:  Wound 02/19/22 Toe (Comment  which one) Left great toe plantar  (Active)   Wound Image   02/21/22 1134   Wound Etiology Diabetic 02/21/22 1134   Dressing Status New dressing applied 02/21/22 1134   Wound Cleansed Cleansed with saline 02/21/22 1134   Dressing/Treatment Hydrofiber Ag 02/21/22 1134   Wound Length (cm) 1 cm 02/21/22 1134   Wound Width (cm) 1 cm 02/21/22 1134   Wound Depth (cm) 0.1 cm 02/21/22 1134   Wound Surface Area (cm^2) 1 cm^2 02/21/22 1134   Wound Volume (cm^3) 0.1 cm^3 02/21/22 1134   Distance Tunneling (cm) 0 cm 02/21/22 1134   Tunneling Position ___ O'Clock 0 02/21/22 1134   Undermining Starts ___ O'Clock 0 02/21/22 1134   Undermining Ends___ O'Clock 0 02/21/22 1134   Undermining Maxium Distance (cm) 0 02/21/22 1134   Wound Assessment Dry;Devitalized tissue;Pink/red 02/21/22 0800   Drainage Amount Moderate 02/21/22 1134   Drainage Description Serosanguinous 02/21/22 1134   Odor None 02/21/22 1134   Saira-wound Assessment Intact 02/21/22 1134   Margins Defined edges 02/21/22 1134   Wound Thickness Description not for Pressure Injury Partial thickness 02/21/22 1134   Number of days: 1       Response to treatment:  Well tolerated by patient. Pain Assessment:  Severity:  none  Quality of pain:   Wound Pain Timing/Severity:   Premedicated: no    Plan:     Plan of Care: Wound 02/19/22 Toe (Comment  which one) Left great toe plantar -Dressing/Treatment: Hydrofiber Ag (gauze kerlix tape )     Patient in bed agreeable to wound care eval. Pt has wound to left great toe plantar. Cleansed with NS. Measured and pictured. Pt is diabetic and was seen by his podiatrist last Thursday and sent to the ER. Pt has redness to foot that was tracking up the leg. Applied a new dressing with aquacel ag as above. Rt foot intact. Pt is being discharged today. Podiatry Dr Rose Dawson consult was placed but has not seen patient here.      Specialty Bed Required : no  [] Low Air Loss   [] Pressure Redistribution  [] Fluid Immersion  [] Bariatric  [] Total Pressure Relief  [] Other:     Discharge Plan:  Placement for patient upon discharge: [facility]  Hospice Care:  no  Patient appropriate for Outpatient [address]: pt to follow with dr Kylah Dowell. Patient/Caregiver Teaching:  Level of patient/caregiver understanding able to: wound care explained as given. Pt verbalized understanding. Electronically signed by Urmila Colvin RN,  on 2/21/2022 at 1:47 PM

## 2022-02-21 NOTE — PROGRESS NOTES
Outpatient Pharmacy Progress Note for Meds-to-Beds    Total number of Prescriptions Filled: 2  The following medications were dispensed to the patient during the discharge process:  Cephalexin 500 mg  Metformin 500 mg    Additional Documentation:  Patient's family member picked-up the medication(s) in the OP Pharmacy      Thank you for letting us serve your patients.   [address] [place]    [address], [address] [facility]    Phone: [phone]    Fax: [phone]

## 2022-02-21 NOTE — DISCHARGE SUMMARY
Discharge Summary    Name:  Angélica Meyer /Age/Sex: 1980  (46 y.o. male)   MRN & CSN:  [id number] & [id number] Admission Date/Time: 2022  6:52 PM   Attending:  Tr Abarca MD Discharging Physician: Tr Abarca MD     Hospital Course:   Angélica Meyer is a 46 y.o.  male  who presents with Cellulitis    Angélica Meyer is a 46 y.o.  male  who presents with Cellulitis     Mariluz Shields a 46 y.o.  male  who presents with alcohol intoxication, requesting help with detox.  And left lower extremity skin infection           cellulitis of  L foot, with leukocytosis  -Monitor leukocytosis to see that it improves and becomes within normal limits, if continue to be elevated consider hematology consult, has had unexplained leukocytosis in the past before  - marked via body marker. Erythema has significantly improved  - vancomycin and zoyn, cellulitis is markedly improved. - Consider CT scan of left lower extremity is within normal limits. -Venous duplex is negative to  - follow-up blood cultures which are negative till date  - wound care consult   -Podiatry consulted by ED provider  - wound Cx growing staph which is pansensitive     Alcohol dependence, going through withdrawal. Pt is placed on CIWA protocol, ativan to be given per protocol. On iv hydration, multivitamin daily. Neurocheck.   H/o substance use        Sinus tachycardia, likely related to alcohol withdrawal. Hydrate with normal saline, close monitoring.      DM II, none compliant with taking medications. Will order ISS, hgba1c, and will start on low dose lantus ( 15 units) nightly. Diabetic neuropathy, on neurontin. Endocrinology on board.     H/o Thrombocytopenia and anemia, could be related to alcohol abuse and liver injury. Close monitoring. Periodic checks.     Hyponatremia  -Secondary to beer potomania. Also drinking a lot of hypotonic fluids like Gatorade. Advised to limit oral intake. Nephrology on board.   Patient is neurologically intact. Labs are awaited or have not been drawn as yet.           Diet ADULT DIET; Regular; 4 carb choices (60 gm/meal)   DVT Prophylaxis [x]? Lovenox, []? Heparin, []? SCDs, []? Ambulation   GI Prophylaxis []? PPI,  []? H2 Blocker,  []? Carafate,  []? Diet/Tube Feeds   Code Status Full Code   Disposition Patient requires continued admission due to cellulitis and uncontrolled diabetes   MDM []? Low, []? Moderate,[x]? High  Patient's risk as above due to cellulitis      History of Present Illness:      Chief Complaint: Cellulitis  Essie Castañeda is a 46 y.o.  male  who presents with cellulitis and uncontrolled diabetes.     Subjective-patient examined at bedside. He is comfortable in no distress. Labs reviewed. Iron indices are within normal limits. His cellulitis has considerably improved and he wishes a desire to go home. We will discharge him on oral antibiotics. Follow-up with podiatry as an outpatient. Advised not to drink alcohol and follow-up with endocrinology.     Ten point ROS reviewed negative, unless as noted above     Objective:         Intake/Output Summary (Last 24 hours) at 2/21/2022 0849  Last data filed at 2/20/2022 2359      Gross per 24 hour   Intake 240 ml   Output 400 ml   Net -160 ml      Vitals:       Vitals:     02/20/22 2355   BP: 126/81   Pulse: 99   Resp: 18   Temp: 98 °F (36.7 °C)   SpO2:           Vent Information  SpO2: 98 %  No results for input(s): PH, JLT7RDW, PO2ART, BE, MNK8YVF, CO2CT, O2SAT, LABCARB in the last 72 hours.     Invalid input(s): METHGBART           Physical Exam:   GEN    Awake male, sitting upright in bed in no apparent distress. Appears given age. EYES   Pupils are equally round. No scleral erythema, discharge, or conjunctivitis. HENT  Mucous membranes are moist. Oral pharynx without exudates, no evidence of thrush. NECK  Supple, no apparent thyromegaly or masses. RESP  Clear to auscultation, no wheezes, rales or rhonchi.   Symmetric chest movement while on room air. CARDIO/VASC           S1/S2 auscultated. Regular rate without appreciable murmurs, rubs, or gallops. No JVD or carotid bruits. Peripheral pulses equal bilaterally and palpable. No peripheral edema. GI        Abdomen is soft without significant tenderness, masses, or guarding. Bowel sounds are normoactive. Rectal exam deferred.        No costovertebral angle tenderness. Normal appearing external genitalia. Walsh catheter is not present. HEME/LYMPH            No palpable cervical lymphadenopathy and no hepatosplenomegaly. No petechiae or ecchymoses. MSK    No gross joint deformities. SKIN    Normal coloration, warm, dry. Extensive cellulitis of the legs especially the left has improved  NEURO           Cranial nerves appear grossly intact, normal speech, no lateralizing weakness. PSYCH            Awake, alert, oriented x 4.   Affect appropriate.     Data:   CBC with Differential:          Lab Results   Component Value Date     WBC 22.0 02/20/2022     RBC 2.33 02/20/2022     HGB 7.5 02/20/2022     HCT 23.5 02/20/2022      02/20/2022     .9 02/20/2022     MCH 32.2 02/20/2022     MCHC 31.9 02/20/2022     RDW 19.3 02/20/2022     SEGSPCT 90.0 02/20/2022     LYMPHOPCT 8.0 02/20/2022     MONOPCT 2.0 02/20/2022     EOSPCT 3.1 05/01/2011     BASOPCT 0.7 02/18/2022     MONOSABS 0.4 02/20/2022     LYMPHSABS 1.8 02/20/2022     EOSABS 0.1 02/18/2022     BASOSABS 0.2 02/18/2022     DIFFTYPE MANUAL DIFFERENTIAL 02/20/2022         CMP:           Lab Results   Component Value Date      02/20/2022     K 3.8 02/20/2022     CL 93 02/20/2022     CO2 26 02/20/2022     BUN 5 02/20/2022     CREATININE 0.6 02/20/2022     GFRAA >60 02/20/2022     AGRATIO 0.4 02/15/2022     LABGLOM >60 02/20/2022     GLUCOSE 112 02/20/2022     PROT 8.6 02/20/2022     PROT 7.9 05/01/2011     LABALBU 2.6 02/20/2022     CALCIUM 7.5 02/20/2022     BILITOT 5.0 02/20/2022     ALKPHOS 164 02/20/2022     AST 74 02/20/2022     ALT 18 02/20/2022         Troponin:        Lab Results   Component Value Date     TROPONINT 0.014 07/31/2021         U/A:          Lab Results   Component Value Date     COLORU TREVOR 08/03/2021     PROTEINU 30 08/03/2021     WBCUA <1 08/03/2021     RBCUA 1 08/03/2021     MUCUS RARE 09/16/2014     TRICHOMONAS NONE SEEN 08/03/2021     BACTERIA NEGATIVE 08/03/2021     CLARITYU CLEAR 08/03/2021     SPECGRAV 1.015 08/03/2021     LEUKOCYTESUR NEGATIVE 08/03/2021     UROBILINOGEN 2.0 08/03/2021     BILIRUBINUR SMALL 08/03/2021     BLOODU SMALL 08/03/2021         Urine Culture:  No components found for: ORALIA     Radiology results:  VL DUP LOWER EXTREMITY ARTERIES LEFT   Final Result   Multiphasic appropriate waveforms are seen within the left lower extremity,   without focal stenosis or significant atherosclerotic burden seen   sonographically.           CTA LOWER EXTREMITY LEFT W CONTRAST   Final Result   No acute vascular abnormality is identified within the lower extremities. Three-vessel runoff bilaterally with mild atherosclerotic plaque.       Soft tissue edema seen within both calves, left greater than right. No   definite DVT seen by CT imaging.       Cirrhotic morphology of the liver, with sequela of cirrhosis as above.    Findings appear similar to the previous CT study done in August of 2021.                          Medications:   Medications:   Scheduled Medications    aspirin EC  81 mg Oral Daily    busPIRone  15 mg Oral TID    pregabalin  100 mg Oral BID    sodium chloride flush  10 mL IntraVENous 2 times per day    enoxaparin  40 mg SubCUTAneous Daily    piperacillin-tazobactam  3,375 mg IntraVENous Q8H    sodium chloride flush  5-40 mL IntraVENous 2 times per day    vancomycin  1,250 mg IntraVENous Q12H    insulin glargine  20 Units SubCUTAneous Nightly    insulin lispro  0-12 Units SubCUTAneous TID WC    insulin lispro  0-6 Units SubCUTAneous 2 times per day    clindamycin (CLEOCIN) IV  600 mg IntraVENous Once         Infusions:   Infusions Meds    sodium chloride 100 mL/hr at 02/20/22 0024    dextrose      sodium chloride 25 mL (02/20/22 1531)         PRN Meds:   PRN Medications    traZODone, 50 mg, Nightly PRN  sodium chloride flush, 10 mL, PRN  sodium chloride, 25 mL, PRN  ondansetron, 4 mg, Q8H PRN   Or  ondansetron, 4 mg, Q6H PRN  bisacodyl, 5 mg, Daily PRN  acetaminophen, 650 mg, Q6H PRN   Or  acetaminophen, 650 mg, Q6H PRN  glucose, 15 g, PRN  glucagon (rDNA), 1 mg, PRN  dextrose, 100 mL/hr, PRN  sodium chloride flush, 5-40 mL, PRN  sodium chloride, 25 mL, PRN  LORazepam, 1 mg, Q1H PRN   Or  LORazepam, 1 mg, Q1H PRN   Or  LORazepam, 2 mg, Q1H PRN   Or  LORazepam, 2 mg, Q1H PRN   Or  LORazepam, 3 mg, Q1H PRN   Or  LORazepam, 3 mg, Q1H PRN   Or  LORazepam, 4 mg, Q1H PRN   Or  LORazepam, 4 mg, Q1H PRN  dextrose bolus (hypoglycemia), 125 mL, PRN   Or  dextrose bolus (hypoglycemia), 250 mL, PRN  potassium chloride, 10 mEq, PRN  magnesium sulfate, 1,000 mg, PRN               Electronically signed by Pamela Katz MD on 2/21/2022 at 8:49 AM                         The patient expressed appropriate understanding of and agreement with the discharge recommendations, medications, and plan. Consults this admission:  IP CONSULT TO PODIATRY  IP CONSULT TO HOSPITALIST  PHARMACY TO DOSE VANCOMYCIN  IP CONSULT TO SOCIAL WORK  IP CONSULT TO NEPHROLOGY  IP CONSULT TO ENDOCRINOLOGY    Discharge Instruction:   Follow up appointments: PCP, endocrinology and podiatry  Primary care physician:  within 2 weeks.   CBC and Chem-7 in a week's time    Diet:  diabetic diet   Activity: activity as tolerated  Disposition: Discharged to:   [x]Home, [][facility], []SNF, []Acute Rehab, []Hospice  Condition on discharge: Stable    Discharge Medications:        Medication List      ASK your doctor about these medications    aspirin EC 81 MG EC tablet  Take 1 tablet by mouth daily     blood glucose test strips strip  Commonly known as: Glucose Meter Test  1 each by In Vitro route daily As needed. busPIRone 15 MG tablet  Commonly known as: BUSPAR  TAKE 1 TABLET BY MOUTH THREE TIMES DAILY     furosemide 40 MG tablet  Commonly known as: Lasix  Take 1 tablet by mouth daily     glucose monitoring kit  1 kit by Does not apply route daily as needed (If blood sugars running high or low)     Microlet Lancets Misc  1 each by Does not apply route daily     potassium chloride 10 MEQ extended release tablet  Commonly known as: KLOR-CON M  Take 1 tablet by mouth daily     pregabalin 100 MG capsule  Commonly known as: LYRICA  TAKE 1 CAPSULE BY MOUTH TWICE DAILY     traMADol 50 MG tablet  Commonly known as: Ultram  Take 1 tablet by mouth every 6 hours as needed for Pain for up to 7 days. Intended supply: 7 days. Take lowest dose possible to manage pain     traZODone 50 MG tablet  Commonly known as: DESYREL  Take 1-2 tablets by mouth nightly as needed for Sleep            Objective Findings at Discharge:   /81   Pulse 99   Temp 98 °F (36.7 °C) (Oral)   Resp 18   Wt 235 lb 3.2 oz (106.7 kg)   SpO2 98%   BMI 31.90 kg/m²              BMP/CBC  Recent Labs     02/18/22  1959 02/19/22  1602 02/20/22  0441 02/20/22  0845 02/20/22  1719   *   < > 130* 129* 128*   K 3.2*   < > 3.0* 3.1* 3.8   CL 87*   < > 93* 92* 93*   CO2 26   < > 28 28 26   BUN 2*  --  5*  --   --    CREATININE 0.5*  --  0.6*  --   --    WBC 29.9*  --  22.0*  --   --    HCT 26.0*  --  23.5*  --   --      --  174  --   --     < > = values in this interval not displayed.        IMAGING:      Discharge Time of 35minutes    Electronically signed by Nick He MD on 2/21/2022 at 9:01 AM

## 2022-02-21 NOTE — PROGRESS NOTES
Nephrology Progress Note        [address], [address] [place] [address]  Phone: [phone]  Office Hours: 8:30AM - 4:30PM  Monday - Friday 2/21/2022 7:44 AM  Subjective:   Admit Date: 2/18/2022  PCP: Jazzy Caban MD  Interval History:   Doing better    Diet: ADULT DIET; Regular; 4 carb choices (60 gm/meal)      Data:   Scheduled Meds:   aspirin EC  81 mg Oral Daily    busPIRone  15 mg Oral TID    pregabalin  100 mg Oral BID    sodium chloride flush  10 mL IntraVENous 2 times per day    enoxaparin  40 mg SubCUTAneous Daily    piperacillin-tazobactam  3,375 mg IntraVENous Q8H    sodium chloride flush  5-40 mL IntraVENous 2 times per day    vancomycin  1,250 mg IntraVENous Q12H    insulin glargine  20 Units SubCUTAneous Nightly    insulin lispro  0-12 Units SubCUTAneous TID WC    insulin lispro  0-6 Units SubCUTAneous 2 times per day    clindamycin (CLEOCIN) IV  600 mg IntraVENous Once     Continuous Infusions:   sodium chloride 100 mL/hr at 02/20/22 0024    dextrose      sodium chloride 25 mL (02/20/22 1531)     PRN Meds:traZODone, sodium chloride flush, sodium chloride, ondansetron **OR** ondansetron, bisacodyl, acetaminophen **OR** acetaminophen, glucose, glucagon (rDNA), dextrose, sodium chloride flush, sodium chloride, LORazepam **OR** LORazepam **OR** LORazepam **OR** LORazepam **OR** LORazepam **OR** LORazepam **OR** LORazepam **OR** LORazepam, dextrose bolus (hypoglycemia) **OR** dextrose bolus (hypoglycemia), potassium chloride, magnesium sulfate  I/O last 3 completed shifts: In: 600 [P.O.:600]  Out: 1400 [Urine:1400]  No intake/output data recorded.     Intake/Output Summary (Last 24 hours) at 2/21/2022 0744  Last data filed at 2/20/2022 2359  Gross per 24 hour   Intake 240 ml   Output 400 ml   Net -160 ml       CBC:   Recent Labs     02/18/22 1959 02/20/22  0441   WBC 29.9* 22.0*   HGB 8.6* 7.5*    174       BMP:    Recent Labs 02/18/22 1959 02/19/22  1602 02/20/22  0441 02/20/22  0845 02/20/22  1719   *   < > 130* 129* 128*   K 3.2*   < > 3.0* 3.1* 3.8   CL 87*   < > 93* 92* 93*   CO2 26   < > 28 28 26   BUN 2*  --  5*  --   --    CREATININE 0.5*  --  0.6*  --   --    GLUCOSE 117*  --  112*  --   --     < > = values in this interval not displayed.      Hepatic:   Recent Labs     02/18/22 1959 02/20/22  0441   * 74*   ALT 26 18   BILITOT 5.1* 5.0*   ALKPHOS 212* 164*         Objective:   Vitals: /81   Pulse 99   Temp 98 °F (36.7 °C) (Oral)   Resp 18   Wt 235 lb 3.2 oz (106.7 kg)   SpO2 98%   BMI 31.90 kg/m²   General appearance: alert and cooperative with exam, in no acute distress  HEENT: normocephalic, atraumatic,   Neck: supple, trachea midline  Lungs:  breathing comfortably on room air  Abdomen: soft, non-tender; non distended,  Extremities: no leg edema, right thumb wound  Neurologic: alert, oriented, follows commands, interactive    Assessment and Plan:     Hyponatremia  Hypokalemia  Alcohol abuse  LLE cellulitis    Plan:  Sodium 128  K wnl  Awaiting am labs for further interventions if needed  Discussed alcohol cessation                  Electronically signed by Anne Marie Watkins DO on 2/21/2022 at 7:44 AM    [facility]  MD Shahnaz Gaspar DO [address],  [address] [facility], [place] [address]  PHONE: [phone]  FAX: [phone]

## 2022-02-22 ENCOUNTER — CARE COORDINATION (OUTPATIENT)
Dept: CASE MANAGEMENT | Age: 42
End: 2022-02-22

## 2022-02-22 NOTE — PROGRESS NOTES
Progress Note( Dr. Estefania Joyner)    Subjective:   Admit Date: 2/18/2022  PCP: Paulina Lorenz MD    Admitted For :Left big toe cellulitis    Consulted For: Better control of blood glucose    Interval History: Better left toe cellulitis was debrided  Hemoglobin hematocrit dropped to 7.5      Denies any chest pains,   Denies SOB . Denies nausea or vomiting. No new bowel or bladder symptoms. Intake/Output Summary (Last 24 hours) at 2/22/2022 0646  Last data filed at 2/21/2022 1215  Gross per 24 hour   Intake 1110 ml   Output --   Net 1110 ml       DATA    CBC:   Recent Labs     02/20/22  0441 02/21/22  0913   WBC 22.0* 15.4*   HGB 7.5* 7.8*    196    CMP:  Recent Labs     02/20/22  0441 02/20/22  0441 02/20/22  0845 02/20/22  1719 02/21/22  0913   *   < > 129* 128* 132*   K 3.0*   < > 3.1* 3.8 3.9   CL 93*   < > 92* 93* 99   CO2 28   < > 28 26 26   BUN 5*  --   --   --  4*   CREATININE 0.6*  --   --   --  0.5*   CALCIUM 7.5*  --   --   --  7.6*   PROT 8.6*  --   --   --  7.6   LABALBU 2.6*  --   --   --  2.3*   BILITOT 5.0*  --   --   --  4.7*   ALKPHOS 164*  --   --   --  158*   AST 74*  --   --   --  77*   ALT 18  --   --   --  18    < > = values in this interval not displayed. Lipids:   Lab Results   Component Value Date    CHOL 106 02/07/2022    HDL 30 02/07/2022    TRIG 91 02/07/2022     Glucose:  Recent Labs     02/20/22  2048 02/21/22  0142 02/21/22  0656   POCGLU 146* 119* 104*     [id number]:  Lab Results   Component Value Date    LABA1C 4.7 02/18/2022     High Sensitivity TSH:   Lab Results   Component Value Date    TSHHS 3.130 08/01/2021     Free T3: No results found for: FT3  Free T4:No results found for: T4FREE    VL DUP LOWER EXTREMITY ARTERIES LEFT   Final Result   Multiphasic appropriate waveforms are seen within the left lower extremity,   without focal stenosis or significant atherosclerotic burden seen   sonographically.          CTA LOWER EXTREMITY LEFT W CONTRAST Final Result   No acute vascular abnormality is identified within the lower extremities. Three-vessel runoff bilaterally with mild atherosclerotic plaque. Soft tissue edema seen within both calves, left greater than right. No   definite DVT seen by CT imaging. Cirrhotic morphology of the liver, with sequela of cirrhosis as above. Findings appear similar to the previous CT study done in August of 2021. Scheduled Medicines   Medications:      Infusions:         Objective:   Vitals: /89   Pulse 97   Temp 98 °F (36.7 °C) (Oral)   Resp 16   Ht 6' (1.829 m)   Wt 235 lb 6.4 oz (106.8 kg)   SpO2 98%   BMI 31.93 kg/m²   General appearance: alert and cooperative with exam  Neck: no JVD or bruit  Thyroid : Normal lobes   Lungs: Has Vesicular Breath sounds   Heart:  regular rate and rhythm  Abdomen: soft, non-tender; bowel sounds normal; no masses,  no organomegaly  Musculoskeletal: Normal  Extremities: extremities normal, , no edema//left big toe cellulitis  Neurologic:  Awake, alert, oriented to name, place and time. Cranial nerves II-XII are grossly intact. Motor is  intact. Sensory neuropathy. ,  and gait is abnormal.    Assessment:     Patient Active Problem List:     HTN (hypertension)     Fatty liver     Obstructive sleep apnea     Type 2 diabetes, controlled, with neuropathy (HCC)     Anxiety     Hypercholesterolemia     Alcohol withdrawal delirium (HCC)     Closed displaced Maisonneuve's fracture of left leg     Cellulitis     Leukocytosis     Hyponatremia     Hypokalemia     Isolated proteinuria with minor glomerular abnormality      Plan:     1. Reviewed POC blood glucose . Labs and X ray results   2. Reviewed Current Medicines   3. On meal/ Correction bolus Humalog/ Basal Lantus Insulin regime  4. Monitor Blood glucose frequently   5. Modified  the dose of Insulin/ other medicines as needed   6. Will follow     .      Lucas Gr MD, MD

## 2022-02-22 NOTE — CARE COORDINATION
[facility] Transitions Initial Follow Up Call    Call within 2 business days of discharge: Yes    Patient: Bacilio Allison Patient : 1980   MRN: [id number]  Reason for Admission: Cellulitis  Discharge Date: 22 RARS: Readmission Risk Score: 14.5 ( )      Last Discharge [facility]       Complaint Diagnosis Description Type Department Provider    22 Other Leukocytosis, unspecified type . .. ED to Hosp-Admission (Discharged) (ADMITTED) Liliya Isidro MD; Ortega Chiu. .. Facility: [facility]     1st attempt to contact patient for initial Care Transition follow up. Unable to reach patient. Left message with contact information and request for call back.       Follow Up  Future Appointments   Date Time Provider Sarah Mckeon   2022  2:45 PM Cory Milton MD [id number] [address]       Clement Seaman RN

## 2022-02-23 ENCOUNTER — CARE COORDINATION (OUTPATIENT)
Dept: CASE MANAGEMENT | Age: 42
End: 2022-02-23

## 2022-02-23 LAB
CULTURE: NORMAL
CULTURE: NORMAL
Lab: NORMAL
Lab: NORMAL
SPECIMEN: NORMAL
SPECIMEN: NORMAL

## 2022-02-23 NOTE — CARE COORDINATION
[facility] Transitions Initial Follow Up Call    Call within 2 business days of discharge: Yes    Patient: Stacey Rowe Patient : 1980   MRN: [id number]  Reason for Admission: Cellulitis  Discharge Date: 22 RARS: Readmission Risk Score: 14.5 ( )      Last Discharge [facility]       Complaint Diagnosis Description Type Department Provider    22 Other Leukocytosis, unspecified type . .. ED to Hosp-Admission (Discharged) (ADMITTED) Claire Gr MD; Ulisses Cuellar. .. Facility:  [facility]     2nd attempt to contact patient for initial Care Transition follow up. Unable to reach patient. Left message with contact information and request for call back. Received incoming text from phone number asking if okay to call later. CTN responded with sure, I may be on the phone so please leave a message and I will call you back. Episode to be resolved and CTN to sign off if return call is not received from the patient.      Follow Up  Future Appointments   Date Time Provider Sarah Mckeon   2022  2:45 PM Reynaldo Loving MD [id number] [address]       Angelic Georges RN

## 2022-02-24 LAB
CULTURE: ABNORMAL
CULTURE: ABNORMAL
CULTURE: NORMAL
Lab: ABNORMAL
Lab: NORMAL
SPECIMEN: ABNORMAL
SPECIMEN: NORMAL

## 2022-03-01 ENCOUNTER — OFFICE VISIT (OUTPATIENT)
Dept: INTERNAL MEDICINE CLINIC | Age: 42
End: 2022-03-01
Payer: COMMERCIAL

## 2022-03-01 VITALS
OXYGEN SATURATION: 98 % | RESPIRATION RATE: 16 BRPM | DIASTOLIC BLOOD PRESSURE: 78 MMHG | SYSTOLIC BLOOD PRESSURE: 128 MMHG | HEART RATE: 104 BPM

## 2022-03-01 DIAGNOSIS — D64.9 ANEMIA, UNSPECIFIED TYPE: ICD-10-CM

## 2022-03-01 DIAGNOSIS — K70.30 ALCOHOLIC CIRRHOSIS OF LIVER WITHOUT ASCITES (HCC): Primary | ICD-10-CM

## 2022-03-01 DIAGNOSIS — F10.931 ALCOHOL WITHDRAWAL DELIRIUM (HCC): ICD-10-CM

## 2022-03-01 PROCEDURE — 99214 OFFICE O/P EST MOD 30 MIN: CPT | Performed by: INTERNAL MEDICINE

## 2022-03-01 NOTE — PROGRESS NOTES
Angélica Meyer  1980 03/01/22    SUBJECTIVE:    Pain in the leg is significantly improved, swelling is much improved after being in the hospital for 4 days. He was on vanco and zosyn, now on keflex. Pt was on the CIWA scale and was given ativan, but pt does not feel that he went through withdrawal. He is drinking 2-3 beers every other day. OBJECTIVE:    /78   Pulse 104   Resp 16   SpO2 98%     Physical Exam    ASSESSMENT:    1. Alcoholic cirrhosis of liver without ascites ([address]    2. Alcohol withdrawal delirium ([facility] [address]    3. Anemia, unspecified type        PLAN:    Jose Blood was seen today for follow-up from hospital.    Diagnoses and all orders for this visit:    Alcoholic cirrhosis of liver without ascites ([facility] [address] - discussed hospital results at length. Cellulitis is improved but the much more significant concern is the underlying cirrhosis. Unclear how advance this is, but the markedly elevated bilirubin is an ominous sign. STRONGLY encourage pt to stop all alcohol. Will check many labs for possible causes apart form alcohol for his cirrhosis. Will refer to Dr Aviva Sanchez. Will consider referral to heme as well, but will defer for now. Close f/u in 2 weeks   -     Amb External Referral To Gastroenterology  -     CBC; Future  -     Comprehensive Metabolic Panel; Future  -     Vitamin B12 & Folate; Future  -     Reticulocytes; Future  -     Protime-INR; Future  -     Hepatitis C Antibody; Future  -     Hepatitis B Surface Antigen; Future  -     Hepatitis B Surface Antibody; Future  -     Hepatitis B Core Antibody, IgM; Future  -     HTAO; Future  -     F-actin (smooth muscle) antibody w/ reflex to titer; Future  -     Ceruloplasmin; Future  -     Alpha-1-Antitrypsin; Future  -     Hepatitis A Antibody, IgM; Future  -     Iron and TIBC; Future  -     Ferritin; Future  -     AFP Tumor Marker; Future  -     HEMOCHROMATOSIS MUTATION;  Future    Alcohol withdrawal delirium (HCC)  -     Amb External Referral To

## 2022-03-09 DIAGNOSIS — R20.2 NUMBNESS AND TINGLING: ICD-10-CM

## 2022-03-09 DIAGNOSIS — R20.0 NUMBNESS AND TINGLING: ICD-10-CM

## 2022-03-09 RX ORDER — PREGABALIN 100 MG/1
CAPSULE ORAL
Qty: 180 CAPSULE | Refills: 1 | Status: SHIPPED | OUTPATIENT
Start: 2022-03-09 | End: 2022-09-09

## 2022-03-10 LAB
AFP-TUMOR MARKER: 4 NG/ML
ALBUMIN/GLOBULIN RATIO: 0.6 RATIO (ref 0.8–2.6)
ALBUMIN: 3.5 G/DL (ref 3.5–5.2)
ALP BLD-CCNC: 141 U/L (ref 23–144)
ALT SERPL-CCNC: 35 U/L (ref 0–60)
AST SERPL-CCNC: 123 U/L (ref 0–55)
BILIRUB SERPL-MCNC: 5 MG/DL (ref 0–1.2)
BUN BLDV-MCNC: 8 MG/DL (ref 3–29)
BUN/CREAT BLD: 11 (ref 7–25)
CALCIUM SERPL-MCNC: 9.2 MG/DL (ref 8.5–10.5)
CHLORIDE BLD-SCNC: 99 MEQ/L (ref 96–110)
CO2: 26 MEQ/L (ref 19–32)
CREAT SERPL-MCNC: 0.7 MG/DL (ref 0.5–1.4)
FERRITIN: 157 NG/ML (ref 18–350)
FOLATE: >20 NG/ML
GLOBULIN: 6.1 G/DL (ref 1.9–3.6)
GLOMERULAR FILTRATION RATE: 120 MLS/MIN/1.73M2
GLUCOSE BLD-MCNC: 98 MG/DL (ref 70–99)
HCT VFR BLD CALC: 26.7 % (ref 37.5–51)
HEMOGLOBIN: 9.1 G/DL (ref 13–17.7)
INR BLD: 1.4 (ref 0.9–1.1)
IRON SATURATION: 20 % (ref 12–57)
IRON: 72 MCG/DL (ref 40–190)
MCH RBC QN AUTO: 31.1 PG (ref 26–34)
MCHC RBC AUTO-ENTMCNC: 34.1 G/DL (ref 30.7–35.5)
MCV RBC AUTO: 91.1 FL (ref 80–100)
PDW BLD-RTO: 16.2 %
PLATELET # BLD: 242 K/UL (ref 140–400)
PMV BLD AUTO: 10.3 FL (ref 7.2–11.7)
POTASSIUM SERPL-SCNC: 4 MEQ/L (ref 3.4–5.3)
PT: 16.1 SEC (ref 11.7–13.9)
RBC # BLD: 2.93 M/UL (ref 4.14–5.8)
SODIUM BLD-SCNC: 135 MEQ/L (ref 135–148)
STATUS: ABNORMAL
TOTAL IRON BINDING CAPACITY: 352 MCG/DL (ref 200–450)
TOTAL PROTEIN: 9.6 G/DL (ref 6–8.3)
VITAMIN B-12: 776 PG/ML (ref 200–1100)
WBC: 12.7 K/UL (ref 3.5–10.9)

## 2022-03-11 LAB
HEPATITIS B SURFACE ANTIGEN: NEGATIVE
HEPATITIS C ANTIBODY: NEGATIVE

## 2022-03-14 ENCOUNTER — TELEPHONE (OUTPATIENT)
Dept: INTERNAL MEDICINE CLINIC | Age: 42
End: 2022-03-14

## 2022-03-14 LAB
ANA PATTERN: ABNORMAL
ANA SCREEN: POSITIVE
ANA TITER: ABNORMAL

## 2022-03-15 ENCOUNTER — TELEPHONE (OUTPATIENT)
Dept: INTERNAL MEDICINE CLINIC | Age: 42
End: 2022-03-15

## 2022-03-15 NOTE — TELEPHONE ENCOUNTER
----- Message from Zoe Daletyra sent at 3/15/2022  2:30 PM EDT -----  Subject: Message to Provider    QUESTIONS  Information for Provider? pt wanted to get blood work results to Dr Sona Chauhan   before being see with Dr Xena Fonseca so he wanted to resched but wanted to   make sure it was okay with pcp. please reach out to advise.  ---------------------------------------------------------------------------  --------------  CALL BACK INFO  What is the best way for the office to contact you? OK to leave message on   voicemail  Preferred Call Back Phone Number? [phone]  ---------------------------------------------------------------------------  --------------  SCRIPT ANSWERS  Relationship to Patient?  Self

## 2022-03-25 ENCOUNTER — TELEPHONE (OUTPATIENT)
Dept: INTERNAL MEDICINE CLINIC | Age: 42
End: 2022-03-25

## 2022-03-25 NOTE — TELEPHONE ENCOUNTER
Pt said he does not want to have to see another doctor he said he was just referred to Dr Vincenzo Mijares and stated that he does not have the money to keep seeing different doctors. He said the Lyrica is $100 out of pocket and it does not even work he said he just need something to help with his feet and leg.

## 2022-03-25 NOTE — TELEPHONE ENCOUNTER
Spoke with the pt he said it help his feet feel so much better and his leg is not completely healed. He said the lyrica is not helping .

## 2022-03-25 NOTE — TELEPHONE ENCOUNTER
The norco is for short term use.  If he needs long term pain management we can discuss referring him to pain mgmt at our next appointment

## 2022-03-30 ENCOUNTER — HOSPITAL ENCOUNTER (EMERGENCY)
Age: 42
Discharge: HOME | End: 2022-03-30
Payer: COMMERCIAL

## 2022-03-30 VITALS
BODY MASS INDEX: 31.15 KG/M2 | HEIGHT: 72 IN | OXYGEN SATURATION: 98 % | SYSTOLIC BLOOD PRESSURE: 162 MMHG | HEART RATE: 111 BPM | DIASTOLIC BLOOD PRESSURE: 100 MMHG | WEIGHT: 230 LBS | TEMPERATURE: 97.7 F | RESPIRATION RATE: 20 BRPM

## 2022-03-30 DIAGNOSIS — L03.116 CELLULITIS OF LEFT LOWER EXTREMITY: Primary | ICD-10-CM

## 2022-03-30 PROCEDURE — 99283 EMERGENCY DEPT VISIT LOW MDM: CPT

## 2022-03-30 RX ORDER — IBUPROFEN 600 MG/1
600 TABLET ORAL
Qty: 40 TABLET | Refills: 0 | Status: SHIPPED | OUTPATIENT
Start: 2022-03-30

## 2022-03-30 RX ORDER — HYDROCODONE BITARTRATE AND ACETAMINOPHEN 5; 325 MG/1; MG/1
1 TABLET ORAL EVERY 6 HOURS PRN
Qty: 10 TABLET | Refills: 0 | Status: SHIPPED | OUTPATIENT
Start: 2022-03-30 | End: 2022-04-02

## 2022-03-30 RX ORDER — SULFAMETHOXAZOLE AND TRIMETHOPRIM 800; 160 MG/1; MG/1
1 TABLET ORAL 2 TIMES DAILY
Qty: 30 TABLET | Refills: 0 | Status: ON HOLD | OUTPATIENT
Start: 2022-03-30 | End: 2022-04-15 | Stop reason: HOSPADM

## 2022-03-30 ASSESSMENT — PAIN DESCRIPTION - ORIENTATION: ORIENTATION: LEFT

## 2022-03-30 ASSESSMENT — PAIN DESCRIPTION - PAIN TYPE: TYPE: ACUTE PAIN

## 2022-03-30 ASSESSMENT — PAIN DESCRIPTION - LOCATION: LOCATION: LEG

## 2022-03-30 ASSESSMENT — PAIN SCALES - GENERAL: PAINLEVEL_OUTOF10: 8

## 2022-03-30 NOTE — ED PROVIDER NOTES
[address] ENCOUNTER        Pt Name: Simi Sawyer  MRN: [id number]  [place] 1980  Date of evaluation: 3/30/2022  Provider: Nika Carroll PA-C  PCP: Travis Garcia MD  Note Started: 1:43 PM EDT       RANDOLPH. I have evaluated this patient. My supervising physician was available for consultation. Diane Naranjo MD      [address] [place] [address]       Chief Complaint   Patient presents with    Leg Pain     Left leg, hx of cellulitis       HISTORY OF PRESENT ILLNESS   (Location, Timing/Onset, Context/Setting, Quality, Duration, Modifying Factors, Severity, Associated Signs and Symptoms)  Note limiting factors. Chief Complaint: Pain, swelling, redness left lower leg. Simi Sawyer is a 46 y.o. male who presents  Patient is diabetic. Patient reports currently on Keflex 500 mg 4 times daily x1 month. Followed by PCP. This patient was admitted this facility with cellulitis secondary to a blister/infection left great toe. He has not had follow-up other than 1 month ago. He does report some progressing erythema x4 days lower leg more anterior and slightly circumferential.  Actually a bit improved today however the pain and swelling and redness continues. This patient was admitted this facility on February 2, 2022. He has been followed by internal medicine. He has had previous podiatry but not recently. Nursing Notes were all reviewed and agreed with or any disagreements were addressed in the HPI. REVIEW OF SYSTEMS    (2-9 systems for level 4, 10 or more for level 5)     Review of Systems    Positives and Pertinent negatives as per HPI. Except as noted above in the ROS, all other systems were reviewed and negative.        PAST MEDICAL HISTORY     Past Medical History:   Diagnosis Date    Hypercholesterolemia 2/18/2019    Hypertension     Type 2 diabetes, controlled, with neuropathy ([facility] [address] 6/6/2018 SURGICAL HISTORY   History reviewed. No pertinent surgical history. [address]       Discharge Medication List as of 3/30/2022  1:26 PM      CONTINUE these medications which have NOT CHANGED    Details   pregabalin (LYRICA) 100 MG capsule TAKE 1 CAPSULE BY MOUTH TWICE DAILY, Disp-180 capsule, R-1Normal      busPIRone (BUSPAR) 15 MG tablet TAKE 1 TABLET BY MOUTH THREE TIMES DAILY, Disp-270 tablet, R-1Normal      traZODone (DESYREL) 50 MG tablet Take 1-2 tablets by mouth nightly as needed for Sleep, Disp-60 tablet, R-11Normal      glucose blood VI test strips (GLUCOSE METER TEST) strip DAILY Starting Wed 5/30/2018, Disp-100 each, R-3, NormalAs needed. glucose monitoring kit (FREESTYLE) monitoring kit DAILY PRN Starting Wed 5/30/2018, Disp-1 kit, R-0, Normal      MICROLET LANCETS MISC DAILY Starting Wed 5/30/2018, Disp-100 each, R-3, Normal               ALLERGIES     Paxil [paroxetine hcl]    FAMILYHISTORY       Family History   Problem Relation Age of Onset    High Blood Pressure Sister           SOCIAL HISTORY       Social History     Tobacco Use    Smoking status: Never Smoker    Smokeless tobacco: Never Used   Substance Use Topics    Alcohol use: Yes     Alcohol/week: 12.0 standard drinks     Types: 12 Standard drinks or equivalent per week     Comment: occasional    Drug use: No       SCREENINGS             PHYSICAL EXAM    (up to 7 for level 4, 8 or more for level 5)     ED Triage Vitals [03/30/22 1246]   BP Temp Temp Source Pulse Resp SpO2 Height Weight   (!) 182/104 97.8 °F (36.6 °C) Oral 108 16 98 % 6' (1.829 m) 230 lb (104.3 kg)       Physical Exam  Vitals and nursing note reviewed. Constitutional:       Appearance: Normal appearance. He is well-developed. He is obese. HENT:      Head: Normocephalic and atraumatic. Right Ear: External ear normal.      Left Ear: External ear normal.   Eyes:      General: No scleral icterus. Right eye: No discharge.          Left eye: No discharge. Conjunctiva/sclera: Conjunctivae normal.   Cardiovascular:      Rate and Rhythm: Normal rate. Pulmonary:      Effort: Pulmonary effort is normal.   Musculoskeletal:         General: Swelling and tenderness present. Normal range of motion. Cervical back: Normal range of motion and neck supple. Comments: The lower leg shows some mild erythema. More anterior and slightly circumferential.  No pitting. No skin breakage. Likely originating from of the great toe. Skin:     General: Skin is warm and dry. Findings: Erythema present. Neurological:      General: No focal deficit present. Mental Status: He is alert and oriented to person, place, and time. Mental status is at baseline. Psychiatric:         Mood and Affect: Mood normal.         Behavior: Behavior normal.         Thought Content: Thought content normal.         Judgment: Judgment normal.               DIAGNOSTIC RESULTS   LABS:    Labs Reviewed - No data to display    When ordered only abnormal lab results are displayed. All other labs were within normal range or not returned as of this dictation. EKG: When ordered, EKG's are interpreted by the Emergency Department Physician in the absence of a cardiologist.  Please see their note for interpretation of EKG. RADIOLOGY:   Non-plain film images such as CT, Ultrasound and MRI are read by the radiologist. Plain radiographic images are visualized and preliminarily interpreted by the ED Provider with the below findings:        Interpretation per the Radiologist below, if available at the time of this note:    No orders to display     No results found.         PROCEDURES   Unless otherwise noted below, none     Procedures    CRITICAL CARE TIME       CONSULTS:  None      EMERGENCY DEPARTMENT COURSE and DIFFERENTIAL DIAGNOSIS/MDM:   Vitals:    Vitals:    03/30/22 1246 03/30/22 1301 03/30/22 1315   BP: (!) 182/104 (!) 172/100 (!) 162/100   Pulse: 108  111   Resp: 16  20   Temp: 97.8 °F (36.6 °C)  97.7 °F (36.5 °C)   TempSrc: Oral  Oral   SpO2: 98% 96% 98%   Weight: 230 lb (104.3 kg)     Height: 6' (1.829 m)         Patient was given the following medications:  Medications - No data to display        The patient with continuing cellulitis left lower leg slightly progressive x4 days. Currently on Keflex 500 4 times daily. Will change to Bactrim DS twice daily as the previous culture grew a staph aureus and non-MRSA. Prescribing hydrocodone and ibuprofen for pain control. I have asked him to follow with podiatry. Will follow with PCP regarding the infection. Laboratory studies not indicated as he is afebrile. His pulse is elevated however cannot clear chart review he typically runs between 98 and 180. I am comfortable in discharging with a pulse. A low suspicion for infection or sepsis causing the tachycardia. FINAL IMPRESSION      1. Cellulitis of left lower extremity          DISPOSITION/PLAN   DISPOSITION Decision To Discharge 03/30/2022 01:12:25 PM      PATIENT REFERRED TO:  Marty Coley DPM  [address] [address]    Schedule an appointment as soon as possible for a visit in 2 days      Karla Ruffin MD  [address] [phone]    Schedule an appointment as soon as possible for a visit   As needed    [facility] Emergency Department  [facility] [phone]  [phone]  Go to   If symptoms worsen      DISCHARGE MEDICATIONS:  Discharge Medication List as of 3/30/2022  1:26 PM      START taking these medications    Details   sulfamethoxazole-trimethoprim (BACTRIM DS) 800-160 MG per tablet Take 1 tablet by mouth 2 times daily for 15 days, Disp-30 tablet, R-0Normal             DISCONTINUED MEDICATIONS:  Discharge Medication List as of 3/30/2022  1:26 PM      STOP taking these medications       cephALEXin (KEFLEX) 500 MG capsule Comments: Reason for Stopping:                 Periodic Controlled Substance Monitoring: No signs of potential drug abuse or diversion identified. Nika Carroll PA-C)    (Please note that portions of this note were completed with a voice recognition program.  Efforts were made to edit the dictations but occasionally words are mis-transcribed. )    Nika Carroll PA-C (electronically signed)           Nika Carroll PA-C  03/31/22 [id number]

## 2022-03-30 NOTE — ED NOTES
Discharge instructions reviewed with pt. Explained to pt where to  prescriptions. All questions answered at this time. Pt verbalized understanding.       Mike Mcdowell RN  03/30/22 5522

## 2022-04-13 ENCOUNTER — APPOINTMENT (OUTPATIENT)
Dept: GENERAL RADIOLOGY | Age: 42
End: 2022-04-13
Payer: COMMERCIAL

## 2022-04-13 ENCOUNTER — APPOINTMENT (OUTPATIENT)
Dept: MRI IMAGING | Age: 42
End: 2022-04-13
Payer: COMMERCIAL

## 2022-04-13 ENCOUNTER — HOSPITAL ENCOUNTER (OUTPATIENT)
Age: 42
Setting detail: OBSERVATION
Discharge: HOME | End: 2022-04-15
Attending: INTERNAL MEDICINE | Admitting: INTERNAL MEDICINE
Payer: COMMERCIAL

## 2022-04-13 ENCOUNTER — APPOINTMENT (OUTPATIENT)
Dept: ULTRASOUND IMAGING | Age: 42
End: 2022-04-13
Payer: COMMERCIAL

## 2022-04-13 DIAGNOSIS — L03.116 LEFT LEG CELLULITIS: ICD-10-CM

## 2022-04-13 DIAGNOSIS — L97.529 DIABETIC ULCER OF LEFT GREAT TOE (HCC): Primary | ICD-10-CM

## 2022-04-13 DIAGNOSIS — E11.621 DIABETIC ULCER OF LEFT GREAT TOE (HCC): Primary | ICD-10-CM

## 2022-04-13 PROBLEM — E80.6 HYPERBILIRUBINEMIA: Chronic | Status: ACTIVE | Noted: 2022-04-13

## 2022-04-13 PROBLEM — L03.115 CELLULITIS OF RIGHT FOOT: Status: ACTIVE | Noted: 2022-04-13

## 2022-04-13 PROBLEM — F10.10 ALCOHOL ABUSE: Chronic | Status: ACTIVE | Noted: 2022-04-13

## 2022-04-13 LAB
ALBUMIN SERPL-MCNC: 3.8 GM/DL (ref 3.4–5)
ALP BLD-CCNC: 160 IU/L (ref 40–129)
ALT SERPL-CCNC: 36 U/L (ref 10–40)
ANION GAP SERPL CALCULATED.3IONS-SCNC: 13 MMOL/L (ref 4–16)
AST SERPL-CCNC: 86 IU/L (ref 15–37)
BASOPHILS ABSOLUTE: 0.2 K/CU MM
BASOPHILS RELATIVE PERCENT: 2.4 % (ref 0–1)
BILIRUB SERPL-MCNC: 2.9 MG/DL (ref 0–1)
BUN BLDV-MCNC: 5 MG/DL (ref 6–23)
CALCIUM SERPL-MCNC: 8.7 MG/DL (ref 8.3–10.6)
CHLORIDE BLD-SCNC: 97 MMOL/L (ref 99–110)
CO2: 25 MMOL/L (ref 21–32)
CREAT SERPL-MCNC: 0.6 MG/DL (ref 0.9–1.3)
DIFFERENTIAL TYPE: ABNORMAL
EOSINOPHILS ABSOLUTE: 0.5 K/CU MM
EOSINOPHILS RELATIVE PERCENT: 5.4 % (ref 0–3)
GFR AFRICAN AMERICAN: >60 ML/MIN/1.73M2
GFR NON-AFRICAN AMERICAN: >60 ML/MIN/1.73M2
GLUCOSE BLD-MCNC: 112 MG/DL (ref 70–99)
GLUCOSE BLD-MCNC: 127 MG/DL (ref 70–99)
GLUCOSE BLD-MCNC: 162 MG/DL (ref 70–99)
HCT VFR BLD CALC: 31.4 % (ref 42–52)
HEMOGLOBIN: 9.9 GM/DL (ref 13.5–18)
IMMATURE NEUTROPHIL %: 0.7 % (ref 0–0.43)
LACTATE: 1.7 MMOL/L (ref 0.4–2)
LYMPHOCYTES ABSOLUTE: 1.9 K/CU MM
LYMPHOCYTES RELATIVE PERCENT: 20.7 % (ref 24–44)
MCH RBC QN AUTO: 29.3 PG (ref 27–31)
MCHC RBC AUTO-ENTMCNC: 31.5 % (ref 32–36)
MCV RBC AUTO: 92.9 FL (ref 78–100)
MONOCYTES ABSOLUTE: 1.1 K/CU MM
MONOCYTES RELATIVE PERCENT: 12 % (ref 0–4)
NUCLEATED RBC %: 0 %
PDW BLD-RTO: 17.4 % (ref 11.7–14.9)
PLATELET # BLD: 140 K/CU MM (ref 140–440)
PMV BLD AUTO: 9.4 FL (ref 7.5–11.1)
POTASSIUM SERPL-SCNC: 3.8 MMOL/L (ref 3.5–5.1)
RBC # BLD: 3.38 M/CU MM (ref 4.6–6.2)
SEGMENTED NEUTROPHILS ABSOLUTE COUNT: 5.4 K/CU MM
SEGMENTED NEUTROPHILS RELATIVE PERCENT: 58.8 % (ref 36–66)
SODIUM BLD-SCNC: 135 MMOL/L (ref 135–145)
TOTAL CK: 115 IU/L (ref 38–174)
TOTAL IMMATURE NEUTOROPHIL: 0.06 K/CU MM
TOTAL NUCLEATED RBC: 0 K/CU MM
TOTAL PROTEIN: 10.1 GM/DL (ref 6.4–8.2)
WBC # BLD: 9.1 K/CU MM (ref 4–10.5)

## 2022-04-13 PROCEDURE — 6360000004 HC RX CONTRAST MEDICATION: Performed by: STUDENT IN AN ORGANIZED HEALTH CARE EDUCATION/TRAINING PROGRAM

## 2022-04-13 PROCEDURE — A9579 GAD-BASE MR CONTRAST NOS,1ML: HCPCS | Performed by: STUDENT IN AN ORGANIZED HEALTH CARE EDUCATION/TRAINING PROGRAM

## 2022-04-13 PROCEDURE — 82962 GLUCOSE BLOOD TEST: CPT

## 2022-04-13 PROCEDURE — 73720 MRI LWR EXTREMITY W/O&W/DYE: CPT

## 2022-04-13 PROCEDURE — 82550 ASSAY OF CK (CPK): CPT

## 2022-04-13 PROCEDURE — 87040 BLOOD CULTURE FOR BACTERIA: CPT

## 2022-04-13 PROCEDURE — 99285 EMERGENCY DEPT VISIT HI MDM: CPT

## 2022-04-13 PROCEDURE — 96365 THER/PROPH/DIAG IV INF INIT: CPT

## 2022-04-13 PROCEDURE — 80053 COMPREHEN METABOLIC PANEL: CPT

## 2022-04-13 PROCEDURE — 6370000000 HC RX 637 (ALT 250 FOR IP): Performed by: PHYSICIAN ASSISTANT

## 2022-04-13 PROCEDURE — G0378 HOSPITAL OBSERVATION PER HR: HCPCS

## 2022-04-13 PROCEDURE — 2580000003 HC RX 258: Performed by: PHYSICIAN ASSISTANT

## 2022-04-13 PROCEDURE — 96367 TX/PROPH/DG ADDL SEQ IV INF: CPT

## 2022-04-13 PROCEDURE — 36415 COLL VENOUS BLD VENIPUNCTURE: CPT

## 2022-04-13 PROCEDURE — 6360000002 HC RX W HCPCS: Performed by: PHYSICIAN ASSISTANT

## 2022-04-13 PROCEDURE — 83036 HEMOGLOBIN GLYCOSYLATED A1C: CPT

## 2022-04-13 PROCEDURE — 76882 US LMTD JT/FCL EVL NVASC XTR: CPT

## 2022-04-13 PROCEDURE — 93971 EXTREMITY STUDY: CPT

## 2022-04-13 PROCEDURE — 6370000000 HC RX 637 (ALT 250 FOR IP): Performed by: INTERNAL MEDICINE

## 2022-04-13 PROCEDURE — 85025 COMPLETE CBC W/AUTO DIFF WBC: CPT

## 2022-04-13 PROCEDURE — 87070 CULTURE OTHR SPECIMN AEROBIC: CPT

## 2022-04-13 PROCEDURE — 73630 X-RAY EXAM OF FOOT: CPT

## 2022-04-13 PROCEDURE — 87075 CULTR BACTERIA EXCEPT BLOOD: CPT

## 2022-04-13 PROCEDURE — 96366 THER/PROPH/DIAG IV INF ADDON: CPT

## 2022-04-13 PROCEDURE — 83605 ASSAY OF LACTIC ACID: CPT

## 2022-04-13 RX ORDER — LORAZEPAM 2 MG/ML
3 INJECTION INTRAMUSCULAR
Status: DISCONTINUED | OUTPATIENT
Start: 2022-04-13 | End: 2022-04-15 | Stop reason: HOSPADM

## 2022-04-13 RX ORDER — BUSPIRONE HYDROCHLORIDE 7.5 MG/1
15 TABLET ORAL DAILY
Status: DISCONTINUED | OUTPATIENT
Start: 2022-04-14 | End: 2022-04-15 | Stop reason: HOSPADM

## 2022-04-13 RX ORDER — LORAZEPAM 1 MG/1
1 TABLET ORAL
Status: DISCONTINUED | OUTPATIENT
Start: 2022-04-13 | End: 2022-04-15 | Stop reason: HOSPADM

## 2022-04-13 RX ORDER — SODIUM CHLORIDE 9 MG/ML
INJECTION, SOLUTION INTRAVENOUS PRN
Status: DISCONTINUED | OUTPATIENT
Start: 2022-04-13 | End: 2022-04-15 | Stop reason: HOSPADM

## 2022-04-13 RX ORDER — 0.9 % SODIUM CHLORIDE 0.9 %
1000 INTRAVENOUS SOLUTION INTRAVENOUS ONCE
Status: COMPLETED | OUTPATIENT
Start: 2022-04-13 | End: 2022-04-13

## 2022-04-13 RX ORDER — POLYETHYLENE GLYCOL 3350 17 G/17G
17 POWDER, FOR SOLUTION ORAL DAILY PRN
Status: DISCONTINUED | OUTPATIENT
Start: 2022-04-13 | End: 2022-04-15 | Stop reason: HOSPADM

## 2022-04-13 RX ORDER — TRAZODONE HYDROCHLORIDE 50 MG/1
100 TABLET ORAL NIGHTLY PRN
Status: DISCONTINUED | OUTPATIENT
Start: 2022-04-13 | End: 2022-04-15 | Stop reason: HOSPADM

## 2022-04-13 RX ORDER — LORAZEPAM 2 MG/ML
1 INJECTION INTRAMUSCULAR
Status: DISCONTINUED | OUTPATIENT
Start: 2022-04-13 | End: 2022-04-15 | Stop reason: HOSPADM

## 2022-04-13 RX ORDER — LORAZEPAM 2 MG/ML
2 INJECTION INTRAMUSCULAR
Status: DISCONTINUED | OUTPATIENT
Start: 2022-04-13 | End: 2022-04-15 | Stop reason: HOSPADM

## 2022-04-13 RX ORDER — HYDROCODONE BITARTRATE AND ACETAMINOPHEN 5; 325 MG/1; MG/1
1 TABLET ORAL ONCE
Status: COMPLETED | OUTPATIENT
Start: 2022-04-13 | End: 2022-04-13

## 2022-04-13 RX ORDER — NICOTINE 21 MG/24HR
1 PATCH, TRANSDERMAL 24 HOURS TRANSDERMAL DAILY
Status: DISCONTINUED | OUTPATIENT
Start: 2022-04-13 | End: 2022-04-15 | Stop reason: HOSPADM

## 2022-04-13 RX ORDER — LORAZEPAM 1 MG/1
4 TABLET ORAL
Status: DISCONTINUED | OUTPATIENT
Start: 2022-04-13 | End: 2022-04-15 | Stop reason: HOSPADM

## 2022-04-13 RX ORDER — NICOTINE POLACRILEX 4 MG
15 LOZENGE BUCCAL PRN
Status: DISCONTINUED | OUTPATIENT
Start: 2022-04-13 | End: 2022-04-15 | Stop reason: HOSPADM

## 2022-04-13 RX ORDER — SODIUM CHLORIDE 0.9 % (FLUSH) 0.9 %
10 SYRINGE (ML) INJECTION PRN
Status: DISCONTINUED | OUTPATIENT
Start: 2022-04-13 | End: 2022-04-15 | Stop reason: HOSPADM

## 2022-04-13 RX ORDER — POLYETHYLENE GLYCOL 3350 17 G
2 POWDER IN PACKET (EA) ORAL
Status: DISCONTINUED | OUTPATIENT
Start: 2022-04-13 | End: 2022-04-15 | Stop reason: HOSPADM

## 2022-04-13 RX ORDER — HYDROCODONE BITARTRATE AND ACETAMINOPHEN 5; 325 MG/1; MG/1
1 TABLET ORAL EVERY 4 HOURS PRN
Status: DISCONTINUED | OUTPATIENT
Start: 2022-04-13 | End: 2022-04-14

## 2022-04-13 RX ORDER — LORAZEPAM 1 MG/1
3 TABLET ORAL
Status: DISCONTINUED | OUTPATIENT
Start: 2022-04-13 | End: 2022-04-15 | Stop reason: HOSPADM

## 2022-04-13 RX ORDER — SODIUM CHLORIDE 0.9 % (FLUSH) 0.9 %
10 SYRINGE (ML) INJECTION EVERY 12 HOURS SCHEDULED
Status: DISCONTINUED | OUTPATIENT
Start: 2022-04-13 | End: 2022-04-15 | Stop reason: HOSPADM

## 2022-04-13 RX ORDER — LORAZEPAM 2 MG/ML
4 INJECTION INTRAMUSCULAR
Status: DISCONTINUED | OUTPATIENT
Start: 2022-04-13 | End: 2022-04-15 | Stop reason: HOSPADM

## 2022-04-13 RX ORDER — LORAZEPAM 1 MG/1
2 TABLET ORAL
Status: DISCONTINUED | OUTPATIENT
Start: 2022-04-13 | End: 2022-04-15 | Stop reason: HOSPADM

## 2022-04-13 RX ORDER — M-VIT,TX,IRON,MINS/CALC/FOLIC 27MG-0.4MG
1 TABLET ORAL DAILY
Status: DISCONTINUED | OUTPATIENT
Start: 2022-04-13 | End: 2022-04-15 | Stop reason: HOSPADM

## 2022-04-13 RX ORDER — DEXTROSE MONOHYDRATE 50 MG/ML
100 INJECTION, SOLUTION INTRAVENOUS PRN
Status: DISCONTINUED | OUTPATIENT
Start: 2022-04-13 | End: 2022-04-15 | Stop reason: HOSPADM

## 2022-04-13 RX ORDER — ACETAMINOPHEN 650 MG/1
650 SUPPOSITORY RECTAL EVERY 6 HOURS PRN
Status: DISCONTINUED | OUTPATIENT
Start: 2022-04-13 | End: 2022-04-15 | Stop reason: HOSPADM

## 2022-04-13 RX ORDER — ACETAMINOPHEN 325 MG/1
650 TABLET ORAL EVERY 6 HOURS PRN
Status: DISCONTINUED | OUTPATIENT
Start: 2022-04-13 | End: 2022-04-15 | Stop reason: HOSPADM

## 2022-04-13 RX ORDER — LANOLIN ALCOHOL/MO/W.PET/CERES
100 CREAM (GRAM) TOPICAL DAILY
Status: DISCONTINUED | OUTPATIENT
Start: 2022-04-13 | End: 2022-04-15 | Stop reason: HOSPADM

## 2022-04-13 RX ORDER — DEXTROSE MONOHYDRATE 25 G/50ML
12.5 INJECTION, SOLUTION INTRAVENOUS PRN
Status: DISCONTINUED | OUTPATIENT
Start: 2022-04-13 | End: 2022-04-13

## 2022-04-13 RX ADMIN — MULTIPLE VITAMINS W/ MINERALS TAB 1 TABLET: TAB at 17:34

## 2022-04-13 RX ADMIN — Medication 100 MG: at 17:34

## 2022-04-13 RX ADMIN — LORAZEPAM 1 MG: 1 TABLET ORAL at 17:33

## 2022-04-13 RX ADMIN — HYDROCODONE BITARTRATE AND ACETAMINOPHEN 1 TABLET: 5; 325 TABLET ORAL at 17:34

## 2022-04-13 RX ADMIN — SODIUM CHLORIDE, PRESERVATIVE FREE 10 ML: 5 INJECTION INTRAVENOUS at 19:52

## 2022-04-13 RX ADMIN — CEFTRIAXONE 1000 MG: 1 INJECTION, POWDER, FOR SOLUTION INTRAMUSCULAR; INTRAVENOUS at 11:16

## 2022-04-13 RX ADMIN — HYDROCODONE BITARTRATE AND ACETAMINOPHEN 1 TABLET: 5; 325 TABLET ORAL at 11:32

## 2022-04-13 RX ADMIN — LORAZEPAM 1 MG: 1 TABLET ORAL at 20:30

## 2022-04-13 RX ADMIN — CEFEPIME HYDROCHLORIDE 2000 MG: 2 INJECTION, POWDER, FOR SOLUTION INTRAVENOUS at 23:07

## 2022-04-13 RX ADMIN — TRAZODONE HYDROCHLORIDE 100 MG: 50 TABLET ORAL at 20:41

## 2022-04-13 RX ADMIN — HYDROCODONE BITARTRATE AND ACETAMINOPHEN 1 TABLET: 5; 325 TABLET ORAL at 06:21

## 2022-04-13 RX ADMIN — GADOTERIDOL 20 ML: 279.3 INJECTION, SOLUTION INTRAVENOUS at 19:24

## 2022-04-13 RX ADMIN — SODIUM CHLORIDE: 9 INJECTION, SOLUTION INTRAVENOUS at 23:05

## 2022-04-13 RX ADMIN — SODIUM CHLORIDE 1000 ML: 9 INJECTION, SOLUTION INTRAVENOUS at 06:25

## 2022-04-13 RX ADMIN — VANCOMYCIN HYDROCHLORIDE 2000 MG: 1 INJECTION, POWDER, LYOPHILIZED, FOR SOLUTION INTRAVENOUS at 11:48

## 2022-04-13 ASSESSMENT — PAIN SCALES - GENERAL
PAINLEVEL_OUTOF10: 8
PAINLEVEL_OUTOF10: 5
PAINLEVEL_OUTOF10: 8
PAINLEVEL_OUTOF10: 3
PAINLEVEL_OUTOF10: 0
PAINLEVEL_OUTOF10: 7
PAINLEVEL_OUTOF10: 8
PAINLEVEL_OUTOF10: 7

## 2022-04-13 ASSESSMENT — ENCOUNTER SYMPTOMS
BACK PAIN: 0
ABDOMINAL PAIN: 0
COUGH: 0
NAUSEA: 0
RHINORRHEA: 0
DIARRHEA: 0
VOMITING: 0
EYE PAIN: 0
SHORTNESS OF BREATH: 0

## 2022-04-13 ASSESSMENT — PAIN DESCRIPTION - PAIN TYPE
TYPE: ACUTE PAIN
TYPE: ACUTE PAIN

## 2022-04-13 ASSESSMENT — PAIN DESCRIPTION - ORIENTATION
ORIENTATION: LEFT

## 2022-04-13 ASSESSMENT — PAIN DESCRIPTION - FREQUENCY
FREQUENCY: CONTINUOUS

## 2022-04-13 ASSESSMENT — PAIN SCALES - WONG BAKER: WONGBAKER_NUMERICALRESPONSE: 0

## 2022-04-13 ASSESSMENT — PAIN DESCRIPTION - LOCATION
LOCATION: LEG

## 2022-04-13 ASSESSMENT — PAIN - FUNCTIONAL ASSESSMENT
PAIN_FUNCTIONAL_ASSESSMENT: 0-10
PAIN_FUNCTIONAL_ASSESSMENT: 0-10

## 2022-04-13 NOTE — LETTER
[facility] Emergency Department  [address] [place] [address]  Phone: [phone]  Fax: [phone]             April 13, 2022    Patient: Juan Lincoln   YOB: 1980   Date of Visit: 4/13/2022       To Whom It May Concern:    Nico Ambrose was seen and treated in our emergency department on 4/13/2022 and is being admitted to the hospital.      Sincerely,             Signature:__________________________________

## 2022-04-13 NOTE — ED PROVIDER NOTES
[address] ENCOUNTER        Pt Name: Lizeth Buckner  MRN: [id number]  [place] 1980  Date of evaluation: 4/13/2022  Provider: Tom Cabral PA-C  PCP: Lissette Bosch MD  Note Started: 6:19 AM EDT      RANDOLPH. I have evaluated this patient. My supervising physician was available for consultation. Triage CHIEF COMPLAINT       Chief Complaint   Patient presents with    Leg Pain     recently treated for cellulitis    Leg Swelling         HISTORY OF PRESENT ILLNESS   (Location/Symptom, Timing/Onset, Context/Setting, Quality, Duration, Modifying Factors, Severity)  Note limiting factors. Chief Complaint: Left leg infection    Lizeth Buckner is a 46 y.o. male diabetic who presents concern for infection to his left lower extremity. He mentions that has been infected in the past, has been admitted for, but he actually states that the pain is much more worse today. He has noticed this worsening over the past 2 days, pain is company with swelling redness. He does mention a fracture in that leg, and since then had some issues with healing, was admitted last month. Approximately 2 weeks ago his primary care provider sent him to a podiatrist who he had seen at that time and was referred to the emergency department. Patient was seen in ED 2 weeks ago, switched his antibiotic to Bactrim has had some improvement but again worsening over the past couple days. He is denying any fever, chest pain, shortness of breath, reinjury, back pain, recent illness    Nursing Notes were all reviewed and agreed with or any disagreements were addressed in the HPI. REVIEW OF SYSTEMS    (2-9 systems for level 4, 10 or more for level 5)     Review of Systems   Constitutional: Negative for chills and fever. HENT: Negative for congestion and rhinorrhea. Eyes: Negative for pain and visual disturbance.    Respiratory: Negative for cough and shortness of breath. Cardiovascular: Negative for chest pain and leg swelling. Gastrointestinal: Negative for abdominal pain, diarrhea, nausea and vomiting. Genitourinary: Negative for dysuria and hematuria. Musculoskeletal: Negative for back pain and myalgias. Skin: Negative for rash and wound. Neurological: Negative for dizziness, seizures and light-headedness. Psychiatric/Behavioral: Negative for agitation and hallucinations. PAST MEDICAL HISTORY     Past Medical History:   Diagnosis Date    Hypercholesterolemia 2/18/2019    Hypertension     Type 2 diabetes, controlled, with neuropathy ([facility] [address] 6/6/2018       SURGICAL HISTORY   History reviewed. No pertinent surgical history. CURRENTMEDICATIONS       Previous Medications    BUSPIRONE (BUSPAR) 15 MG TABLET    TAKE 1 TABLET BY MOUTH THREE TIMES DAILY    GLUCOSE BLOOD VI TEST STRIPS (GLUCOSE METER TEST) STRIP    1 each by In Vitro route daily As needed.     GLUCOSE MONITORING KIT (FREESTYLE) MONITORING KIT    1 kit by Does not apply route daily as needed (If blood sugars running high or low)    IBUPROFEN (ADVIL;MOTRIN) 600 MG TABLET    Take 1 tablet by mouth 3 times daily (with meals)    MICROLET LANCETS MISC    1 each by Does not apply route daily    PREGABALIN (LYRICA) 100 MG CAPSULE    TAKE 1 CAPSULE BY MOUTH TWICE DAILY    SULFAMETHOXAZOLE-TRIMETHOPRIM (BACTRIM DS) 800-160 MG PER TABLET    Take 1 tablet by mouth 2 times daily for 15 days    TRAZODONE (DESYREL) 50 MG TABLET    Take 1-2 tablets by mouth nightly as needed for Sleep       ALLERGIES     Paxil [paroxetine hcl]    FAMILYHISTORY       Family History   Problem Relation Age of Onset    High Blood Pressure Sister         SOCIAL HISTORY       Social History     Socioeconomic History    Marital status:      Spouse name: None    Number of children: None    Years of education: None    Highest education level: None   Occupational History    None   Tobacco Use 4, 8 or more for level 5)     ED Triage Vitals [04/13/22 0540]   BP Temp Temp Source Pulse Resp SpO2 Height Weight   (!) 148/88 98.1 °F (36.7 °C) Oral 110 20 100 % 6' (1.829 m) 230 lb (104.3 kg)       Physical Exam  Vitals and nursing note reviewed. Constitutional:       Appearance: Normal appearance. He is well-developed. He is not ill-appearing or diaphoretic. HENT:      Head: Normocephalic and atraumatic. Right Ear: External ear normal.      Left Ear: External ear normal.      Nose: Nose normal.   Eyes:      General:         Right eye: No discharge. Left eye: No discharge. Pulmonary:      Effort: Pulmonary effort is normal. No respiratory distress. Breath sounds: No stridor. Musculoskeletal:      Cervical back: Normal range of motion and neck supple. Left lower leg: Swelling present. Left ankle: Swelling present. Feet:       Comments: Left anterior shin: Erythema, non circumferential, no induration, fluctuance or lymphangitic streaking   Skin:     General: Skin is warm and dry. Coloration: Skin is not pale. Neurological:      General: No focal deficit present. Mental Status: He is alert and oriented to person, place, and time.    Psychiatric:         Mood and Affect: Mood normal.         Behavior: Behavior normal.         DIAGNOSTIC RESULTS   LABS:    Labs Reviewed   CBC WITH AUTO DIFFERENTIAL - Abnormal; Notable for the following components:       Result Value    RBC 3.38 (*)     Hemoglobin 9.9 (*)     Hematocrit 31.4 (*)     MCHC 31.5 (*)     RDW 17.4 (*)     Lymphocytes % 20.7 (*)     Monocytes % 12.0 (*)     Eosinophils % 5.4 (*)     Basophils % 2.4 (*)     Immature Neutrophil % 0.7 (*)     All other components within normal limits   COMPREHENSIVE METABOLIC PANEL - Abnormal; Notable for the following components:    Chloride 97 (*)     BUN 5 (*)     CREATININE 0.6 (*)     Glucose 162 (*)     Total Protein 10.1 (*)     Total Bilirubin 2.9 (*)     AST 86 (*) Alkaline Phosphatase 160 (*)     All other components within normal limits   CULTURE, BLOOD 1   CULTURE, BLOOD 2   CULTURE, WOUND   CULTURE, ANAEROBIC   LACTIC ACID   CK       When ordered, only abnormal lab results are displayed. All other labs were within normal range or not returned as of this dictation. EKG: When ordered, EKG's are interpreted by the Emergency Department Physician in the absence of a cardiologist.  Please see their note for interpretation of EKG. RADIOLOGY:   Non-plain film images such as CT, Ultrasound and MRI are read by the radiologist. Plain radiographic images are visualized andpreliminarily interpreted by the  ED Provider with the below findings:        Interpretation perthe Radiologist below, if available at the time of this note:    VL DUP LOWER EXTREMITY VENOUS LEFT   Final Result   No evidence of DVT in the left lower extremity. Indeterminate, large complex partially cystic mass involving the   posteromedial aspect of the knee. MRI correlation is recommended. US EXTREMITY LEFT NON VASC LIMITED   Final Result   No evidence of DVT in the left lower extremity. Indeterminate, large complex partially cystic mass involving the   posteromedial aspect of the knee. MRI correlation is recommended. XR FOOT LEFT (MIN 3 VIEWS)   Final Result   No osseous abnormality identified. Soft tissue swelling over the dorsum of the foot and at the great toe   compatible with the provided history. MRI FOOT LEFT W WO CONTRAST    (Results Pending)     No results found.       PROCEDURES   Unless otherwise noted below, none     Procedures    CRITICAL CARE TIME   N/A    CONSULTS:  IP CONSULT TO GENERAL SURGERY  IP CONSULT TO PODIATRY  IP CONSULT TO HOSPITALIST      EMERGENCY DEPARTMENT COURSE and DIFFERENTIAL DIAGNOSIS/MDM:   Vitals:    Vitals:    04/13/22 0836 04/13/22 0859 04/13/22 0934 04/13/22 1120   BP: (!) 161/85 (!) 151/80 (!) 155/90 (!) 160/104   Pulse: 103   Resp:       Temp:       TempSrc:       SpO2: 96% 95% 94% 93%   Weight:       Height:           Patient was given thefollowing medications:  Medications   cefTRIAXone (ROCEPHIN) 1000 mg IVPB in 50 mL D5W minibag (1,000 mg IntraVENous New Bag 4/13/22 1116)   vancomycin (VANCOCIN) 2,000 mg in dextrose 5 % 500 mL IVPB (has no administration in time range)   HYDROcodone-acetaminophen (NORCO) 5-325 MG per tablet 1 tablet (has no administration in time range)   HYDROcodone-acetaminophen (NORCO) 5-325 MG per tablet 1 tablet (1 tablet Oral Given 4/13/22 0621)   0.9 % sodium chloride bolus (0 mLs IntraVENous Stopped 4/13/22 0836)           Attempt to reach out 53-year-old male diabetic presents with above HPI. On examination, he does have some scaling dry skin lesions over left shin, with some noticeable swelling erythema. As well as a approximately 1 cm ulcerated lesion on the plantar surface of his right great toe. Some diffuse erythema left lower extremity. No lymphangitic streaking. No fluctuance drainage or discharge. DVT ultrasound, imaging analgesics ordered. Clinically sober pleasant alert. @08:30 DVT Doppler negative, chest x-ray showed no evidence of any osseous involvement. Lab work no leukocytosis. Concern for reoccurring cellulitis, nonhealing diabetic ulcer. This time, does not appear to be consistent with DVT, ischemia. @2880 on-call general surgery was paged, and I spoke with LILY Tolbert who is covering for . And him, we discussed patient's diabetic ulcer, previous visits. She is recommending at this time to reach out to podiatrist Dr. Attila Dave, who she mentions at times takes call and sees patients. If unavailable, they are agreeable to consult.       @10:18 I spoke with podiatrist Dr. Attila Dave regarding patient's presentation, previous visits, concern for diabetic ulcer and cellulitis. He is advising for admission for IV antibiotics and agreed To see in consult.   He is also requesting an

## 2022-04-13 NOTE — ED NOTES
Report called to Axel Barton RN. Room dirty @ this time. Will be informed of when it is cleaned.       Joshua Marie RN  04/13/22 5146

## 2022-04-13 NOTE — H&P
History and Physical      Name:  Lupe Goodwin /Age/Sex: 1980  (46 y.o. male)   MRN & CSN:  [id number] & [id number] Encounter Date/Time: 2022 11:49 AM EDT   Location:  ED[facility]31 PCP: Ericka Craig MD       Hospital Day: 1    Assessment and Plan:     Lupe Goodwin is a 46 y.o. male who presents with Cellulitis. Medical decision making:   Cellulitis of left great toe ulcer, POA  o Failed outpatient antibiotics. He previously was a diabetic but his most recent hemoglobin A1c was in the 4 range in 2022 and he was taken off of his antidiabetic medications. DVT study negative in ER  o ER started vancomycin and Rocephin, continue to biotics  o Blood cultures pending  o Wound culture pending  o Check procalcitonin  o Trend Aime Laguna consulted by ER  o MRI w/ and w/o contrast pending     Alcohol dependence without withdrawl  o Last drink several days ago, drinks 6-8 beers 3x weekly. o Start multivitamin, folic acid, and thiamine  o Monitor for signs of withdrawal, Ativan per CIWA if needed     Chronic normocytic anemia  o Monitor     Hypertension   Hyperbilirubinemia, history of alcoholic cirrhosis  o Per review of last PCP visit on 3/1/2022, patient was having a comprehensive work-up for cirrhosis and was referred to GI.    o Present previously upon trending and is lower than previously, monitor     Complex, partially cystic mass in the posteromedial aspect of knee  o Seen on US of left leg  o Recommend MRI knee outpatient      Chronic conditions: home medications continued unless contraindicated   MICHAEL   Anxiety - continue buspar    Disposition:   Current Living situation: home with wife  Expected Disposition: same  Estimated D/C: 1-2 days pending MRI/podiatry    Diet regular   DVT Prophylaxis [x] Lovenox, []  Heparin, [] SCDs, [] Ambulation,  [] Eliquis, [] Xarelto   Code Status full   Surrogate Decision Maker/ POA wife     History from:   Patient and EMR History of Present Illness:   Chief Complaint: right foot cellulitis  Camilo Alarcon is a 46 y.o. male with pmh of alcoholism and anxiety, cirrhosis who presents to the ER for evaluation of right foot ulcer and concern for cellulitis. He was seen several months ago for similar symptoms. He states that 2 months ago he noticed an ulcer opening up on his left great toe. He states that since starting a new job several weeks ago where he has to wear work boots, the ulcer has worsened and he has noticed redness of his left great toe. He states he has been on Bactrim outpatient which slightly improved symptoms but they are still ongoing. He denies purulent drainage. He states he has mild chronic swelling but has worsening left leg pain since starting his new job. He denies fevers, chills, shortness of breath, chest pain, headaches. He states that he drinks 6-8 beers per day 3 times per week but denies symptoms of withdrawal at this time. Last drink was several days ago. Patient was given vancomycin and Rocephin in the ER. Podiatry was consulted and will see, recommended MRI with and without of the left foot. Patient's past medical, social, and family history have been reviewed. Patient case discussed with ED provider   Review of Systems:    10 point system reviewed, negative, unless as noted in above HPI. Objective: Intake/Output Summary (Last 24 hours) at 4/13/2022 1149  Last data filed at 4/13/2022 1148  Gross per 24 hour   Intake 1050 ml   Output --   Net 1050 ml      Vitals:   Vitals:    04/13/22 0836 04/13/22 0859 04/13/22 0934 04/13/22 1120   BP: (!) 161/85 (!) 151/80 (!) 155/90 (!) 160/104   Pulse:    103   Resp:       Temp:       TempSrc:       SpO2: 96% 95% 94% 93%   Weight:       Height:         Medications Prior to Admission     Prior to Admission medications    Medication Sig Start Date End Date Taking?  Authorizing Provider   sulfamethoxazole-trimethoprim (BACTRIM DS) 800-160 MG per tablet Take 1 tablet by mouth 2 times daily for 15 days 3/30/22 4/14/22  Josh Rhodes PA-C   ibuprofen (ADVIL;MOTRIN) 600 MG tablet Take 1 tablet by mouth 3 times daily (with meals) 3/30/22   Josh Rhodes PA-C   pregabalin (LYRICA) 100 MG capsule TAKE 1 CAPSULE BY MOUTH TWICE DAILY 3/9/22 9/9/22  Chris Harmon MD   busPIRone (BUSPAR) 15 MG tablet TAKE 1 TABLET BY MOUTH THREE TIMES DAILY 2/7/22   Chris Harmon MD   traZODone (DESYREL) 50 MG tablet Take 1-2 tablets by mouth nightly as needed for Sleep 8/27/21   Chris Harmon MD   glucose blood VI test strips (GLUCOSE METER TEST) strip 1 each by In Vitro route daily As needed. 5/30/18   Chris Harmon MD   glucose monitoring kit (FREESTYLE) monitoring kit 1 kit by Does not apply route daily as needed (If blood sugars running high or low) 5/30/18   Chris Harmon MD   MICROLET LANCETS [address] [facility] 1 each by Does not apply route daily 5/30/18   Chris Harmon MD     Physical Exam:      GEN -Awake. NAD. Appears given age. EYES -PERRLA. No scleral erythema, discharge, or conjunctivitis. HENT -MM are moist. Oral pharynx without exudates, no evidence of thrush. NECK -Supple, no apparent thyromegaly or masses. RESP -CTA, no wheezes, rales or rhonchi. Symmetric chest movement while on room air. C/V -S1/S2 auscultated. RRR without appreciable M/R/G. No JVD or carotid bruits. Peripheral pulses equal bilaterally and palpable. Cap refill <3 sec. No peripheral edema. GI -Abdomen is soft non distended and without significant tenderness to palpation. + BS. No masses or guarding.  -No CVA/ flank tenderness. Walsh catheter is not present. LYMPH-No palpable cervical lymphadenopathy and no hepatosplenomegaly. No petechiae or ecchymoses. MS -No gross joint deformities. Decreased sensation to bilateral toes. Bilateral lower extremity pulses intact. SKIN -1+ pitting edema bilateral lower extremities.   Open ulcer on left great toe without signs of purulent drainage. No obvious erythema surrounding ulcer. Chronic drying of the skin on the left leg. No significant calf tenderness on palpation. Faint erythema noticed superior to the ankle extending up left calf, ending before knee. Zelpha Braver, alert, oriented x  person, place, time, situation. Cranial nerves appear grossly intact, normal speech, no lateralizing weakness. PSYC- Appropriate affect. Past Medical History:   PMHx   Past Medical History:   Diagnosis Date    Hypercholesterolemia 2/18/2019    Hypertension     Type 2 diabetes, controlled, with neuropathy ([address] 6/6/2018     PSHX:  has no past surgical history on file. Allergies: Allergies   Allergen Reactions    Paxil [Paroxetine Hcl]      Caused \"weird\" things to happen in his sleep     Fam HX: family history includes High Blood Pressure in his sister. Soc HX:   Social History     Socioeconomic History    Marital status:      Spouse name: None    Number of children: None    Years of education: None    Highest education level: None   Occupational History    None   Tobacco Use    Smoking status: Never Smoker    Smokeless tobacco: Never Used   Substance and Sexual Activity    Alcohol use: Yes     Alcohol/week: 12.0 standard drinks     Types: 12 Standard drinks or equivalent per week     Comment: occasional    Drug use: No    Sexual activity: Yes     Partners: Male   Other Topics Concern    None   Social History Narrative    [employer] and receiving        Diet:Unrestricted    Exercise:work    Seat belts:Sometimes     Social Determinants of Health     Financial Resource Strain:     Difficulty of Paying Living Expenses: Not on file   Food Insecurity:     Worried About Running Out of Food in the Last Year: Not on file    Desmond of Food in the Last Year: Not on file   Transportation Needs:     Lack of Transportation (Medical):  Not on file    Lack of Transportation (Non-Medical): Not on file   Physical Activity:     Days of Exercise per Week: Not on file    Minutes of Exercise per Session: Not on file   Stress:     Feeling of Stress : Not on file   Social Connections:     Frequency of Communication with Friends and Family: Not on file    Frequency of Social Gatherings with Friends and Family: Not on file    Attends Zoroastrianism Services: Not on file    Active Member of [address] [place] [address] or Organizations: Not on file    Attends Club or Organization Meetings: Not on file    Marital Status: Not on file   Intimate Partner Violence:     Fear of Current or Ex-Partner: Not on file    Emotionally Abused: Not on file    Physically Abused: Not on file    Sexually Abused: Not on file   Housing Stability:     Unable to Pay for Housing in the Last Year: Not on file    Number of [place] in the Last Year: Not on file    Unstable Housing in the Last Year: Not on file     Medications:   Medications:    vancomycin  20 mg/kg IntraVENous Once      Infusions:   PRN Meds:    Labs    XR FOOT LEFT (MIN 3 VIEWS)    Result Date: 4/13/2022  EXAMINATION: THREE XRAY VIEWS OF THE LEFT FOOT 4/13/2022 6:38 am COMPARISON: 02/08/2022 HISTORY: ORDERING SYSTEM PROVIDED HISTORY: infection, pain/swelling, DM ulcer on great toe TECHNOLOGIST PROVIDED HISTORY: Reason for exam:->infection, pain/swelling, DM ulcer on great toe Reason for Exam: leg pain, leg swelling, foot pain Additional signs and symptoms: leg pain, leg swelling, foot pain FINDINGS: No fracture, dislocation or joint abnormality identified. Bone density is normal.  There is moderate soft tissue swelling at the great toe and marked soft tissue swelling over the dorsum of foot. No osseous abnormality identified. Soft tissue swelling over the dorsum of the foot and at the great toe compatible with the provided history.      US EXTREMITY LEFT NON VASC LIMITED    Result Date: 4/13/2022  EXAMINATION: DUPLEX VENOUS ULTRASOUND OF THE LEFT LOWER EXTREMITY 4/13/2022 7:00 am TECHNIQUE: Duplex ultrasound using B-mode/gray scaled imaging and Doppler spectral analysis and color flow was obtained of the deep venous structures of the left extremity. COMPARISON: None. HISTORY: ORDERING SYSTEM PROVIDED HISTORY: pain/swelling, eval for DVT TECHNOLOGIST PROVIDED HISTORY: Reason for exam:->pain/swelling, eval for DVT FINDINGS: The visualized veins of the left lower extremity are patent and free of echogenic thrombus. The veins demonstrate good compressibility with normal color flow study and spectral analysis. Incidental note is made of a complex mass posteromedially, measuring approximately 7.6 x 4.6 x 6.4 cm. No evidence of DVT in the left lower extremity. Indeterminate, large complex partially cystic mass involving the posteromedial aspect of the knee. MRI correlation is recommended. VL DUP LOWER EXTREMITY VENOUS LEFT    Result Date: 4/13/2022  EXAMINATION: DUPLEX VENOUS ULTRASOUND OF THE LEFT LOWER EXTREMITY 4/13/2022 7:00 am TECHNIQUE: Duplex ultrasound using B-mode/gray scaled imaging and Doppler spectral analysis and color flow was obtained of the deep venous structures of the left extremity. COMPARISON: None. HISTORY: ORDERING SYSTEM PROVIDED HISTORY: pain/swelling, eval for DVT TECHNOLOGIST PROVIDED HISTORY: Reason for exam:->pain/swelling, eval for DVT FINDINGS: The visualized veins of the left lower extremity are patent and free of echogenic thrombus. The veins demonstrate good compressibility with normal color flow study and spectral analysis. Incidental note is made of a complex mass posteromedially, measuring approximately 7.6 x 4.6 x 6.4 cm. No evidence of DVT in the left lower extremity. Indeterminate, large complex partially cystic mass involving the posteromedial aspect of the knee. MRI correlation is recommended.        CBC:   Recent Labs     04/13/22  0615   WBC 9.1   HGB 9.9*        BMP:    Recent Labs 04/13/22  0615      K 3.8   CL 97*   CO2 25   BUN 5*   CREATININE 0.6*   GLUCOSE 162*     Hepatic:   Recent Labs     04/13/22  0615   AST 86*   ALT 36   BILITOT 2.9*   ALKPHOS 160*     Lipids:   Lab Results   Component Value Date    CHOL 106 02/07/2022    HDL 30 02/07/2022    TRIG 91 02/07/2022     Hemoglobin A1C:   Lab Results   Component Value Date    LABA1C 4.7 02/18/2022     TSH:   Lab Results   Component Value Date    TSH 2.05 01/27/2020     Troponin:   Lab Results   Component Value Date    TROPONINT 0.014 07/31/2021     Lactic Acid: No results for input(s): LACTA in the last 72 hours. BNP: No results for input(s): PROBNP in the last 72 hours. UA:  Lab Results   Component Value Date    NITRU POSITIVE 08/03/2021    COLORU TREVOR 08/03/2021    WBCUA <1 08/03/2021    RBCUA 1 08/03/2021    MUCUS RARE 09/16/2014    TRICHOMONAS NONE SEEN 08/03/2021    BACTERIA NEGATIVE 08/03/2021    CLARITYU CLEAR 08/03/2021    SPECGRAV 1.015 08/03/2021    LEUKOCYTESUR NEGATIVE 08/03/2021    UROBILINOGEN 2.0 08/03/2021    BILIRUBINUR SMALL 08/03/2021    BLOODU SMALL 08/03/2021    KETUA NEGATIVE 08/03/2021     Urine Cultures: No results found for: Yair He  Blood Cultures: No results found for: BC  No results found for: BLOODCULT2  Organism: No results found for: ORG  Imaging/Diagnostics Last 24 Hours   XR FOOT LEFT (MIN 3 VIEWS)    Result Date: 4/13/2022  EXAMINATION: THREE XRAY VIEWS OF THE LEFT FOOT 4/13/2022 6:38 am COMPARISON: 02/08/2022 HISTORY: ORDERING SYSTEM PROVIDED HISTORY: infection, pain/swelling, DM ulcer on great toe TECHNOLOGIST PROVIDED HISTORY: Reason for exam:->infection, pain/swelling, DM ulcer on great toe Reason for Exam: leg pain, leg swelling, foot pain Additional signs and symptoms: leg pain, leg swelling, foot pain FINDINGS: No fracture, dislocation or joint abnormality identified.   Bone density is normal.  There is moderate soft tissue swelling at the great toe and marked soft tissue swelling over the dorsum of foot. No osseous abnormality identified. Soft tissue swelling over the dorsum of the foot and at the great toe compatible with the provided history. US EXTREMITY LEFT NON VASC LIMITED    Result Date: 4/13/2022  EXAMINATION: DUPLEX VENOUS ULTRASOUND OF THE LEFT LOWER EXTREMITY 4/13/2022 7:00 am TECHNIQUE: Duplex ultrasound using B-mode/gray scaled imaging and Doppler spectral analysis and color flow was obtained of the deep venous structures of the left extremity. COMPARISON: None. HISTORY: ORDERING SYSTEM PROVIDED HISTORY: pain/swelling, eval for DVT TECHNOLOGIST PROVIDED HISTORY: Reason for exam:->pain/swelling, eval for DVT FINDINGS: The visualized veins of the left lower extremity are patent and free of echogenic thrombus. The veins demonstrate good compressibility with normal color flow study and spectral analysis. Incidental note is made of a complex mass posteromedially, measuring approximately 7.6 x 4.6 x 6.4 cm. No evidence of DVT in the left lower extremity. Indeterminate, large complex partially cystic mass involving the posteromedial aspect of the knee. MRI correlation is recommended. VL DUP LOWER EXTREMITY VENOUS LEFT    Result Date: 4/13/2022  EXAMINATION: DUPLEX VENOUS ULTRASOUND OF THE LEFT LOWER EXTREMITY 4/13/2022 7:00 am TECHNIQUE: Duplex ultrasound using B-mode/gray scaled imaging and Doppler spectral analysis and color flow was obtained of the deep venous structures of the left extremity. COMPARISON: None. HISTORY: ORDERING SYSTEM PROVIDED HISTORY: pain/swelling, eval for DVT TECHNOLOGIST PROVIDED HISTORY: Reason for exam:->pain/swelling, eval for DVT FINDINGS: The visualized veins of the left lower extremity are patent and free of echogenic thrombus. The veins demonstrate good compressibility with normal color flow study and spectral analysis. Incidental note is made of a complex mass posteromedially, measuring approximately 7.6 x 4.6 x 6.4 cm.      No evidence of DVT in the left lower extremity. Indeterminate, large complex partially cystic mass involving the posteromedial aspect of the knee. MRI correlation is recommended.        Personally reviewed Lab Studies, Imaging, and discussed case with Dr. Ashu Allen PA-C  Hospitalist  4/13/2022, 11:49 AM

## 2022-04-13 NOTE — ED NOTES
Pt states he has been on antibiotics for a month for cellulitis to left leg but it doesn't seem to be getting any better. Pt states \"I have a hole in my toe. It was a blood blister from a cast I had had on and when it popped it became a hole and hasnt gotten any better. \"     Jadiel Ugarte  04/13/22 0604

## 2022-04-13 NOTE — PROGRESS NOTES
4 Eyes Skin Assessment     NAME:  Neal Storey  YOB: 1980  MEDICAL RECORD NUMBER:  [id number]    The patient is being assess for  Admission    I agree that 2 RN's have performed a thorough Head to Toe Skin Assessment on the patient. ALL assessment sites listed below have been assessed. Areas assessed by both nurses:    Head, Face, Ears, Shoulders, Back, Chest, Arms, Elbows, Hands, Sacrum. Buttock, Coccyx, Ischium and Legs. Feet and Heels        Does the Patient have a Wound? Yes wound(s) were present on assessment.  LDA wound assessment was Initiated and completed        Bon Prevention initiated:  Yes   Wound Care Orders initiated:  No    Pressure Injury (Stage 3,4, Unstageable, DTI, NWPT, and Complex wounds) if present place consult order under [de-identified] No    New and Established Ostomies if present place consult order under : No      Nurse 1 eSignature: Electronically signed by Emerald Peter LPN on 0/73/26 at 0:45 PM EDT    **SHARE this note so that the co-signing nurse is able to place an eSignature**    Nurse 2 eSignature: Electronically signed by Gregorio Sanchez RN on 4/13/22 at 5:07 PM EDT

## 2022-04-13 NOTE — PROGRESS NOTES
[id number]  consulted by Dr. Cate French MD &  Perla Homans, PA-C,  for monitoring and adjustment. Indication for treatment: skin & soft tissue infection  Goal trough: [x] 10-15 mcg/mL or [] 15-20 mcg/ml  AUC/BOY: [] <500 or [x] 400-600    Pertinent Laboratory Values:   Temp Readings from Last 3 Encounters:   04/13/22 98.1 °F (36.7 °C) (Oral)   03/30/22 97.7 °F (36.5 °C) (Oral)   02/21/22 98 °F (36.7 °C) (Oral)     Recent Labs     04/13/22  0615   WBC 9.1   LACTATE 1.7     Recent Labs     04/13/22  0615   BUN 5*   CREATININE 0.6*     Estimated Creatinine Clearance: 206 mL/min (A) (based on SCr of 0.6 mg/dL (L)). Intake/Output Summary (Last 24 hours) at 4/13/2022 1337  Last data filed at 4/13/2022 1148  Gross per 24 hour   Intake 1050 ml   Output --   Net 1050 ml       Pertinent Cultures:  Date    Source    Results  4/13   Toe, left, aneorobic              pending  4/13                            blood x 2                                  pending    Vancomycin level:   TROUGH:  No results for input(s): VANCOTROUGH in the last 72 hours. RANDOM:  No results for input(s): VANCORANDOM in the last 72 hours. Assessment:  SCr, BUN, and urine output: Renal function= appears at baseline & good; scr= 0.6. I/O: more data still pending  Day(s) of therapy: day 1 today  Vancomycin concentration: pending, 4/14    Plan:  46 yom, 104kg  Renal function= appears at baseline & good; scr= 0.6. Note: a 1 x dose of Vancomycin 2000mg iv, is currently infusing, 4/13. Than: continue Vancomycin as 1500mg iv q12hr;  Predicted AUC= 552, Vanco Tr= 17.1  Pharmacy will continue to monitor patient and adjust therapy as indicated    Chloe 3 4/14 @ 6am    Thank you for the consult.   Agustín Avila, [address] [place] [address]  4/13/2022 1:37 PM

## 2022-04-14 LAB
ALBUMIN SERPL-MCNC: 3.5 GM/DL (ref 3.4–5)
ALP BLD-CCNC: 150 IU/L (ref 40–128)
ALT SERPL-CCNC: 30 U/L (ref 10–40)
ANION GAP SERPL CALCULATED.3IONS-SCNC: 13 MMOL/L (ref 4–16)
AST SERPL-CCNC: 68 IU/L (ref 15–37)
BASOPHILS ABSOLUTE: 0.2 K/CU MM
BASOPHILS RELATIVE PERCENT: 2.1 % (ref 0–1)
BILIRUB SERPL-MCNC: 3.7 MG/DL (ref 0–1)
BUN BLDV-MCNC: 7 MG/DL (ref 6–23)
CALCIUM SERPL-MCNC: 9.1 MG/DL (ref 8.3–10.6)
CHLORIDE BLD-SCNC: 95 MMOL/L (ref 99–110)
CO2: 24 MMOL/L (ref 21–32)
CREAT SERPL-MCNC: 0.6 MG/DL (ref 0.9–1.3)
DIFFERENTIAL TYPE: ABNORMAL
DOSE AMOUNT: NORMAL
DOSE TIME: NORMAL
EOSINOPHILS ABSOLUTE: 0.7 K/CU MM
EOSINOPHILS RELATIVE PERCENT: 6.5 % (ref 0–3)
ESTIMATED AVERAGE GLUCOSE: 100 MG/DL
GFR AFRICAN AMERICAN: >60 ML/MIN/1.73M2
GFR NON-AFRICAN AMERICAN: >60 ML/MIN/1.73M2
GLUCOSE BLD-MCNC: 116 MG/DL (ref 70–99)
GLUCOSE BLD-MCNC: 129 MG/DL (ref 70–99)
GLUCOSE BLD-MCNC: 150 MG/DL (ref 70–99)
GLUCOSE BLD-MCNC: 158 MG/DL (ref 70–99)
GLUCOSE BLD-MCNC: 209 MG/DL (ref 70–99)
HBA1C MFR BLD: 5.1 % (ref 4.2–6.3)
HCT VFR BLD CALC: 30.7 % (ref 42–52)
HEMOGLOBIN: 9.8 GM/DL (ref 13.5–18)
IMMATURE NEUTROPHIL %: 0.7 % (ref 0–0.43)
LYMPHOCYTES ABSOLUTE: 2.3 K/CU MM
LYMPHOCYTES RELATIVE PERCENT: 20.4 % (ref 24–44)
MCH RBC QN AUTO: 29.5 PG (ref 27–31)
MCHC RBC AUTO-ENTMCNC: 31.9 % (ref 32–36)
MCV RBC AUTO: 92.5 FL (ref 78–100)
MONOCYTES ABSOLUTE: 1.1 K/CU MM
MONOCYTES RELATIVE PERCENT: 9.5 % (ref 0–4)
NUCLEATED RBC %: 0 %
PDW BLD-RTO: 17.5 % (ref 11.7–14.9)
PLATELET # BLD: 151 K/CU MM (ref 140–440)
PMV BLD AUTO: 9.5 FL (ref 7.5–11.1)
POTASSIUM SERPL-SCNC: 3.7 MMOL/L (ref 3.5–5.1)
PROCALCITONIN: 0.1
RBC # BLD: 3.32 M/CU MM (ref 4.6–6.2)
SEGMENTED NEUTROPHILS ABSOLUTE COUNT: 6.9 K/CU MM
SEGMENTED NEUTROPHILS RELATIVE PERCENT: 60.8 % (ref 36–66)
SODIUM BLD-SCNC: 132 MMOL/L (ref 135–145)
TOTAL IMMATURE NEUTOROPHIL: 0.08 K/CU MM
TOTAL NUCLEATED RBC: 0 K/CU MM
TOTAL PROTEIN: 9.3 GM/DL (ref 6.4–8.2)
VANCOMYCIN RANDOM: 10 UG/ML
WBC # BLD: 11.3 K/CU MM (ref 4–10.5)

## 2022-04-14 PROCEDURE — 6360000002 HC RX W HCPCS: Performed by: INTERNAL MEDICINE

## 2022-04-14 PROCEDURE — 82962 GLUCOSE BLOOD TEST: CPT

## 2022-04-14 PROCEDURE — 6360000002 HC RX W HCPCS: Performed by: PHYSICIAN ASSISTANT

## 2022-04-14 PROCEDURE — 6370000000 HC RX 637 (ALT 250 FOR IP): Performed by: INTERNAL MEDICINE

## 2022-04-14 PROCEDURE — G0378 HOSPITAL OBSERVATION PER HR: HCPCS

## 2022-04-14 PROCEDURE — 6370000000 HC RX 637 (ALT 250 FOR IP): Performed by: PHYSICIAN ASSISTANT

## 2022-04-14 PROCEDURE — 94761 N-INVAS EAR/PLS OXIMETRY MLT: CPT

## 2022-04-14 PROCEDURE — 2580000003 HC RX 258: Performed by: INTERNAL MEDICINE

## 2022-04-14 PROCEDURE — 96366 THER/PROPH/DIAG IV INF ADDON: CPT

## 2022-04-14 PROCEDURE — 99211 OFF/OP EST MAY X REQ PHY/QHP: CPT

## 2022-04-14 PROCEDURE — 80202 ASSAY OF VANCOMYCIN: CPT

## 2022-04-14 PROCEDURE — 85025 COMPLETE CBC W/AUTO DIFF WBC: CPT

## 2022-04-14 PROCEDURE — 2580000003 HC RX 258: Performed by: PHYSICIAN ASSISTANT

## 2022-04-14 PROCEDURE — 84145 PROCALCITONIN (PCT): CPT

## 2022-04-14 PROCEDURE — 36415 COLL VENOUS BLD VENIPUNCTURE: CPT

## 2022-04-14 PROCEDURE — 80053 COMPREHEN METABOLIC PANEL: CPT

## 2022-04-14 RX ORDER — HYDROCODONE BITARTRATE AND ACETAMINOPHEN 5; 325 MG/1; MG/1
1 TABLET ORAL EVERY 6 HOURS PRN
Status: DISCONTINUED | OUTPATIENT
Start: 2022-04-14 | End: 2022-04-15 | Stop reason: HOSPADM

## 2022-04-14 RX ORDER — POLYVINYL ALCOHOL 14 MG/ML
1 SOLUTION/ DROPS OPHTHALMIC
Status: DISCONTINUED | OUTPATIENT
Start: 2022-04-14 | End: 2022-04-15 | Stop reason: HOSPADM

## 2022-04-14 RX ADMIN — SODIUM CHLORIDE: 9 INJECTION, SOLUTION INTRAVENOUS at 00:24

## 2022-04-14 RX ADMIN — HYDROCODONE BITARTRATE AND ACETAMINOPHEN 1 TABLET: 5; 325 TABLET ORAL at 14:27

## 2022-04-14 RX ADMIN — HYDROCODONE BITARTRATE AND ACETAMINOPHEN 1 TABLET: 5; 325 TABLET ORAL at 04:56

## 2022-04-14 RX ADMIN — CEFEPIME HYDROCHLORIDE 2000 MG: 2 INJECTION, POWDER, FOR SOLUTION INTRAVENOUS at 09:48

## 2022-04-14 RX ADMIN — POLYVINYL ALCOHOL 1 DROP: 14 SOLUTION/ DROPS OPHTHALMIC at 17:21

## 2022-04-14 RX ADMIN — SODIUM CHLORIDE, PRESERVATIVE FREE 10 ML: 5 INJECTION INTRAVENOUS at 19:52

## 2022-04-14 RX ADMIN — VANCOMYCIN HYDROCHLORIDE 1500 MG: 5 INJECTION, POWDER, LYOPHILIZED, FOR SOLUTION INTRAVENOUS at 23:20

## 2022-04-14 RX ADMIN — Medication 100 MG: at 09:46

## 2022-04-14 RX ADMIN — BUSPIRONE HYDROCHLORIDE 15 MG: 7.5 TABLET ORAL at 09:46

## 2022-04-14 RX ADMIN — SODIUM CHLORIDE: 9 INJECTION, SOLUTION INTRAVENOUS at 22:22

## 2022-04-14 RX ADMIN — VANCOMYCIN HYDROCHLORIDE 1500 MG: 5 INJECTION, POWDER, LYOPHILIZED, FOR SOLUTION INTRAVENOUS at 00:25

## 2022-04-14 RX ADMIN — HYDROCODONE BITARTRATE AND ACETAMINOPHEN 1 TABLET: 5; 325 TABLET ORAL at 09:46

## 2022-04-14 RX ADMIN — MULTIPLE VITAMINS W/ MINERALS TAB 1 TABLET: TAB at 09:46

## 2022-04-14 RX ADMIN — LORAZEPAM 1 MG: 1 TABLET ORAL at 04:47

## 2022-04-14 RX ADMIN — TRAZODONE HYDROCHLORIDE 100 MG: 50 TABLET ORAL at 23:19

## 2022-04-14 RX ADMIN — LORAZEPAM 1 MG: 1 TABLET ORAL at 09:46

## 2022-04-14 RX ADMIN — LORAZEPAM 1 MG: 1 TABLET ORAL at 18:46

## 2022-04-14 RX ADMIN — VANCOMYCIN HYDROCHLORIDE 1500 MG: 5 INJECTION, POWDER, LYOPHILIZED, FOR SOLUTION INTRAVENOUS at 11:35

## 2022-04-14 RX ADMIN — CEFEPIME HYDROCHLORIDE 2000 MG: 2 INJECTION, POWDER, FOR SOLUTION INTRAVENOUS at 22:23

## 2022-04-14 RX ADMIN — INSULIN LISPRO 1 UNITS: 100 INJECTION, SOLUTION INTRAVENOUS; SUBCUTANEOUS at 12:23

## 2022-04-14 RX ADMIN — LORAZEPAM 1 MG: 1 TABLET ORAL at 19:52

## 2022-04-14 RX ADMIN — SODIUM CHLORIDE, PRESERVATIVE FREE 10 ML: 5 INJECTION INTRAVENOUS at 09:46

## 2022-04-14 ASSESSMENT — PAIN DESCRIPTION - DESCRIPTORS
DESCRIPTORS: ACHING
DESCRIPTORS: ACHING
DESCRIPTORS: ACHING;SHOOTING

## 2022-04-14 ASSESSMENT — PAIN SCALES - GENERAL
PAINLEVEL_OUTOF10: 0
PAINLEVEL_OUTOF10: 8
PAINLEVEL_OUTOF10: 7
PAINLEVEL_OUTOF10: 8
PAINLEVEL_OUTOF10: 0
PAINLEVEL_OUTOF10: 4
PAINLEVEL_OUTOF10: 0
PAINLEVEL_OUTOF10: 0

## 2022-04-14 ASSESSMENT — PAIN DESCRIPTION - FREQUENCY
FREQUENCY: CONTINUOUS

## 2022-04-14 ASSESSMENT — PAIN DESCRIPTION - ORIENTATION
ORIENTATION: LEFT;RIGHT
ORIENTATION: LEFT;RIGHT
ORIENTATION: LEFT
ORIENTATION: LEFT;RIGHT

## 2022-04-14 ASSESSMENT — PAIN DESCRIPTION - LOCATION
LOCATION: FOOT
LOCATION: LEG
LOCATION: FOOT
LOCATION: FOOT

## 2022-04-14 ASSESSMENT — PAIN DESCRIPTION - DIRECTION
RADIATING_TOWARDS: LEGS

## 2022-04-14 ASSESSMENT — PAIN DESCRIPTION - PROGRESSION
CLINICAL_PROGRESSION: NOT CHANGED

## 2022-04-14 ASSESSMENT — PAIN DESCRIPTION - PAIN TYPE
TYPE: ACUTE PAIN;CHRONIC PAIN
TYPE: ACUTE PAIN;CHRONIC PAIN
TYPE: ACUTE PAIN
TYPE: ACUTE PAIN;CHRONIC PAIN

## 2022-04-14 ASSESSMENT — PAIN DESCRIPTION - ONSET
ONSET: ON-GOING
ONSET: PROGRESSIVE
ONSET: PROGRESSIVE
ONSET: GRADUAL

## 2022-04-14 ASSESSMENT — PAIN SCALES - WONG BAKER
WONGBAKER_NUMERICALRESPONSE: 0

## 2022-04-14 ASSESSMENT — PAIN - FUNCTIONAL ASSESSMENT
PAIN_FUNCTIONAL_ASSESSMENT: ACTIVITIES ARE NOT PREVENTED

## 2022-04-14 NOTE — PROGRESS NOTES
Progress Note      Name:  Vanessa Azevedo /Age/Sex: 1980  (46 y.o. male)   MRN & CSN:  [id number] & [id number] Encounter Date/Time: 2022 11:49 AM EDT   Location:  8396/4809-O PCP: Lucio Pierson MD       Hospital Day: 2    Assessment and Plan:     Vanessa Azevedo is a 46 y.o. male who presents with Cellulitis    Medical decision making:   Cellulitis of left great toe ulcer, POA  o Failed outpatient antibiotics. He previously was a diabetic but his most recent hemoglobin A1c was in the 4 range in 2022 and he was taken off of his antidiabetic medications. DVT study negative in ER  o Continue vanc and cefepime until sensitivities result  o Blood cultures pending  o Wound culture pending  o Procalcitonin pending  o Nahum Becerra consulted by ER - pending evaluation  o MRI w/ and w/o showed no osteomyelitis, showed cellulitis     Alcohol dependence without withdrawl  o Last drink several days ago, drinks 6-8 beers 3x weekly. o Start multivitamin, folic acid, and thiamine  o Monitor for signs of withdrawal, Ativan per CIWA if needed     Chronic normocytic anemia  o Monitor     Hypertension, uncontrolled  o Pain may be contributing. Does not appear to be withdrawaling. If not improving, will start oral antihypertensive     Hyperbilirubinemia, history of alcoholic cirrhosis  o Per review of last PCP visit on 3/1/2022, patient was having a comprehensive work-up for cirrhosis and was referred to GI.    o Present previously upon trending and is lower than previously, monitor     Complex, partially cystic mass in the posteromedial aspect of knee  o Seen on US of left leg  o Recommend MRI knee outpatient, discussed with patient      Chronic conditions: home medications continued unless contraindicated   MICHAEL   Anxiety - continue buspar    Disposition:   Current Living situation: home with wife  Expected Disposition: same  Estimated D/C: 1-2 days pending podiatry evaluation and culture results    Diet regular   DVT Prophylaxis [x] Lovenox, []  Heparin, [] SCDs, [] Ambulation,  [] Eliquis, [] Xarelto   Code Status full   Surrogate Decision Maker/ POA wife     History from:   Patient and EMR   Overnight events/subjective:   Patient was seen and evaluated at bedside by myself. No acute overnight events. Patient reports feeling mild left leg pain. No withdrawal symptoms. Review of Systems:    10 point system reviewed, negative, unless as noted in above HPI. Objective: Intake/Output Summary (Last 24 hours) at 4/14/2022 1108  Last data filed at 4/13/2022 1148  Gross per 24 hour   Intake 50 ml   Output --   Net 50 ml      Vitals:   Vitals:    04/13/22 1945 04/13/22 2115 04/14/22 0430 04/14/22 0942   BP: (!) 159/101 (!) 153/87 (!) 164/93 (!) 161/92   Pulse: 102 101 103 110   Resp: 18 16 16 20   Temp: 98 °F (36.7 °C) 98.3 °F (36.8 °C) 98.3 °F (36.8 °C) 98.5 °F (36.9 °C)   TempSrc: Oral Oral Oral Oral   SpO2:  96%  98%   Weight:       Height:         Medications Prior to Admission     Prior to Admission medications    Medication Sig Start Date End Date Taking? Authorizing Provider   sulfamethoxazole-trimethoprim (BACTRIM DS) 800-160 MG per tablet Take 1 tablet by mouth 2 times daily for 15 days  Patient not taking: Reported on 4/13/2022 3/30/22 4/14/22  Whitney Dimas PA-C   ibuprofen (ADVIL;MOTRIN) 600 MG tablet Take 1 tablet by mouth 3 times daily (with meals) 3/30/22   Whitney Dimas PA-C   pregabalin (LYRICA) 100 MG capsule TAKE 1 CAPSULE BY MOUTH TWICE DAILY 3/9/22 9/9/22  Nargis Mckinney MD   busPIRone (BUSPAR) 15 MG tablet TAKE 1 TABLET BY MOUTH THREE TIMES DAILY 2/7/22   Nargis Mckinney MD   traZODone (DESYREL) 50 MG tablet Take 1-2 tablets by mouth nightly as needed for Sleep 8/27/21   Nargis Mckinney MD   glucose blood VI test strips (GLUCOSE METER TEST) strip 1 each by In Vitro route daily As needed.  5/30/18   Nargis Mckinney MD   glucose monitoring kit (FREESTYLE) monitoring kit 1 kit by Does not apply route daily as needed (If blood sugars running high or low) 5/30/18   Garret Gaspar MD   MICROLET LANCETS [address] 1 each by Does not apply route daily 5/30/18   Garret Gaspar MD     Physical Exam:      GEN -Awake. NAD. Appears given age. EYES -PERRLA. No scleral erythema, discharge, or conjunctivitis. HENT -MM are moist. Oral pharynx without exudates, no evidence of thrush. NECK -Supple, no apparent thyromegaly or masses. RESP -CTA, no wheezes, rales or rhonchi. Symmetric chest movement while on room air. C/V -S1/S2 auscultated. RRR without appreciable M/R/G. No JVD or carotid bruits. Peripheral pulses equal bilaterally and palpable. Cap refill <3 sec. No peripheral edema. GI -Abdomen is soft non distended and without significant tenderness to palpation. + BS. No masses or guarding.  -No CVA/ flank tenderness. Walsh catheter is not present. LYMPH-No palpable cervical lymphadenopathy and no hepatosplenomegaly. No petechiae or ecchymoses. MS -No gross joint deformities. Decreased sensation to bilateral toes. Bilateral lower extremity pulses intact. SKIN -1+ pitting edema bilateral lower extremities. Open ulcer on left great toe without signs of purulent drainage. No obvious erythema surrounding ulcer. Chronic drying of the skin on the left leg. No significant calf tenderness on palpation. Faint erythema noticed superior to the ankle extending up left calf, ending before knee. Zelpha Braver, alert, oriented x  person, place, time, situation. Cranial nerves appear grossly intact, normal speech, no lateralizing weakness. PSYC- Appropriate affect. Past Medical History:   PMHx   Past Medical History:   Diagnosis Date    Hypercholesterolemia 2/18/2019    Hypertension     Type 2 diabetes, controlled, with neuropathy ([address] 6/6/2018     PSHX:  has no past surgical history on file. Allergies:    Allergies Not on file   Housing Stability:     Unable to Pay for Housing in the Last Year: Not on file    Number of Places Lived in the Last Year: Not on file    Unstable Housing in the Last Year: Not on file     Medications:   Medications:    busPIRone  15 mg Oral Daily    sodium chloride flush  10 mL IntraVENous 2 times per day    nicotine  1 patch TransDERmal Daily    thiamine  100 mg Oral Daily    multivitamin  1 tablet Oral Daily    cefepime  2,000 mg IntraVENous Q12H    vancomycin  1,500 mg IntraVENous Q12H    enoxaparin  30 mg SubCUTAneous BID    insulin lispro  0-6 Units SubCUTAneous TID WC    insulin lispro  0-3 Units SubCUTAneous Nightly      Infusions:    sodium chloride 25 mL/hr at 04/14/22 0024    dextrose       PRN Meds: sodium chloride flush, 10 mL, PRN  sodium chloride, , PRN  polyethylene glycol, 17 g, Daily PRN  nicotine polacrilex, 2 mg, Q1H PRN  acetaminophen, 650 mg, Q6H PRN   Or  acetaminophen, 650 mg, Q6H PRN  LORazepam, 1 mg, Q1H PRN   Or  LORazepam, 1 mg, Q1H PRN   Or  LORazepam, 2 mg, Q1H PRN   Or  LORazepam, 2 mg, Q1H PRN   Or  LORazepam, 3 mg, Q1H PRN   Or  LORazepam, 3 mg, Q1H PRN   Or  LORazepam, 4 mg, Q1H PRN   Or  LORazepam, 4 mg, Q1H PRN  traZODone, 100 mg, Nightly PRN  HYDROcodone-acetaminophen, 1 tablet, Q4H PRN  glucose, 15 g, PRN  glucagon (rDNA), 1 mg, PRN  dextrose, 100 mL/hr, PRN  dextrose bolus (hypoglycemia), 125 mL, PRN   Or  dextrose bolus (hypoglycemia), 250 mL, PRN      Labs    XR FOOT LEFT (MIN 3 VIEWS)    Result Date: 4/13/2022  EXAMINATION: THREE XRAY VIEWS OF THE LEFT FOOT 4/13/2022 6:38 am COMPARISON: 02/08/2022 HISTORY: ORDERING SYSTEM PROVIDED HISTORY: infection, pain/swelling, DM ulcer on great toe TECHNOLOGIST PROVIDED HISTORY: Reason for exam:->infection, pain/swelling, DM ulcer on great toe Reason for Exam: leg pain, leg swelling, foot pain Additional signs and symptoms: leg pain, leg swelling, foot pain FINDINGS: No fracture, dislocation or joint abnormality identified. Bone density is normal.  There is moderate soft tissue swelling at the great toe and marked soft tissue swelling over the dorsum of foot. No osseous abnormality identified. Soft tissue swelling over the dorsum of the foot and at the great toe compatible with the provided history. US EXTREMITY LEFT NON VASC LIMITED    Result Date: 4/13/2022  EXAMINATION: DUPLEX VENOUS ULTRASOUND OF THE LEFT LOWER EXTREMITY 4/13/2022 7:00 am TECHNIQUE: Duplex ultrasound using B-mode/gray scaled imaging and Doppler spectral analysis and color flow was obtained of the deep venous structures of the left extremity. COMPARISON: None. HISTORY: ORDERING SYSTEM PROVIDED HISTORY: pain/swelling, eval for DVT TECHNOLOGIST PROVIDED HISTORY: Reason for exam:->pain/swelling, eval for DVT FINDINGS: The visualized veins of the left lower extremity are patent and free of echogenic thrombus. The veins demonstrate good compressibility with normal color flow study and spectral analysis. Incidental note is made of a complex mass posteromedially, measuring approximately 7.6 x 4.6 x 6.4 cm. No evidence of DVT in the left lower extremity. Indeterminate, large complex partially cystic mass involving the posteromedial aspect of the knee. MRI correlation is recommended. VL DUP LOWER EXTREMITY VENOUS LEFT    Result Date: 4/13/2022  EXAMINATION: DUPLEX VENOUS ULTRASOUND OF THE LEFT LOWER EXTREMITY 4/13/2022 7:00 am TECHNIQUE: Duplex ultrasound using B-mode/gray scaled imaging and Doppler spectral analysis and color flow was obtained of the deep venous structures of the left extremity. COMPARISON: None. HISTORY: ORDERING SYSTEM PROVIDED HISTORY: pain/swelling, eval for DVT TECHNOLOGIST PROVIDED HISTORY: Reason for exam:->pain/swelling, eval for DVT FINDINGS: The visualized veins of the left lower extremity are patent and free of echogenic thrombus.  The veins demonstrate good compressibility with normal color flow study and spectral analysis. Incidental note is made of a complex mass posteromedially, measuring approximately 7.6 x 4.6 x 6.4 cm. No evidence of DVT in the left lower extremity. Indeterminate, large complex partially cystic mass involving the posteromedial aspect of the knee. MRI correlation is recommended. CBC:   Recent Labs     04/13/22  0615 04/14/22  1012   WBC 9.1 11.3*   HGB 9.9* 9.8*    151     BMP:    Recent Labs     04/13/22 0615      K 3.8   CL 97*   CO2 25   BUN 5*   CREATININE 0.6*   GLUCOSE 162*     Hepatic:   Recent Labs     04/13/22 0615   AST 86*   ALT 36   BILITOT 2.9*   ALKPHOS 160*     Lipids:   Lab Results   Component Value Date    CHOL 106 02/07/2022    HDL 30 02/07/2022    TRIG 91 02/07/2022     Hemoglobin A1C:   Lab Results   Component Value Date    LABA1C 5.1 04/13/2022     TSH:   Lab Results   Component Value Date    TSH 2.05 01/27/2020     Troponin:   Lab Results   Component Value Date    TROPONINT 0.014 07/31/2021     Lactic Acid: No results for input(s): LACTA in the last 72 hours. BNP: No results for input(s): PROBNP in the last 72 hours.   UA:  Lab Results   Component Value Date    NITRU POSITIVE 08/03/2021    COLORU TREVOR 08/03/2021    WBCUA <1 08/03/2021    RBCUA 1 08/03/2021    MUCUS RARE 09/16/2014    TRICHOMONAS NONE SEEN 08/03/2021    BACTERIA NEGATIVE 08/03/2021    CLARITYU CLEAR 08/03/2021    SPECGRAV 1.015 08/03/2021    LEUKOCYTESUR NEGATIVE 08/03/2021    UROBILINOGEN 2.0 08/03/2021    BILIRUBINUR SMALL 08/03/2021    BLOODU SMALL 08/03/2021    KETUA NEGATIVE 08/03/2021     Urine Cultures: No results found for: Ana Duncannon  Blood Cultures: No results found for: BC  No results found for: BLOODCULT2  Organism: No results found for: ORG  Imaging/Diagnostics Last 24 Hours   XR FOOT LEFT (MIN 3 VIEWS)    Result Date: 4/13/2022  EXAMINATION: THREE XRAY VIEWS OF THE LEFT FOOT 4/13/2022 6:38 am COMPARISON: 02/08/2022 HISTORY: ORDERING SYSTEM PROVIDED HISTORY: infection, pain/swelling, DM ulcer on great toe TECHNOLOGIST PROVIDED HISTORY: Reason for exam:->infection, pain/swelling, DM ulcer on great toe Reason for Exam: leg pain, leg swelling, foot pain Additional signs and symptoms: leg pain, leg swelling, foot pain FINDINGS: No fracture, dislocation or joint abnormality identified. Bone density is normal.  There is moderate soft tissue swelling at the great toe and marked soft tissue swelling over the dorsum of foot. No osseous abnormality identified. Soft tissue swelling over the dorsum of the foot and at the great toe compatible with the provided history. US EXTREMITY LEFT NON VASC LIMITED    Result Date: 4/13/2022  EXAMINATION: DUPLEX VENOUS ULTRASOUND OF THE LEFT LOWER EXTREMITY 4/13/2022 7:00 am TECHNIQUE: Duplex ultrasound using B-mode/gray scaled imaging and Doppler spectral analysis and color flow was obtained of the deep venous structures of the left extremity. COMPARISON: None. HISTORY: ORDERING SYSTEM PROVIDED HISTORY: pain/swelling, eval for DVT TECHNOLOGIST PROVIDED HISTORY: Reason for exam:->pain/swelling, eval for DVT FINDINGS: The visualized veins of the left lower extremity are patent and free of echogenic thrombus. The veins demonstrate good compressibility with normal color flow study and spectral analysis. Incidental note is made of a complex mass posteromedially, measuring approximately 7.6 x 4.6 x 6.4 cm. No evidence of DVT in the left lower extremity. Indeterminate, large complex partially cystic mass involving the posteromedial aspect of the knee. MRI correlation is recommended. VL DUP LOWER EXTREMITY VENOUS LEFT    Result Date: 4/13/2022  EXAMINATION: DUPLEX VENOUS ULTRASOUND OF THE LEFT LOWER EXTREMITY 4/13/2022 7:00 am TECHNIQUE: Duplex ultrasound using B-mode/gray scaled imaging and Doppler spectral analysis and color flow was obtained of the deep venous structures of the left extremity. COMPARISON: None.  HISTORY: ORDERING SYSTEM PROVIDED HISTORY: pain/swelling, eval for DVT TECHNOLOGIST PROVIDED HISTORY: Reason for exam:->pain/swelling, eval for DVT FINDINGS: The visualized veins of the left lower extremity are patent and free of echogenic thrombus. The veins demonstrate good compressibility with normal color flow study and spectral analysis. Incidental note is made of a complex mass posteromedially, measuring approximately 7.6 x 4.6 x 6.4 cm. No evidence of DVT in the left lower extremity. Indeterminate, large complex partially cystic mass involving the posteromedial aspect of the knee. MRI correlation is recommended.        Personally reviewed Lab Studies, Imaging, and discussed case with Dr. Manolo Doe PA-C  Hospitalist  4/14/2022, 11:08 AM

## 2022-04-14 NOTE — PROGRESS NOTES
Comprehensive Nutrition Assessment    Type and Reason for Visit:  Initial,Wound,Positive Nutrition Screen (Non-healing wound)    Nutrition Recommendations/Plan:   · Start Wound healing oral nutrition supplement BID   · Will send Carbohydrates in Alcohol handout from [facility]   · Document Po intakes and encourage adequate vitamin C and high protein with hydration  · May consider carb controlled if uncontrolled glucose levels dos, will monitor closely   · Please obtain measured weight     Nutrition Assessment:  Admitted with cellulitis leg pain and swelling. Pt on regular diet, no po intake data yet. Pt was busy with other provider at visit this AM. Significant wt loss x 2 months, unsure if unintentional. Stable hemoglobin A1c less than 6% x 3 months. Will start wound healing supplements, and monitor as moderate nutrition risk. Malnutrition Assessment:  Malnutrition Status: At risk for malnutrition (Comment)    Context:  Acute Illness     Findings of the 6 clinical characteristics of malnutrition:  Energy Intake:  Unable to assess  Weight Loss:  7 - Greater than 7.5% over 3 months (11% x 2 months)     Body Fat Loss:  Unable to assess     Muscle Mass Loss:  Unable to assess    Fluid Accumulation:  1 - Mild Extremities,Generalized   Strength:  Not Performed    Estimated Daily Nutrient Needs:  Energy (kcal):  0309-4672 (1.1-1.3 Stress factor); Weight Used for Energy Requirements:  Current     Protein (g):   (1.2-1.5 g/kg); Weight Used for Protein Requirements:  Ideal        Fluid (ml/day):  2200; Method Used for Fluid Requirements:  1 ml/kcal      Nutrition Related Findings:  BLE +1 edema, A1c 5.1, Per chart, pt drinks 6-8 beers 3x weekly but hx of drinking 10 beers daily with 2 half gallons of jagermeister on weekends. Meds: MVI, thiamin      Wounds:  Diabetic Ulcer (Toe)       Current Nutrition Therapies:    ADULT DIET; Regular  ADULT ORAL NUTRITION SUPPLEMENT; Lunch, Dinner;  Wound Healing Oral Supplement    Anthropometric Measures:  · Height: 6' (182.9 cm)  · Current Body Weight: 229 lb 15 oz (104.3 kg)   · Admission Body Weight:  (stated)    · Usual Body Weight: 257 lb 12.8 oz (116.9 kg) (February 2022)     · Ideal Body Weight: 178 lbs; % Ideal Body Weight 129.2 %   · BMI: 31.2  · BMI Categories: Obese Class 1 (BMI 30.0-34. 9)       Nutrition Diagnosis:   · Increased nutrient needs related to inadequate parenteral nutrition infusion (healing) as evidenced by wounds,weight loss    Nutrition Interventions:   Food and/or Nutrient Delivery:  Continue Current Diet  Nutrition Education/Counseling:  Education needed   Coordination of Nutrition Care:  Continue to monitor while inpatient,Coordination of Community Care    Goals: To consume at least 50% of meals and supplements       Nutrition Monitoring and Evaluation:   Behavioral-Environmental Outcomes:  None Identified   Food/Nutrient Intake Outcomes:  Food and Nutrient Intake,Supplement Intake  Physical Signs/Symptoms Outcomes:  Biochemical Data,Weight,Meal Time Behavior,Skin     Discharge Planning:     Too soon to determine     Electronically signed by Jess Rhodes RD, LD on 4/14/22 at 11:38 AM EDT    Contact: [phone]

## 2022-04-14 NOTE — PROGRESS NOTES
Post op shoe in patient's size unavailable on unit and other units; central supply contact and will bring to unit if available.

## 2022-04-14 NOTE — PROGRESS NOTES
Patient up to shower with only set up support from staff. Will continue to assess and monitor. Dressing changed per wound care orders after shower.      Post op shoe arrived from central supply; applied shoe to patient's affected limb, patient tolerated well and reports he has used a post op shoe in the past.

## 2022-04-14 NOTE — PROGRESS NOTES
[id number]  consulted by Dr. Thuan Reyez MD &  Indira Skaggs PA-C,  for monitoring and adjustment. Indication for treatment: skin & soft tissue infection  Goal trough: [x] 10-15 mcg/mL or [] 15-20 mcg/ml  AUC/BOY: [] <500 or [x] 400-600    Pertinent Laboratory Values:   Temp Readings from Last 3 Encounters:   04/14/22 98.5 °F (36.9 °C) (Oral)   03/30/22 97.7 °F (36.5 °C) (Oral)   02/21/22 98 °F (36.7 °C) (Oral)     Recent Labs     04/13/22  0615 04/14/22  1012   WBC 9.1 11.3*   LACTATE 1.7  --      Recent Labs     04/13/22  0615 04/14/22  1012   BUN 5* 7   CREATININE 0.6* 0.6*     Estimated Creatinine Clearance: 206 mL/min (A) (based on SCr of 0.6 mg/dL (L)). No intake or output data in the 24 hours ending 04/14/22 1343    Pertinent Cultures:  Date    Source    Results  4/13   Toe, left, aneorobic              pending  4/13                            blood x 2                                  pending    Vancomycin level:   TROUGH:  No results for input(s): VANCOTROUGH in the last 72 hours. RANDOM:    Recent Labs     04/14/22  1012   VANCORANDOM 10.0       Assessment:  · SCr, BUN, and urine output: SCR remains at baseline, no UOP data  · Day(s) of therapy: 2  · Vancomycin concentration:  · 4/14 - 10, 10 hour level on 1500mg q12h, predicted trough 14.2 at steady state. Plan:  · Renal trends remain at baseline. · Continue vancomycin 1500mg ivpb q12h with a predicted therapeutic trough at steady state. · Recheck the vanco level in 4 days, unless renal status changes  · Pharmacy will continue to monitor patient and adjust therapy as indicated    Chloe 3 4/18 @06:00    Thank you for the consult. Ha Disla, [facility]  4/14/2022 1:43 PM

## 2022-04-14 NOTE — CARE COORDINATION
Reviewed chart and Discussed in IDR, also with PA. PA states pt is an occasional drinker no need to discuss ETOH rehab. I  spoke with pt about discharge needs/plans. Pt lives with his wife, works FT and is totally independent. Wife able to assist him as needed. He has a PCP and insurance that covers medications. We discussed HC and he does not feel needed at this time. Cm will continue to follow for needs.

## 2022-04-14 NOTE — CONSULTS
[facility] Continence Nurse  Consult Note       Irvin Agarwal  AGE: 46 y.o. GENDER: male  : 1980  TODAY'S DATE:  2022    Subjective:     Reason for CWOCN Evaluation and Assessment: wound assessment      Irvin Agarwal is a 46 y.o. male referred by:   [x] Physician  [] Nursing  [] Other:     Wound Identification:  Wound Type: diabetic  Contributing Factors: diabetes and chronic pressure        PAST MEDICAL HISTORY        Diagnosis Date    Hypercholesterolemia 2019    Hypertension     Type 2 diabetes, controlled, with neuropathy ([facility] [address] 2018       PAST SURGICAL HISTORY    History reviewed. No pertinent surgical history. FAMILY HISTORY    Family History   Problem Relation Age of Onset    High Blood Pressure Sister        SOCIAL HISTORY    Social History     Tobacco Use    Smoking status: Never Smoker    Smokeless tobacco: Never Used   Substance Use Topics    Alcohol use: Yes     Alcohol/week: 12.0 standard drinks     Types: 12 Standard drinks or equivalent per week     Comment: occasional    Drug use: No       ALLERGIES    Allergies   Allergen Reactions    Paxil [Paroxetine Hcl]      Caused \"weird\" things to happen in his sleep       MEDICATIONS    No current facility-administered medications on file prior to encounter.      Current Outpatient Medications on File Prior to Encounter   Medication Sig Dispense Refill    sulfamethoxazole-trimethoprim (BACTRIM DS) 800-160 MG per tablet Take 1 tablet by mouth 2 times daily for 15 days (Patient not taking: Reported on 2022) 30 tablet 0    ibuprofen (ADVIL;MOTRIN) 600 MG tablet Take 1 tablet by mouth 3 times daily (with meals) 40 tablet 0    pregabalin (LYRICA) 100 MG capsule TAKE 1 CAPSULE BY MOUTH TWICE DAILY 180 capsule 1    busPIRone (BUSPAR) 15 MG tablet TAKE 1 TABLET BY MOUTH THREE TIMES DAILY 270 tablet 1    traZODone (DESYREL) 50 MG tablet Take 1-2 tablets by mouth nightly as needed for Sleep 60 tablet 11    glucose blood VI test strips (GLUCOSE METER TEST) strip 1 each by In Vitro route daily As needed.  100 each 3    glucose monitoring kit (FREESTYLE) monitoring kit 1 kit by Does not apply route daily as needed (If blood sugars running high or low) 1 kit 0    MICROLET LANCETS MISC 1 each by Does not apply route daily 100 each 3         Objective:      BP (!) 161/92   Pulse 110   Temp 98.5 °F (36.9 °C) (Oral)   Resp 20   Ht 6' (1.829 m)   Wt 230 lb (104.3 kg)   SpO2 98%   BMI 31.19 kg/m²   Bon Risk Score: Bon Scale Score: 21    LABS    CBC:   Lab Results   Component Value Date    WBC 9.1 04/13/2022    RBC 3.38 04/13/2022    HGB 9.9 04/13/2022    HCT 31.4 04/13/2022    MCV 92.9 04/13/2022    MCH 29.3 04/13/2022    MCHC 31.5 04/13/2022    RDW 17.4 04/13/2022     04/13/2022    MPV 9.4 04/13/2022     CMP:    Lab Results   Component Value Date     04/13/2022    K 3.8 04/13/2022    CL 97 04/13/2022    CO2 25 04/13/2022    BUN 5 04/13/2022    CREATININE 0.6 04/13/2022    GFRAA >60 04/13/2022    AGRATIO 0.4 02/15/2022    LABGLOM >60 04/13/2022    GLUCOSE 162 04/13/2022    PROT 10.1 04/13/2022    PROT 7.9 05/01/2011    LABALBU 3.8 04/13/2022    LABALBU 3.5 03/10/2022    CALCIUM 8.7 04/13/2022    BILITOT 2.9 04/13/2022    ALKPHOS 160 04/13/2022    AST 86 04/13/2022    ALT 36 04/13/2022     Albumin:    Lab Results   Component Value Date    LABALBU 3.8 04/13/2022    LABALBU 3.5 03/10/2022     PT/INR:    Lab Results   Component Value Date    PROTIME 16.1 03/10/2022    INR 1.4 03/10/2022     HgBA1c:    Lab Results   Component Value Date    LABA1C 5.1 04/13/2022         Assessment:     Patient Active Problem List   Diagnosis    HTN (hypertension)    Fatty liver    Obstructive sleep apnea    Type 2 diabetes, controlled, with neuropathy ([facility] [address]    Anxiety    Hypercholesterolemia    Alcohol withdrawal delirium ([facility] [address]    Closed displaced Maisonneuve's fracture of left leg    Cellulitis    Leukocytosis    Hyponatremia    Hypokalemia    Isolated proteinuria with minor glomerular abnormality    Cellulitis of right foot    Alcohol abuse    Hyperbilirubinemia       Measurements:  Wound 02/19/22 Toe (Comment  which one) Left great toe plantar  (Active)   Wound Image   04/14/22 0840   Wound Etiology Diabetic 04/14/22 0840   Dressing Status New dressing applied 04/14/22 0840   Wound Cleansed Cleansed with saline 04/14/22 0840   Dressing/Treatment Collagen;Silicone border 33/68/24 0840   Wound Length (cm) 0.7 cm 04/14/22 0840   Wound Width (cm) 0.8 cm 04/14/22 0840   Wound Depth (cm) 0.3 cm 04/14/22 0840   Wound Surface Area (cm^2) 0.56 cm^2 04/14/22 0840   Change in Wound Size % (l*w) 44 04/14/22 0840   Wound Volume (cm^3) 0.168 cm^3 04/14/22 0840   Wound Healing % -68 04/14/22 0840   Distance Tunneling (cm) 0 cm 04/14/22 0840   Tunneling Position ___ O'Clock 0 04/14/22 0840   Undermining Starts ___ O'Clock 12 04/14/22 0840   Undermining Ends___ O'Clock 12 04/14/22 0840   Undermining Maxium Distance (cm) . 3 04/14/22 0840   Wound Assessment Pink/red 04/14/22 0840   Drainage Amount Small 04/14/22 0840   Drainage Description Serosanguinous 04/14/22 0840   Odor None 04/14/22 0840   Saira-wound Assessment Hyperkeratosis (callous) 04/14/22 0840   Margins Defined edges 04/14/22 0840   Wound Thickness Description not for Pressure Injury Full thickness 04/14/22 0840   Number of days: 53       Response to treatment:  Well tolerated by patient. Pain Assessment:  Severity:  none  Quality of pain: na  Wound Pain Timing/Severity: na  Premedicated: no    Plan:     Plan of Care: Wound 02/19/22 Toe (Comment  which one) Left great toe plantar -Dressing/Treatment: Collagen,Silicone border     Pt in bed. Agreeable to wound assessment. Dr. Bal Dense to evaluate as well. Diabetic wound noted to left great toe. Surrounding callous noted. Appears clean. Picture and measurement taken. Treatment applied as above.  Dr. Mally Damico placed orders for alginate and bandaid dressing change daily. Plan to follow up at outpatient wound clinic. Referral sent. Pt is generally not at risk for skin breakdown AEB lissette. Specialty Bed Required : no  [] Low Air Loss   [] Pressure Redistribution  [] Fluid Immersion  [] Bariatric  [] Total Pressure Relief  [] Other:     Discharge Plan:  Placement for patient upon discharge: tbd  Hospice Care: no  Patient appropriate for Outpatient [address] [place] [address] Street: yes-referral sent    Patient/Caregiver Teaching:  Level of patient/caregiver understanding able to:   Voiced understanding.         Electronically signed by Katherine Hanson RN, Elmer Monaco on 4/14/2022 at 10:51 AM

## 2022-04-14 NOTE — CONSULTS
Podiatry Consult Note      CHIEF COMPLAINT:    Chief Complaint   Patient presents with    Leg Pain     recently treated for cellulitis    Leg Swelling       HISTORY OF PRESENT ILLNESS:      The patient is a 46 y.o. male who presents with 2 diabetes mellitus with peripheral neuropathy, hypertension, hypercholesterolemia. Patient has a chronic wound to the bottom of the left big toe for which she has been following up with his podiatrist for for the past few months. Says that he noticed increasing redness to the left great toe over the past few days. Denies any pain to the area. Some bloody drainage was noted. He was recently put on oral antibiotics by his podiatrist that he saw few months ago. Says that he was supposed to follow-up with podiatrist in the wound care center but he never did. He does get neuropathy from diabetes but says that his last hemoglobin A1c was 5%. He denies any constitutional symptoms today    Past Medical History:    Past Medical History:   Diagnosis Date    Hypercholesterolemia 2/18/2019    Hypertension     Type 2 diabetes, controlled, with neuropathy ([facility] [address] 6/6/2018      Past Surgical History:    History reviewed. No pertinent surgical history.   Current Medications:   Current Facility-Administered Medications   Medication Dose Route Frequency Provider Last Rate Last Admin    busPIRone (BUSPAR) tablet 15 mg  15 mg Oral Daily Lupe Bentley PA-C        sodium chloride flush 0.9 % injection 10 mL  10 mL IntraVENous 2 times per day Lupe Bentley PA-C   10 mL at 04/13/22 1952    sodium chloride flush 0.9 % injection 10 mL  10 mL IntraVENous PRN Bridget Moreno PA-C        0.9 % sodium chloride infusion   IntraVENous PRN Bridget PARIS Moreno 25 mL/hr at 04/14/22 0024 New Bag at 04/14/22 0024    polyethylene glycol (GLYCOLAX) packet 17 g  17 g Oral Daily PRN Bridgetbrooke Moreno PA-C        nicotine (NICODERM CQ) 21 MG/24HR 1 patch  1 patch TransDERmal Daily Bridget Moreno PA-JESSICA        nicotine polacrilex (COMMIT) lozenge 2 mg  2 mg Oral Q1H PRN Lupe Bentley PA-C        acetaminophen (TYLENOL) tablet 650 mg  650 mg Oral Q6H PRN Lupe Bentley PA-C        Or    acetaminophen (TYLENOL) suppository 650 mg  650 mg Rectal Q6H PRN Lupe Bentley PA-C        thiamine tablet 100 mg  100 mg Oral Daily Lupe Bentley PA-C   100 mg at 04/13/22 1734    therapeutic multivitamin-minerals 1 tablet  1 tablet Oral Daily Michaelle Lechuga PA-C   1 tablet at 04/13/22 1734    LORazepam (ATIVAN) tablet 1 mg  1 mg Oral Q1H PRN Michaelle Lechuga PA-C   1 mg at 04/14/22 0447    Or    LORazepam (ATIVAN) injection 1 mg  1 mg IntraVENous Q1H PRN Lupe Bentley PA-C        Or    LORazepam (ATIVAN) tablet 2 mg  2 mg Oral Q1H PRN Lupe Bentley PA-C        Or    LORazepam (ATIVAN) injection 2 mg  2 mg IntraVENous Q1H PRN Lupe Bentley PA-C        Or    LORazepam (ATIVAN) tablet 3 mg  3 mg Oral Q1H PRN Lupe Bentley PA-C        Or    LORazepam (ATIVAN) injection 3 mg  3 mg IntraVENous Q1H PRN Lupe Bentley PA-C        Or    LORazepam (ATIVAN) tablet 4 mg  4 mg Oral Q1H PRN Lupe Bentley PA-C        Or    LORazepam (ATIVAN) injection 4 mg  4 mg IntraVENous Q1H PRN Lupe Bentley PA-C        traZODone (DESYREL) tablet 100 mg  100 mg Oral Nightly PRN Michaelle Lechuga PA-C   100 mg at 04/13/22 2041    cefepime (MAXIPIME) 2000 mg IVPB minibag  2,000 mg IntraVENous Q12H Lupe Bentley PA-C   Stopped at 04/14/22 0024    vancomycin (VANCOCIN) 1,500 mg in dextrose 5 % 500 mL IVPB  1,500 mg IntraVENous Q12H Angle Crum MD   Stopped at 04/14/22 0305    enoxaparin (LOVENOX) injection 30 mg  30 mg SubCUTAneous BID Lupe Bentley PA-C        HYDROcodone-acetaminophen (NORCO) 5-325 MG per tablet 1 tablet  1 tablet Oral Q4H PRN Angle Crum MD   1 tablet at 04/14/22 0456    glucose (GLUTOSE) 40 % oral gel 15 g  15 g Oral PRN Kobe Hadley Mendez MD        glucagon (rDNA) injection 1 mg  1 mg IntraMUSCular PRN Kobe Em Lazo MD        dextrose 5 % solution  100 mL/hr IntraVENous PRN Kobe Lazo MD        insulin lispro (HUMALOG) injection vial 0-6 Units  0-6 Units SubCUTAneous TID WC Kobe Lazo MD        insulin lispro (HUMALOG) injection vial 0-3 Units  0-3 Units SubCUTAneous Nightly Kobe Lazo MD        dextrose bolus (hypoglycemia) 10% 125 mL  125 mL IntraVENous PRN Attila PELAEZ MD        Or    dextrose bolus (hypoglycemia) 10% 250 mL  250 mL IntraVENous PRN Attila PELAEZ MD          Allergies: Paxil [paroxetine hcl]    Social History:   Social History     Socioeconomic History    Marital status:      Spouse name: Not on file    Number of children: Not on file    Years of education: Not on file    Highest education level: Not on file   Occupational History    Not on file   Tobacco Use    Smoking status: Never Smoker    Smokeless tobacco: Never Used   Substance and Sexual Activity    Alcohol use: Yes     Alcohol/week: 12.0 standard drinks     Types: 12 Standard drinks or equivalent per week     Comment: occasional    Drug use: No    Sexual activity: Yes     Partners: Male   Other Topics Concern    Not on file   Social History Narrative    [id number] -         Diet:Unrestricted    Exercise:work    Seat belts:Sometimes     Social Determinants of Health     Financial Resource Strain:     Difficulty of Paying Living Expenses: Not on file   Food Insecurity:     Worried About Running Out of Food in the Last Year: Not on file    Desmond of Food in the Last Year: Not on file   Transportation Needs:     Lack of Transportation (Medical): Not on file    Lack of Transportation (Non-Medical):  Not on file   Physical Activity:     Days of Exercise per Week: Not on file    Minutes of Exercise per Session: Not on file   Stress:     Feeling of Stress : Not on file   Social Connections:     Frequency of Communication with Friends and Family: Not on file    Frequency of Social Gatherings with Friends and Family: Not on file    Attends Shinto Services: Not on file    Active Member of Clubs or Organizations: Not on file    Attends Club or Organization Meetings: Not on file    Marital Status: Not on file   Intimate Partner Violence:     Fear of Current or Ex-Partner: Not on file    Emotionally Abused: Not on file    Physically Abused: Not on file    Sexually Abused: Not on file   Housing Stability:     Unable to Pay for Housing in the Last Year: Not on file    Number of [place] in the Last Year: Not on file    Unstable Housing in the Last Year: Not on file     Family History:   Family History   Problem Relation Age of Onset    High Blood Pressure Sister         REVIEW OF SYSTEMS:    Cardiac: Denies chest pain, palpitations, or irregular heart beat  Pulmonary: Denies cough, wheezing, or sputum production  Constitutional: Denies fever, chills, or diarrhea      PHYSICAL EXAM:    BP (!) 164/93   Pulse 103   Temp 98.3 °F (36.8 °C) (Oral)   Resp 16   Ht 6' (1.829 m)   Wt 230 lb (104.3 kg)   SpO2 96%   BMI 31.19 kg/m²      Vascular: Pedal pulses are palpable. Slight increase in temperature to the left hallux. Capillary refill time is brisk to pedal digits. Skin temperature warm to warm proximal tibial tuberosity to distal digits  Neurologic: Gross and epicritic sensation are diminished  Dermatologic: Full-thickness ulceration plantar medial aspect of the left hallux measuring 1 cm x 0.8 cm x 0.2 cm. Granular fibrotic base. No crepitus purulence or drainage. Musculoskeletal: No pain on palpation to affected area of the left foot    Imaging:  Left foot x-ray negative for any osseous abnormality.   Soft tissue swelling over the dorsum of the foot at the great toe compatible with the patient's history      Assessment:   -Nonpressure ulceration chronic in nature down to subcutaneous tissue left foot  -Cellulitis left foot  -Type 2 diabetes mellitus with peripheral neuropathy  -Pain left foot      Plan:    -Patient was seen, evaluated, and treated in the emergency room today  -Conservative treatment options discussed in detail with patient  -Chronic wound left hallux.     -Vitals and labs stable upon admission to the hospital  -X-ray negative for any acute osseous findings  -Order for MRI of the left foot placed to assess for underlying bone infection  -Local wound care instructions ordered  -Continue IV antibiotics per primary care team  -Further recommendations pending MRI findings  -Please contact any questions    Elva Jaramillo DPM    [facility] [address] [place] [address] and Ankle Surgery        Electronically signed by Elva Jaramillo DPM on 4/14/2022 at 9:19 AM

## 2022-04-14 NOTE — PROGRESS NOTES
Podiatry Progress Note      HISTORY OF PRESENT ILLNESS:      The patient is a 46 y.o. male who presents with 2 diabetes mellitus with peripheral neuropathy, hypertension, hypercholesterolemia. No worsening pain to the wound in the left foot today. Still denies any constitutional symptoms. Objective:  Neurovascular status unchanged from previous assessment  Neurologic: Gross and epicritic sensation are diminished  Dermatologic: Full-thickness ulceration plantar medial aspect of the left hallux measuring 1 cm x 0.8 cm x 0.2 cm. Granular fibrotic base. No crepitus purulence or drainage. Musculoskeletal: No pain on palpation to affected area of the left foot    Imaging:  Left foot MRI negative for any underlying osteomyelitis      Assessment:   -Nonpressure ulceration chronic in nature down to subcutaneous tissue left foot  -Cellulitis left foot  -Type 2 diabetes mellitus with peripheral neuropathy  -Pain left foot      Plan:    -Patient was seen, evaluated, and treated in the emergency room today  -Conservative treatment options discussed in detail with patient  -Chronic wound left hallux. -MRI negative for any acute osteomyelitis wound appears to be improving clinically today.  -No surgical intervention from podiatry standpoint  -Continue local wound care.   Orders placed  -Patient to follow-up with me in the wound care center upon discharge from the hospital next week on Tuesday.  -Discharge information placed in the patient's chart  -Recommend patient discharged on short course of oral antibiotics for any residual soft tissue infection coverage upon discharge from the hospital  -Please contact any questions      Basim Hickman DPM    [facility] [address] [place] [address] and Ankle Surgery        Electronically signed by Basim Hickman DPM on 4/14/2022 at 9:23 AM

## 2022-04-14 NOTE — PROGRESS NOTES
Message left for Dr. Mally Damico at hospitalist's request to see if he planned on coming to see patient today.

## 2022-04-15 VITALS
SYSTOLIC BLOOD PRESSURE: 138 MMHG | TEMPERATURE: 98.1 F | RESPIRATION RATE: 20 BRPM | BODY MASS INDEX: 31.15 KG/M2 | HEIGHT: 72 IN | DIASTOLIC BLOOD PRESSURE: 100 MMHG | OXYGEN SATURATION: 97 % | HEART RATE: 115 BPM | WEIGHT: 230 LBS

## 2022-04-15 LAB
ALBUMIN SERPL-MCNC: 3.3 GM/DL (ref 3.4–5)
ALP BLD-CCNC: 135 IU/L (ref 40–128)
ALT SERPL-CCNC: 26 U/L (ref 10–40)
ANION GAP SERPL CALCULATED.3IONS-SCNC: 13 MMOL/L (ref 4–16)
AST SERPL-CCNC: 58 IU/L (ref 15–37)
BASOPHILS ABSOLUTE: 0.2 K/CU MM
BASOPHILS RELATIVE PERCENT: 1.9 % (ref 0–1)
BILIRUB SERPL-MCNC: 3.1 MG/DL (ref 0–1)
BUN BLDV-MCNC: 11 MG/DL (ref 6–23)
CALCIUM SERPL-MCNC: 8.9 MG/DL (ref 8.3–10.6)
CHLORIDE BLD-SCNC: 99 MMOL/L (ref 99–110)
CO2: 22 MMOL/L (ref 21–32)
CREAT SERPL-MCNC: 0.5 MG/DL (ref 0.9–1.3)
DIFFERENTIAL TYPE: ABNORMAL
EOSINOPHILS ABSOLUTE: 0.7 K/CU MM
EOSINOPHILS RELATIVE PERCENT: 6.6 % (ref 0–3)
GFR AFRICAN AMERICAN: >60 ML/MIN/1.73M2
GFR NON-AFRICAN AMERICAN: >60 ML/MIN/1.73M2
GLUCOSE BLD-MCNC: 115 MG/DL (ref 70–99)
GLUCOSE BLD-MCNC: 124 MG/DL (ref 70–99)
GLUCOSE BLD-MCNC: 166 MG/DL (ref 70–99)
HCT VFR BLD CALC: 27.7 % (ref 42–52)
HEMOGLOBIN: 9 GM/DL (ref 13.5–18)
IMMATURE NEUTROPHIL %: 0.5 % (ref 0–0.43)
INR BLD: 1.39 INDEX
LYMPHOCYTES ABSOLUTE: 2.1 K/CU MM
LYMPHOCYTES RELATIVE PERCENT: 20.7 % (ref 24–44)
MCH RBC QN AUTO: 29.6 PG (ref 27–31)
MCHC RBC AUTO-ENTMCNC: 32.5 % (ref 32–36)
MCV RBC AUTO: 91.1 FL (ref 78–100)
MONOCYTES ABSOLUTE: 1 K/CU MM
MONOCYTES RELATIVE PERCENT: 9.5 % (ref 0–4)
NUCLEATED RBC %: 0 %
PDW BLD-RTO: 17.5 % (ref 11.7–14.9)
PLATELET # BLD: 131 K/CU MM (ref 140–440)
PMV BLD AUTO: 9.4 FL (ref 7.5–11.1)
POTASSIUM SERPL-SCNC: 3.6 MMOL/L (ref 3.5–5.1)
PROTHROMBIN TIME: 18 SECONDS (ref 11.7–14.5)
RBC # BLD: 3.04 M/CU MM (ref 4.6–6.2)
SEGMENTED NEUTROPHILS ABSOLUTE COUNT: 6.2 K/CU MM
SEGMENTED NEUTROPHILS RELATIVE PERCENT: 60.8 % (ref 36–66)
SODIUM BLD-SCNC: 134 MMOL/L (ref 135–145)
TOTAL IMMATURE NEUTOROPHIL: 0.05 K/CU MM
TOTAL NUCLEATED RBC: 0 K/CU MM
TOTAL PROTEIN: 8.6 GM/DL (ref 6.4–8.2)
WBC # BLD: 10.2 K/CU MM (ref 4–10.5)

## 2022-04-15 PROCEDURE — 85025 COMPLETE CBC W/AUTO DIFF WBC: CPT

## 2022-04-15 PROCEDURE — 36415 COLL VENOUS BLD VENIPUNCTURE: CPT

## 2022-04-15 PROCEDURE — 96366 THER/PROPH/DIAG IV INF ADDON: CPT

## 2022-04-15 PROCEDURE — 2580000003 HC RX 258: Performed by: INTERNAL MEDICINE

## 2022-04-15 PROCEDURE — G0378 HOSPITAL OBSERVATION PER HR: HCPCS

## 2022-04-15 PROCEDURE — 6360000002 HC RX W HCPCS: Performed by: PHYSICIAN ASSISTANT

## 2022-04-15 PROCEDURE — 85610 PROTHROMBIN TIME: CPT

## 2022-04-15 PROCEDURE — 94761 N-INVAS EAR/PLS OXIMETRY MLT: CPT

## 2022-04-15 PROCEDURE — 6360000002 HC RX W HCPCS: Performed by: INTERNAL MEDICINE

## 2022-04-15 PROCEDURE — 6370000000 HC RX 637 (ALT 250 FOR IP): Performed by: INTERNAL MEDICINE

## 2022-04-15 PROCEDURE — 2580000003 HC RX 258: Performed by: PHYSICIAN ASSISTANT

## 2022-04-15 PROCEDURE — 82962 GLUCOSE BLOOD TEST: CPT

## 2022-04-15 PROCEDURE — 6370000000 HC RX 637 (ALT 250 FOR IP): Performed by: HOSPITALIST

## 2022-04-15 PROCEDURE — 6370000000 HC RX 637 (ALT 250 FOR IP): Performed by: PHYSICIAN ASSISTANT

## 2022-04-15 PROCEDURE — 80053 COMPREHEN METABOLIC PANEL: CPT

## 2022-04-15 RX ORDER — PREGABALIN 100 MG/1
100 CAPSULE ORAL 2 TIMES DAILY
Status: DISCONTINUED | OUTPATIENT
Start: 2022-04-15 | End: 2022-04-15 | Stop reason: HOSPADM

## 2022-04-15 RX ORDER — DOXYCYCLINE HYCLATE 100 MG
100 TABLET ORAL 2 TIMES DAILY
Qty: 10 TABLET | Refills: 0 | Status: SHIPPED | OUTPATIENT
Start: 2022-04-15 | End: 2022-04-20

## 2022-04-15 RX ADMIN — Medication 100 MG: at 10:03

## 2022-04-15 RX ADMIN — MULTIPLE VITAMINS W/ MINERALS TAB 1 TABLET: TAB at 10:00

## 2022-04-15 RX ADMIN — BUSPIRONE HYDROCHLORIDE 15 MG: 7.5 TABLET ORAL at 10:00

## 2022-04-15 RX ADMIN — PREGABALIN 100 MG: 100 CAPSULE ORAL at 12:01

## 2022-04-15 RX ADMIN — HYDROCODONE BITARTRATE AND ACETAMINOPHEN 1 TABLET: 5; 325 TABLET ORAL at 10:18

## 2022-04-15 RX ADMIN — SODIUM CHLORIDE: 9 INJECTION, SOLUTION INTRAVENOUS at 10:06

## 2022-04-15 RX ADMIN — SODIUM CHLORIDE, PRESERVATIVE FREE 10 ML: 5 INJECTION INTRAVENOUS at 10:00

## 2022-04-15 RX ADMIN — INSULIN LISPRO 1 UNITS: 100 INJECTION, SOLUTION INTRAVENOUS; SUBCUTANEOUS at 13:26

## 2022-04-15 RX ADMIN — CEFEPIME HYDROCHLORIDE 2000 MG: 2 INJECTION, POWDER, FOR SOLUTION INTRAVENOUS at 10:08

## 2022-04-15 RX ADMIN — VANCOMYCIN HYDROCHLORIDE 1500 MG: 5 INJECTION, POWDER, LYOPHILIZED, FOR SOLUTION INTRAVENOUS at 12:01

## 2022-04-15 RX ADMIN — LORAZEPAM 1 MG: 1 TABLET ORAL at 12:13

## 2022-04-15 ASSESSMENT — PAIN SCALES - GENERAL
PAINLEVEL_OUTOF10: 4
PAINLEVEL_OUTOF10: 7
PAINLEVEL_OUTOF10: 0
PAINLEVEL_OUTOF10: 5

## 2022-04-15 ASSESSMENT — PAIN SCALES - WONG BAKER
WONGBAKER_NUMERICALRESPONSE: 0

## 2022-04-15 ASSESSMENT — PAIN DESCRIPTION - PROGRESSION: CLINICAL_PROGRESSION: NOT CHANGED

## 2022-04-15 NOTE — PLAN OF CARE
Problem: Falls - Risk of:  Goal: Will remain free from falls  Description: Will remain free from falls  4/15/2022 1040 by Yokasta Carrion RN  Outcome: Ongoing  4/15/2022 0118 by Janeen Guerrier RN  Outcome: Ongoing  Goal: Absence of physical injury  Description: Absence of physical injury  4/15/2022 1040 by Yokasta Carrion RN  Outcome: Ongoing  4/15/2022 0118 by Janeen Guerrier RN  Outcome: Ongoing     Problem: Pain:  Goal: Pain level will decrease  Description: Pain level will decrease  4/15/2022 1040 by Yokasta Carrion RN  Outcome: Ongoing  4/15/2022 0118 by Janeen Guerrier RN  Outcome: Ongoing  Goal: Control of acute pain  Description: Control of acute pain  4/15/2022 1040 by Yokasta Carrion RN  Outcome: Ongoing  4/15/2022 0118 by Janeen Guerrier RN  Outcome: Ongoing  Goal: Control of chronic pain  Description: Control of chronic pain  4/15/2022 1040 by Yokasta Carrion RN  Outcome: Ongoing  4/15/2022 0118 by Janeen Guerrier RN  Outcome: Ongoing     Problem: Nutrition  Goal: Optimal nutrition therapy  4/15/2022 1040 by Yokasta Carrion RN  Outcome: Ongoing  4/15/2022 0118 by Janeen Guerrier RN  Outcome: Ongoing     Problem: Skin Integrity:  Goal: Will show no infection signs and symptoms  Description: Will show no infection signs and symptoms  4/15/2022 1040 by Yokasta Carrion RN  Outcome: Ongoing  4/15/2022 0118 by Janeen Guerrier RN  Outcome: Ongoing  Goal: Absence of new skin breakdown  Description: Absence of new skin breakdown  4/15/2022 1040 by Yokasta Carrion RN  Outcome: Ongoing  4/15/2022 0118 by Janeen Guerrier RN  Outcome: Ongoing

## 2022-04-15 NOTE — DISCHARGE SUMMARY
[facility]    [facility] Demographics    Patient. Luisa Diaz  Date of Birth. 1980  MRN. [id number]     Primary care provider. Anamaria Espitia MD  (Tel: [phone])    Admit date: 4/13/2022    Discharge date (blank if same as Note Date): Note Date: 4/15/2022     Reason for Hospitalization. Chief Complaint   Patient presents with    Leg Pain     recently treated for cellulitis    Leg Swelling         Diagnosis. Principal Problem:    Cellulitis  Active Problems:    HTN (hypertension)    Obstructive sleep apnea    Anxiety    Cellulitis of right foot    Alcohol abuse    Hyperbilirubinemia  Resolved Problems:    * No resolved hospital problems. [facility] FAC[facility] Course:    patient is a 28-year-old male with history of alcohol dependence, obstructive sleep apnea, essential hypertension, and anxiety who was admitted with right foot ulcer. Failed outpatient antibiotics, started on vancomycin and cefepime, podiatry consulted  CIWA protocol initiated for alcohol withdrawal  MRI did not show any osteomyelitis, treatment for cellulitis continued    Alcohol dependence: Not withdrawing, continue folate thiamine  Monitor CIWA  Bilirubin elevated, suspect alcoholic hepatitis  [facility] discriminant function 30.8   No indication for steroids   Was discharged home on doxycyline  Stable at the time of discharge  Was advised to stop drinking alcohol       Invasive procedures and treatments. 1. None     Consults. IP CONSULT TO GENERAL SURGERY  IP CONSULT TO PODIATRY  IP CONSULT TO HOSPITALIST  IP CONSULT TO PHARMACY  IP CONSULT TO SOCIAL WORK    Physical examination on discharge day. BP (!) 138/100   Pulse 115   Temp 98.1 °F (36.7 °C) (Oral)   Resp 20   Ht 6' (1.829 m)   Wt 230 lb (104.3 kg)   SpO2 97%   BMI 31.19 kg/m²   General appearance. Alert. Looks comfortable. HEENT. Sclera clear.  Moist mucus membranes. Cardiovascular. Regular rate and rhythm, normal S1, S2. No murmur. Respiratory. Not using accessory muscles. Clear to auscultation bilaterally, no wheeze. Gastrointestinal. Abdomen soft, non-tender, not distended, normal bowel sounds  Neurology. Facial symmetry. No speech deficits. Moving all extremities equally. Extremities. No edema in lower extremities. Skin. Warm, dry, normal turgor    Condition at time of discharge stable    Medication instructions provided to patient at discharge. Medication List      START taking these medications    doxycycline hyclate 100 MG tablet  Commonly known as: VIBRA-TABS  Take 1 tablet by mouth 2 times daily for 5 days        CONTINUE taking these medications    blood glucose test strips strip  Commonly known as: Glucose Meter Test  1 each by In Vitro route daily As needed. busPIRone 15 MG tablet  Commonly known as: BUSPAR  TAKE 1 TABLET BY MOUTH THREE TIMES DAILY     glucose monitoring kit  1 kit by Does not apply route daily as needed (If blood sugars running high or low)     ibuprofen 600 MG tablet  Commonly known as: ADVIL;MOTRIN  Take 1 tablet by mouth 3 times daily (with meals)     Microlet Lancets Misc  1 each by Does not apply route daily     pregabalin 100 MG capsule  Commonly known as: LYRICA  TAKE 1 CAPSULE BY MOUTH TWICE DAILY     traZODone 50 MG tablet  Commonly known as: DESYREL  Take 1-2 tablets by mouth nightly as needed for Sleep        STOP taking these medications    sulfamethoxazole-trimethoprim 800-160 MG per tablet  Commonly known as: Bactrim DS           Where to Get Your Medications      These medications were sent to [address] [place] [address], [facility]     Phone: [phone]   · doxycycline hyclate 100 MG tablet         Discharge recommendations given to patient. Follow Up. PCP in 1 week   Disposition. home  Activity. As tolerated  Diet: ADULT DIET; Regular  ADULT ORAL NUTRITION SUPPLEMENT; Lunch, Dinner; Wound Healing Oral Supplement      Spent 34 minutes in discharge process.     Signed:  Hernan Bell MD     4/15/2022 1:54 PM   c

## 2022-04-15 NOTE — PLAN OF CARE
Problem: Falls - Risk of:  Goal: Will remain free from falls  Description: Will remain free from falls  4/15/2022 0118 by Lois Lee RN  Outcome: Ongoing  4/14/2022 1159 by Crisitne Be RN  Outcome: Ongoing  Goal: Absence of physical injury  Description: Absence of physical injury  4/15/2022 0118 by Lois Lee RN  Outcome: Ongoing  4/14/2022 1159 by Cristine Be RN  Outcome: Ongoing     Problem: Pain:  Goal: Pain level will decrease  Description: Pain level will decrease  4/15/2022 0118 by Lois Lee RN  Outcome: Ongoing  4/14/2022 1159 by Cristine Be RN  Outcome: Ongoing  Goal: Control of acute pain  Description: Control of acute pain  4/15/2022 0118 by Lois Lee RN  Outcome: Ongoing  4/14/2022 1159 by Cristine Be RN  Outcome: Ongoing  Goal: Control of chronic pain  Description: Control of chronic pain  4/15/2022 0118 by Lois Lee RN  Outcome: Ongoing  4/14/2022 1159 by Cristine Be RN  Outcome: Ongoing     Problem: Nutrition  Goal: Optimal nutrition therapy  4/15/2022 0118 by Lois Lee RN  Outcome: Ongoing  4/14/2022 1159 by Cristine Be RN  Outcome: Ongoing     Problem: Skin Integrity:  Goal: Will show no infection signs and symptoms  Description: Will show no infection signs and symptoms  4/15/2022 0118 by Lois Lee RN  Outcome: Ongoing  4/14/2022 1159 by Cristine Be RN  Outcome: Ongoing  Goal: Absence of new skin breakdown  Description: Absence of new skin breakdown  4/15/2022 0118 by Lois Lee RN  Outcome: Ongoing  4/14/2022 1159 by Cristine Be RN  Outcome: Ongoing

## 2022-04-18 ENCOUNTER — CARE COORDINATION (OUTPATIENT)
Dept: CASE MANAGEMENT | Age: 42
End: 2022-04-18

## 2022-04-18 LAB
CULTURE: NORMAL
CULTURE: NORMAL
Lab: NORMAL
Lab: NORMAL
SPECIMEN: NORMAL
SPECIMEN: NORMAL

## 2022-04-19 ENCOUNTER — HOSPITAL ENCOUNTER (OUTPATIENT)
Dept: WOUND CARE | Age: 42
Discharge: HOME | End: 2022-04-19
Payer: COMMERCIAL

## 2022-04-19 ENCOUNTER — CARE COORDINATION (OUTPATIENT)
Dept: CASE MANAGEMENT | Age: 42
End: 2022-04-19

## 2022-04-19 VITALS
HEART RATE: 106 BPM | RESPIRATION RATE: 16 BRPM | DIASTOLIC BLOOD PRESSURE: 97 MMHG | SYSTOLIC BLOOD PRESSURE: 164 MMHG | TEMPERATURE: 98.8 F

## 2022-04-19 DIAGNOSIS — L03.032 CELLULITIS OF TOE OF LEFT FOOT: Primary | ICD-10-CM

## 2022-04-19 DIAGNOSIS — L97.522 NON-PRESSURE CHRONIC ULCER OF OTHER PART OF LEFT FOOT WITH FAT LAYER EXPOSED (HCC): ICD-10-CM

## 2022-04-19 LAB
CULTURE: NORMAL
Lab: NORMAL
SPECIMEN: NORMAL

## 2022-04-19 PROCEDURE — 11042 DBRDMT SUBQ TIS 1ST 20SQCM/<: CPT

## 2022-04-19 PROCEDURE — 99212 OFFICE O/P EST SF 10 MIN: CPT

## 2022-04-19 RX ORDER — GINSENG 100 MG
CAPSULE ORAL ONCE
Status: CANCELLED | OUTPATIENT
Start: 2022-04-19 | End: 2022-04-19

## 2022-04-19 RX ORDER — IBUPROFEN 800 MG/1
800 TABLET ORAL 2 TIMES DAILY PRN
Qty: 20 TABLET | Refills: 0 | Status: SHIPPED | OUTPATIENT
Start: 2022-04-19 | End: 2022-05-27 | Stop reason: SDUPTHER

## 2022-04-19 RX ORDER — GENTAMICIN SULFATE 1 MG/G
OINTMENT TOPICAL ONCE
Status: CANCELLED | OUTPATIENT
Start: 2022-04-19 | End: 2022-04-19

## 2022-04-19 RX ORDER — LIDOCAINE 50 MG/G
OINTMENT TOPICAL ONCE
Status: CANCELLED | OUTPATIENT
Start: 2022-04-19 | End: 2022-04-19

## 2022-04-19 RX ORDER — BACITRACIN ZINC AND POLYMYXIN B SULFATE 500; 1000 [USP'U]/G; [USP'U]/G
OINTMENT TOPICAL ONCE
Status: CANCELLED | OUTPATIENT
Start: 2022-04-19 | End: 2022-04-19

## 2022-04-19 RX ORDER — CLOBETASOL PROPIONATE 0.5 MG/G
OINTMENT TOPICAL ONCE
Status: CANCELLED | OUTPATIENT
Start: 2022-04-19 | End: 2022-04-19

## 2022-04-19 RX ORDER — BACITRACIN, NEOMYCIN, POLYMYXIN B 400; 3.5; 5 [USP'U]/G; MG/G; [USP'U]/G
OINTMENT TOPICAL ONCE
Status: CANCELLED | OUTPATIENT
Start: 2022-04-19 | End: 2022-04-19

## 2022-04-19 RX ORDER — LIDOCAINE HYDROCHLORIDE 20 MG/ML
JELLY TOPICAL ONCE
Status: CANCELLED | OUTPATIENT
Start: 2022-04-19 | End: 2022-04-19

## 2022-04-19 RX ORDER — LIDOCAINE 40 MG/G
CREAM TOPICAL ONCE
Status: CANCELLED | OUTPATIENT
Start: 2022-04-19 | End: 2022-04-19

## 2022-04-19 RX ORDER — LIDOCAINE HYDROCHLORIDE 40 MG/ML
SOLUTION TOPICAL ONCE
Status: CANCELLED | OUTPATIENT
Start: 2022-04-19 | End: 2022-04-19

## 2022-04-19 RX ORDER — BETAMETHASONE DIPROPIONATE 0.05 %
OINTMENT (GRAM) TOPICAL ONCE
Status: CANCELLED | OUTPATIENT
Start: 2022-04-19 | End: 2022-04-19

## 2022-04-19 ASSESSMENT — PAIN DESCRIPTION - FREQUENCY: FREQUENCY: INTERMITTENT

## 2022-04-19 ASSESSMENT — PAIN DESCRIPTION - PROGRESSION: CLINICAL_PROGRESSION: NOT CHANGED

## 2022-04-19 ASSESSMENT — PAIN SCALES - GENERAL: PAINLEVEL_OUTOF10: 7

## 2022-04-19 ASSESSMENT — PAIN - FUNCTIONAL ASSESSMENT: PAIN_FUNCTIONAL_ASSESSMENT: PREVENTS OR INTERFERES SOME ACTIVE ACTIVITIES AND ADLS

## 2022-04-19 ASSESSMENT — PAIN DESCRIPTION - LOCATION: LOCATION: TOE (COMMENT WHICH ONE)

## 2022-04-19 ASSESSMENT — PAIN DESCRIPTION - PAIN TYPE: TYPE: ACUTE PAIN

## 2022-04-19 ASSESSMENT — PAIN DESCRIPTION - ONSET: ONSET: ON-GOING

## 2022-04-19 ASSESSMENT — PAIN DESCRIPTION - DESCRIPTORS: DESCRIPTORS: SHOOTING

## 2022-04-19 ASSESSMENT — PAIN DESCRIPTION - ORIENTATION: ORIENTATION: LEFT

## 2022-04-19 NOTE — PROGRESS NOTES
3 times daily (with meals) 40 tablet 0    pregabalin (LYRICA) 100 MG capsule TAKE 1 CAPSULE BY MOUTH TWICE DAILY 180 capsule 1    busPIRone (BUSPAR) 15 MG tablet TAKE 1 TABLET BY MOUTH THREE TIMES DAILY 270 tablet 1    traZODone (DESYREL) 50 MG tablet Take 1-2 tablets by mouth nightly as needed for Sleep 60 tablet 11    glucose blood VI test strips (GLUCOSE METER TEST) strip 1 each by In Vitro route daily As needed. 100 each 3    glucose monitoring kit (FREESTYLE) monitoring kit 1 kit by Does not apply route daily as needed (If blood sugars running high or low) 1 kit 0    MICROLET LANCETS MISC 1 each by Does not apply route daily 100 each 3     No current facility-administered medications on file prior to encounter. REVIEW OF SYSTEMS    Pertinent items are noted in HPI. Constitutional: Negative for systemic symptoms including fever, chills and malaise. Objective:      BP (!) 164/97   Pulse 106   Temp 98.8 °F (37.1 °C) (Temporal)   Resp 16     PHYSICAL EXAM    General: The patient is in no acute distress. Mental status:  Patient is appropriate, is  oriented to place and plan of care. 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 Performed by: Gavin Coronado DPM    Consent obtained: Yes    Time out taken:  Yes    Pain Control: lidocaine       Debridement:Non-excisional Debridement    Using #15 blade scalpel the wound(s) was/were sharply debrided down through and including the removal of subcutaneous tissue. Devitalized Tissue Debrided:  biofilm    Pre Debridement Measurements:  Are located in the Wound Documentation Flow Sheet    All active wounds listed below with today's date are evaluated  Wound(s)    debrided this date include # : 1     Post  Debridement Measurements:  Wound 02/19/22 Toe (Comment  which one) Left #1 (onset 2 months) Left Great Toe Plantar   (Active)   Wound Image   04/19/22 1323   Wound Etiology Diabetic 04/14/22 2037   Dressing Status New dressing applied 04/15/22 1030   Wound Cleansed Wound cleanser 04/19/22 1323   Dressing/Treatment Collagen;Silicone border 57/93/79 1030   Offloading for Diabetic Foot Ulcers Post op shoe 04/19/22 1323   Wound Length (cm) 0.9 cm 04/19/22 1323   Wound Width (cm) 0.7 cm 04/19/22 1323   Wound Depth (cm) 0.1 cm 04/19/22 1323   Wound Surface Area (cm^2) 0.63 cm^2 04/19/22 1323   Change in Wound Size % (l*w) 37 04/19/22 1323   Wound Volume (cm^3) 0.063 cm^3 04/19/22 1323   Wound Healing % 37 04/19/22 1323   Post-Procedure Length (cm) 0.9 cm 04/19/22 1339   Post-Procedure Width (cm) 0.7 cm 04/19/22 1339   Post-Procedure Depth (cm) 0.1 cm 04/19/22 1339   Post-Procedure Surface Area (cm^2) 0.63 cm^2 04/19/22 1339   Post-Procedure Volume (cm^3) 0.063 cm^3 04/19/22 1339   Distance Tunneling (cm) 0 cm 04/19/22 1323   Tunneling Position ___ O'Clock 0 04/19/22 1323   Undermining Starts ___ O'Clock 0 04/19/22 1323   Undermining Ends___ O'Clock 0 04/19/22 1323   Undermining Maxium Distance (cm) 0 04/19/22 1323   Wound Assessment Pink/red 04/19/22 1323   Drainage Amount Moderate 04/19/22 1323   Drainage Description Serosanguinous 04/19/22 1323   Odor None 04/19/22 1323   Saira-wound Assessment Hyperkeratosis (callous) 04/19/22 1323   Margins Defined edges; Unattached edges 04/19/22 1323   Wound Thickness Description not for Pressure Injury Full thickness 04/19/22 1323   Number of days: 58       Percent of Wound(s) Debrided: approximately 100%    Total  Area  Debrided:  0.63 sq cm     Bleeding:  Minimal    Hemostasis Achieved:  by pressure    Procedural Pain:  0  / 10     Post Procedural Pain:  0 / 10     Response to treatment:  Well tolerated by patient. Status of wound progress and description from last visit:   stable. Plan:       Discharge Instructions       PHYSICIAN ORDERS AND DISCHARGE INSTRUCTIONS    NOTE: Upon discharge from the [address], you will receive a patient experience survey. We would be grateful if you would take the time to fill this survey out. Wound care order history:     Vascular studies: arterial studies  Date 22   Imaging:   Date    Cultures:   Date    Grafts:  Date    Antibiotics:               Earlier Wound care treatments:     Primary care physician:     Continuing wound care orders and information:              Residence:               Continue home health care with:     Wound Medications:    Wound cleansing:      Do not scrub or use excessive force. Wash hands with soap and water before and after dressing changes. Prior to applying a clean dressing, cleanse wound with normal saline,          wound cleanser, or mild soap and water. Ask the physician or nurse before getting the wound(s) wet in a shower   Daily Wound management:    Keep weight off wounds and reposition every 2 hours. Avoid standing for long periods of time. Apply wraps/stockings in AM and remove at bedtime. If swelling is present, elevate legs to the level of the heart or above for 30                              minutes 4-5 times a day and/or when sitting. When taking antibiotics take entire prescription as ordered by physician    do not stop taking until medicine is all gone. Orders for this week: 2022       Left great toe-- wash with soap and water, pat dry. Apply anasept and stimulen, to wound bed and cover with band aid. Change dialy.        RX  Consult:  Central [phone]    Follow up with   In  weeks in the wound care center  Call [phone] for any questions or concerns. Date__________   Time____________      Signature____________________________________________________        Treatment Note     -Chronic wound hallux left foot  -Finish oral antibiotics that were given to you upon discharge from the hospital.  -No signs of infection to the left leg today. Patient asking about getting more antibiotics at this time. No clinical indication for this at this time as there are no clinical signs of infection. Chronic wound is stable. -Continue to ambulate in surgical shoe on a daily basis  -Daily dressing instructions dispensed to the patient  -Note for work to be off for the next month given to the patient while diabetic foot wound is healing.   Excess pressure will slow down healing to the area  -Any signs of worsening infection to let me know or go to the emergency room  -Follow-up 1 week for recheck or sooner if needed      Written Patient Dismissal Instructions Given            Electronically signed by Laz Araujo DPM on 4/19/2022 at 1:45 PM

## 2022-04-19 NOTE — CARE COORDINATION
Care Transitions Outreach Attempt  Late entry for 22 1210pm    Call within 2 business days of discharge: Yes   Patient: Jb Marin Patient : 1980 MRN: [id number]    Last Discharge [facility]       Complaint Diagnosis Description Type Department Provider    22 Leg Pain; Leg Swelling Diabetic ulcer of left great toe ([facility] [address] . .. ED to Hosp-Admission (Discharged) (ADMITTED) Kailash De Santiago MD; Rebecca Carrion. .. Was this an external facility discharge? No   Discharge Facility: [facility]    Noted following upcoming appointments from discharge chart review:   [facility] follow up appointment(s):   Future Appointments   Date Time Provider Sarah Mckeon   2022  1:00 PM Osbaldo Viveros  [facility]     1st attempt to reach for Care Transition discharge call unsuccessful. HIPAA compliant message left requesting call back.  [address] [place] [address] [phone]

## 2022-04-19 NOTE — LETTER
[facility]  [address]  Phone: [phone]    Glenny Delgado        April 19, 2022     Patient: Nicole Puente   YOB: 1980   Date of Visit: 4/19/2022       To Whom It May Concern: It is my medical opinion that Kalpeshjanis Tijerina . Please note that he is under physician care and cannot work until after 5/20/22    If you have any questions or concerns, please don't hesitate to call.     Sincerely,        Serafin Guevara DPM

## 2022-04-19 NOTE — CARE COORDINATION
factors for readmission: medical condition-Ulcer/Cellulits Left Gt Toe, Alcohol Abuse, MICHAEL, Anxiety, Htn    Care Transition Nurse (CTN) contacted patient by telephone to perform post hospital discharge assessment. Verified name and  with patient as identifiers. Provided introduction to self, and explanation of the CTN role. Phone Assessment: Unable to complete full assessment at Patient currently en route to appt w/ Dr Dylon Ramirez- Podiatry. States \"not sure if its any better, still red, scabbed, painful\". Advised to discuss pain concerns at todays appt. Denies wound drainage, swelling, odor or fever. WBAT left foot per MD approval-- pg 7 AVS. Patient reports wound is related to prior broken leg and blood blister to toe which he was not aware of. AVS/Meds: Confirmed copy of AVS received, reviewed with Patient. Confirmed Patient obtained and is taking Doxycycline as directed. Med education provided, questions answered, verbalizes understanding. Stressed importance of med compliance. 1111F medication review completed with Patient . Advised obtaining 90 day supply of routine, prn meds. Advised contacting pharmacy/md for refill needs. Resource Need Assessment:   Living Arrangements: lives w/ spouse   ADLS:independent, drives   DME:none, denies need   Transportation: self or spouse  HHC:none, denies need   Community Assistance:none, denies need   Referrals Made per CTN with Patient/Family Approval: [facility] Follow Up Appointments: Discussed importance of 7 day hospital follow up appointment for continuity of care. Patient prefers to contact PCP for hosp f/u appt after speaking w/ Podiatrist today. Confirmed transportation for 22 [facility] appt. Advised to contact PCP  re: any health concerns for early outpatient intervention in an effort to avoid hospitalization. Discussed benefits of WIC, Urgent Care. Advised 911 if noting acute distress. CTN provided contact information.  Plan for follow-up call in 1-2 days based on severity of symptoms and risk factors.   Plan for next call: f/u on podiatry appt, dme needs?, pcp appt? sx, alcohol? ; [id number] education, [facility]    [address] [place] [address] [phone]

## 2022-04-19 NOTE — CARE COORDINATION
Care Transitions Outreach Attempt  Late entry for 22 12:11    Call within 2 business days of discharge: Yes   Attempted to reach patient for transitions of care follow up. Unable to reach patient. Patient: Nicole Puente Patient : 1980 MRN: [id number]    Last Discharge [facility]       Complaint Diagnosis Description Type Department Provider    22 Leg Pain; Leg Swelling Diabetic ulcer of left great toe ([address] . .. ED to Hosp-Admission (Discharged) (ADMITTED) Cuco Barron MD; Aide Juarez. .. Was this an external facility discharge? No   Discharge Facility: [facility]    Noted following upcoming appointments from discharge chart review:   [facility] follow up appointment(s):   Future Appointments   Date Time Provider Sarah Mckeon   2022  1:00 PM Serafin Guevara  [facility]     1st attempt to reach for Care Transition discharge call unsuccessful. HIPAA compliant message left requesting call back.    [address] [place] [address] [phone]

## 2022-04-21 ENCOUNTER — CARE COORDINATION (OUTPATIENT)
Dept: CASE MANAGEMENT | Age: 42
End: 2022-04-21

## 2022-04-21 NOTE — CARE COORDINATION
Chris [facility] Transitions Follow Up Call    2022    Patient: Juan Manuel Rios  Patient : 1980   MRN: [id number]  Reason for Admission: Ulcer/Cellulitis Left Great Toe  Discharge Date: 4/15/22 RARS: Readmission Risk Score: 19.3 ( )    Attempted to contact patient for Transition Subsequent call. Left HIPAA Compliant message on Voice Mail to call CTN (number given) with any questions and an update on patient's condition since discharge. Will continue to follow. Genia Longoria LPN    [phone]  [facility] / [address] [place] [address] Transitions Subsequent and Final Call    Subsequent and Final Calls  Care Transitions Interventions  Other Interventions:            Follow Up  Future Appointments   Date Time Provider Sarah Mckeon   2022  1:30 PM Jimmy Savage DPM [place], [place]

## 2022-04-22 ENCOUNTER — CARE COORDINATION (OUTPATIENT)
Dept: CASE MANAGEMENT | Age: 42
End: 2022-04-22

## 2022-04-22 NOTE — CARE COORDINATION
Chris [facility] Transitions Follow Up Call    2022    Patient: Junior Chacon  Patient : 1980   MRN: [id number]  Reason for Admission: Ulcer/Cellulitis Left Great Toe  Discharge Date: 4/15/22 RARS: Readmission Risk Score: 19.3 ( )    Future Appointments   Date Time Provider Sarah Mckeon   2022  1:30 PM Jayden Mckeon DPM [facility]: [facility]  RARS:  19    Attempt to reach for Care Transition follow up call unsuccessful. HIPAA compliant message left requesting call back. Noted to have been seen by Dr Yaya Davis on 22 as scheduled-- next appt 22.     Cyndi Hamlin RN

## 2022-04-26 ENCOUNTER — HOSPITAL ENCOUNTER (OUTPATIENT)
Dept: WOUND CARE | Age: 42
Discharge: HOME | End: 2022-04-26

## 2022-04-27 ENCOUNTER — CARE COORDINATION (OUTPATIENT)
Dept: CASE MANAGEMENT | Age: 42
End: 2022-04-27

## 2022-04-27 NOTE — CARE COORDINATION
Care Transitions Outreach Attempt-3rd attempt    Call within 2 business days of discharge: Yes   Attempted to reach patient for subsequent transitional call. VM left to return call to [phone]. If no return call, CTN will sign off-3rd attempt. Patient: Chantell Gonsalez Patient : 1980 MRN: [id number]    Last Discharge [facility]       Complaint Diagnosis Description Type Department Provider    22 Leg Pain; Leg Swelling Diabetic ulcer of left great toe ([facility] [address] . .. ED to Hosp-Admission (Discharged) (ADMITTED) Castillo Prakash MD; Singh Luna. ..             Noted following upcoming appointments from discharge chart review:   [facility] follow up appointment(s):   Future Appointments   Date Time Provider Sarah Mckeon   5/3/2022  1:30 PM Carlos Eduardo Ireland DPM [facility]     [facility] follow up appointment(s): brooke

## 2022-05-02 ENCOUNTER — HOSPITAL ENCOUNTER (EMERGENCY)
Age: 42
Discharge: LEFT BEFORE DISCHARGE | End: 2022-05-02
Payer: COMMERCIAL

## 2022-05-02 VITALS
HEART RATE: 115 BPM | DIASTOLIC BLOOD PRESSURE: 100 MMHG | TEMPERATURE: 97.8 F | SYSTOLIC BLOOD PRESSURE: 165 MMHG | RESPIRATION RATE: 18 BRPM | OXYGEN SATURATION: 94 %

## 2022-05-02 LAB
ALBUMIN SERPL-MCNC: 3.6 GM/DL (ref 3.4–5)
ALCOHOL SCREEN SERUM: 0.24 %WT/VOL
ALP BLD-CCNC: 181 IU/L (ref 40–129)
ALT SERPL-CCNC: 33 U/L (ref 10–40)
ANION GAP SERPL CALCULATED.3IONS-SCNC: 19 MMOL/L (ref 4–16)
AST SERPL-CCNC: 93 IU/L (ref 15–37)
BASOPHILS ABSOLUTE: 0.2 K/CU MM
BASOPHILS RELATIVE PERCENT: 1.3 % (ref 0–1)
BILIRUB SERPL-MCNC: 2.9 MG/DL (ref 0–1)
BUN BLDV-MCNC: 6 MG/DL (ref 6–23)
CALCIUM SERPL-MCNC: 8.3 MG/DL (ref 8.3–10.6)
CHLORIDE BLD-SCNC: 92 MMOL/L (ref 99–110)
CO2: 19 MMOL/L (ref 21–32)
CREAT SERPL-MCNC: 0.7 MG/DL (ref 0.9–1.3)
DIFFERENTIAL TYPE: ABNORMAL
EOSINOPHILS ABSOLUTE: 0 K/CU MM
EOSINOPHILS RELATIVE PERCENT: 0.3 % (ref 0–3)
GFR AFRICAN AMERICAN: >60 ML/MIN/1.73M2
GFR NON-AFRICAN AMERICAN: >60 ML/MIN/1.73M2
GLUCOSE BLD-MCNC: 166 MG/DL (ref 70–99)
HCT VFR BLD CALC: 32.4 % (ref 42–52)
HEMOGLOBIN: 10.4 GM/DL (ref 13.5–18)
IMMATURE NEUTROPHIL %: 0.6 % (ref 0–0.43)
LYMPHOCYTES ABSOLUTE: 1.2 K/CU MM
LYMPHOCYTES RELATIVE PERCENT: 10.6 % (ref 24–44)
MCH RBC QN AUTO: 29.1 PG (ref 27–31)
MCHC RBC AUTO-ENTMCNC: 32.1 % (ref 32–36)
MCV RBC AUTO: 90.5 FL (ref 78–100)
MONOCYTES ABSOLUTE: 1 K/CU MM
MONOCYTES RELATIVE PERCENT: 8.8 % (ref 0–4)
NUCLEATED RBC %: 0 %
PDW BLD-RTO: 16.3 % (ref 11.7–14.9)
PLATELET # BLD: 109 K/CU MM (ref 140–440)
PMV BLD AUTO: 9.9 FL (ref 7.5–11.1)
POTASSIUM SERPL-SCNC: 3.8 MMOL/L (ref 3.5–5.1)
RBC # BLD: 3.58 M/CU MM (ref 4.6–6.2)
SEGMENTED NEUTROPHILS ABSOLUTE COUNT: 9 K/CU MM
SEGMENTED NEUTROPHILS RELATIVE PERCENT: 78.4 % (ref 36–66)
SODIUM BLD-SCNC: 130 MMOL/L (ref 135–145)
TOTAL IMMATURE NEUTOROPHIL: 0.07 K/CU MM
TOTAL NUCLEATED RBC: 0 K/CU MM
TOTAL PROTEIN: 10.1 GM/DL (ref 6.4–8.2)
WBC # BLD: 11.5 K/CU MM (ref 4–10.5)

## 2022-05-02 PROCEDURE — 80053 COMPREHEN METABOLIC PANEL: CPT

## 2022-05-02 PROCEDURE — G0480 DRUG TEST DEF 1-7 CLASSES: HCPCS

## 2022-05-02 PROCEDURE — 93005 ELECTROCARDIOGRAM TRACING: CPT | Performed by: EMERGENCY MEDICINE

## 2022-05-02 PROCEDURE — 85025 COMPLETE CBC W/AUTO DIFF WBC: CPT

## 2022-05-02 RX ORDER — LORAZEPAM 2 MG/ML
4 INJECTION INTRAMUSCULAR
Status: DISCONTINUED | OUTPATIENT
Start: 2022-05-02 | End: 2022-05-02 | Stop reason: HOSPADM

## 2022-05-02 RX ORDER — LORAZEPAM 1 MG/1
4 TABLET ORAL
Status: DISCONTINUED | OUTPATIENT
Start: 2022-05-02 | End: 2022-05-02 | Stop reason: HOSPADM

## 2022-05-02 RX ORDER — LORAZEPAM 1 MG/1
3 TABLET ORAL
Status: DISCONTINUED | OUTPATIENT
Start: 2022-05-02 | End: 2022-05-02 | Stop reason: HOSPADM

## 2022-05-02 RX ORDER — SODIUM CHLORIDE 0.9 % (FLUSH) 0.9 %
5-40 SYRINGE (ML) INJECTION PRN
Status: DISCONTINUED | OUTPATIENT
Start: 2022-05-02 | End: 2022-05-02 | Stop reason: HOSPADM

## 2022-05-02 RX ORDER — LORAZEPAM 1 MG/1
1 TABLET ORAL
Status: DISCONTINUED | OUTPATIENT
Start: 2022-05-02 | End: 2022-05-02 | Stop reason: HOSPADM

## 2022-05-02 RX ORDER — NICOTINE 21 MG/24HR
1 PATCH, TRANSDERMAL 24 HOURS TRANSDERMAL DAILY
Status: DISCONTINUED | OUTPATIENT
Start: 2022-05-02 | End: 2022-05-02 | Stop reason: HOSPADM

## 2022-05-02 RX ORDER — LORAZEPAM 2 MG/ML
2 INJECTION INTRAMUSCULAR
Status: DISCONTINUED | OUTPATIENT
Start: 2022-05-02 | End: 2022-05-02 | Stop reason: HOSPADM

## 2022-05-02 RX ORDER — LANOLIN ALCOHOL/MO/W.PET/CERES
100 CREAM (GRAM) TOPICAL DAILY
Status: DISCONTINUED | OUTPATIENT
Start: 2022-05-02 | End: 2022-05-02 | Stop reason: HOSPADM

## 2022-05-02 RX ORDER — M-VIT,TX,IRON,MINS/CALC/FOLIC 27MG-0.4MG
1 TABLET ORAL DAILY
Status: DISCONTINUED | OUTPATIENT
Start: 2022-05-02 | End: 2022-05-02 | Stop reason: HOSPADM

## 2022-05-02 RX ORDER — LORAZEPAM 1 MG/1
2 TABLET ORAL
Status: DISCONTINUED | OUTPATIENT
Start: 2022-05-02 | End: 2022-05-02 | Stop reason: HOSPADM

## 2022-05-02 RX ORDER — LORAZEPAM 2 MG/ML
3 INJECTION INTRAMUSCULAR
Status: DISCONTINUED | OUTPATIENT
Start: 2022-05-02 | End: 2022-05-02 | Stop reason: HOSPADM

## 2022-05-02 RX ORDER — SODIUM CHLORIDE 0.9 % (FLUSH) 0.9 %
5-40 SYRINGE (ML) INJECTION EVERY 12 HOURS SCHEDULED
Status: DISCONTINUED | OUTPATIENT
Start: 2022-05-02 | End: 2022-05-02 | Stop reason: HOSPADM

## 2022-05-02 RX ORDER — LORAZEPAM 2 MG/ML
1 INJECTION INTRAMUSCULAR
Status: DISCONTINUED | OUTPATIENT
Start: 2022-05-02 | End: 2022-05-02 | Stop reason: HOSPADM

## 2022-05-02 RX ORDER — SODIUM CHLORIDE 9 MG/ML
INJECTION, SOLUTION INTRAVENOUS PRN
Status: DISCONTINUED | OUTPATIENT
Start: 2022-05-02 | End: 2022-05-02 | Stop reason: HOSPADM

## 2022-05-02 NOTE — ED PROVIDER NOTES
12 lead EKG per my interpretation:  Sinus Tachycardia 110  Axis is   Normal  QTc is  500  There is no specific T wave changes appreciated. There is no specific ST wave changes appreciated.     Prior EKG to compare with was not available         Ayse Espino DO  05/02/22 4274

## 2022-05-03 ENCOUNTER — HOSPITAL ENCOUNTER (EMERGENCY)
Age: 42
Discharge: HOME | End: 2022-05-03
Attending: EMERGENCY MEDICINE
Payer: COMMERCIAL

## 2022-05-03 ENCOUNTER — HOSPITAL ENCOUNTER (OUTPATIENT)
Dept: WOUND CARE | Age: 42
Discharge: HOME | End: 2022-05-03

## 2022-05-03 VITALS
HEART RATE: 103 BPM | DIASTOLIC BLOOD PRESSURE: 92 MMHG | BODY MASS INDEX: 31.19 KG/M2 | WEIGHT: 230 LBS | OXYGEN SATURATION: 96 % | TEMPERATURE: 98 F | SYSTOLIC BLOOD PRESSURE: 159 MMHG | RESPIRATION RATE: 18 BRPM

## 2022-05-03 DIAGNOSIS — F10.229 ALCOHOL DEPENDENCE WITH INTOXICATION WITH COMPLICATION (HCC): ICD-10-CM

## 2022-05-03 DIAGNOSIS — E83.42 HYPOMAGNESEMIA: ICD-10-CM

## 2022-05-03 DIAGNOSIS — F10.929 ACUTE ALCOHOLIC INTOXICATION WITH COMPLICATION (HCC): Primary | ICD-10-CM

## 2022-05-03 DIAGNOSIS — R03.0 ELEVATED BLOOD PRESSURE READING: ICD-10-CM

## 2022-05-03 LAB
ALBUMIN SERPL-MCNC: 3.8 GM/DL (ref 3.4–5)
ALCOHOL SCREEN SERUM: 0.16 %WT/VOL
ALP BLD-CCNC: 180 IU/L (ref 40–129)
ALT SERPL-CCNC: 36 U/L (ref 10–40)
AMMONIA: 88 UMOL/L (ref 16–60)
APTT: 48.3 SECONDS (ref 25.1–37.1)
AST SERPL-CCNC: 112 IU/L (ref 15–37)
BACTERIA: ABNORMAL /HPF
BASOPHILS ABSOLUTE: 0.1 K/CU MM
BASOPHILS RELATIVE PERCENT: 1.5 % (ref 0–1)
BILIRUB SERPL-MCNC: 2.8 MG/DL (ref 0–1)
BILIRUBIN DIRECT: 1.4 MG/DL (ref 0–0.3)
BILIRUBIN URINE: NEGATIVE MG/DL
BILIRUBIN, INDIRECT: 1.4 MG/DL (ref 0–0.7)
BLOOD, URINE: ABNORMAL
CLARITY: CLEAR
COLOR: YELLOW
DIFFERENTIAL TYPE: ABNORMAL
EOSINOPHILS ABSOLUTE: 0.2 K/CU MM
EOSINOPHILS RELATIVE PERCENT: 1.7 % (ref 0–3)
GLUCOSE, URINE: NEGATIVE MG/DL
HCT VFR BLD CALC: 31.3 % (ref 42–52)
HEMOGLOBIN: 10.1 GM/DL (ref 13.5–18)
IMMATURE NEUTROPHIL %: 1.3 % (ref 0–0.43)
INR BLD: 1.31 INDEX
KETONES, URINE: NEGATIVE MG/DL
LACTATE: 2.3 MMOL/L (ref 0.4–2)
LACTATE: 3.4 MMOL/L (ref 0.4–2)
LEUKOCYTE ESTERASE, URINE: NEGATIVE
LYMPHOCYTES ABSOLUTE: 2.1 K/CU MM
LYMPHOCYTES RELATIVE PERCENT: 23.9 % (ref 24–44)
MAGNESIUM: 1.5 MG/DL (ref 1.8–2.4)
MCH RBC QN AUTO: 28.9 PG (ref 27–31)
MCHC RBC AUTO-ENTMCNC: 32.3 % (ref 32–36)
MCV RBC AUTO: 89.4 FL (ref 78–100)
MONOCYTES ABSOLUTE: 1.2 K/CU MM
MONOCYTES RELATIVE PERCENT: 13.4 % (ref 0–4)
MUCUS: ABNORMAL HPF
NITRITE URINE, QUANTITATIVE: NEGATIVE
NUCLEATED RBC %: 0 %
PDW BLD-RTO: 16.5 % (ref 11.7–14.9)
PH, URINE: 6 (ref 5–8)
PLATELET # BLD: 82 K/CU MM (ref 140–440)
PMV BLD AUTO: 10 FL (ref 7.5–11.1)
PROTEIN UA: ABNORMAL MG/DL
PROTHROMBIN TIME: 17 SECONDS (ref 11.7–14.5)
RBC # BLD: 3.5 M/CU MM (ref 4.6–6.2)
RBC URINE: 13 /HPF (ref 0–3)
SEGMENTED NEUTROPHILS ABSOLUTE COUNT: 5.1 K/CU MM
SEGMENTED NEUTROPHILS RELATIVE PERCENT: 58.2 % (ref 36–66)
SPECIFIC GRAVITY UA: 1 (ref 1–1.03)
SQUAMOUS EPITHELIAL: <1 /HPF
TOTAL IMMATURE NEUTOROPHIL: 0.11 K/CU MM
TOTAL NUCLEATED RBC: 0 K/CU MM
TOTAL PROTEIN: 10.4 GM/DL (ref 6.4–8.2)
TRICHOMONAS: ABNORMAL /HPF
UROBILINOGEN, URINE: 0.2 MG/DL (ref 0.2–1)
WBC # BLD: 8.8 K/CU MM (ref 4–10.5)
WBC UA: 1 /HPF (ref 0–2)
YEAST: ABNORMAL /HPF

## 2022-05-03 PROCEDURE — 83735 ASSAY OF MAGNESIUM: CPT

## 2022-05-03 PROCEDURE — 2500000003 HC RX 250 WO HCPCS: Performed by: EMERGENCY MEDICINE

## 2022-05-03 PROCEDURE — 80076 HEPATIC FUNCTION PANEL: CPT

## 2022-05-03 PROCEDURE — 83605 ASSAY OF LACTIC ACID: CPT

## 2022-05-03 PROCEDURE — 96368 THER/DIAG CONCURRENT INF: CPT

## 2022-05-03 PROCEDURE — 96375 TX/PRO/DX INJ NEW DRUG ADDON: CPT

## 2022-05-03 PROCEDURE — 99284 EMERGENCY DEPT VISIT MOD MDM: CPT

## 2022-05-03 PROCEDURE — 81001 URINALYSIS AUTO W/SCOPE: CPT

## 2022-05-03 PROCEDURE — 85730 THROMBOPLASTIN TIME PARTIAL: CPT

## 2022-05-03 PROCEDURE — 96365 THER/PROPH/DIAG IV INF INIT: CPT

## 2022-05-03 PROCEDURE — G0480 DRUG TEST DEF 1-7 CLASSES: HCPCS

## 2022-05-03 PROCEDURE — 2580000003 HC RX 258: Performed by: EMERGENCY MEDICINE

## 2022-05-03 PROCEDURE — 96366 THER/PROPH/DIAG IV INF ADDON: CPT

## 2022-05-03 PROCEDURE — 6360000002 HC RX W HCPCS: Performed by: EMERGENCY MEDICINE

## 2022-05-03 PROCEDURE — 96376 TX/PRO/DX INJ SAME DRUG ADON: CPT

## 2022-05-03 PROCEDURE — 82140 ASSAY OF AMMONIA: CPT

## 2022-05-03 PROCEDURE — 85025 COMPLETE CBC W/AUTO DIFF WBC: CPT

## 2022-05-03 PROCEDURE — 85610 PROTHROMBIN TIME: CPT

## 2022-05-03 RX ORDER — MAGNESIUM SULFATE IN WATER 40 MG/ML
2000 INJECTION, SOLUTION INTRAVENOUS ONCE
Status: COMPLETED | OUTPATIENT
Start: 2022-05-03 | End: 2022-05-03

## 2022-05-03 RX ORDER — LORAZEPAM 2 MG/ML
1 INJECTION INTRAMUSCULAR ONCE
Status: COMPLETED | OUTPATIENT
Start: 2022-05-03 | End: 2022-05-03

## 2022-05-03 RX ADMIN — MAGNESIUM SULFATE HEPTAHYDRATE 2000 MG: 2 INJECTION, SOLUTION INTRAVENOUS at 16:00

## 2022-05-03 RX ADMIN — LORAZEPAM 1 MG: 2 INJECTION INTRAMUSCULAR; INTRAVENOUS at 13:57

## 2022-05-03 RX ADMIN — THIAMINE HYDROCHLORIDE: 100 INJECTION, SOLUTION INTRAMUSCULAR; INTRAVENOUS at 13:59

## 2022-05-03 RX ADMIN — LORAZEPAM 1 MG: 2 INJECTION INTRAMUSCULAR; INTRAVENOUS at 15:58

## 2022-05-03 RX ADMIN — LORAZEPAM 1 MG: 2 INJECTION INTRAMUSCULAR; INTRAVENOUS at 20:10

## 2022-05-03 NOTE — CARE COORDINATION
CM consult per Dr Magdaleno Link to assist with discharge planning. Pt to ER today with c/o ETOH abuse. Review of pt chart pt has PCP and insurance. CM visited pt who is alert and oriented, CM introduced self and reason for visit. Pt states he is here due to his drinking, denies depression, SI/HI. Pt states he started a new job has been off work due to cellulitis of his legs. Pt states he is interested in treatment, willing to due inpatient treatment. CM discussed inpatient treatment at [facility] pt is agreeable to that possibility. CM contacted [facility]/intake at [facility] #[phone]. Clairjuan Mustapha states if pt wants to come for inpatient treatment ,they due take pt insurance. CM took phone to pt for interview with [facility] at [facility]. Juno Bell Update to Dr Magdaleno Link, who will complete ER treatment plan before discharge. CM visited pt continues to to be awake, alert and oriented. Pt states he still plans to discharge from here and go to [facility] for inpatient ETOH rehab treatment. POC is for discharge, pt will have his wife take him to [facility].  TREVER,RN/CM

## 2022-05-03 NOTE — ED TRIAGE NOTES
Pt states he came to ED yesterday for help with alcohol withdrawal and left after waiting 1 1/2 hrs to be seen.  Pt returns today and wanting help with alcohol detox

## 2022-05-03 NOTE — ED PROVIDER NOTES
Emergency Department Encounter    Patient: Arben Canales  MRN: [id number]  : 1980  Date of Evaluation: 2022  ED Provider:  Reesa Cogan, MD      Triage Chief Complaint:   Alcohol Problem (pt wants to be detoxed from alcohol. pt reports his last drink was 10 min ago)      [place]:  Arben Canales is a 46 y.o. male that presents to the emergency department with request for assistance with alcohol detox. Patient reports longstanding use of alcohol. Patient reports that he drinks on average every other day. He would drink approximately a sixpack of beer when he does drink. He has been in outpatient detox facilities previously. He comes in today because \"I do not feel very good. \"  He has been drinking more frequently over the past 1 week or so. He will occasionally drink so that he does not feel sick. He is feeling some burning in the upper, midline abdomen. He feels shaky. He has had some nausea and dry heaves. He denies history of seizures. He denies paresthesias or hallucinations. He does report history of liver disease but denies paracentesis. Patient reports no other particular provocative or alleviating factors. ROS - see HPI, below listed is current ROS at time of my eval:  CONSTITUTIONAL: No fevers, chills, or sweats. EYES: No vision change, redness, drainage, or discharge. HENT: No sore throat, runny nose, or earache. No painful swallowing. RESPIRATORY: No shortness of breath, cough, or sputum production. CARDIOVASCULAR: No anginal-type chest pain, orthopnea, or edema. GASTROINTESTINAL: As above. No diarrhea or constipation. No hematochezia, melena, or hematemesis. GENITOURINARY: No frequency, urgency, or dysuria. No hematuria. MUSCULOSKELETAL: No recent injury. No neck, back, or extremity pain. NEUROLOGICAL: No focal weakness, numbness, or tingling. SKIN: No rashes or other lesions reported. No yellowing of the skin.     Medical history:  Past Medical History:   Diagnosis Date  Hypercholesterolemia 2/18/2019    Hypertension     Type 2 diabetes, controlled, with neuropathy ([address] 6/6/2018     History reviewed. No pertinent surgical history. Family History   Problem Relation Age of Onset    High Blood Pressure Sister      Social History     Socioeconomic History    Marital status:      Spouse name: Not on file    Number of children: Not on file    Years of education: Not on file    Highest education level: Not on file   Occupational History    Not on file   Tobacco Use    Smoking status: Never Smoker    Smokeless tobacco: Never Used   Substance and Sexual Activity    Alcohol use: Yes     Alcohol/week: 12.0 standard drinks     Types: 12 Standard drinks or equivalent per week     Comment: 6 drinks daily    Drug use: No    Sexual activity: Yes     Partners: Male   Other Topics Concern    Not on file   Social History Narrative    [id number] -         Diet:Unrestricted    Exercise:work    Seat belts:Sometimes     Social Determinants of Health     Financial Resource Strain:     Difficulty of Paying Living Expenses: Not on file   Food Insecurity:     Worried About Running Out of Food in the Last Year: Not on file    Desmond of Food in the Last Year: Not on file   Transportation Needs:     Lack of Transportation (Medical): Not on file    Lack of Transportation (Non-Medical):  Not on file   Physical Activity:     Days of Exercise per Week: Not on file    Minutes of Exercise per Session: Not on file   Stress:     Feeling of Stress : Not on file   Social Connections:     Frequency of Communication with Friends and Family: Not on file    Frequency of Social Gatherings with Friends and Family: Not on file    Attends Taoism Services: Not on file    Active Member of Clubs or Organizations: Not on file    Attends Club or Organization Meetings: Not on file    Marital Status: Not on file   Intimate Partner Violence:     Fear of Current or Ex-Partner: Not on file    Emotionally Abused: Not on file    Physically Abused: Not on file    Sexually Abused: Not on file   Housing Stability:     Unable to Pay for Housing in the Last Year: Not on file    Number of Places Lived in the Last Year: Not on file    Unstable Housing in the Last Year: Not on file     No current facility-administered medications for this encounter. Current Outpatient Medications   Medication Sig Dispense Refill    ibuprofen (ADVIL;MOTRIN) 800 MG tablet Take 1 tablet by mouth 2 times daily as needed for Pain 20 tablet 0    ibuprofen (ADVIL;MOTRIN) 600 MG tablet Take 1 tablet by mouth 3 times daily (with meals) 40 tablet 0    pregabalin (LYRICA) 100 MG capsule TAKE 1 CAPSULE BY MOUTH TWICE DAILY 180 capsule 1    busPIRone (BUSPAR) 15 MG tablet TAKE 1 TABLET BY MOUTH THREE TIMES DAILY 270 tablet 1    traZODone (DESYREL) 50 MG tablet Take 1-2 tablets by mouth nightly as needed for Sleep 60 tablet 11    glucose blood VI test strips (GLUCOSE METER TEST) strip 1 each by In Vitro route daily As needed. 100 each 3    glucose monitoring kit (FREESTYLE) monitoring kit 1 kit by Does not apply route daily as needed (If blood sugars running high or low) 1 kit 0    MICROLET LANCETS MISC 1 each by Does not apply route daily 100 each 3     Allergies   Allergen Reactions    Paxil [Paroxetine Hcl]      Caused \"weird\" things to happen in his sleep       Nursing Notes Reviewed    Physical Exam:  Triage VS:    ED Triage Vitals   Enc Vitals Group      BP 05/03/22 1039 (!) 165/100      Pulse 05/03/22 1039 101      Resp 05/03/22 1039 16      Temp 05/03/22 1036 97.9 °F (36.6 °C)      Temp Source 05/03/22 1036 Oral      SpO2 05/03/22 1039 100 %      Weight --       Height --       Head Circumference --       Peak Flow --       Pain Score --       Pain Loc --       Pain Edu? --       Excl.  in [address]? --        My pulse ox interpretation is -normal on room air    GENERAL: Patient is awake, alert, and oriented appropriately. Patient is resting comfortably in a still position on the exam table. Patient speaking in full and complete sentences. Well-nourished and well-developed. HEENT: Normocephalic and atraumatic. Pupils equal, round, and reactive to light. No redness or matting. Bilateral external ears are unremarkable. Nasal mucosa is pink without purulence. Oral mucosa is moist and pink. NECK: Supple with normal range of motion. No Kernig's or Brudzinski sign. No JVD. RESPIRATORY: Symmetric aeration. No respiratory distress. No wheeze, stridor, rales, rhonchi. Chest wall stable and nontender. CARDIOVASCULAR: Regular tachycardia, mild. No murmurs, rubs, or gallops. No central or peripheral cyanosis. GASTROINTESTINAL: Mildly protuberant and firm. Mild upper and epigastric tenderness with no McBurney's or Lee's point tenderness. No other focal tenderness. No involuntary guarding, rebound, or rigidity. No mass or pulsatile mass. Bowel sounds normal.  No CVA tenderness. NEUROLOGICAL: Awake, alert and oriented x 3. GCS 15. Cranial nerves III through XII are grossly intact as tested without facial droop or dermatomal paresthesias. Of note, forehead wrinkles are symmetric and intact. Conjugate gaze without entrapment. No asymmetry of the corners of the mouth or nasolabial folds. No gross motor or cerebellar deficits. Motor exam shows 5/5 strength in the bilateral trapezius, biceps, triceps, handgrip, quadriceps, psoas, dorsiflexors, and plantar flexors. No dermatomal paresthesias across the extremities. Slight tremor of the upper extremities when held in extension. No asterixis. BACK/MUSCULOSKELETAL: No asymmetric edema or calf tenderness. No Homans sign or cords. SKIN: Normal tone for ethnicity. Normal turgor and brisk capillary refill peripherally. PSYCHIATRIC: Normal mood. Mildly anxious affect. Emergency department course.   Patient is brought to bed 32 and assessed and reassessed by me. After initial evaluation, orders are placed for medical screening studies including CBC, metabolic panel, coagulation panel, ethanol, and ammonia among others. IV line is requested. Additional orders are placed for normal saline with vitamin infusion and lorazepam 1 mg IV. Patient does have mild tachycardia and a fine tremor with the hands outstretched. He denies any significant headache, though there is nausea and dry heaves. He denies any paresthesias or visual auditory hallucinations. He denies previous seizures, alcohol related or otherwise. Patient is agreeable to continuing plan. Case management has been consulted for potential rehabilitation facility placement. There have been some reassuring signs from [facility]. Additional dosage of lorazepam 1 mg is ordered for anxiety and restlessness. Patient has been assessed and reassessed on multiple occasions. He remains generally stable. There has been no seizure activity, psychosis, or developing neurological deficit. Lactate is improving with IV fluids, likely secondary to the recent alcohol consumption. Medical screening studies have not suggested higher risk of an infectious or septic process or surgical emergency or ischemia. Arrangements have been made for patient to be admitted to [facility]. We have discussed all available results. Patient is satisfied with evaluation and agreeable to recommendations. Patient has had the opportunity to ask questions, and they have been answered to the best of my ability. Instructions are given to follow-up with primary care provider for reevaluation and further testing. Very strict return and follow-up instructions are provided. Patient seen during [facility], I did don appropriate PPE during my encounters with the patient, including n95 (when appropriate) mask and eye protection as appropriate.     I have reviewed and interpreted all of the currently available lab results from this visit (if applicable):  Results for orders placed or performed during the hospital encounter of 05/03/22   CBC with Auto Differential   Result Value Ref Range    WBC 8.8 4.0 - 10.5 K/CU MM    RBC 3.50 (L) 4.6 - 6.2 M/CU MM    Hemoglobin 10.1 (L) 13.5 - 18.0 GM/DL    Hematocrit 31.3 (L) 42 - 52 %    MCV 89.4 78 - 100 FL    MCH 28.9 27 - 31 PG    MCHC 32.3 32.0 - 36.0 %    RDW 16.5 (H) 11.7 - 14.9 %    Platelets 82 (L) 875 - 440 K/CU MM    MPV 10.0 7.5 - 11.1 FL    Differential Type AUTOMATED DIFFERENTIAL     Segs Relative 58.2 36 - 66 %    Lymphocytes % 23.9 (L) 24 - 44 %    Monocytes % 13.4 (H) 0 - 4 %    Eosinophils % 1.7 0 - 3 %    Basophils % 1.5 (H) 0 - 1 %    Segs Absolute 5.1 K/CU MM    Lymphocytes Absolute 2.1 K/CU MM    Monocytes Absolute 1.2 K/CU MM    Eosinophils Absolute 0.2 K/CU MM    Basophils Absolute 0.1 K/CU MM    Nucleated RBC % 0.0 %    Total Nucleated RBC 0.0 K/CU MM    Total Immature Neutrophil 0.11 K/CU MM    Immature Neutrophil % 1.3 (H) 0 - 0.43 %   Hepatic Function Panel   Result Value Ref Range    Albumin 3.8 3.4 - 5.0 GM/DL    Total Bilirubin 2.8 (H) 0.0 - 1.0 MG/DL    Bilirubin, Direct 1.4 (H) 0.0 - 0.3 MG/DL    Bilirubin, Indirect 1.4 (H) 0 - 0.7 MG/DL    Alkaline Phosphatase 180 (H) 40 - 129 IU/L     (H) 15 - 37 IU/L    ALT 36 10 - 40 U/L    Total Protein 10.4 (H) 6.4 - 8.2 GM/DL   Protime-INR   Result Value Ref Range    Protime 17.0 (H) 11.7 - 14.5 SECONDS    INR 1.31 INDEX   APTT   Result Value Ref Range    aPTT 48.3 (H) 25.1 - 37.1 SECONDS   Urinalysis with Reflex to Culture    Specimen: Urine   Result Value Ref Range    Color, UA YELLOW (A) YELLOW    Clarity, UA CLEAR CLEAR    Glucose, Urine NEGATIVE NEGATIVE MG/DL    Bilirubin Urine NEGATIVE NEGATIVE MG/DL    Ketones, Urine NEGATIVE NEGATIVE MG/DL    Specific Gravity, UA 1.005 1.001 - 1.035    Blood, Urine LARGE NEGATIVE    pH, Urine 6.0 5.0 - 8.0    Protein,  MG/DL NEGATIVE MG/DL    Urobilinogen, Urine 0.2 0.2 - 1.0 MG/DL    Nitrite Urine, Quantitative NEGATIVE NEGATIVE    Leukocyte Esterase, Urine NEGATIVE NEGATIVE    RBC, UA 13 (H) 0 - 3 /HPF    WBC, UA 1 0 - 2 /HPF    Bacteria, UA RARE (A) NEGATIVE /HPF    Yeast, UA RARE /HPF    Squam Epithel, UA <1 /HPF    Mucus, UA RARE (A) NEGATIVE HPF    Trichomonas, UA NONE SEEN NONE SEEN /HPF   Lactic Acid   Result Value Ref Range    Lactate 3.4 (HH) 0.4 - 2.0 mMOL/L   Magnesium   Result Value Ref Range    Magnesium 1.5 (L) 1.8 - 2.4 mg/dl   Ammonia   Result Value Ref Range    Ammonia 88 (H) 16 - 60 UMOL/L   Ethanol   Result Value Ref Range    Alcohol Scrn 0.16 (H) <0.01 %WT/VOL   Lactic Acid   Result Value Ref Range    Lactate 2.3 (HH) 0.4 - 2.0 mMOL/L        Radiographs (if obtained):  Radiologist's Report Reviewed:  None indicated. Medical decision making:  Patient presents to the emergency department requesting assistance with alcohol detox and recovery. He did present with some elevated blood pressure, tachycardia, and a mild tremor. His last drink of alcohol was just prior to arrival.  Certainly consider developing withdrawal, but symptoms are more mild at this time. There has been no seizure activity, psychosis, or focal neurological deficits. Patient does have a mildly elevated lactate, likely due to the recent alcohol consumption and some volume contraction. Patient is medically clear and stable. Patient has no meningeal findings or behavioral changes to suggest higher risk of meningitis, encephalitis, cerebritis, or subarachnoid hemorrhage. I doubt acute coronary syndrome, myocardial infarction, or decompensating congestive heart failure. Patient is low risk for venous thromboembolic disease including pulmonary embolism. There is no asymmetric calf pain or swelling, sustained tachycardia, hypoxia, or cyanosis.   Abdominal exam does not suggest mesenteric ischemia, aortic catastrophe, urogenital, or other surgical emergency. There is no sign of other focal infection such as pneumonia or urinary tract infection including pyelonephritis. Sepsis is clinically unlikely. There is no hemodynamic instability, fever, hypoxia, cyanosis, or respiratory distress. There is no intractable vomiting. Blood pressure is elevated, but there has been no complaint of headache, chest pain or discomfort, difficulty breathing, or other symptoms to suggest endorgan injury. Recommendations are given to follow-up with primary care provider for long-term monitoring. Procedures: None. Consultations: Case management. Clinical Impression:  1. Acute alcoholic intoxication with complication ([facility]    2. Alcohol dependence with intoxication with complication ([facility]    3. Hypomagnesemia    4. Elevated blood pressure reading      Disposition referral (if applicable):  Edwin Wu MD  [address] [facility] [address]  [phone]    Schedule an appointment as soon as possible for a visit       [facility] Emergency Department  [address] [phone]  [phone]  Go to  As needed, If symptoms worsen    Disposition medications (if applicable):  Discharge Medication List as of 5/3/2022  7:35 PM        ED Provider Disposition Time  DISPOSITION Decision To Discharge 05/03/2022 08:20:08 PM      Comment: Please note this report has been produced using speech recognition software and may contain errors related to that system including errors in grammar, punctuation, and spelling, as well as words and phrases that may be inappropriate. Efforts were made to edit the dictations.         Jona Cruz MD  05/04/22 [id number]

## 2022-05-03 NOTE — ED NOTES
End of shift report given to Jose Calhoun [facility].       Peyton Garcia RN  05/03/22 5891

## 2022-05-03 NOTE — ED NOTES
Patient to the bathroom, urine sample provided and sent to lab.       Lashon Olvera RN  05/03/22 3998

## 2022-05-03 NOTE — LETTER
[facility] Emergency Department  [address] [id number]  Phone: [phone]  Fax: [phone]               May 3, 2022    Patient: Salomón Dimas   YOB: 1980   Date of Visit: 5/3/2022       To Whom It May Concern:    Onofre Marcus was seen and treated in our emergency department on 5/3/2022. He {Return to school/sport/work:47162}.       Sincerely,       Nuno Sun RN         Signature:__________________________________

## 2022-05-04 NOTE — ED NOTES
This RN explained discharge instructions to pt regarding inpatient treatment to [facility] for alcohol detox. Pt verbalizes understanding. This RN wrote both Rhonda Serrato's phone number and address on top of patients discharge and follow up paperwork. Pt verbalizes understanding of how to get in touch with and get to facility.       Otf Lopez RN  05/03/22 2019

## 2022-05-05 ENCOUNTER — HOSPITAL ENCOUNTER (OUTPATIENT)
Dept: WOUND CARE | Age: 42
Discharge: HOME | End: 2022-05-05
Payer: COMMERCIAL

## 2022-05-05 VITALS
DIASTOLIC BLOOD PRESSURE: 93 MMHG | SYSTOLIC BLOOD PRESSURE: 173 MMHG | TEMPERATURE: 98 F | HEART RATE: 110 BPM | RESPIRATION RATE: 18 BRPM

## 2022-05-05 DIAGNOSIS — E11.40 TYPE 2 DIABETES, CONTROLLED, WITH NEUROPATHY (HCC): ICD-10-CM

## 2022-05-05 DIAGNOSIS — L97.522 NON-PRESSURE CHRONIC ULCER OF OTHER PART OF LEFT FOOT WITH FAT LAYER EXPOSED (HCC): Primary | ICD-10-CM

## 2022-05-05 LAB
EKG ATRIAL RATE: 110 BPM
EKG DIAGNOSIS: NORMAL
EKG P AXIS: 63 DEGREES
EKG P-R INTERVAL: 150 MS
EKG Q-T INTERVAL: 370 MS
EKG QRS DURATION: 100 MS
EKG QTC CALCULATION (BAZETT): 500 MS
EKG R AXIS: 27 DEGREES
EKG T AXIS: 54 DEGREES
EKG VENTRICULAR RATE: 110 BPM

## 2022-05-05 PROCEDURE — 93010 ELECTROCARDIOGRAM REPORT: CPT | Performed by: INTERNAL MEDICINE

## 2022-05-05 PROCEDURE — 11042 DBRDMT SUBQ TIS 1ST 20SQCM/<: CPT | Performed by: NURSE PRACTITIONER

## 2022-05-05 PROCEDURE — 11042 DBRDMT SUBQ TIS 1ST 20SQCM/<: CPT

## 2022-05-05 RX ORDER — LIDOCAINE HYDROCHLORIDE 40 MG/ML
SOLUTION TOPICAL ONCE
Status: CANCELLED | OUTPATIENT
Start: 2022-05-05 | End: 2022-05-05

## 2022-05-05 RX ORDER — LIDOCAINE HYDROCHLORIDE 20 MG/ML
JELLY TOPICAL ONCE
Status: CANCELLED | OUTPATIENT
Start: 2022-05-05 | End: 2022-05-05

## 2022-05-05 RX ORDER — BACITRACIN ZINC AND POLYMYXIN B SULFATE 500; 1000 [USP'U]/G; [USP'U]/G
OINTMENT TOPICAL ONCE
Status: CANCELLED | OUTPATIENT
Start: 2022-05-05 | End: 2022-05-05

## 2022-05-05 RX ORDER — BACITRACIN, NEOMYCIN, POLYMYXIN B 400; 3.5; 5 [USP'U]/G; MG/G; [USP'U]/G
OINTMENT TOPICAL ONCE
Status: CANCELLED | OUTPATIENT
Start: 2022-05-05 | End: 2022-05-05

## 2022-05-05 RX ORDER — LIDOCAINE 50 MG/G
OINTMENT TOPICAL ONCE
Status: CANCELLED | OUTPATIENT
Start: 2022-05-05 | End: 2022-05-05

## 2022-05-05 RX ORDER — GENTAMICIN SULFATE 1 MG/G
OINTMENT TOPICAL ONCE
Status: CANCELLED | OUTPATIENT
Start: 2022-05-05 | End: 2022-05-05

## 2022-05-05 RX ORDER — BETAMETHASONE DIPROPIONATE 0.05 %
OINTMENT (GRAM) TOPICAL ONCE
Status: CANCELLED | OUTPATIENT
Start: 2022-05-05 | End: 2022-05-05

## 2022-05-05 RX ORDER — CLOBETASOL PROPIONATE 0.5 MG/G
OINTMENT TOPICAL ONCE
Status: CANCELLED | OUTPATIENT
Start: 2022-05-05 | End: 2022-05-05

## 2022-05-05 RX ORDER — LIDOCAINE 40 MG/G
CREAM TOPICAL ONCE
Status: CANCELLED | OUTPATIENT
Start: 2022-05-05 | End: 2022-05-05

## 2022-05-05 RX ORDER — GINSENG 100 MG
CAPSULE ORAL ONCE
Status: CANCELLED | OUTPATIENT
Start: 2022-05-05 | End: 2022-05-05

## 2022-05-05 ASSESSMENT — PAIN - FUNCTIONAL ASSESSMENT: PAIN_FUNCTIONAL_ASSESSMENT: PREVENTS OR INTERFERES SOME ACTIVE ACTIVITIES AND ADLS

## 2022-05-05 ASSESSMENT — PAIN DESCRIPTION - ORIENTATION: ORIENTATION: LEFT

## 2022-05-05 ASSESSMENT — PAIN DESCRIPTION - LOCATION: LOCATION: TOE (COMMENT WHICH ONE)

## 2022-05-05 ASSESSMENT — PAIN SCALES - GENERAL: PAINLEVEL_OUTOF10: 2

## 2022-05-05 ASSESSMENT — PAIN DESCRIPTION - FREQUENCY: FREQUENCY: INTERMITTENT

## 2022-05-05 ASSESSMENT — PAIN DESCRIPTION - ONSET: ONSET: ON-GOING

## 2022-05-05 ASSESSMENT — PAIN DESCRIPTION - DESCRIPTORS: DESCRIPTORS: ACHING

## 2022-05-05 NOTE — PROGRESS NOTES
Wound Care Center Progress Note With Procedure    Desi Fisher  AGE: 46 y.o. GENDER: male  : 1980  EPISODE DATE:  2022     Subjective:     Chief Complaint   Patient presents with    Wound Check     left great toe          HISTORY of PRESENT ILLNESS     Desi Fisher is a 46 y.o. male who presents today for wound evaluation of the left foot. I saw patient in the hospital last week for concern of infection to the area. He was discharged on oral antibiotics which he completed. Diabetes: History of, not currently on medication, last A1c was 5.1 as of 22. Does have neuropathy. Smoking: Never smoker  Obesity: Yes  Anticoagulant therapy: No  Immunosuppression: No    Patient educated on the 6 essential components necessary for wound healing: Circulation, Debridements, Proper Dressings and Topical Wound Products, Infection Control, Edema Control and Offloading. Patient educated on those factors that negatively effect or impact wound healing: smoking, obesity, uncontrolled diabetes, anticoagulant and immunosuppressive regimens, inadequate nutrition, untreated arterial and venous disease if applicable and measures to manage edema. PAST MEDICAL HISTORY        Diagnosis Date    Hypercholesterolemia 2019    Hypertension     Type 2 diabetes, controlled, with neuropathy ([facility] 2018       PAST SURGICAL HISTORY    History reviewed. No pertinent surgical history. FAMILY HISTORY    Family History   Problem Relation Age of Onset    High Blood Pressure Sister        SOCIAL HISTORY    Social History     Tobacco Use    Smoking status: Never Smoker    Smokeless tobacco: Never Used   Substance Use Topics    Alcohol use:  Yes     Alcohol/week: 12.0 standard drinks     Types: 12 Standard drinks or equivalent per week     Comment: 6 drinks daily    Drug use: No       ALLERGIES    Allergies   Allergen Reactions    Paxil [Paroxetine Hcl]      Caused \"weird\" things to happen in his sleep MEDICATIONS    Current Outpatient Medications on File Prior to Encounter   Medication Sig Dispense Refill    ibuprofen (ADVIL;MOTRIN) 800 MG tablet Take 1 tablet by mouth 2 times daily as needed for Pain 20 tablet 0    ibuprofen (ADVIL;MOTRIN) 600 MG tablet Take 1 tablet by mouth 3 times daily (with meals) 40 tablet 0    pregabalin (LYRICA) 100 MG capsule TAKE 1 CAPSULE BY MOUTH TWICE DAILY 180 capsule 1    busPIRone (BUSPAR) 15 MG tablet TAKE 1 TABLET BY MOUTH THREE TIMES DAILY 270 tablet 1    traZODone (DESYREL) 50 MG tablet Take 1-2 tablets by mouth nightly as needed for Sleep 60 tablet 11    glucose blood VI test strips (GLUCOSE METER TEST) strip 1 each by In Vitro route daily As needed. 100 each 3    glucose monitoring kit (FREESTYLE) monitoring kit 1 kit by Does not apply route daily as needed (If blood sugars running high or low) 1 kit 0    MICROLET LANCETS MISC 1 each by Does not apply route daily 100 each 3     No current facility-administered medications on file prior to encounter. REVIEW OF SYSTEMS    Pertinent items are noted in HPI. Constitutional: Negative for systemic symptoms including fever, chills and malaise. Objective:      BP (!) 173/93   Pulse 110   Temp 98 °F (36.7 °C) (Temporal)   Resp 18     PHYSICAL EXAM    General: The patient is in no acute distress. Mental status:  Patient is appropriate, is  oriented to place and plan of care.   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 Performed by: TEE Viera CNP    Consent obtained: Yes    Time out taken:  Yes    Pain Control: Anesthetic  Anesthetic: 4% Lidocaine Liquid Topical        Debridement:Excisional Debridement    Using curette the wound(s) was/were sharply debrided down through and including the removal of subcutaneous tissue.         Devitalized Tissue Debrided:  slough and exudate    Pre Debridement Measurements:  Are located in the Wound Documentation Flow Sheet    All active wounds listed below with today's date are evaluated  Wound(s)    debrided this date include # : 1     Post  Debridement Measurements:  Wound 02/19/22 Toe (Comment  which one) Left #1 (onset 2 months) Left Great Toe Plantar   (Active)   Wound Image   04/19/22 1323   Wound Etiology Diabetic 05/05/22 0902   Dressing Status New dressing applied 05/05/22 0927   Wound Cleansed Wound cleanser 05/05/22 0902   Dressing/Treatment Collagen;Silicone border 82/23/61 1030   Offloading for Diabetic Foot Ulcers Post op shoe 05/05/22 0902   Wound Length (cm) 0.8 cm 05/05/22 0902   Wound Width (cm) 0.8 cm 05/05/22 0902   Wound Depth (cm) 0.1 cm 05/05/22 0902   Wound Surface Area (cm^2) 0.64 cm^2 05/05/22 0902   Change in Wound Size % (l*w) 36 05/05/22 0902   Wound Volume (cm^3) 0.064 cm^3 05/05/22 0902   Wound Healing % 36 05/05/22 0902   Post-Procedure Length (cm) 0.8 cm 05/05/22 0921   Post-Procedure Width (cm) 0.8 cm 05/05/22 0921   Post-Procedure Depth (cm) 0.1 cm 05/05/22 0921   Post-Procedure Surface Area (cm^2) 0.64 cm^2 05/05/22 0921   Post-Procedure Volume (cm^3) 0.064 cm^3 05/05/22 0921   Distance Tunneling (cm) 0 cm 05/05/22 0902   Tunneling Position ___ O'Clock 0 05/05/22 0902   Undermining Starts ___ O'Clock 0 05/05/22 0902   Undermining Ends___ O'Clock 0 05/05/22 0902   Undermining Maxium Distance (cm) 0 05/05/22 0902   Wound Assessment Pink/red 05/05/22 0902   Drainage Amount Moderate 05/05/22 0902   Drainage Description Serosanguinous 05/05/22 0902   Odor None 05/05/22 0902   Saira-wound Assessment Hyperkeratosis (callous) 05/05/22 0902   Margins Defined edges; Unattached edges 05/05/22 0902   Wound Thickness Description not for Pressure Injury Full thickness 05/05/22 0902   Number of days: 74       Percent of Wound(s) Debrided: approximately 100%    Total  Area  Debrided:  0.64 sq cm     Bleeding:  Minimal    Hemostasis Achieved:  by pressure    Procedural Pain:  0  / 10     Post Procedural Pain:  0 / 10     Response to treatment:  Well tolerated by patient. Status of wound progress and description from last visit: Stable, need to return to work. Will follow up next week. Plan:       Discharge Instructions       PHYSICIAN ORDERS AND DISCHARGE INSTRUCTIONS     NOTE: Upon discharge from the [address], you will receive a patient experience survey. We would be grateful if you would take the time to fill this survey out.     Wound care order history:                 Vascular studies: arterial studies  Date 2/21/22              Imaging:   Date               Cultures:   Date               Grafts:  Date               Antibiotics:               Earlier Wound care treatments:                           Primary care physician:      Continuing wound care orders and information:              Residence:               Continue home health care with:               Wound Medications:              Wound cleansing:                           Do not scrub or use excessive force. Wash hands with soap and water before and after dressing changes. Prior to applying a clean dressing, cleanse wound with normal saline,                                wound cleanser, or mild soap and water.                            Ask the physician or nurse before getting the wound(s) wet in a shower              Daily Wound management:                          Keep weight off wounds and reposition every 2 hours. Avoid standing for long periods of time. Apply wraps/stockings in AM and remove at bedtime. If swelling is present, elevate legs to the level of the heart or above for 30 minutes 4-5 times a day and/or when sitting. When taking antibiotics take entire prescription as ordered by physician do not stop taking until medicine is all gone.                                                       Orders for this week: 2022                   Left Great Toe -- wash with soap and water, pat dry. Apply Sulfamylon in clinic (may use cream provided by podiatrist at home) and stimulen to wound bed. Cover ca alginate and secure with narrow Coban. Change daily.         Rx:  Consult:  Central [phone]     Follow up with Barrera Tatum CNP in 1 week in the wound care center. Call [phone] for any questions or concerns.   Date__________   Time____________        Treatment Note Wound 22 Toe (Comment  which one) Left #1 (onset 2 months) Left Great Toe Plantar  -Dressing/Treatment:  (sulfamylon,ca alginate, coban)    Written Patient Dismissal Instructions Given            Electronically signed by TEE Trinidad CNP on 2022 at 10:23 AM

## 2022-05-05 NOTE — LETTER
[facility]  [address] [phone]  Phone: [phone] [facility], APRN - JOY        May 5, 2022     Patient: Chantell Gonsalez   YOB: 1980   Date of Visit: 5/5/2022       To Whom It May Concern:     Mau Tejada has been seen in the [facility] since 4/19/22 and is to remain off work through 5/9/22. Mary Nahed may return to work on 5/10/22 with no restrictions. If you have any questions or concerns, please don't hesitate to call.     Sincerely,        Brayden Hood, TEE - JOY

## 2022-05-11 RX ORDER — IBUPROFEN 800 MG/1
TABLET ORAL
Qty: 90 TABLET | OUTPATIENT
Start: 2022-05-11

## 2022-05-11 RX ORDER — NAPROXEN 500 MG/1
TABLET ORAL
Qty: 60 TABLET | Refills: 0 | OUTPATIENT
Start: 2022-05-11

## 2022-05-13 ENCOUNTER — HOSPITAL ENCOUNTER (OUTPATIENT)
Dept: WOUND CARE | Age: 42
Discharge: HOME | End: 2022-05-13
Payer: COMMERCIAL

## 2022-05-13 VITALS
SYSTOLIC BLOOD PRESSURE: 135 MMHG | RESPIRATION RATE: 20 BRPM | DIASTOLIC BLOOD PRESSURE: 86 MMHG | HEART RATE: 103 BPM | TEMPERATURE: 98.9 F

## 2022-05-13 DIAGNOSIS — L08.9 LOCAL INFECTION OF SKIN AND SUBCUTANEOUS TISSUE: ICD-10-CM

## 2022-05-13 DIAGNOSIS — E11.40 TYPE 2 DIABETES, CONTROLLED, WITH NEUROPATHY (HCC): ICD-10-CM

## 2022-05-13 DIAGNOSIS — L97.522 NON-PRESSURE CHRONIC ULCER OF OTHER PART OF LEFT FOOT WITH FAT LAYER EXPOSED (HCC): Primary | ICD-10-CM

## 2022-05-13 DIAGNOSIS — L97.522 DIABETIC ULCER OF TOE OF LEFT FOOT ASSOCIATED WITH TYPE 2 DIABETES MELLITUS, WITH FAT LAYER EXPOSED (HCC): ICD-10-CM

## 2022-05-13 DIAGNOSIS — E11.621 DIABETIC ULCER OF TOE OF LEFT FOOT ASSOCIATED WITH TYPE 2 DIABETES MELLITUS, WITH FAT LAYER EXPOSED (HCC): ICD-10-CM

## 2022-05-13 PROCEDURE — 87070 CULTURE OTHR SPECIMN AEROBIC: CPT

## 2022-05-13 PROCEDURE — 87077 CULTURE AEROBIC IDENTIFY: CPT

## 2022-05-13 PROCEDURE — 11042 DBRDMT SUBQ TIS 1ST 20SQCM/<: CPT

## 2022-05-13 PROCEDURE — 6370000000 HC RX 637 (ALT 250 FOR IP): Performed by: STUDENT IN AN ORGANIZED HEALTH CARE EDUCATION/TRAINING PROGRAM

## 2022-05-13 PROCEDURE — 87075 CULTR BACTERIA EXCEPT BLOOD: CPT

## 2022-05-13 RX ORDER — LIDOCAINE 50 MG/G
OINTMENT TOPICAL ONCE
Status: COMPLETED | OUTPATIENT
Start: 2022-05-13 | End: 2022-05-13

## 2022-05-13 RX ORDER — CLOBETASOL PROPIONATE 0.5 MG/G
OINTMENT TOPICAL ONCE
Status: CANCELLED | OUTPATIENT
Start: 2022-05-13 | End: 2022-05-13

## 2022-05-13 RX ORDER — LIDOCAINE HYDROCHLORIDE 20 MG/ML
JELLY TOPICAL ONCE
Status: CANCELLED | OUTPATIENT
Start: 2022-05-13 | End: 2022-05-13

## 2022-05-13 RX ORDER — BACITRACIN, NEOMYCIN, POLYMYXIN B 400; 3.5; 5 [USP'U]/G; MG/G; [USP'U]/G
OINTMENT TOPICAL ONCE
Status: CANCELLED | OUTPATIENT
Start: 2022-05-13 | End: 2022-05-13

## 2022-05-13 RX ORDER — GINSENG 100 MG
CAPSULE ORAL ONCE
Status: CANCELLED | OUTPATIENT
Start: 2022-05-13 | End: 2022-05-13

## 2022-05-13 RX ORDER — LIDOCAINE HYDROCHLORIDE 40 MG/ML
SOLUTION TOPICAL ONCE
Status: CANCELLED | OUTPATIENT
Start: 2022-05-13 | End: 2022-05-13

## 2022-05-13 RX ORDER — BETAMETHASONE DIPROPIONATE 0.05 %
OINTMENT (GRAM) TOPICAL ONCE
Status: CANCELLED | OUTPATIENT
Start: 2022-05-13 | End: 2022-05-13

## 2022-05-13 RX ORDER — GENTAMICIN SULFATE 1 MG/G
OINTMENT TOPICAL ONCE
Status: CANCELLED | OUTPATIENT
Start: 2022-05-13 | End: 2022-05-13

## 2022-05-13 RX ORDER — LIDOCAINE 40 MG/G
CREAM TOPICAL ONCE
Status: CANCELLED | OUTPATIENT
Start: 2022-05-13 | End: 2022-05-13

## 2022-05-13 RX ORDER — LIDOCAINE 50 MG/G
OINTMENT TOPICAL ONCE
Status: CANCELLED | OUTPATIENT
Start: 2022-05-13 | End: 2022-05-13

## 2022-05-13 RX ORDER — BACITRACIN ZINC AND POLYMYXIN B SULFATE 500; 1000 [USP'U]/G; [USP'U]/G
OINTMENT TOPICAL ONCE
Status: CANCELLED | OUTPATIENT
Start: 2022-05-13 | End: 2022-05-13

## 2022-05-13 RX ADMIN — LIDOCAINE: 50 OINTMENT TOPICAL at 15:58

## 2022-05-13 ASSESSMENT — PAIN SCALES - GENERAL: PAINLEVEL_OUTOF10: 7

## 2022-05-13 ASSESSMENT — PAIN DESCRIPTION - ONSET: ONSET: ON-GOING

## 2022-05-13 ASSESSMENT — PAIN DESCRIPTION - ORIENTATION: ORIENTATION: LEFT

## 2022-05-13 ASSESSMENT — PAIN - FUNCTIONAL ASSESSMENT: PAIN_FUNCTIONAL_ASSESSMENT: PREVENTS OR INTERFERES SOME ACTIVE ACTIVITIES AND ADLS

## 2022-05-13 ASSESSMENT — PAIN DESCRIPTION - LOCATION: LOCATION: TOE (COMMENT WHICH ONE)

## 2022-05-13 ASSESSMENT — PAIN DESCRIPTION - DESCRIPTORS: DESCRIPTORS: THROBBING

## 2022-05-13 ASSESSMENT — PAIN DESCRIPTION - PAIN TYPE: TYPE: ACUTE PAIN

## 2022-05-13 ASSESSMENT — PAIN DESCRIPTION - FREQUENCY: FREQUENCY: CONTINUOUS

## 2022-05-13 NOTE — PROGRESS NOTES
Wound Care Center Progress Note With Procedure    James Schulte  AGE: 46 y.o. GENDER: male  : 1980  EPISODE DATE:  2022     Subjective:     Chief Complaint   Patient presents with    Wound Check     left foot         HISTORY of PRESENT ILLNESS      James Schulte is a 46 y.o. male who presents today for wound evaluation of Chronic diabetic ulcer(s) of the left toe. The ulcer is of moderate severity. The underlying cause of the wound is pressure/callous. He is in for f/u- he complains of pain in the toe, he also notes a blister on the toe which he just noticed. I discuss with him that I am concerned for infection and will take a culture and start doxycycline. I discuss the need for off-loading- he is on his foot at work and wears steel-toed boots- he thinks he is not allowed to take more time off work and not allowed to wear a TCC to work. I want him to check on this. I show him how to modify his walking to keep pressure off. Wound Pain Timing/Severity: constant  Quality of pain: tender  Severity of pain:  3  10   Modifying Factors: edema and diabetes  Associated Signs/Symptoms: erythema and drainage        PAST MEDICAL HISTORY        Diagnosis Date    Hypercholesterolemia 2019    Hypertension     Local infection of skin and subcutaneous tissue 2022    Type 2 diabetes, controlled, with neuropathy ([address] 2018    WD-Diabetic ulcer of toe of left foot associated with type 2 diabetes mellitus, with fat layer exposed ([address] 2022       PAST SURGICAL HISTORY    History reviewed. No pertinent surgical history. FAMILY HISTORY    Family History   Problem Relation Age of Onset    High Blood Pressure Sister        SOCIAL HISTORY    Social History     Tobacco Use    Smoking status: Never Smoker    Smokeless tobacco: Never Used   Substance Use Topics    Alcohol use:  Yes     Alcohol/week: 12.0 standard drinks     Types: 12 Standard drinks or equivalent per week     Comment: 6 drinks daily    Drug use: No       ALLERGIES    Allergies   Allergen Reactions    Paxil [Paroxetine Hcl]      Caused \"weird\" things to happen in his sleep       MEDICATIONS    Current Outpatient Medications on File Prior to Encounter   Medication Sig Dispense Refill    ibuprofen (ADVIL;MOTRIN) 800 MG tablet Take 1 tablet by mouth 2 times daily as needed for Pain 20 tablet 0    ibuprofen (ADVIL;MOTRIN) 600 MG tablet Take 1 tablet by mouth 3 times daily (with meals) 40 tablet 0    pregabalin (LYRICA) 100 MG capsule TAKE 1 CAPSULE BY MOUTH TWICE DAILY 180 capsule 1    busPIRone (BUSPAR) 15 MG tablet TAKE 1 TABLET BY MOUTH THREE TIMES DAILY 270 tablet 1    traZODone (DESYREL) 50 MG tablet Take 1-2 tablets by mouth nightly as needed for Sleep 60 tablet 11    glucose blood VI test strips (GLUCOSE METER TEST) strip 1 each by In Vitro route daily As needed. 100 each 3    glucose monitoring kit (FREESTYLE) monitoring kit 1 kit by Does not apply route daily as needed (If blood sugars running high or low) 1 kit 0    MICROLET LANCETS MISC 1 each by Does not apply route daily 100 each 3     No current facility-administered medications on file prior to encounter. REVIEW OF SYSTEMS    Pertinent items are noted in HPI. Constitutional: Negative for systemic symptoms including fever, chills and malaise. Objective:      /86   Pulse 103   Temp 98.9 °F (37.2 °C) (Temporal)   Resp 20     PHYSICAL EXAM       General: The patient is in no acute distress. Mental status:  Patient is appropriate, is  oriented to place and plan of care.   Dermatologic exam: Visual inspection of the periwound reveals the skin to be normal in turgor and texture  Wound exam: see wound description below in procedure note      Assessment:     Problem List Items Addressed This Visit     WD-Diabetic ulcer of toe of left foot associated with type 2 diabetes mellitus, with fat layer exposed ([address]    Relevant Orders    Culture, Wound Local infection of skin and subcutaneous tissue    Relevant Orders    Culture, Wound    Type 2 diabetes, controlled, with neuropathy ([address]    Relevant Orders    Initiate Outpatient Wound Care Protocol    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 Performed by: Morales Duffy MD    Consent obtained: Yes    Time out taken:  Yes    Pain Control: Anesthetic  Anesthetic: 5% Lidocaine Ointment Topical     Debridement:Excisional Debridement    Using curette the wound(s) was/were sharply debrided down through and including the removal of subcutaneous tissue.         Devitalized Tissue Debrided:  fibrin, biofilm, slough and callus    Pre Debridement Measurements:  Are located in the Wound Documentation Flow Sheet    All active wounds listed below with today's date are evaluated  Wound(s)    debrided this date include # : 1     Post  Debridement Measurements:  Wound 02/19/22 Toe (Comment  which one) Left #1 (onset 2 months) Left Great Toe Plantar   (Active)   Wound Image   04/19/22 1323   Wound Etiology Diabetic 05/13/22 1553   Dressing Status New dressing applied 05/05/22 0927   Wound Cleansed Wound cleanser 05/13/22 1553   Dressing/Treatment Collagen;Silicone border 39/52/42 1030   Offloading for Diabetic Foot Ulcers Post op shoe 05/05/22 0902   Wound Length (cm) 0.7 cm 05/13/22 1553   Wound Width (cm) 0.6 cm 05/13/22 1553   Wound Depth (cm) 0.2 cm 05/13/22 1553   Wound Surface Area (cm^2) 0.42 cm^2 05/13/22 1553   Change in Wound Size % (l*w) 58 05/13/22 1553   Wound Volume (cm^3) 0.084 cm^3 05/13/22 1553   Wound Healing % 16 05/13/22 1553   Post-Procedure Length (cm) 0.7 cm 05/13/22 1616   Post-Procedure Width (cm) 0.6 cm 05/13/22 1616   Post-Procedure Depth (cm) 0.2 cm 05/13/22 1616   Post-Procedure Surface Area (cm^2) 0.42 cm^2 05/13/22 1616   Post-Procedure Volume (cm^3) 0.084 cm^3 05/13/22 1616   Distance Tunneling (cm) 0 cm 05/13/22 1553   Tunneling Position ___ O'Clock 0 05/13/22 1553   Undermining Starts ___ O'Clock 0 05/13/22 1553   Undermining Ends___ O'Clock 0 05/13/22 1553   Undermining Maxium Distance (cm) 0 05/13/22 1553   Wound Assessment Pink/red 05/13/22 1553   Drainage Amount Scant 05/13/22 1553   Drainage Description Brown 05/13/22 1553   Odor None 05/13/22 1553   Saira-wound Assessment Hyperkeratosis (callous) 05/13/22 1553   Margins Defined edges 05/13/22 1553   Wound Thickness Description not for Pressure Injury Full thickness 05/13/22 1553   Number of days: 82       Percent of Wound(s) Debrided: approximately 100%    Total  Area  Debrided:  0.42 sq cm     Bleeding:  Minimal    Hemostasis Achieved:  by pressure    Procedural Pain:  1  / 10     Post Procedural Pain:  1 / 10     Response to treatment:  Well tolerated by patient. Status of wound progress and description from last visit:   Looks infected. He needs off-loading and antibiotics. .      Plan:       Discharge Instructions       PHYSICIAN ORDERS AND DISCHARGE INSTRUCTIONS     NOTE: Upon discharge from the [address], you will receive a patient experience survey.  We would be grateful if you would take the time to fill this survey out.     Wound care order history:                 Vascular studies: arterial studies  Date 2/21/22              Imaging:   Date               Cultures:   Obtained on 5/13/2022              Grafts:  Date               Antibiotics:  Doxycycline 100 mg BID for 10 days ordered on 5/13/2022              Earlier Wound care treatments:                           Primary care physician:      Continuing wound care orders and information:              Residence:               Continue home health care with:               Wound Medications:              GXLEV cleansing:                           LW not scrub or use excessive force.                          Wash hands with soap and water before and after dressing changes.                         Prior to applying a clean dressing, cleanse wound with normal saline,                                wound cleanser, or mild soap and water.                           Ask the physician or nurse before getting the wound(s) wet in a shower              Daily Wound management:                          Keep weight off wounds and reposition every 2 hours.                          Avoid standing for long periods of time.                          MHUXG wraps/stockings in AM and remove at bedtime.                          If swelling is present, elevate legs to the level of the heart or above for 30 minutes 4-5 times a day and/or when sitting.                                             When taking antibiotics take entire prescription as ordered by physician do not stop taking until medicine is all gone.                                                       Orders for this week: 22     Left Great Toe -- wash with soap and water, pat dry. Apply Hydrofera blue  Cut to size and damp with saline    Cover with gentac border   Secure with narrow Coban. Change daily. (may use cream provided by podiatrist at home)    Pharm: [facility]         Follow up Instructions: at the [facility] in 1 week   Primary Wound Care Provider: Dr Mercy Perez   Call  for any questions or concerns.   Date__________   Time____________  Central [phone]        Treatment Note      Written Patient Dismissal Instructions Given            Electronically signed by Sumaya Donaldson MD on 2022 at 4:31 PM

## 2022-05-18 LAB
CULTURE: ABNORMAL
CULTURE: ABNORMAL
SPECIMEN: ABNORMAL

## 2022-05-20 ENCOUNTER — HOSPITAL ENCOUNTER (OUTPATIENT)
Dept: WOUND CARE | Age: 42
Discharge: HOME | End: 2022-05-20

## 2022-05-27 ENCOUNTER — HOSPITAL ENCOUNTER (OUTPATIENT)
Dept: WOUND CARE | Age: 42
Discharge: HOME | End: 2022-05-27
Payer: COMMERCIAL

## 2022-05-27 VITALS
TEMPERATURE: 99.8 F | DIASTOLIC BLOOD PRESSURE: 95 MMHG | RESPIRATION RATE: 18 BRPM | HEART RATE: 98 BPM | SYSTOLIC BLOOD PRESSURE: 163 MMHG

## 2022-05-27 DIAGNOSIS — L97.522 DIABETIC ULCER OF TOE OF LEFT FOOT ASSOCIATED WITH TYPE 2 DIABETES MELLITUS, WITH FAT LAYER EXPOSED (HCC): Primary | ICD-10-CM

## 2022-05-27 DIAGNOSIS — E11.621 DIABETIC ULCER OF TOE OF LEFT FOOT ASSOCIATED WITH TYPE 2 DIABETES MELLITUS, WITH FAT LAYER EXPOSED (HCC): Primary | ICD-10-CM

## 2022-05-27 PROCEDURE — 11042 DBRDMT SUBQ TIS 1ST 20SQCM/<: CPT | Performed by: NURSE PRACTITIONER

## 2022-05-27 PROCEDURE — 11042 DBRDMT SUBQ TIS 1ST 20SQCM/<: CPT

## 2022-05-27 RX ORDER — IBUPROFEN 800 MG/1
800 TABLET ORAL 2 TIMES DAILY PRN
Qty: 60 TABLET | Refills: 0 | Status: SHIPPED | OUTPATIENT
Start: 2022-05-27

## 2022-05-27 RX ORDER — LIDOCAINE 50 MG/G
OINTMENT TOPICAL
Qty: 50 G | Refills: 2 | Status: SHIPPED | OUTPATIENT
Start: 2022-05-27

## 2022-05-27 RX ORDER — OXYCODONE HYDROCHLORIDE AND ACETAMINOPHEN 5; 325 MG/1; MG/1
1 TABLET ORAL DAILY PRN
Qty: 7 TABLET | Refills: 0 | Status: SHIPPED | OUTPATIENT
Start: 2022-05-27 | End: 2022-06-03

## 2022-05-27 ASSESSMENT — PAIN DESCRIPTION - LOCATION: LOCATION: FOOT

## 2022-05-27 ASSESSMENT — PAIN DESCRIPTION - FREQUENCY: FREQUENCY: INTERMITTENT

## 2022-05-27 ASSESSMENT — PAIN SCALES - GENERAL: PAINLEVEL_OUTOF10: 8

## 2022-05-27 ASSESSMENT — PAIN DESCRIPTION - ONSET: ONSET: ON-GOING

## 2022-05-27 ASSESSMENT — PAIN DESCRIPTION - PAIN TYPE: TYPE: CHRONIC PAIN

## 2022-05-27 ASSESSMENT — PAIN DESCRIPTION - ORIENTATION: ORIENTATION: LEFT

## 2022-05-27 ASSESSMENT — PAIN - FUNCTIONAL ASSESSMENT: PAIN_FUNCTIONAL_ASSESSMENT: PREVENTS OR INTERFERES SOME ACTIVE ACTIVITIES AND ADLS

## 2022-05-27 ASSESSMENT — PAIN DESCRIPTION - DESCRIPTORS: DESCRIPTORS: SHOOTING

## 2022-05-27 NOTE — PROGRESS NOTES
Wound Care Center Progress Note With Procedure    Cathleen Garcia  AGE: 46 y.o. GENDER: male  : 1980  EPISODE DATE:  2022     Subjective:     Chief Complaint   Patient presents with    Wound Check     left gt. toe         HISTORY of PRESENT ILLNESS     Mayra Lou a 46 y. o. male who presents today for wound evaluation of the left foot.  I saw patient in the hospital last week for concern of infection to the area. Cristina was discharged on oral antibiotics which he completed.      Patient reports not much improvement and increased pain. Has been changing dressing twice a day, but works and is on it constantly. Asking for some pain medication. May consider making some changes to plan of care considering progress     Patient educated on those factors that negatively effect or impact wound healing: smoking, obesity, uncontrolled diabetes, anticoagulant and immunosuppressive regimens, inadequate nutrition, untreated arterial and venous disease if applicable and measures to manage edema. PAST MEDICAL HISTORY        Diagnosis Date    Hypercholesterolemia 2019    Hypertension     Local infection of skin and subcutaneous tissue 2022    Type 2 diabetes, controlled, with neuropathy ([address] 2018    WD-Diabetic ulcer of toe of left foot associated with type 2 diabetes mellitus, with fat layer exposed ([address] 2022       PAST SURGICAL HISTORY    History reviewed. No pertinent surgical history. FAMILY HISTORY    Family History   Problem Relation Age of Onset    High Blood Pressure Sister        SOCIAL HISTORY    Social History     Tobacco Use    Smoking status: Never Smoker    Smokeless tobacco: Never Used   Substance Use Topics    Alcohol use:  Yes     Alcohol/week: 12.0 standard drinks     Types: 12 Standard drinks or equivalent per week     Comment: 6 drinks daily    Drug use: No       ALLERGIES    Allergies   Allergen Reactions    Paxil [Paroxetine Hcl]      Caused \"weird\" things to happen in his sleep       MEDICATIONS    Current Outpatient Medications on File Prior to Encounter   Medication Sig Dispense Refill    ibuprofen (ADVIL;MOTRIN) 800 MG tablet Take 1 tablet by mouth 2 times daily as needed for Pain 20 tablet 0    ibuprofen (ADVIL;MOTRIN) 600 MG tablet Take 1 tablet by mouth 3 times daily (with meals) 40 tablet 0    pregabalin (LYRICA) 100 MG capsule TAKE 1 CAPSULE BY MOUTH TWICE DAILY 180 capsule 1    busPIRone (BUSPAR) 15 MG tablet TAKE 1 TABLET BY MOUTH THREE TIMES DAILY 270 tablet 1    traZODone (DESYREL) 50 MG tablet Take 1-2 tablets by mouth nightly as needed for Sleep 60 tablet 11    glucose blood VI test strips (GLUCOSE METER TEST) strip 1 each by In Vitro route daily As needed. 100 each 3    glucose monitoring kit (FREESTYLE) monitoring kit 1 kit by Does not apply route daily as needed (If blood sugars running high or low) 1 kit 0    MICROLET LANCETS MISC 1 each by Does not apply route daily 100 each 3     No current facility-administered medications on file prior to encounter. REVIEW OF SYSTEMS    Pertinent items are noted in HPI. Constitutional: Negative for systemic symptoms including fever, chills and malaise. Objective:      BP (!) 163/95   Pulse 98   Temp 99.8 °F (37.7 °C) (Temporal)   Resp 18     PHYSICAL EXAM      General: The patient is in no acute distress. Mental status:  Patient is appropriate, is  oriented to place and plan of care.   Dermatologic exam: Visual inspection of the periwound reveals the skin to be dry and coarse  Wound exam: see wound description below in procedure note      Assessment:     Problem List Items Addressed This Visit        Endocrine    WD-Diabetic ulcer of toe of left foot associated with type 2 diabetes mellitus, with fat layer exposed ([facility] - Primary        Procedure Note    Indications:  Based on my examination of this patient's wound(s) today, sharp excision into necrotic subcutaneous tissue is required to promote healing and evaluate the extent of previous healing. Performed by: TEE James CNP    Consent obtained: Yes    Time out taken:  Yes    Pain Control: N/a       Debridement:Excisional Debridement    Using curette the wound(s) was/were sharply debrided down through and including the removal of subcutaneous tissue.         Devitalized Tissue Debrided:  fibrin, biofilm, slough, exudate and callus    Pre Debridement Measurements:  Are located in the Wound Documentation Flow Sheet    All active wounds listed below with today's date are evaluated  Wound(s)    debrided this date include # : 1     Post  Debridement Measurements:  Wound 02/19/22 Toe (Comment  which one) Left #1 (onset 2 months) Left Great Toe Plantar   (Active)   Wound Etiology Diabetic 05/13/22 1553   Dressing Status New dressing applied 05/13/22 1629   Wound Cleansed Soap and water 05/27/22 1608   Offloading for Diabetic Foot Ulcers Post op shoe 05/27/22 1608   Wound Length (cm) 1 cm 05/27/22 1608   Wound Width (cm) 1.8 cm 05/27/22 1608   Wound Depth (cm) 0.1 cm 05/27/22 1608   Wound Surface Area (cm^2) 1.8 cm^2 05/27/22 1608   Change in Wound Size % (l*w) -80 05/27/22 1608   Wound Volume (cm^3) 0.18 cm^3 05/27/22 1608   Wound Healing % -80 05/27/22 1608   Post-Procedure Length (cm) 1 cm 05/27/22 1620   Post-Procedure Width (cm) 1.8 cm 05/27/22 1620   Post-Procedure Depth (cm) 0.1 cm 05/27/22 1620   Post-Procedure Surface Area (cm^2) 1.8 cm^2 05/27/22 1620   Post-Procedure Volume (cm^3) 0.18 cm^3 05/27/22 1620   Distance Tunneling (cm) 0 cm 05/27/22 1608   Tunneling Position ___ O'Clock 0 05/27/22 1608   Undermining Starts ___ O'Clock 0 05/27/22 1608   Undermining Ends___ O'Clock 0 05/27/22 1608   Undermining Maxium Distance (cm) 0 05/27/22 1608   Wound Assessment Pink/red 05/27/22 1608   Drainage Amount Moderate 05/27/22 1608   Drainage Description Yellow 05/27/22 1608   Odor None 05/27/22 1608   Saira-wound Assessment Hyperkeratosis (callous) 05/27/22 1608   Margins Defined edges 05/27/22 1608   Wound Thickness Description not for Pressure Injury Partial thickness 05/27/22 1608   Number of days: 96       Percent of Wound(s) Debrided: approximately 100%    Total  Area  Debrided:  1.8 sq cm     Bleeding:  Minimal    Hemostasis Achieved:  by pressure    Procedural Pain:  3  / 10     Post Procedural Pain:  0 / 10     Response to treatment:  Well tolerated by patient. Status of wound progress and description from last visit:   Stable and clean. Issue is with pressure and moisture. Made some changes to orders and will send for supplies. Asking for some pain meds and ibuprofen 800. Sending lidocaine ointment as well       We will continue to prescribe patient opioid pain medication at this time. Patient understands all side effects and contraindications of opioids. No indication of abuse or seeking behavior. OARRS report checked at this time. We will continue to monitor. Plan:       Discharge Instructions              Discharge Instructions        PHYSICIAN ORDERS AND DISCHARGE INSTRUCTIONS     NOTE: Upon discharge from the [address], you will receive a patient experience survey.  We would be grateful if you would take the time to fill this survey out.     Wound care order history:                 Vascular studies: arterial studies  Date 2/21/22              Imaging:   Date               Cultures:   Obtained on 5/13/2022              Grafts:  Date               Antibiotics:  Doxycycline 100 mg BID for 10 days ordered on 5/13/2022              Earlier Wound care treatments:                           Primary care physician:      Continuing wound care orders and information:              Residence:               Continue home health care with:               VKHNJ Medications:              KUIPR cleansing:                           KH not scrub or use excessive force.                          Wash hands with soap and water before and after dressing changes.                         Prior to applying a clean dressing, cleanse wound with normal saline,                                wound cleanser, or mild soap and water.                           Ask the physician or nurse before getting the wound(s) wet in a shower              Daily Wound management:                          Keep weight off wounds and reposition every 2 hours.                          QMDGX standing for long periods of time.                          INHVO wraps/stockings in AM and remove at bedtime.                          If swelling is present, elevate legs to the level of the heart or above for 30 minutes 4-5 times a day and/or when sitting.                                             When taking antibiotics take entire prescription as ordered by physician do not stop taking until medicine is all gone.                                                       Orders for this week: 5/27/22     Left Great Toe -- wash with soap and water, pat dry. Apply Stimulen and cover with Ioplex    Cover with 4x4 guaze   Secure with narrow Coban. Change daily.       Pharm: [facility]         Follow up Instructions: at the [address] [place] [address] in 1 week   Primary Wound Care Provider: Dr Smooth Bourgeois   Call  for any questions or concerns.   Date__________   Time____________  [place] Scheduling: [phone]             Treatment Note Wound 02/19/22 Toe (Comment  which one) Left #1 (onset 2 months) Left Great Toe Plantar  -Dressing/Treatment:  (stimulen ioplex, 4x4 coban )    Written Patient Dismissal Instructions Given            Electronically signed by TEE Valladares CNP on 5/27/2022 at 4:41 PM

## 2022-06-03 ENCOUNTER — HOSPITAL ENCOUNTER (OUTPATIENT)
Dept: WOUND CARE | Age: 42
Discharge: HOME | End: 2022-06-03
Payer: COMMERCIAL

## 2022-06-03 VITALS
HEART RATE: 98 BPM | TEMPERATURE: 99.1 F | DIASTOLIC BLOOD PRESSURE: 95 MMHG | RESPIRATION RATE: 18 BRPM | SYSTOLIC BLOOD PRESSURE: 158 MMHG

## 2022-06-03 DIAGNOSIS — L97.522 DIABETIC ULCER OF TOE OF LEFT FOOT ASSOCIATED WITH TYPE 2 DIABETES MELLITUS, WITH FAT LAYER EXPOSED (HCC): ICD-10-CM

## 2022-06-03 DIAGNOSIS — E11.40 TYPE 2 DIABETES, CONTROLLED, WITH NEUROPATHY (HCC): ICD-10-CM

## 2022-06-03 DIAGNOSIS — E11.621 DIABETIC ULCER OF TOE OF LEFT FOOT ASSOCIATED WITH TYPE 2 DIABETES MELLITUS, WITH FAT LAYER EXPOSED (HCC): ICD-10-CM

## 2022-06-03 DIAGNOSIS — L97.522 NON-PRESSURE CHRONIC ULCER OF OTHER PART OF LEFT FOOT WITH FAT LAYER EXPOSED (HCC): Primary | ICD-10-CM

## 2022-06-03 PROCEDURE — 11042 DBRDMT SUBQ TIS 1ST 20SQCM/<: CPT

## 2022-06-03 RX ORDER — GENTAMICIN SULFATE 1 MG/G
OINTMENT TOPICAL ONCE
Status: CANCELLED | OUTPATIENT
Start: 2022-06-03 | End: 2022-06-03

## 2022-06-03 RX ORDER — LIDOCAINE HYDROCHLORIDE 40 MG/ML
SOLUTION TOPICAL ONCE
Status: CANCELLED | OUTPATIENT
Start: 2022-06-03 | End: 2022-06-03

## 2022-06-03 RX ORDER — GINSENG 100 MG
CAPSULE ORAL ONCE
Status: CANCELLED | OUTPATIENT
Start: 2022-06-03 | End: 2022-06-03

## 2022-06-03 RX ORDER — BACITRACIN, NEOMYCIN, POLYMYXIN B 400; 3.5; 5 [USP'U]/G; MG/G; [USP'U]/G
OINTMENT TOPICAL ONCE
Status: CANCELLED | OUTPATIENT
Start: 2022-06-03 | End: 2022-06-03

## 2022-06-03 RX ORDER — LIDOCAINE 40 MG/G
CREAM TOPICAL ONCE
Status: CANCELLED | OUTPATIENT
Start: 2022-06-03 | End: 2022-06-03

## 2022-06-03 RX ORDER — LIDOCAINE HYDROCHLORIDE 20 MG/ML
JELLY TOPICAL ONCE
Status: CANCELLED | OUTPATIENT
Start: 2022-06-03 | End: 2022-06-03

## 2022-06-03 RX ORDER — BETAMETHASONE DIPROPIONATE 0.05 %
OINTMENT (GRAM) TOPICAL ONCE
Status: CANCELLED | OUTPATIENT
Start: 2022-06-03 | End: 2022-06-03

## 2022-06-03 RX ORDER — CLOBETASOL PROPIONATE 0.5 MG/G
OINTMENT TOPICAL ONCE
Status: CANCELLED | OUTPATIENT
Start: 2022-06-03 | End: 2022-06-03

## 2022-06-03 RX ORDER — LIDOCAINE 50 MG/G
OINTMENT TOPICAL ONCE
Status: CANCELLED | OUTPATIENT
Start: 2022-06-03 | End: 2022-06-03

## 2022-06-03 RX ORDER — BACITRACIN ZINC AND POLYMYXIN B SULFATE 500; 1000 [USP'U]/G; [USP'U]/G
OINTMENT TOPICAL ONCE
Status: CANCELLED | OUTPATIENT
Start: 2022-06-03 | End: 2022-06-03

## 2022-06-03 ASSESSMENT — PAIN DESCRIPTION - FREQUENCY: FREQUENCY: INTERMITTENT

## 2022-06-03 ASSESSMENT — PAIN DESCRIPTION - PAIN TYPE: TYPE: CHRONIC PAIN

## 2022-06-03 ASSESSMENT — PAIN SCALES - GENERAL: PAINLEVEL_OUTOF10: 8

## 2022-06-03 ASSESSMENT — PAIN - FUNCTIONAL ASSESSMENT: PAIN_FUNCTIONAL_ASSESSMENT: PREVENTS OR INTERFERES SOME ACTIVE ACTIVITIES AND ADLS

## 2022-06-03 ASSESSMENT — PAIN DESCRIPTION - DESCRIPTORS: DESCRIPTORS: SHOOTING

## 2022-06-03 ASSESSMENT — PAIN DESCRIPTION - ORIENTATION: ORIENTATION: LEFT

## 2022-06-03 ASSESSMENT — PAIN DESCRIPTION - LOCATION: LOCATION: TOE (COMMENT WHICH ONE)

## 2022-06-03 ASSESSMENT — PAIN DESCRIPTION - ONSET: ONSET: ON-GOING

## 2022-06-03 NOTE — PROGRESS NOTES
Wound Care Center Progress Note With Procedure    Duane Hanson  AGE: 46 y.o. GENDER: male  : 1980  EPISODE DATE:  6/3/2022     Subjective:     Chief Complaint   Patient presents with    Wound Check     left great toe         HISTORY of PRESENT ILLNESS      Duane Hanson is a 46 y.o. male who presents today for wound evaluation of Chronic diabetic ulcer(s) of the left great toe. The ulcer is of moderate severity. The underlying cause of the wound is callous and pressure. He is in for f/u- the wound is slightly better. He is unable to wear a TCC due to work, he also can't wear his off-loading surgical shoe. I discuss the importance of staying off the wound. Wound Pain Timing/Severity: moderate  Quality of pain: tender  Severity of pain:  5 / 10   Modifying Factors: diabetes and chronic pressure  Associated Signs/Symptoms: none        PAST MEDICAL HISTORY        Diagnosis Date    Hypercholesterolemia 2019    Hypertension     Local infection of skin and subcutaneous tissue 2022    Type 2 diabetes, controlled, with neuropathy ([address] 2018    WD-Diabetic ulcer of toe of left foot associated with type 2 diabetes mellitus, with fat layer exposed ([address] 2022       PAST SURGICAL HISTORY    History reviewed. No pertinent surgical history. FAMILY HISTORY    Family History   Problem Relation Age of Onset    High Blood Pressure Sister        SOCIAL HISTORY    Social History     Tobacco Use    Smoking status: Never Smoker    Smokeless tobacco: Never Used   Substance Use Topics    Alcohol use:  Yes     Alcohol/week: 12.0 standard drinks     Types: 12 Standard drinks or equivalent per week     Comment: 6 drinks daily    Drug use: No       ALLERGIES    Allergies   Allergen Reactions    Paxil [Paroxetine Hcl]      Caused \"weird\" things to happen in his sleep       MEDICATIONS    Current Outpatient Medications on File Prior to Encounter   Medication Sig Dispense Refill    lidocaine (XYLOCAINE) 5 % ointment Apply topically as needed. 50 g 2    oxyCODONE-acetaminophen (ENDOCET) 5-325 MG per tablet Take 1 tablet by mouth daily as needed for Pain for up to 7 days. Intended supply: 3 days. Take lowest dose possible to manage pain 7 tablet 0    ibuprofen (ADVIL;MOTRIN) 800 MG tablet Take 1 tablet by mouth 2 times daily as needed for Pain 60 tablet 0    ibuprofen (ADVIL;MOTRIN) 600 MG tablet Take 1 tablet by mouth 3 times daily (with meals) 40 tablet 0    pregabalin (LYRICA) 100 MG capsule TAKE 1 CAPSULE BY MOUTH TWICE DAILY 180 capsule 1    busPIRone (BUSPAR) 15 MG tablet TAKE 1 TABLET BY MOUTH THREE TIMES DAILY 270 tablet 1    traZODone (DESYREL) 50 MG tablet Take 1-2 tablets by mouth nightly as needed for Sleep 60 tablet 11    glucose blood VI test strips (GLUCOSE METER TEST) strip 1 each by In Vitro route daily As needed. 100 each 3    glucose monitoring kit (FREESTYLE) monitoring kit 1 kit by Does not apply route daily as needed (If blood sugars running high or low) 1 kit 0    MICROLET LANCETS MISC 1 each by Does not apply route daily 100 each 3     No current facility-administered medications on file prior to encounter. REVIEW OF SYSTEMS    Pertinent items are noted in HPI. Constitutional: Negative for systemic symptoms including fever, chills and malaise. Objective:      BP (!) 158/95   Pulse 98   Temp 99.1 °F (37.3 °C) (Temporal)   Resp 18     PHYSICAL EXAM       General: The patient is in no acute distress. Mental status:  Patient is appropriate, is  oriented to place and plan of care.   Dermatologic exam: Visual inspection of the periwound reveals the skin to be normal in turgor and texture  Wound exam: see wound description below in procedure note      Assessment:     Problem List Items Addressed This Visit     WD-Diabetic ulcer of toe of left foot associated with type 2 diabetes mellitus, with fat layer exposed ([address]    Type 2 diabetes, controlled, with neuropathy ([facility] [address]    Relevant Orders    Initiate Outpatient Wound Care Protocol    Non-pressure chronic ulcer of other part of left foot with fat layer exposed ([facility] [address] - Primary    Relevant Orders    Initiate Outpatient Wound Care Protocol        Procedure Note    Indications:  Based on my examination of this patient's wound(s) today, sharp excision into necrotic subcutaneous tissue is required to promote healing and evaluate the extent of previous healing. Performed by: Patrick Barr MD    Consent obtained: Yes    Time out taken:  Yes    Pain Control: lidocaine       Debridement:Excisional Debridement    Using curette the wound(s) was/were sharply debrided down through and including the removal of subcutaneous tissue.         Devitalized Tissue Debrided:  fibrin, biofilm and slough and callous    Pre Debridement Measurements:  Are located in the Wound Documentation Flow Sheet    All active wounds listed below with today's date are evaluated  Wound(s)    debrided this date include # : 1     Post  Debridement Measurements:  Wound 02/19/22 Toe (Comment  which one) Left #1 (onset 2 months) Left Great Toe Plantar   (Active)   Wound Image   04/19/22 1323   Wound Etiology Diabetic 05/13/22 1553   Dressing Status New dressing applied 06/03/22 1431   Wound Cleansed Wound cleanser 06/03/22 1355   Offloading for Diabetic Foot Ulcers Post op shoe 05/27/22 1608   Wound Length (cm) 0.5 cm 06/03/22 1355   Wound Width (cm) 1.5 cm 06/03/22 1355   Wound Depth (cm) 0.1 cm 06/03/22 1355   Wound Surface Area (cm^2) 0.75 cm^2 06/03/22 1355   Change in Wound Size % (l*w) 25 06/03/22 1355   Wound Volume (cm^3) 0.075 cm^3 06/03/22 1355   Wound Healing % 25 06/03/22 1355   Post-Procedure Length (cm) 0.5 cm 06/03/22 1411   Post-Procedure Width (cm) 1.5 cm 06/03/22 1411   Post-Procedure Depth (cm) 0.1 cm 06/03/22 1411   Post-Procedure Surface Area (cm^2) 0.75 cm^2 06/03/22 1411   Post-Procedure Volume (cm^3) 0.075 cm^3 06/03/22 1411 Distance Tunneling (cm) 0 cm 06/03/22 1355   Tunneling Position ___ O'Clock 0 06/03/22 1355   Undermining Starts ___ O'Clock 0 06/03/22 1355   Undermining Ends___ O'Clock 0 06/03/22 1355   Undermining Maxium Distance (cm) 0 06/03/22 1355   Wound Assessment Pink/red 06/03/22 1355   Drainage Amount Moderate 06/03/22 1355   Drainage Description Yellow 06/03/22 1355   Odor None 06/03/22 1355   Saira-wound Assessment Hyperkeratosis (callous) 06/03/22 1355   Margins Defined edges 06/03/22 1355   Wound Thickness Description not for Pressure Injury Partial thickness 06/03/22 1355   Number of days: 103       Percent of Wound(s) Debrided: approximately 100%    Total  Area  Debrided:  0.75 sq cm     Bleeding:  Minimal    Hemostasis Achieved:  by pressure    Procedural Pain:  0  / 10     Post Procedural Pain:  0 / 10     Response to treatment:  Well tolerated by patient. Status of wound progress and description from last visit:   Improved slightly. Plan:       Discharge Instructions         Discharge Instructions        PHYSICIAN ORDERS AND DISCHARGE INSTRUCTIONS     NOTE: Upon discharge from the [address], you will receive a patient experience survey.  We would be grateful if you would take the time to fill this survey out.     Wound care order history:                 Vascular studies: arterial studies  Date 2/21/22              Imaging:   Date               Cultures:   Obtained on 5/13/2022              Grafts:  Date               Antibiotics:  Doxycycline 100 mg BID for 10 days ordered on 5/13/2022              Earlier Wound care treatments:                           Primary care physician:      Continuing wound care orders and information:              Residence:               Continue home health care with:               URJZX Medications:              QXBXX cleansing:                           QE not scrub or use excessive force.                          Wash hands with soap and water before and after dressing changes.                         Prior to applying a clean dressing, cleanse wound with normal saline,                                wound cleanser, or mild soap and water.                           Ask the physician or nurse before getting the wound(s) wet in a shower              Daily Wound management:                          Keep weight off wounds and reposition every 2 hours.                          Avoid standing for long periods of time.                          CDBQD wraps/stockings in AM and remove at bedtime.                          If swelling is present, elevate legs to the level of the heart or above for 30 minutes 4-5 times a day and/or when sitting.                                             When taking antibiotics take entire prescription as ordered by physician do not stop taking until medicine is all gone.                                                       Orders for this week: 6/3/22     Left Great Toe -- wash with soap and water, pat dry. Apply Stimulen and cover with Ioplex    Cover with 4x4 guaze   Secure with narrow Coban.   Change daily.       Pharm: [facility]         Follow up Instructions: at the [address] [address] in 1 week   Primary Wound Care Provider: Dr ANN [facility]   Call  for any questions or concerns.   Date__________   Time____________  [phone]                  Treatment Note Wound 22 Toe (Comment  which one) Left #1 (onset 2 months) Left Great Toe Plantar  -Dressing/Treatment:  (Stimulen, Ioplex, 4x4, Coban )    Written Patient Dismissal Instructions Given            Electronically signed by Ilsa Aase, MD on 6/3/2022 at 5:32 PM

## 2022-06-03 NOTE — PLAN OF CARE
Problem: Chronic Conditions and Co-morbidities  Goal: Patient's chronic conditions and co-morbidity symptoms are monitored and maintained or improved  6/3/2022 1454 by Lv Paul LPN  Outcome: Progressing  6/3/2022 1431 by Fredy Dill RN  Outcome: Progressing     Problem: Pain  Goal: Verbalizes/displays adequate comfort level or baseline comfort level  6/3/2022 1454 by Lv Paul LPN  Outcome: Progressing  6/3/2022 1431 by Fredy Dill RN  Outcome: Progressing

## 2022-06-10 ENCOUNTER — HOSPITAL ENCOUNTER (OUTPATIENT)
Dept: WOUND CARE | Age: 42
Discharge: HOME | End: 2022-06-10

## 2022-06-13 ENCOUNTER — TELEPHONE (OUTPATIENT)
Dept: INTERNAL MEDICINE CLINIC | Age: 42
End: 2022-06-13

## 2022-06-13 DIAGNOSIS — G62.9 NEUROPATHY: ICD-10-CM

## 2022-06-13 DIAGNOSIS — M79.672 BILATERAL FOOT PAIN: ICD-10-CM

## 2022-06-13 DIAGNOSIS — R20.2 NUMBNESS AND TINGLING: Primary | ICD-10-CM

## 2022-06-13 DIAGNOSIS — R20.0 NUMBNESS AND TINGLING: Primary | ICD-10-CM

## 2022-06-13 DIAGNOSIS — M79.671 BILATERAL FOOT PAIN: ICD-10-CM

## 2022-06-24 ENCOUNTER — HOSPITAL ENCOUNTER (OUTPATIENT)
Dept: WOUND CARE | Age: 42
Discharge: HOME | End: 2022-06-24
Payer: COMMERCIAL

## 2022-06-24 VITALS
DIASTOLIC BLOOD PRESSURE: 87 MMHG | HEART RATE: 106 BPM | RESPIRATION RATE: 18 BRPM | TEMPERATURE: 99.8 F | SYSTOLIC BLOOD PRESSURE: 165 MMHG

## 2022-06-24 DIAGNOSIS — E11.40 TYPE 2 DIABETES, CONTROLLED, WITH NEUROPATHY (HCC): ICD-10-CM

## 2022-06-24 DIAGNOSIS — L97.522 NON-PRESSURE CHRONIC ULCER OF OTHER PART OF LEFT FOOT WITH FAT LAYER EXPOSED (HCC): Primary | ICD-10-CM

## 2022-06-24 PROCEDURE — 11042 DBRDMT SUBQ TIS 1ST 20SQCM/<: CPT

## 2022-06-24 PROCEDURE — 11042 DBRDMT SUBQ TIS 1ST 20SQCM/<: CPT | Performed by: NURSE PRACTITIONER

## 2022-06-24 RX ORDER — LIDOCAINE HYDROCHLORIDE 20 MG/ML
JELLY TOPICAL ONCE
Status: CANCELLED | OUTPATIENT
Start: 2022-06-24 | End: 2022-06-24

## 2022-06-24 RX ORDER — LIDOCAINE 40 MG/G
CREAM TOPICAL ONCE
Status: CANCELLED | OUTPATIENT
Start: 2022-06-24 | End: 2022-06-24

## 2022-06-24 RX ORDER — LIDOCAINE HYDROCHLORIDE 40 MG/ML
SOLUTION TOPICAL ONCE
Status: CANCELLED | OUTPATIENT
Start: 2022-06-24 | End: 2022-06-24

## 2022-06-24 RX ORDER — LIDOCAINE 50 MG/G
OINTMENT TOPICAL ONCE
Status: CANCELLED | OUTPATIENT
Start: 2022-06-24 | End: 2022-06-24

## 2022-06-24 RX ORDER — BACITRACIN ZINC AND POLYMYXIN B SULFATE 500; 1000 [USP'U]/G; [USP'U]/G
OINTMENT TOPICAL ONCE
Status: CANCELLED | OUTPATIENT
Start: 2022-06-24 | End: 2022-06-24

## 2022-06-24 RX ORDER — GINSENG 100 MG
CAPSULE ORAL ONCE
Status: CANCELLED | OUTPATIENT
Start: 2022-06-24 | End: 2022-06-24

## 2022-06-24 RX ORDER — BETAMETHASONE DIPROPIONATE 0.05 %
OINTMENT (GRAM) TOPICAL ONCE
Status: CANCELLED | OUTPATIENT
Start: 2022-06-24 | End: 2022-06-24

## 2022-06-24 RX ORDER — GENTAMICIN SULFATE 1 MG/G
OINTMENT TOPICAL ONCE
Status: CANCELLED | OUTPATIENT
Start: 2022-06-24 | End: 2022-06-24

## 2022-06-24 RX ORDER — CLOBETASOL PROPIONATE 0.5 MG/G
OINTMENT TOPICAL ONCE
Status: CANCELLED | OUTPATIENT
Start: 2022-06-24 | End: 2022-06-24

## 2022-06-24 RX ORDER — BACITRACIN, NEOMYCIN, POLYMYXIN B 400; 3.5; 5 [USP'U]/G; MG/G; [USP'U]/G
OINTMENT TOPICAL ONCE
Status: CANCELLED | OUTPATIENT
Start: 2022-06-24 | End: 2022-06-24

## 2022-06-24 ASSESSMENT — PAIN SCALES - GENERAL: PAINLEVEL_OUTOF10: 7

## 2022-06-24 ASSESSMENT — PAIN - FUNCTIONAL ASSESSMENT: PAIN_FUNCTIONAL_ASSESSMENT: PREVENTS OR INTERFERES SOME ACTIVE ACTIVITIES AND ADLS

## 2022-06-24 ASSESSMENT — PAIN DESCRIPTION - PAIN TYPE: TYPE: CHRONIC PAIN

## 2022-06-24 ASSESSMENT — PAIN DESCRIPTION - FREQUENCY: FREQUENCY: INTERMITTENT

## 2022-06-24 ASSESSMENT — PAIN DESCRIPTION - ONSET: ONSET: ON-GOING

## 2022-06-24 ASSESSMENT — PAIN DESCRIPTION - DESCRIPTORS: DESCRIPTORS: OTHER (COMMENT)

## 2022-06-24 ASSESSMENT — PAIN DESCRIPTION - LOCATION: LOCATION: TOE (COMMENT WHICH ONE)

## 2022-06-24 ASSESSMENT — PAIN DESCRIPTION - ORIENTATION: ORIENTATION: LEFT

## 2022-06-24 NOTE — PROGRESS NOTES
Progress Note and application of total contact shabana Hall  AGE: 46 y.o. GENDER: male  : 1980  EPISODE DATE:  2022     Subjective:     Chief Complaint   Patient presents with    Wound Check     LEFT TOE         HISTORY of PRESENT ILLNESS     Mana stroud 46 y. o. male who presents today for wound evaluation for chronic diabetic and pressure wound left foot.    Diabetes: History of diabetes, not currently on medication, last A1c was 5.1 as of 22. Does have neuropathy. Smoking: Never smoker  Obesity: Yes  Anticoagulant therapy: No  Immunosuppression: No     Patient educated on the 6 essential components necessary for wound healing: Circulation, Debridements, Proper Dressings and Topical Wound Products, Infection Control, Edema Control and Offloading.     Patient educated on those factors that negatively effect or impact wound healing: smoking, obesity, uncontrolled diabetes, anticoagulant and immunosuppressive regimens, inadequate nutrition, untreated arterial and venous disease if applicable and measures to manage edema. PAST MEDICAL HISTORY        Diagnosis Date    Hypercholesterolemia 2019    Hypertension     Local infection of skin and subcutaneous tissue 2022    Type 2 diabetes, controlled, with neuropathy ([address] 2018    WD-Diabetic ulcer of toe of left foot associated with type 2 diabetes mellitus, with fat layer exposed ([address] 2022       PAST SURGICAL HISTORY    History reviewed. No pertinent surgical history. FAMILY HISTORY    Family History   Problem Relation Age of Onset    High Blood Pressure Sister        SOCIAL HISTORY    Social History     Tobacco Use    Smoking status: Never Smoker    Smokeless tobacco: Never Used   Substance Use Topics    Alcohol use:  Yes     Alcohol/week: 12.0 standard drinks     Types: 12 Standard drinks or equivalent per week     Comment: 6 drinks daily    Drug use: No       ALLERGIES    Allergies   Allergen Reactions    Paxil [Paroxetine Hcl]      Caused \"weird\" things to happen in his sleep       MEDICATIONS    Current Outpatient Medications on File Prior to Encounter   Medication Sig Dispense Refill    lidocaine (XYLOCAINE) 5 % ointment Apply topically as needed. 50 g 2    ibuprofen (ADVIL;MOTRIN) 800 MG tablet Take 1 tablet by mouth 2 times daily as needed for Pain 60 tablet 0    ibuprofen (ADVIL;MOTRIN) 600 MG tablet Take 1 tablet by mouth 3 times daily (with meals) 40 tablet 0    pregabalin (LYRICA) 100 MG capsule TAKE 1 CAPSULE BY MOUTH TWICE DAILY 180 capsule 1    busPIRone (BUSPAR) 15 MG tablet TAKE 1 TABLET BY MOUTH THREE TIMES DAILY 270 tablet 1    traZODone (DESYREL) 50 MG tablet Take 1-2 tablets by mouth nightly as needed for Sleep 60 tablet 11    glucose blood VI test strips (GLUCOSE METER TEST) strip 1 each by In Vitro route daily As needed. 100 each 3    glucose monitoring kit (FREESTYLE) monitoring kit 1 kit by Does not apply route daily as needed (If blood sugars running high or low) 1 kit 0    MICROLET LANCETS MISC 1 each by Does not apply route daily 100 each 3     No current facility-administered medications on file prior to encounter. REVIEW OF SYSTEMS    Pertinent items are noted in HPI. Constitutional: Negative for systemic symptoms including fever, chills and malaise. Objective:      BP (!) 165/87   Pulse (!) 106   Temp 99.8 °F (37.7 °C) (Temporal)   Resp 18     PHYSICAL EXAM    General: The patient is in no acute distress. Mental status:  Patient is appropriate, is  oriented to place and plan of care.   Dermatologic exam: Visual inspection of the periwound reveals the skin to be coarse and callus  Wound exam: see wound description below in procedure note      Assessment:     Problem List Items Addressed This Visit        Endocrine    Type 2 diabetes, controlled, with neuropathy [facility])    Relevant Orders    Initiate Outpatient Wound Care Protocol       Other Non-pressure chronic ulcer of other part of left foot with fat layer exposed ([address] - Primary    Relevant Orders    Initiate Outpatient Wound Care Protocol          Procedure: The wound bed and lower extremity was cleansed and prepared as necessary for debridement and casting. Debridement was required. Consent obtained: Yes    Time out taken: Yes    Using curette sharp Excisional Debridement of the wound(s) was/were performed down through and including the removal of subcutaneous tissue.      Devitalized Tissue Debrided:  slough, exudate and callus    Pre Debridement Measurements:  Are located in the Wound Documentation Flow Sheet    All active wounds listed below with today's date are evaluated  Wound(s)    debrided this date include # : 1     Post  Debridement Measurements:  Wound 02/19/22 Toe (Comment  which one) Left #1 (onset 2 months) Left Great Toe Plantar   (Active)   Wound Image   06/24/22 1549   Wound Etiology Diabetic 06/24/22 1549   Dressing Status New dressing applied 06/24/22 1603   Wound Cleansed Wound cleanser 06/24/22 1549   Offloading for Diabetic Foot Ulcers Post op shoe;Offloading ordered 06/24/22 1549   Wound Length (cm) 0.6 cm 06/24/22 1549   Wound Width (cm) 0.7 cm 06/24/22 1549   Wound Depth (cm) 0.1 cm 06/24/22 1549   Wound Surface Area (cm^2) 0.42 cm^2 06/24/22 1549   Change in Wound Size % (l*w) 58 06/24/22 1549   Wound Volume (cm^3) 0.042 cm^3 06/24/22 1549   Wound Healing % 58 06/24/22 1549   Post-Procedure Length (cm) 0.6 cm 06/24/22 1601   Post-Procedure Width (cm) 0.7 cm 06/24/22 1601   Post-Procedure Depth (cm) 0.1 cm 06/24/22 1601   Post-Procedure Surface Area (cm^2) 0.42 cm^2 06/24/22 1601   Post-Procedure Volume (cm^3) 0.042 cm^3 06/24/22 1601   Distance Tunneling (cm) 0 cm 06/24/22 1549   Tunneling Position ___ O'Clock 0 06/24/22 1549   Undermining Starts ___ O'Clock 0 06/24/22 1549   Undermining Ends___ O'Clock 0 06/24/22 1549   Undermining Maxium Distance (cm) 0 06/24/22 1549   Wound Assessment Pink/red 06/24/22 1549   Drainage Amount Scant 06/24/22 1549   Drainage Description Yellow;Serosanguinous 06/24/22 1549   Odor None 06/24/22 1549   Saira-wound Assessment Hyperkeratosis (callous) 06/24/22 1549   Margins Defined edges 06/24/22 1549   Wound Thickness Description not for Pressure Injury Full thickness 06/24/22 1549   Number of days: 124       Percent of Wound(s) Debrided: approximately 100%    Total  Area  Debrided[de-identified]  0.42 sq cm       Bleeding:  Minimal    Hemostasis Achieved:  by pressure    Procedural Pain:  0  / 10     Post Procedural Pain:  0 / 10     Application of cast:    With the indications of casting being present and no contraindications, information and  instructions were given to the patient and the patient consented to the treatment. After proper wound care and appropriate padding. A total contact cast was applied according to protocol. Fall prevention safety and instructions on using various supportive walking devices were discussed with the patient. The patient was also instructed on what to do and how to get help if the cast became uncomfortable. The patient expressed understanding of all of these issues. The patient tolerated the procedure well. Status of wound progress and description from last visit: Smaller since the last visit, has agreed to TCC. Plan:       Discharge Instructions         Discharge Instructions        PHYSICIAN ORDERS AND DISCHARGE INSTRUCTIONS     NOTE: Upon discharge from the [address], you will receive a patient experience survey.  We would be grateful if you would take the time to fill this survey out.     Wound care order history:                 Vascular studies: arterial studies  Date 2/21/22              Imaging:   Date               Cultures:   Obtained on 5/13/2022              Grafts:  Date               Antibiotics:  Doxycycline 100 mg BID for 10 days ordered on 5/13/2022              Earlier Wound care treatments:                           Primary care physician:      Continuing wound care orders and information:              Residence:               Continue home health care with:               [facility] Medications:              HGPDI cleansing:                           RZ not scrub or use excessive force.                          Wash hands with soap and water before and after dressing changes.                         Prior to applying a clean dressing, cleanse wound with normal saline,                                wound cleanser, or mild soap and water.                           Ask the physician or nurse before getting the wound(s) wet in a shower              Daily Wound management:                          Keep weight off wounds and reposition every 2 hours.                          Avoid standing for long periods of time.                          ZGXEI wraps/stockings in AM and remove at bedtime.                          If swelling is present, elevate legs to the level of the heart or above for 30 minutes 4-5 times a day and/or when sitting.                                             When taking antibiotics take entire prescription as ordered by physician do not stop taking until medicine is all gone.                                                       Orders for this week: 22     Left Great Toe -- wash with soap and water, pat dry. Apply Stimulen and cover with Ioplex    Cover with  Foam from cast kit and TCC      Pharm: [facility]         Follow up Instructions: at the [address] [place] [address] Road on Monday for cast change  Primary Wound Care Provider: Dr Ray Andrew   Call  for any questions or concerns.   Date__________   Time____________  [place] [phone]               Treatment Note Wound 22 Toe (Comment  which one) Left #1 (onset 2 months) Left Great Toe Plantar  -Dressing/Treatment:  (Stimulen, Ioplex, Foam, TCC)    Written Patient Dismissal Instructions Given Electronically signed by TEE Cuba CNP on 6/24/2022 at 4:27 PM

## 2022-06-27 ENCOUNTER — HOSPITAL ENCOUNTER (OUTPATIENT)
Dept: GENERAL RADIOLOGY | Age: 42
Discharge: HOME | End: 2022-06-27
Payer: COMMERCIAL

## 2022-06-27 ENCOUNTER — HOSPITAL ENCOUNTER (OUTPATIENT)
Dept: WOUND CARE | Age: 42
Discharge: HOME | End: 2022-06-27
Payer: COMMERCIAL

## 2022-06-27 ENCOUNTER — HOSPITAL ENCOUNTER (OUTPATIENT)
Age: 42
Discharge: HOME | End: 2022-06-27
Payer: COMMERCIAL

## 2022-06-27 VITALS
DIASTOLIC BLOOD PRESSURE: 94 MMHG | HEART RATE: 109 BPM | TEMPERATURE: 99.6 F | SYSTOLIC BLOOD PRESSURE: 149 MMHG | RESPIRATION RATE: 20 BRPM
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
## 2022-06-27 DIAGNOSIS — E11.40 TYPE 2 DIABETES, CONTROLLED, WITH NEUROPATHY (HCC): ICD-10-CM

## 2022-06-27 DIAGNOSIS — M79.89 SWELLING OF RIGHT FOOT: ICD-10-CM

## 2022-06-27 DIAGNOSIS — M25.571 ACUTE RIGHT ANKLE PAIN: ICD-10-CM

## 2022-06-27 DIAGNOSIS — S99.919A ANKLE INJURY, INITIAL ENCOUNTER: ICD-10-CM

## 2022-06-27 DIAGNOSIS — E11.621 DIABETIC ULCER OF TOE OF LEFT FOOT ASSOCIATED WITH TYPE 2 DIABETES MELLITUS, WITH FAT LAYER EXPOSED (HCC): ICD-10-CM

## 2022-06-27 DIAGNOSIS — L97.522 DIABETIC ULCER OF TOE OF LEFT FOOT ASSOCIATED WITH TYPE 2 DIABETES MELLITUS, WITH FAT LAYER EXPOSED (HCC): ICD-10-CM

## 2022-06-27 DIAGNOSIS — L97.522 NON-PRESSURE CHRONIC ULCER OF OTHER PART OF LEFT FOOT WITH FAT LAYER EXPOSED (HCC): Primary | ICD-10-CM

## 2022-06-27 DIAGNOSIS — M25.471 RIGHT ANKLE SWELLING: ICD-10-CM

## 2022-06-27 PROCEDURE — 11042 DBRDMT SUBQ TIS 1ST 20SQCM/<: CPT | Performed by: NURSE PRACTITIONER

## 2022-06-27 PROCEDURE — 73630 X-RAY EXAM OF FOOT: CPT

## 2022-06-27 PROCEDURE — 99214 OFFICE O/P EST MOD 30 MIN: CPT | Performed by: NURSE PRACTITIONER

## 2022-06-27 PROCEDURE — 73610 X-RAY EXAM OF ANKLE: CPT

## 2022-06-27 PROCEDURE — 11042 DBRDMT SUBQ TIS 1ST 20SQCM/<: CPT

## 2022-06-27 RX ORDER — BETAMETHASONE DIPROPIONATE 0.05 %
OINTMENT (GRAM) TOPICAL ONCE
Status: CANCELLED | OUTPATIENT
Start: 2022-06-27 | End: 2022-06-27

## 2022-06-27 RX ORDER — LIDOCAINE HYDROCHLORIDE 20 MG/ML
JELLY TOPICAL ONCE
Status: CANCELLED | OUTPATIENT
Start: 2022-06-27 | End: 2022-06-27

## 2022-06-27 RX ORDER — GINSENG 100 MG
CAPSULE ORAL ONCE
Status: CANCELLED | OUTPATIENT
Start: 2022-06-27 | End: 2022-06-27

## 2022-06-27 RX ORDER — LIDOCAINE HYDROCHLORIDE 40 MG/ML
SOLUTION TOPICAL ONCE
Status: CANCELLED | OUTPATIENT
Start: 2022-06-27 | End: 2022-06-27

## 2022-06-27 RX ORDER — GENTAMICIN SULFATE 1 MG/G
OINTMENT TOPICAL ONCE
Status: CANCELLED | OUTPATIENT
Start: 2022-06-27 | End: 2022-06-27

## 2022-06-27 RX ORDER — LIDOCAINE 50 MG/G
OINTMENT TOPICAL ONCE
Status: CANCELLED | OUTPATIENT
Start: 2022-06-27 | End: 2022-06-27

## 2022-06-27 RX ORDER — BACITRACIN, NEOMYCIN, POLYMYXIN B 400; 3.5; 5 [USP'U]/G; MG/G; [USP'U]/G
OINTMENT TOPICAL ONCE
Status: CANCELLED | OUTPATIENT
Start: 2022-06-27 | End: 2022-06-27

## 2022-06-27 RX ORDER — LIDOCAINE 40 MG/G
CREAM TOPICAL ONCE
Status: CANCELLED | OUTPATIENT
Start: 2022-06-27 | End: 2022-06-27

## 2022-06-27 RX ORDER — BACITRACIN ZINC AND POLYMYXIN B SULFATE 500; 1000 [USP'U]/G; [USP'U]/G
OINTMENT TOPICAL ONCE
Status: CANCELLED | OUTPATIENT
Start: 2022-06-27 | End: 2022-06-27

## 2022-06-27 RX ORDER — CLOBETASOL PROPIONATE 0.5 MG/G
OINTMENT TOPICAL ONCE
Status: CANCELLED | OUTPATIENT
Start: 2022-06-27 | End: 2022-06-27

## 2022-06-27 RX ORDER — HYDROCODONE BITARTRATE AND ACETAMINOPHEN 5; 325 MG/1; MG/1
1 TABLET ORAL EVERY 8 HOURS PRN
Qty: 21 TABLET | Refills: 0 | Status: SHIPPED | OUTPATIENT
Start: 2022-06-27 | End: 2022-07-04

## 2022-06-27 ASSESSMENT — PAIN SCALES - GENERAL: PAINLEVEL_OUTOF10: 10

## 2022-06-27 ASSESSMENT — PAIN DESCRIPTION - LOCATION: LOCATION: TOE (COMMENT WHICH ONE)

## 2022-06-27 ASSESSMENT — PAIN DESCRIPTION - ORIENTATION: ORIENTATION: RIGHT;LEFT

## 2022-06-27 ASSESSMENT — PAIN DESCRIPTION - DESCRIPTORS: DESCRIPTORS: BURNING;SHOOTING

## 2022-06-27 ASSESSMENT — PAIN DESCRIPTION - FREQUENCY: FREQUENCY: INTERMITTENT

## 2022-06-27 ASSESSMENT — PAIN DESCRIPTION - ONSET: ONSET: AWAKENED FROM SLEEP

## 2022-06-27 ASSESSMENT — PAIN - FUNCTIONAL ASSESSMENT: PAIN_FUNCTIONAL_ASSESSMENT: PREVENTS OR INTERFERES SOME ACTIVE ACTIVITIES AND ADLS

## 2022-06-27 NOTE — PROGRESS NOTES
Multilayer Compression Wrap   (Not Unna) Below the Knee    NAME:  Duane Hanson  YOB: 1980  MEDICAL RECORD NUMBER:  [id number]  DATE:  6/27/2022    Multilayer compression wrap: Removed old Multilayer wrap if indicated and wash leg with mild soap/water. Applied moisturizing agent to dry skin as needed. Applied primary and secondary dressing as ordered. Applied multilayered dressing below the knee to left lower leg. Instructed patient/caregiver not to remove dressing and to keep it clean and dry. Instructed patient/caregiver on complications to report to provider, such as pain, numbness in toes, heavy drainage, and slippage of dressing. Instructed patient on purpose of compression dressing and on activity and exercise recommendations.       Electronically signed by Karlee Perales LPN on 7/12/1756 at 1:73 PM       wrapped to ankle per order

## 2022-06-27 NOTE — PROGRESS NOTES
Progress Note       Vargas Dominguez  AGE: 46 y.o. GENDER: male  : 1980  EPISODE DATE:  2022     Subjective:     Chief Complaint   Patient presents with    Wound Check     left great toe plantar         HISTORY of PRESENT ILLNESS     Mike Hall a 46 y. o. male who presents today for wound evaluation for chronic diabetic and pressure wound left foot. A TCC was placed last Friday. He presents today with a right ankle injury post ambulating without his cane he rolled the right ankle and states he heard a \"pop. \" The right lower leg mid shin down is edematous and warm. He has been icing and using 800 mg Ibuprofen with minimal relief of pain. Orders given for Xray of the right foot and ankle. Wrapped with an ace wrap, reinforced continued icing every 2-3 hours, elevation. Diabetes: History of diabetes, not currently on medication, last A1c was 5.1 as of 22. Does have neuropathy. Smoking: Never smoker  Obesity: Yes  Anticoagulant therapy: No  Immunosuppression: No     Patient educated on the 6 essential components necessary for wound healing: Circulation, Debridements, Proper Dressings and Topical Wound Products, Infection Control, Edema Control and Offloading.     Patient educated on those factors that negatively effect or impact wound healing: smoking, obesity, uncontrolled diabetes, anticoagulant and immunosuppressive regimens, inadequate nutrition, untreated arterial and venous disease if applicable and measures to manage edema. PAST MEDICAL HISTORY        Diagnosis Date    Hypercholesterolemia 2019    Hypertension     Local infection of skin and subcutaneous tissue 2022    Type 2 diabetes, controlled, with neuropathy ([address] 2018    WD-Diabetic ulcer of toe of left foot associated with type 2 diabetes mellitus, with fat layer exposed ([address] 2022       PAST SURGICAL HISTORY    History reviewed. No pertinent surgical history.     FAMILY HISTORY    Family History   Problem Relation Age of Onset    High Blood Pressure Sister        SOCIAL HISTORY    Social History     Tobacco Use    Smoking status: Never Smoker    Smokeless tobacco: Never Used   Substance Use Topics    Alcohol use: Yes     Alcohol/week: 12.0 standard drinks     Types: 12 Standard drinks or equivalent per week     Comment: 6 drinks daily    Drug use: No       ALLERGIES    Allergies   Allergen Reactions    Paxil [Paroxetine Hcl]      Caused \"weird\" things to happen in his sleep       MEDICATIONS    Current Outpatient Medications on File Prior to Encounter   Medication Sig Dispense Refill    lidocaine (XYLOCAINE) 5 % ointment Apply topically as needed. 50 g 2    ibuprofen (ADVIL;MOTRIN) 800 MG tablet Take 1 tablet by mouth 2 times daily as needed for Pain 60 tablet 0    ibuprofen (ADVIL;MOTRIN) 600 MG tablet Take 1 tablet by mouth 3 times daily (with meals) 40 tablet 0    pregabalin (LYRICA) 100 MG capsule TAKE 1 CAPSULE BY MOUTH TWICE DAILY 180 capsule 1    busPIRone (BUSPAR) 15 MG tablet TAKE 1 TABLET BY MOUTH THREE TIMES DAILY 270 tablet 1    traZODone (DESYREL) 50 MG tablet Take 1-2 tablets by mouth nightly as needed for Sleep 60 tablet 11    glucose blood VI test strips (GLUCOSE METER TEST) strip 1 each by In Vitro route daily As needed. 100 each 3    glucose monitoring kit (FREESTYLE) monitoring kit 1 kit by Does not apply route daily as needed (If blood sugars running high or low) 1 kit 0    MICROLET LANCETS MISC 1 each by Does not apply route daily 100 each 3     No current facility-administered medications on file prior to encounter. REVIEW OF SYSTEMS    Pertinent items are noted in HPI. Constitutional: Negative for systemic symptoms including fever, chills and malaise. Objective:      BP (!) 149/94   Pulse (!) 109   Temp 99.6 °F (37.6 °C) (Temporal)   Resp 20     PHYSICAL EXAM    General: The patient is in no acute distress.     Mental status:  Patient is appropriate, is  oriented to place and plan of care. Dermatologic exam: Visual inspection of the periwound reveals the skin to be coarse and callus  Wound exam: see wound description below in procedure note      Assessment:     Problem List Items Addressed This Visit        Endocrine    WD-Diabetic ulcer of toe of left foot associated with type 2 diabetes mellitus, with fat layer exposed ([address]    Relevant Medications    HYDROcodone-acetaminophen (Thor Sharifa) 5-325 MG per tablet    Type 2 diabetes, controlled, with neuropathy ([address]    Relevant Orders    Initiate Outpatient Wound Care Protocol       Other    WD-Ankle injury, initial encounter    WD-Acute right ankle pain    WD-Right ankle swelling    Non-pressure chronic ulcer of other part of left foot with fat layer exposed ([address] - Primary    Relevant Orders    Initiate Outpatient Wound Care Protocol      Other Visit Diagnoses     Injury due to fall, initial encounter        Relevant Medications    HYDROcodone-acetaminophen (NORCO) 5-325 MG per tablet    Other Relevant Orders    XR FOOT RIGHT (MIN 3 VIEWS)    XR ANKLE RIGHT (MIN 3 VIEWS)    Swelling of right foot        Relevant Medications    HYDROcodone-acetaminophen (NORCO) 5-325 MG per tablet    Other Relevant Orders    XR FOOT RIGHT (MIN 3 VIEWS)    XR ANKLE RIGHT (MIN 3 VIEWS)          Procedure Note    Indications:  Based on my examination of this patient's wound(s) today, sharp excision into necrotic subcutaneous tissue is required to promote healing and evaluate the extent of previous healing. Performed by: TEE Trinidad - CNP    Consent obtained: Yes    Time out taken:  Yes    Pain Control: Anesthetic  Anesthetic: 4% Lidocaine Liquid Topical        Debridement:Excisional Debridement    Using curette the wound(s) was/were sharply debrided down through and including the removal of subcutaneous tissue.         Devitalized Tissue Debrided:  slough and exudate    Pre Debridement Measurements:  Are located in the Wound Documentation Flow Sheet    All active wounds listed below with today's date are evaluated  Wound(s)    debrided this date include # : 1     Post  Debridement Measurements:  Wound 02/19/22 Toe (Comment  which one) Left #1 (onset 2 months) Left Great Toe Plantar   (Active)   Wound Image   06/24/22 1549   Wound Etiology Diabetic 06/24/22 1549   Dressing Status Clean;Dry;New dressing applied 06/27/22 1651   Wound Cleansed Wound cleanser 06/27/22 1622   Offloading for Diabetic Foot Ulcers Post op shoe;Offloading ordered 06/27/22 1622   Wound Length (cm) 0.4 cm 06/27/22 1622   Wound Width (cm) 0.6 cm 06/27/22 1622   Wound Depth (cm) 0.1 cm 06/27/22 1622   Wound Surface Area (cm^2) 0.24 cm^2 06/27/22 1622   Change in Wound Size % (l*w) 76 06/27/22 1622   Wound Volume (cm^3) 0.024 cm^3 06/27/22 1622   Wound Healing % 76 06/27/22 1622   Post-Procedure Length (cm) 0.4 cm 06/27/22 1636   Post-Procedure Width (cm) 0.6 cm 06/27/22 1636   Post-Procedure Depth (cm) 0.1 cm 06/27/22 1636   Post-Procedure Surface Area (cm^2) 0.24 cm^2 06/27/22 1636   Post-Procedure Volume (cm^3) 0.024 cm^3 06/27/22 1636   Distance Tunneling (cm) 0 cm 06/27/22 1622   Tunneling Position ___ O'Clock 0 06/27/22 1622   Undermining Starts ___ O'Clock 0 06/27/22 1622   Undermining Ends___ O'Clock 0 06/27/22 1622   Undermining Maxium Distance (cm) 0 06/27/22 1622   Wound Assessment Pink/red 06/27/22 1622   Drainage Amount Small 06/27/22 1622   Drainage Description Serosanguinous 06/27/22 1622   Odor None 06/27/22 1622   Saira-wound Assessment Hyperkeratosis (callous) 06/27/22 1622   Margins Defined edges 06/27/22 1622   Wound Thickness Description not for Pressure Injury Full thickness 06/27/22 1622   Number of days: 127       Percent of Wound(s) Debrided: approximately 100%    Total  Area  Debrided:  0.24 sq cm     Bleeding:  Minimal    Hemostasis Achieved:  by pressure    Procedural Pain:  0  / 10     Post Procedural Pain:  0 / 10     Response to treatment:  Well tolerated by patient. Status of wound progress and description from last visit: Toe wound smaller since the TCC has been on. Since right ankle injury will hold off on casting, will use a coban 2 compression dressing to minimize pressure. Will follow up and refer as needed based on Xray findings. Plan:       Discharge Instructions       PHYSICIAN ORDERS AND DISCHARGE INSTRUCTIONS     NOTE: Upon discharge from the [address], you will receive a patient experience survey. We would be grateful if you would take the time to fill this survey out.     Wound care order history:                 Vascular studies: arterial studies  Date 2/21/22              Imaging:   Date               Cultures:   Obtained on 5/13/2022              Grafts:  Date               Antibiotics:  Doxycycline 100 mg BID for 10 days ordered on 5/13/2022              Earlier Wound care treatments:                           Primary care physician:      Continuing wound care orders and information:              Residence:               Continue home health care with:               [facility] Medications:              EKYTB cleansing:                           UM not scrub or use excessive force.                          Wash hands with soap and water before and after dressing changes.                         Prior to applying a clean dressing, cleanse wound with normal saline,                                wound cleanser, or mild soap and water.                           Ask the physician or nurse before getting the wound(s) wet in a shower              Daily Wound management:                          Keep weight off wounds and reposition every 2 hours.                          Avoid standing for long periods of time.                          Apply wraps/stockings in AM and remove at bedtime.                          If swelling is present, elevate legs to the level of the heart or above for 30 minutes 4-5 times a day and/or when sitting.

## 2022-07-05 ENCOUNTER — HOSPITAL ENCOUNTER (OUTPATIENT)
Dept: WOUND CARE | Age: 42
Discharge: HOME | End: 2022-07-05
Payer: COMMERCIAL

## 2022-07-05 VITALS
DIASTOLIC BLOOD PRESSURE: 99 MMHG | RESPIRATION RATE: 18 BRPM | TEMPERATURE: 98.6 F | SYSTOLIC BLOOD PRESSURE: 167 MMHG | HEART RATE: 96 BPM

## 2022-07-05 DIAGNOSIS — M25.471 RIGHT ANKLE SWELLING: ICD-10-CM

## 2022-07-05 DIAGNOSIS — S99.919A ANKLE INJURY, INITIAL ENCOUNTER: ICD-10-CM

## 2022-07-05 DIAGNOSIS — M25.571 ACUTE RIGHT ANKLE PAIN: ICD-10-CM

## 2022-07-05 DIAGNOSIS — E11.621 DIABETIC ULCER OF TOE OF LEFT FOOT ASSOCIATED WITH TYPE 2 DIABETES MELLITUS, WITH FAT LAYER EXPOSED (HCC): ICD-10-CM

## 2022-07-05 DIAGNOSIS — M14.671 CHARCOT ANKLE, RIGHT: ICD-10-CM

## 2022-07-05 DIAGNOSIS — E11.40 TYPE 2 DIABETES, CONTROLLED, WITH NEUROPATHY (HCC): ICD-10-CM

## 2022-07-05 DIAGNOSIS — L97.522 NON-PRESSURE CHRONIC ULCER OF OTHER PART OF LEFT FOOT WITH FAT LAYER EXPOSED (HCC): Primary | ICD-10-CM

## 2022-07-05 DIAGNOSIS — L97.522 DIABETIC ULCER OF TOE OF LEFT FOOT ASSOCIATED WITH TYPE 2 DIABETES MELLITUS, WITH FAT LAYER EXPOSED (HCC): ICD-10-CM

## 2022-07-05 PROCEDURE — 11042 DBRDMT SUBQ TIS 1ST 20SQCM/<: CPT | Performed by: NURSE PRACTITIONER

## 2022-07-05 PROCEDURE — 11042 DBRDMT SUBQ TIS 1ST 20SQCM/<: CPT

## 2022-07-05 RX ORDER — LIDOCAINE HYDROCHLORIDE 20 MG/ML
JELLY TOPICAL ONCE
Status: CANCELLED | OUTPATIENT
Start: 2022-07-05 | End: 2022-07-05

## 2022-07-05 RX ORDER — LIDOCAINE 40 MG/G
CREAM TOPICAL ONCE
Status: CANCELLED | OUTPATIENT
Start: 2022-07-05 | End: 2022-07-05

## 2022-07-05 RX ORDER — BACITRACIN ZINC AND POLYMYXIN B SULFATE 500; 1000 [USP'U]/G; [USP'U]/G
OINTMENT TOPICAL ONCE
Status: CANCELLED | OUTPATIENT
Start: 2022-07-05 | End: 2022-07-05

## 2022-07-05 RX ORDER — LIDOCAINE 50 MG/G
OINTMENT TOPICAL ONCE
Status: CANCELLED | OUTPATIENT
Start: 2022-07-05 | End: 2022-07-05

## 2022-07-05 RX ORDER — LIDOCAINE HYDROCHLORIDE 40 MG/ML
SOLUTION TOPICAL ONCE
Status: CANCELLED | OUTPATIENT
Start: 2022-07-05 | End: 2022-07-05

## 2022-07-05 RX ORDER — BACITRACIN, NEOMYCIN, POLYMYXIN B 400; 3.5; 5 [USP'U]/G; MG/G; [USP'U]/G
OINTMENT TOPICAL ONCE
Status: CANCELLED | OUTPATIENT
Start: 2022-07-05 | End: 2022-07-05

## 2022-07-05 RX ORDER — BETAMETHASONE DIPROPIONATE 0.05 %
OINTMENT (GRAM) TOPICAL ONCE
Status: CANCELLED | OUTPATIENT
Start: 2022-07-05 | End: 2022-07-05

## 2022-07-05 RX ORDER — CLOBETASOL PROPIONATE 0.5 MG/G
OINTMENT TOPICAL ONCE
Status: CANCELLED | OUTPATIENT
Start: 2022-07-05 | End: 2022-07-05

## 2022-07-05 RX ORDER — GINSENG 100 MG
CAPSULE ORAL ONCE
Status: CANCELLED | OUTPATIENT
Start: 2022-07-05 | End: 2022-07-05

## 2022-07-05 RX ORDER — GENTAMICIN SULFATE 1 MG/G
OINTMENT TOPICAL ONCE
Status: CANCELLED | OUTPATIENT
Start: 2022-07-05 | End: 2022-07-05

## 2022-07-05 ASSESSMENT — PAIN DESCRIPTION - DESCRIPTORS: DESCRIPTORS: ACHING

## 2022-07-05 ASSESSMENT — PAIN DESCRIPTION - LOCATION: LOCATION: FOOT

## 2022-07-05 ASSESSMENT — PAIN SCALES - GENERAL: PAINLEVEL_OUTOF10: 0

## 2022-07-05 ASSESSMENT — PAIN - FUNCTIONAL ASSESSMENT: PAIN_FUNCTIONAL_ASSESSMENT: PREVENTS OR INTERFERES SOME ACTIVE ACTIVITIES AND ADLS

## 2022-07-05 ASSESSMENT — PAIN DESCRIPTION - ORIENTATION: ORIENTATION: LEFT;RIGHT

## 2022-07-05 NOTE — PROGRESS NOTES
Progress Note       Jyoti Mccoy  AGE: 46 y.o. GENDER: male  : 1980  EPISODE DATE:  2022     Subjective:     Chief Complaint   Patient presents with    Wound Check     left foot          HISTORY of PRESENT ILLNESS     Irina Angeles a 46 y. o. male who presents today for wound evaluation for chronic diabetic and pressure wound left foot. A TCC was placed last Friday. He presents today with a right ankle injury post ambulating without his cane he rolled the right ankle and states he heard a \"pop. \" The right lower leg mid shin down is edematous and warm initially. The redness, warmth and edema have improved. He was able to see Dr. Beatris Burt who made him non-weight bearing for 8 weeks. The patient was ambulating today with his off loading boot and using a cane. Dr. Beatris Burt was at the clinic and discussed with him his non-weight bearing status. He must use crutches or a knee walker. He verbalizes he will obtain a knee walker. Diabetes: History of diabetes, not currently on medication, last A1c was 5.1 as of 22. Does have neuropathy. Smoking: Never smoker  Obesity: Yes  Anticoagulant therapy: No  Immunosuppression: No     Patient educated on the 6 essential components necessary for wound healing: Circulation, Debridements, Proper Dressings and Topical Wound Products, Infection Control, Edema Control and Offloading.     Patient educated on those factors that negatively effect or impact wound healing: smoking, obesity, uncontrolled diabetes, anticoagulant and immunosuppressive regimens, inadequate nutrition, untreated arterial and venous disease if applicable and measures to manage edema.      PAST MEDICAL HISTORY        Diagnosis Date    Hypercholesterolemia 2019    Hypertension     Local infection of skin and subcutaneous tissue 2022    Type 2 diabetes, controlled, with neuropathy ([facility] 2018    WD-Diabetic ulcer of toe of left foot associated with type 2 diabetes mellitus, with fat layer exposed ([facility] 5/13/2022       PAST SURGICAL HISTORY    History reviewed. No pertinent surgical history. FAMILY HISTORY    Family History   Problem Relation Age of Onset    High Blood Pressure Sister        SOCIAL HISTORY    Social History     Tobacco Use    Smoking status: Never Smoker    Smokeless tobacco: Never Used   Substance Use Topics    Alcohol use: Yes     Alcohol/week: 12.0 standard drinks     Types: 12 Standard drinks or equivalent per week     Comment: 6 drinks daily    Drug use: No       ALLERGIES    Allergies   Allergen Reactions    Paxil [Paroxetine Hcl]      Caused \"weird\" things to happen in his sleep       MEDICATIONS    Current Outpatient Medications on File Prior to Encounter   Medication Sig Dispense Refill    lidocaine (XYLOCAINE) 5 % ointment Apply topically as needed. 50 g 2    ibuprofen (ADVIL;MOTRIN) 800 MG tablet Take 1 tablet by mouth 2 times daily as needed for Pain 60 tablet 0    ibuprofen (ADVIL;MOTRIN) 600 MG tablet Take 1 tablet by mouth 3 times daily (with meals) 40 tablet 0    pregabalin (LYRICA) 100 MG capsule TAKE 1 CAPSULE BY MOUTH TWICE DAILY 180 capsule 1    busPIRone (BUSPAR) 15 MG tablet TAKE 1 TABLET BY MOUTH THREE TIMES DAILY 270 tablet 1    traZODone (DESYREL) 50 MG tablet Take 1-2 tablets by mouth nightly as needed for Sleep 60 tablet 11    glucose blood VI test strips (GLUCOSE METER TEST) strip 1 each by In Vitro route daily As needed. 100 each 3    glucose monitoring kit (FREESTYLE) monitoring kit 1 kit by Does not apply route daily as needed (If blood sugars running high or low) 1 kit 0    MICROLET LANCETS MISC 1 each by Does not apply route daily 100 each 3     No current facility-administered medications on file prior to encounter. REVIEW OF SYSTEMS    Pertinent items are noted in HPI. Constitutional: Negative for systemic symptoms including fever, chills and malaise.     Objective:      BP (!) 167/99   Pulse 96   Temp 98.6 °F (37 °C) (Temporal)   Resp 18     PHYSICAL EXAM    General: The patient is in no acute distress. Mental status:  Patient is appropriate, is  oriented to place and plan of care. Dermatologic exam: Visual inspection of the periwound reveals the skin to be coarse and callus  Wound exam: see wound description below in procedure note      Assessment:     Problem List Items Addressed This Visit        Endocrine    WD-Diabetic ulcer of toe of left foot associated with type 2 diabetes mellitus, with fat layer exposed ([address]    Type 2 diabetes, controlled, with neuropathy ([address]    Relevant Orders    Initiate Outpatient Wound Care Protocol       Other    WD-Ankle injury, initial encounter    WD-Acute right ankle pain    WD-Right ankle swelling    WD-Charcot ankle, right    Non-pressure chronic ulcer of other part of left foot with fat layer exposed ([address] - Primary    Relevant Orders    Initiate Outpatient Wound Care Protocol          Procedure Note    Indications:  Based on my examination of this patient's wound(s) today, sharp excision into necrotic subcutaneous tissue is required to promote healing and evaluate the extent of previous healing. Performed by: TEE Betancourt - CNP    Consent obtained: Yes    Time out taken:  Yes    Pain Control: Anesthetic  Anesthetic: 4% Lidocaine Liquid Topical        Debridement:Excisional Debridement    Using curette the wound(s) was/were sharply debrided down through and including the removal of subcutaneous tissue.         Devitalized Tissue Debrided:  slough and exudate    Pre Debridement Measurements:  Are located in the Wound Documentation Flow Sheet    All active wounds listed below with today's date are evaluated  Wound(s)    debrided this date include # : 1 & 2    Post  Debridement Measurements:  Wound 02/19/22 Toe (Comment  which one) Left #1 (onset 2 months) Left Great Toe Plantar   (Active)   Wound Image   07/05/22 1548   Wound Etiology Diabetic 07/05/22 1548   Dressing Status New dressing applied 07/05/22 1628   Wound Cleansed Wound cleanser 07/05/22 1548   Offloading for Diabetic Foot Ulcers Post op shoe;Offloading ordered 07/05/22 1548   Wound Length (cm) 1 cm 07/05/22 1548   Wound Width (cm) 1.2 cm 07/05/22 1548   Wound Depth (cm) 0.1 cm 07/05/22 1548   Wound Surface Area (cm^2) 1.2 cm^2 07/05/22 1548   Change in Wound Size % (l*w) -20 07/05/22 1548   Wound Volume (cm^3) 0.12 cm^3 07/05/22 1548   Wound Healing % -20 07/05/22 1548   Post-Procedure Length (cm) 1 cm 07/05/22 1618   Post-Procedure Width (cm) 1.2 cm 07/05/22 1618   Post-Procedure Depth (cm) 0.1 cm 07/05/22 1618   Post-Procedure Surface Area (cm^2) 1.2 cm^2 07/05/22 1618   Post-Procedure Volume (cm^3) 0.12 cm^3 07/05/22 1618   Distance Tunneling (cm) 0 cm 07/05/22 1548   Tunneling Position ___ O'Clock 0 07/05/22 1548   Undermining Starts ___ O'Clock 0 07/05/22 1548   Undermining Ends___ O'Clock 0 07/05/22 1548   Undermining Maxium Distance (cm) 0 07/05/22 1548   Wound Assessment Pink/red 07/05/22 1548   Drainage Amount Small 07/05/22 1548   Drainage Description Serosanguinous 07/05/22 1548   Odor None 07/05/22 1548   Saira-wound Assessment Hyperkeratosis (callous) 07/05/22 1548   Margins Defined edges 07/05/22 1548   Wound Thickness Description not for Pressure Injury Full thickness 07/05/22 1548   Number of days: 135       Wound 07/05/22 Distal;Left #2 left distal lower leg (Active)   Wound Image   07/05/22 1553   Wound Etiology Traumatic 07/05/22 1553   Dressing Status New dressing applied 07/05/22 1628   Wound Cleansed Wound cleanser 07/05/22 1553   Offloading for Diabetic Foot Ulcers Offloading not required 07/05/22 1628   Wound Length (cm) 1.5 cm 07/05/22 1553   Wound Width (cm) 2.5 cm 07/05/22 1553   Wound Depth (cm) 0.1 cm 07/05/22 1553   Wound Surface Area (cm^2) 3.75 cm^2 07/05/22 1553   Wound Volume (cm^3) 0.375 cm^3 07/05/22 1553   Post-Procedure Length (cm) 1.5 cm 07/05/22 1618   Post-Procedure Width (cm) 2.5 cm 07/05/22 1618   Post-Procedure Depth (cm) 0.1 cm 07/05/22 1618   Post-Procedure Surface Area (cm^2) 3.75 cm^2 07/05/22 1618   Post-Procedure Volume (cm^3) 0.375 cm^3 07/05/22 1618   Tunneling Position ___ O'Clock 0 07/05/22 1553   Undermining Starts ___ O'Clock 0 07/05/22 1553   Undermining Ends___ O'Clock 0 07/05/22 1553   Undermining Maxium Distance (cm) 0 07/05/22 1553   Wound Assessment Granulation tissue 07/05/22 1553   Drainage Amount Moderate 07/05/22 1553   Drainage Description Serosanguinous 07/05/22 1553   Odor Moderate 07/05/22 1553   Saira-wound Assessment Intact 07/05/22 1553   Margins Attached edges 07/05/22 1553   Wound Thickness Description not for Pressure Injury Full thickness 07/05/22 1553   Number of days: 0         Percent of Wound(s) Debrided: approximately 100%    Total  Area  Debrided: 4.95 sq cm     Bleeding:  Minimal    Hemostasis Achieved:  by pressure    Procedural Pain:  0  / 10     Post Procedural Pain:  0 / 10     Response to treatment:  Well tolerated by patient. Status of wound progress and description from last visit: Toe wound smaller, a new traumatic wound left lower leg from the patient cutting off the cast. Since right ankle injury will hold off on casting, will use a coban 2 compression dressing to minimize pressure. Impression   1. Lisfranc injury with subluxation of the 2nd through 5th metatarsals   laterally. 2. Suspected fractures noted through the cuneiforms and cuboid as well as   possibly the 2nd and 3rd metatarsal bases.  Dedicated CT of the foot is   recommended for further evaluation. 3. Diffuse soft tissue swelling along the right ankle without evidence of a   discrete fracture. Plan:       Discharge Instructions       PHYSICIAN ORDERS AND DISCHARGE INSTRUCTIONS     NOTE: Upon discharge from the [address], you will receive a patient experience survey.  We would be grateful if you would take the time to fill this survey out.     Wound care order history:                 Vascular studies: arterial studies  Date 2/21/22              Imaging:   Date               Cultures:   Obtained on 5/13/2022              Grafts:  Date               Antibiotics:  Doxycycline 100 mg BID for 10 days ordered on 5/13/2022              Earlier Wound care treatments:                           Primary care physician:      Continuing wound care orders and information:              Residence:               Continue home health care with:               RNPSL Medications:              QBHRH cleansing:                           AR not scrub or use excessive force.                          Wash hands with soap and water before and after dressing changes.                         Prior to applying a clean dressing, cleanse wound with normal saline,                                wound cleanser, or mild soap and water.                           Ask the physician or nurse before getting the wound(s) wet in a shower              Daily Wound management:                          Keep weight off wounds and reposition every 2 hours.                          Avoid standing for long periods of time.                          Apply wraps/stockings in AM and remove at bedtime.                          If swelling is present, elevate legs to the level of the heart or above for 30 minutes 4-5 times a day and/or when sitting.                                             When taking antibiotics take entire prescription as ordered by physician do not stop taking until medicine is all gone.                      Wound Care Notes:   Pharm: [facility]: Pain medication ordered                                          Orders for this week: 7/5/22     Left Great Toe and Left Lower Leg -- wash with soap and water, pat dry.    Apply Aster  and cover with Ioplex and foam    Wrap with conform  Wrap LLE with coban 2 lite           Follow up Instructions: at the [address] in 1 week Monday  Primary Wound Care Provider: Janny Ramirez CNP   Call [phone] for any questions or concerns.   Date__________   Time____________  [place] Scheduling: [phone]        Treatment Note Wound 02/19/22 Toe (Comment  which one) Left #1 (onset 2 months) Left Great Toe Plantar  -Dressing/Treatment:  (Aster, Ioplex, Foam, conform, Coban 2 Lite)  Wound 07/05/22 Distal;Left #2 left distal lower leg-Dressing/Treatment:  (Aster, Ioplex, Foam, conform, Coban 2 Lite)    Written Patient Dismissal Instructions Given            Electronically signed by TEE Martinez CNP on 7/5/2022 at 4:55 PM

## 2022-07-05 NOTE — PROGRESS NOTES
Multilayer Compression Wrap   (Not Unna) Below the Knee    NAME:  Matthew Loja  YOB: 1980  MEDICAL RECORD NUMBER:  [id number]  DATE:  7/5/2022    Multilayer compression wrap: Removed old Multilayer wrap if indicated and wash leg with mild soap/water. Applied moisturizing agent to dry skin as needed. Applied primary and secondary dressing as ordered. Applied multilayered dressing below the knee to left lower leg. Instructed patient/caregiver not to remove dressing and to keep it clean and dry. Instructed patient/caregiver on complications to report to provider, such as pain, numbness in toes, heavy drainage, and slippage of dressing. Instructed patient on purpose of compression dressing and on activity and exercise recommendations.       Electronically signed by Wally Cody LPN on 6/4/6508 at 8:28 PM

## 2022-07-11 ENCOUNTER — HOSPITAL ENCOUNTER (OUTPATIENT)
Dept: WOUND CARE | Age: 42
Discharge: HOME | End: 2022-07-11

## 2022-07-11 ENCOUNTER — CARE COORDINATION (OUTPATIENT)
Dept: CARE COORDINATION | Age: 42
End: 2022-07-11

## 2022-07-11 NOTE — CARE COORDINATION
Attempted to reach patient for ACM follow up. No answer to phone. Message left with ACM contact information and request for call back. Introduction letter sent via My Chart.

## 2022-07-13 ENCOUNTER — CARE COORDINATION (OUTPATIENT)
Dept: CARE COORDINATION | Age: 42
End: 2022-07-13

## 2022-07-15 ENCOUNTER — CARE COORDINATION (OUTPATIENT)
Dept: CARE COORDINATION | Age: 42
End: 2022-07-15

## 2022-07-27 ENCOUNTER — TELEPHONE (OUTPATIENT)
Dept: WOUND CARE | Age: 42
End: 2022-07-27

## 2022-07-27 DIAGNOSIS — L97.522 NON-PRESSURE CHRONIC ULCER OF OTHER PART OF LEFT FOOT WITH FAT LAYER EXPOSED (HCC): ICD-10-CM

## 2022-07-27 DIAGNOSIS — E11.40 TYPE 2 DIABETES, CONTROLLED, WITH NEUROPATHY (HCC): Primary | ICD-10-CM

## 2022-08-11 ENCOUNTER — HOSPITAL ENCOUNTER (OUTPATIENT)
Dept: WOUND CARE | Age: 42
Discharge: HOME | End: 2022-08-11
Payer: COMMERCIAL

## 2022-08-11 VITALS
SYSTOLIC BLOOD PRESSURE: 175 MMHG | HEART RATE: 93 BPM | DIASTOLIC BLOOD PRESSURE: 94 MMHG | TEMPERATURE: 96.3 F | RESPIRATION RATE: 20 BRPM

## 2022-08-11 DIAGNOSIS — M25.571 ACUTE RIGHT ANKLE PAIN: ICD-10-CM

## 2022-08-11 DIAGNOSIS — M25.471 RIGHT ANKLE SWELLING: ICD-10-CM

## 2022-08-11 DIAGNOSIS — E11.40 TYPE 2 DIABETES, CONTROLLED, WITH NEUROPATHY (HCC): Primary | ICD-10-CM

## 2022-08-11 DIAGNOSIS — M14.671 CHARCOT ANKLE, RIGHT: ICD-10-CM

## 2022-08-11 DIAGNOSIS — L97.522 DIABETIC ULCER OF TOE OF LEFT FOOT ASSOCIATED WITH TYPE 2 DIABETES MELLITUS, WITH FAT LAYER EXPOSED (HCC): ICD-10-CM

## 2022-08-11 DIAGNOSIS — E11.621 DIABETIC ULCER OF TOE OF LEFT FOOT ASSOCIATED WITH TYPE 2 DIABETES MELLITUS, WITH FAT LAYER EXPOSED (HCC): ICD-10-CM

## 2022-08-11 PROCEDURE — 11042 DBRDMT SUBQ TIS 1ST 20SQCM/<: CPT | Performed by: NURSE PRACTITIONER

## 2022-08-11 PROCEDURE — 11042 DBRDMT SUBQ TIS 1ST 20SQCM/<: CPT

## 2022-08-11 RX ORDER — GENTAMICIN SULFATE 1 MG/G
OINTMENT TOPICAL ONCE
Status: CANCELLED | OUTPATIENT
Start: 2022-08-11 | End: 2022-08-11

## 2022-08-11 RX ORDER — GINSENG 100 MG
CAPSULE ORAL ONCE
Status: CANCELLED | OUTPATIENT
Start: 2022-08-11 | End: 2022-08-11

## 2022-08-11 RX ORDER — BETAMETHASONE DIPROPIONATE 0.05 %
OINTMENT (GRAM) TOPICAL ONCE
Status: CANCELLED | OUTPATIENT
Start: 2022-08-11 | End: 2022-08-11

## 2022-08-11 RX ORDER — BACITRACIN, NEOMYCIN, POLYMYXIN B 400; 3.5; 5 [USP'U]/G; MG/G; [USP'U]/G
OINTMENT TOPICAL ONCE
Status: CANCELLED | OUTPATIENT
Start: 2022-08-11 | End: 2022-08-11

## 2022-08-11 RX ORDER — LIDOCAINE HYDROCHLORIDE 40 MG/ML
SOLUTION TOPICAL ONCE
Status: CANCELLED | OUTPATIENT
Start: 2022-08-11 | End: 2022-08-11

## 2022-08-11 RX ORDER — LIDOCAINE HYDROCHLORIDE 20 MG/ML
JELLY TOPICAL ONCE
Status: CANCELLED | OUTPATIENT
Start: 2022-08-11 | End: 2022-08-11

## 2022-08-11 RX ORDER — HYDROCODONE BITARTRATE AND ACETAMINOPHEN 5; 325 MG/1; MG/1
1 TABLET ORAL EVERY 12 HOURS PRN
Qty: 14 TABLET | Refills: 0 | Status: SHIPPED | OUTPATIENT
Start: 2022-08-11 | End: 2022-08-18

## 2022-08-11 RX ORDER — CLOBETASOL PROPIONATE 0.5 MG/G
OINTMENT TOPICAL ONCE
Status: CANCELLED | OUTPATIENT
Start: 2022-08-11 | End: 2022-08-11

## 2022-08-11 RX ORDER — BACITRACIN ZINC AND POLYMYXIN B SULFATE 500; 1000 [USP'U]/G; [USP'U]/G
OINTMENT TOPICAL ONCE
Status: CANCELLED | OUTPATIENT
Start: 2022-08-11 | End: 2022-08-11

## 2022-08-11 RX ORDER — LIDOCAINE 40 MG/G
CREAM TOPICAL ONCE
Status: CANCELLED | OUTPATIENT
Start: 2022-08-11 | End: 2022-08-11

## 2022-08-11 RX ORDER — LIDOCAINE 50 MG/G
OINTMENT TOPICAL ONCE
Status: CANCELLED | OUTPATIENT
Start: 2022-08-11 | End: 2022-08-11

## 2022-08-11 ASSESSMENT — PAIN DESCRIPTION - PAIN TYPE: TYPE: CHRONIC PAIN

## 2022-08-11 ASSESSMENT — PAIN DESCRIPTION - ORIENTATION: ORIENTATION: RIGHT

## 2022-08-11 ASSESSMENT — PAIN SCALES - GENERAL: PAINLEVEL_OUTOF10: 10

## 2022-08-11 ASSESSMENT — PAIN DESCRIPTION - ONSET: ONSET: ON-GOING

## 2022-08-11 ASSESSMENT — PAIN DESCRIPTION - FREQUENCY: FREQUENCY: INTERMITTENT

## 2022-08-11 ASSESSMENT — PAIN DESCRIPTION - DESCRIPTORS: DESCRIPTORS: SHOOTING;SHARP;POUNDING

## 2022-08-11 ASSESSMENT — PAIN DESCRIPTION - LOCATION: LOCATION: FOOT

## 2022-08-11 ASSESSMENT — PAIN - FUNCTIONAL ASSESSMENT: PAIN_FUNCTIONAL_ASSESSMENT: PREVENTS OR INTERFERES SOME ACTIVE ACTIVITIES AND ADLS

## 2022-08-11 NOTE — DISCHARGE INSTRUCTIONS
PHYSICIAN ORDERS AND DISCHARGE INSTRUCTIONS     NOTE: Upon discharge from the [address], you will receive a patient experience survey. We would be grateful if you would take the time to fill this survey out. Continuing wound care orders and information:              Residence:               Continue home health care with:              Wound Medications:              Wound cleansing:                          Do not scrub or use excessive force. Wash hands with soap and water before and after dressing changes. Prior to applying a clean dressing, cleanse wound with normal saline,                                wound cleanser, or mild soap and water. Ask the physician or nurse before getting the wound(s) wet in a shower              Daily Wound management:                          Keep weight off wounds and reposition every 2 hours. Avoid standing for long periods of time. Apply wraps/stockings in AM and remove at bedtime. If swelling is present, elevate legs to the level of the heart or above for 30 minutes 4-5 times a day and/or when sitting. When taking antibiotics take entire prescription as ordered by physician do not stop taking until medicine is all gone. Wound Care Notes:  Pharm: [facility]: Pain medication ordered  Vascular studies: arterial studies  Date 2/21/22  Cultures:   Obtained on 5/13/2022  Antibiotics:  Doxycycline 100 mg BID for 10 days ordered on 5/13/2022                                            Orders for this week: 7/11/22     Left Great Toe and Left Lower Leg -- wash with soap and water, pat dry.   Apply Aster  and cover with Ioplex and foam    Wrap with conform     Wrap right foot with 6 in ace wrap and wear surgical shoe        Follow up Instructions: at the [facility] [place] in 1 week Monday  Primary Wound Care Provider: Britton Qiu CNP  Call  for any questions or concerns.   Date__________   Time____________  [place] [phone]

## 2022-08-12 NOTE — PROGRESS NOTES
Progress Note       Mari Urban  AGE: 46 y.o. GENDER: male  : 1980  EPISODE DATE:  2022     Subjective:     Chief Complaint   Patient presents with    Wound Check     Right Foot         HISTORY of PRESENT ILLNESS     Mari Urban is a 46 y.o. male who presents today for wound evaluation for chronic diabetic and pressure wound left foot. A TCC was placed last Friday. He presents today with a right ankle injury post ambulating without his cane he rolled the right ankle and states he heard a \"pop. \" The right lower leg mid shin down is edematous and warm initially. The redness, warmth and edema have improved. He was able to see Dr. Rafael Harding who made him non-weight bearing for 8 weeks. The patient was ambulating today with his off loading boot and using a cane. Dr. Rafael Harding was at the clinic and discussed with him his non-weight bearing status. He must use crutches or a knee walker. He verbalizes he will obtain a knee walker. Diabetes: History of diabetes, not currently on medication, last A1c was 5.1 as of 22. Does have neuropathy. Smoking: Never smoker  Obesity: Yes  Anticoagulant therapy: No  Immunosuppression: No     Patient educated on the 6 essential components necessary for wound healing: Circulation, Debridements, Proper Dressings and Topical Wound Products, Infection Control, Edema Control and Offloading. Patient educated on those factors that negatively effect or impact wound healing: smoking, obesity, uncontrolled diabetes, anticoagulant and immunosuppressive regimens, inadequate nutrition, untreated arterial and venous disease if applicable and measures to manage edema.      PAST MEDICAL HISTORY        Diagnosis Date    Hypercholesterolemia 2019    Hypertension     Local infection of skin and subcutaneous tissue 2022    Type 2 diabetes, controlled, with neuropathy ([facility] [address] 2018    WD-Diabetic ulcer of toe of left foot associated with type 2 diabetes mellitus, with fat layer exposed ([facility] 5/13/2022       PAST SURGICAL HISTORY    History reviewed. No pertinent surgical history. FAMILY HISTORY    Family History   Problem Relation Age of Onset    High Blood Pressure Sister        SOCIAL HISTORY    Social History     Tobacco Use    Smoking status: Never    Smokeless tobacco: Never   Substance Use Topics    Alcohol use: Yes     Alcohol/week: 12.0 standard drinks     Types: 12 Standard drinks or equivalent per week     Comment: 6 drinks daily    Drug use: No       ALLERGIES    Allergies   Allergen Reactions    Paxil [Paroxetine Hcl]      Caused \"weird\" things to happen in his sleep       MEDICATIONS    Current Outpatient Medications on File Prior to Encounter   Medication Sig Dispense Refill    lidocaine (XYLOCAINE) 5 % ointment Apply topically as needed. 50 g 2    ibuprofen (ADVIL;MOTRIN) 800 MG tablet Take 1 tablet by mouth 2 times daily as needed for Pain 60 tablet 0    ibuprofen (ADVIL;MOTRIN) 600 MG tablet Take 1 tablet by mouth 3 times daily (with meals) 40 tablet 0    pregabalin (LYRICA) 100 MG capsule TAKE 1 CAPSULE BY MOUTH TWICE DAILY 180 capsule 1    busPIRone (BUSPAR) 15 MG tablet TAKE 1 TABLET BY MOUTH THREE TIMES DAILY 270 tablet 1    traZODone (DESYREL) 50 MG tablet Take 1-2 tablets by mouth nightly as needed for Sleep 60 tablet 11    glucose blood VI test strips (GLUCOSE METER TEST) strip 1 each by In Vitro route daily As needed. 100 each 3    glucose monitoring kit (FREESTYLE) monitoring kit 1 kit by Does not apply route daily as needed (If blood sugars running high or low) 1 kit 0    MICROLET LANCETS MISC 1 each by Does not apply route daily 100 each 3     No current facility-administered medications on file prior to encounter. REVIEW OF SYSTEMS    Pertinent items are noted in HPI. Constitutional: Negative for systemic symptoms including fever, chills and malaise.     Objective:      BP (!) 175/94   Pulse 93   Temp (!) 96.3 °F (35.7 °C) (Temporal)   Resp 20 PHYSICAL EXAM    General: The patient is in no acute distress. Mental status:  Patient is appropriate, is  oriented to place and plan of care. Dermatologic exam: Visual inspection of the periwound reveals the skin to be coarse and callus  Wound exam: see wound description below in procedure note      Assessment:     Problem List Items Addressed This Visit          Endocrine    WD-Diabetic ulcer of toe of left foot associated with type 2 diabetes mellitus, with fat layer exposed ([address]    Type 2 diabetes, controlled, with neuropathy ([address] - Primary       Other    WD-Acute right ankle pain    Relevant Medications    HYDROcodone-acetaminophen (NORCO) 5-325 MG per tablet    WD-Right ankle swelling    Relevant Medications    HYDROcodone-acetaminophen (NORCO) 5-325 MG per tablet    WD-Charcot ankle, right    Relevant Medications    HYDROcodone-acetaminophen (NORCO) 5-325 MG per tablet       Procedure Note    Indications:  Based on my examination of this patient's wound(s) today, sharp excision into necrotic subcutaneous tissue is required to promote healing and evaluate the extent of previous healing. Performed by: TEE Haq - CNP    Consent obtained: Yes    Time out taken:  Yes    Pain Control: Anesthetic  Anesthetic: 4% Lidocaine Liquid Topical        Debridement:Excisional Debridement    Using curette the wound(s) was/were sharply debrided down through and including the removal of subcutaneous tissue.         Devitalized Tissue Debrided:  slough and exudate    Pre Debridement Measurements:  Are located in the Wound Documentation Flow Sheet    All active wounds listed below with today's date are evaluated  Wound(s)    debrided this date include # : 1     Post  Debridement Measurements:  Wound 02/19/22 Toe (Comment  which one) Left #1 (onset 2 months) Left Great Toe Plantar   (Active)   Wound Image   07/05/22 1548   Wound Etiology Diabetic 07/05/22 1548   Dressing Status New dressing applied 07/05/22 Post-Procedure Length (cm) 1.5 cm 07/05/22 1618   Post-Procedure Width (cm) 2.5 cm 07/05/22 1618   Post-Procedure Depth (cm) 0.1 cm 07/05/22 1618   Post-Procedure Surface Area (cm^2) 3.75 cm^2 07/05/22 1618   Post-Procedure Volume (cm^3) 0.375 cm^3 07/05/22 1618   Distance Tunneling (cm) 0 cm 08/11/22 1241   Tunneling Position ___ O'Clock 0 08/11/22 1241   Undermining Starts ___ O'Clock 0 08/11/22 1241   Undermining Ends___ O'Clock 0 08/11/22 1241   Undermining Maxium Distance (cm) 0 08/11/22 1241   Wound Assessment Dry 08/11/22 1241   Drainage Amount None 08/11/22 1241   Drainage Description Serosanguinous 07/05/22 1553   Odor None 08/11/22 1241   Saira-wound Assessment Intact 08/11/22 1241   Margins Attached edges 08/11/22 1241   Wound Thickness Description not for Pressure Injury Full thickness 08/11/22 1241   Number of days: 37       Total  Area  Debrided: 0.24 sq cm     Bleeding:  Minimal    Hemostasis Achieved:  by pressure    Procedural Pain:  0  / 10     Post Procedural Pain:  0 / 10     Response to treatment:  Well tolerated by patient. Status of wound progress and description from last visit: Toe wound smaller, the new traumatic wound left lower leg from the patient cutting off the cast is healed. Since right ankle injury will hold off on casting. The patient has obtained a knee walker and is  using that along with crutches. The patient verbalizes significant pain with his right foot injury, impacting sleep and his ability to complete ADL's. His primary care provider has referred to [facility]. Until seen by pain management, will continue to prescribe patient opioid pain medication at this time. Patient understands all side effects and contraindications of opioids. No indication of abuse or seeking behavior. OARRS report checked at this time. We will continue to monitor. Impression   1. Lisfranc injury with subluxation of the 2nd through 5th metatarsals   laterally.    2. Suspected fractures noted through the cuneiforms and cuboid as well as   possibly the 2nd and 3rd metatarsal bases. Dedicated CT of the foot is   recommended for further evaluation. 3. Diffuse soft tissue swelling along the right ankle without evidence of a   discrete fracture. Plan:       Discharge Instructions         PHYSICIAN ORDERS AND DISCHARGE INSTRUCTIONS     NOTE: Upon discharge from the [address], you will receive a patient experience survey. We would be grateful if you would take the time to fill this survey out. Continuing wound care orders and information:              Residence:               Continue home health care with:              Wound Medications:              Wound cleansing:                          Do not scrub or use excessive force. Wash hands with soap and water before and after dressing changes. Prior to applying a clean dressing, cleanse wound with normal saline,                                wound cleanser, or mild soap and water. Ask the physician or nurse before getting the wound(s) wet in a shower              Daily Wound management:                          Keep weight off wounds and reposition every 2 hours. Avoid standing for long periods of time. Apply wraps/stockings in AM and remove at bedtime. If swelling is present, elevate legs to the level of the heart or above for 30 minutes 4-5 times a day and/or when sitting. When taking antibiotics take entire prescription as ordered by physician do not stop taking until medicine is all gone.                    Wound Care Notes:  Pharm: [facility]: Pain medication ordered  Vascular studies: arterial studies  Date 2/21/22  Cultures:   Obtained on 5/13/2022  Antibiotics:  Doxycycline 100 mg BID for 10 days ordered on 5/13/2022 Orders for this week: 22     Left Great Toe and Left Lower Leg -- wash with soap and water, pat dry. Apply Arielle  and cover with Ioplex and foam    Wrap with conform     Wrap right foot with 6 in ace wrap and wear surgical shoe        Follow up Instructions: at the [address] [place] [address] Road in 1 week Monday  Primary Wound Care Provider: John Tavarez CNP  Call  for any questions or concerns.   Date__________   Time____________  [place] [phone]        Treatment Note Wound 22 Distal;Left #2 left distal lower leg-Dressing/Treatment:  (arielle Ioplex foam conform)  Wound 22 Toe (Comment  which one) Left #1 (onset 2 months) Left Great Toe Plantar  -Dressing/Treatment:  (arielle Ioplex foam conform tape)    Written Patient Dismissal Instructions Given            Electronically signed by TEE Alcantara CNP on 2022 at 8:45 AM

## 2022-09-02 ENCOUNTER — HOSPITAL ENCOUNTER (OUTPATIENT)
Dept: WOUND CARE | Age: 42
Discharge: HOME | End: 2022-09-02
Payer: COMMERCIAL

## 2022-09-02 VITALS
RESPIRATION RATE: 20 BRPM | TEMPERATURE: 98.3 F | SYSTOLIC BLOOD PRESSURE: 149 MMHG | HEART RATE: 105 BPM | DIASTOLIC BLOOD PRESSURE: 92 MMHG

## 2022-09-02 DIAGNOSIS — E11.40 TYPE 2 DIABETES, CONTROLLED, WITH NEUROPATHY (HCC): ICD-10-CM

## 2022-09-02 DIAGNOSIS — S93.325A LISFRANC DISLOCATION, LEFT, INITIAL ENCOUNTER: ICD-10-CM

## 2022-09-02 DIAGNOSIS — L97.522 NON-PRESSURE CHRONIC ULCER OF OTHER PART OF LEFT FOOT WITH FAT LAYER EXPOSED (HCC): Primary | ICD-10-CM

## 2022-09-02 PROCEDURE — 11042 DBRDMT SUBQ TIS 1ST 20SQCM/<: CPT

## 2022-09-02 PROCEDURE — 11720 DEBRIDE NAIL 1-5: CPT

## 2022-09-02 RX ORDER — GENTAMICIN SULFATE 1 MG/G
OINTMENT TOPICAL ONCE
Status: CANCELLED | OUTPATIENT
Start: 2022-09-02 | End: 2022-09-02

## 2022-09-02 RX ORDER — BETAMETHASONE DIPROPIONATE 0.05 %
OINTMENT (GRAM) TOPICAL ONCE
Status: CANCELLED | OUTPATIENT
Start: 2022-09-02 | End: 2022-09-02

## 2022-09-02 RX ORDER — GINSENG 100 MG
CAPSULE ORAL ONCE
Status: CANCELLED | OUTPATIENT
Start: 2022-09-02 | End: 2022-09-02

## 2022-09-02 RX ORDER — CLOBETASOL PROPIONATE 0.5 MG/G
OINTMENT TOPICAL ONCE
Status: CANCELLED | OUTPATIENT
Start: 2022-09-02 | End: 2022-09-02

## 2022-09-02 RX ORDER — BACITRACIN, NEOMYCIN, POLYMYXIN B 400; 3.5; 5 [USP'U]/G; MG/G; [USP'U]/G
OINTMENT TOPICAL ONCE
Status: CANCELLED | OUTPATIENT
Start: 2022-09-02 | End: 2022-09-02

## 2022-09-02 RX ORDER — BACITRACIN ZINC AND POLYMYXIN B SULFATE 500; 1000 [USP'U]/G; [USP'U]/G
OINTMENT TOPICAL ONCE
Status: CANCELLED | OUTPATIENT
Start: 2022-09-02 | End: 2022-09-02

## 2022-09-02 RX ORDER — LIDOCAINE 40 MG/G
CREAM TOPICAL ONCE
Status: CANCELLED | OUTPATIENT
Start: 2022-09-02 | End: 2022-09-02

## 2022-09-02 RX ORDER — LIDOCAINE HYDROCHLORIDE 20 MG/ML
JELLY TOPICAL ONCE
Status: CANCELLED | OUTPATIENT
Start: 2022-09-02 | End: 2022-09-02

## 2022-09-02 RX ORDER — LIDOCAINE 50 MG/G
OINTMENT TOPICAL ONCE
Status: CANCELLED | OUTPATIENT
Start: 2022-09-02 | End: 2022-09-02

## 2022-09-02 RX ORDER — LIDOCAINE HYDROCHLORIDE 40 MG/ML
SOLUTION TOPICAL ONCE
Status: CANCELLED | OUTPATIENT
Start: 2022-09-02 | End: 2022-09-02

## 2022-09-02 ASSESSMENT — PAIN DESCRIPTION - FREQUENCY: FREQUENCY: CONTINUOUS

## 2022-09-02 ASSESSMENT — PAIN SCALES - GENERAL: PAINLEVEL_OUTOF10: 9

## 2022-09-02 ASSESSMENT — PAIN DESCRIPTION - ORIENTATION: ORIENTATION: RIGHT

## 2022-09-02 ASSESSMENT — PAIN DESCRIPTION - ONSET: ONSET: ON-GOING

## 2022-09-02 ASSESSMENT — PAIN DESCRIPTION - LOCATION: LOCATION: FOOT

## 2022-09-02 ASSESSMENT — PAIN DESCRIPTION - DESCRIPTORS: DESCRIPTORS: SHOOTING;ACHING

## 2022-09-02 ASSESSMENT — PAIN - FUNCTIONAL ASSESSMENT: PAIN_FUNCTIONAL_ASSESSMENT: PREVENTS OR INTERFERES SOME ACTIVE ACTIVITIES AND ADLS

## 2022-09-02 NOTE — DISCHARGE INSTRUCTIONS
PHYSICIAN ORDERS AND DISCHARGE INSTRUCTIONS     NOTE: Upon discharge from the [address], you will receive a patient experience survey. We would be grateful if you would take the time to fill this survey out. Continuing wound care orders and information:              Residence:               Continue home health care with:              Wound Medications:              Wound cleansing:                          Do not scrub or use excessive force. Wash hands with soap and water before and after dressing changes. Prior to applying a clean dressing, cleanse wound with normal saline,                                wound cleanser, or mild soap and water. Ask the physician or nurse before getting the wound(s) wet in a shower              Daily Wound management:                          Keep weight off wounds and reposition every 2 hours. Avoid standing for long periods of time. Apply wraps/stockings in AM and remove at bedtime. If swelling is present, elevate legs to the level of the heart or above for 30 minutes 4-5 times a day and/or when sitting. When taking antibiotics take entire prescription as ordered by physician do not stop taking until medicine is all gone. Wound Care Notes:  Pharm: [facility]: Pain medication ordered  Vascular studies: arterial studies  Date 2/21/22  Cultures:   Obtained on 5/13/2022  Antibiotics:  Doxycycline 100 mg BID for 10 days ordered on 5/13/2022                                         Orders for this week: 9/2/22     CT ordered for Right Foot 9/2/22    Left Great Toe and Left Lower Leg -- wash with soap and water, pat dry. Apply saline damp hydrofera blue cut to size of wound. Cover with gauze or ABD. Wrap with conform and secure with tape.      Wrap right foot with 6 in ace wrap and wear surgical shoe        Follow up Instructions: at the [address] [facility] [address] in 1 week on Friday  Primary Wound Care Provider: Dr Theora Lesch  Call  for any questions or concerns.   Date__________   Time____________  [place] [phone]

## 2022-09-02 NOTE — PROGRESS NOTES
Wound Care Center Progress Note With Procedure    Fernie Castaneda  AGE: 38 y.o. GENDER: male  : 1980  EPISODE DATE:  2022     Subjective:     Chief Complaint   Patient presents with    Wound Check     Both feeet and left leg         HISTORY of PRESENT ILLNESS      Fernie Castaneda is a 38 y.o. male who presents today for wound evaluation of Chronic diabetic ulcer(s) of the left great toe. He is also complaining of on-going pain and swelling of the right foot and ankle. I review the chart as I have not seen this patient since 2022. I find on my review of the chart, that he ended up with TCC on the left foot for this DFU of the toe, in late ,  then ended up injuring his right ankle while wearing the TCC. He was seen 'un-officially' in the wound center by Dr. Vanesa Silvestre, who had him f/u in his out-patient podiatry office for this injury. X-ray was obtained showing lisfranc injury with subluxation (see report) and he did have one f/u in Dr. Kathy Lemon office and the plan was for 100% off-loading for 8 weeks with knee scooter. The patient then subsequently never followed up with Dr. Vanesa Silvestre and tells me today that he has been following up for the right ankle here at the wound center. Of note, he cancels most of his weekly scheduled appointments here and has only been seen once a month for 2 months  since this injury has occurred. Now he comes in with no improvement in the ankle pain, no improvement in the swelling and he tells me he will not go back to podiatrist to see him for the ankle. He is established with Dr. Theresa Simpson (orthopedics) and would like to follow up for the right foot/ankle there.   At this point, since there is no improvement and he has obvious compliance issues with follow-up (which also indicates that he likely has compliance issues with his treatment plans and offloading),  I will obtain the CT of the foot and I have already contacted orthopedic office to schedule appointment, for which I have told the patient he must call on Tuesday of this coming week to get an appointment. He is told that he needs to come back here on Friday for the TOE wound, I do tell him that we do not follow ankle fractures or ankle swelling here, we only follow wounds. This is a case for either podiatry or orthopedics and he must be referred out for this. In addition, I have discussed this case with the nurse supervisor, who is familiar with this situation. He notes that the patient didn't use the knee scooter, but used the CAM walker boot to ambulate on the right foot, he was told by our staff that this was not helpful and he needed to be off the foot completely. He was aware that this was not adequate off-loading. He did come in today with his cam walker boot and not a knee scooter. With regards to his toe wound- this is essentially unchanged. This is due to lack of off-loading and compliance with wound care. The rest of this note is going to be pertaining to the toe wound on the left foot, not the right foot. The ulcer is of moderate severity. The underlying cause of the wound is diabetes, ongoing pressure, lack of off-loading  Wound Pain Timing/Severity: none  Quality of pain: N/A  Severity of pain:  0 / 10   Modifying Factors: diabetes, non-compliance  Associated Signs/Symptoms: none        PAST MEDICAL HISTORY        Diagnosis Date    Hypercholesterolemia 2/18/2019    Hypertension     Local infection of skin and subcutaneous tissue 5/13/2022    Type 2 diabetes, controlled, with neuropathy ([address] 6/6/2018    WD-Diabetic ulcer of toe of left foot associated with type 2 diabetes mellitus, with fat layer exposed ([address] 5/13/2022    WD-Lisfranc dislocation, left, initial encounter 9/2/2022       PAST SURGICAL HISTORY    History reviewed. No pertinent surgical history.     FAMILY HISTORY    Family History   Problem Relation Age of Onset    High Blood Pressure Sister        SOCIAL HISTORY    Social History     Tobacco Use    Smoking status: Never    Smokeless tobacco: Never   Substance Use Topics    Alcohol use: Yes     Alcohol/week: 12.0 standard drinks     Types: 12 Standard drinks or equivalent per week     Comment: 6 drinks daily    Drug use: No       ALLERGIES    Allergies   Allergen Reactions    Paxil [Paroxetine Hcl]      Caused \"weird\" things to happen in his sleep       MEDICATIONS    Current Outpatient Medications on File Prior to Encounter   Medication Sig Dispense Refill    lidocaine (XYLOCAINE) 5 % ointment Apply topically as needed. 50 g 2    ibuprofen (ADVIL;MOTRIN) 800 MG tablet Take 1 tablet by mouth 2 times daily as needed for Pain 60 tablet 0    ibuprofen (ADVIL;MOTRIN) 600 MG tablet Take 1 tablet by mouth 3 times daily (with meals) 40 tablet 0    pregabalin (LYRICA) 100 MG capsule TAKE 1 CAPSULE BY MOUTH TWICE DAILY 180 capsule 1    busPIRone (BUSPAR) 15 MG tablet TAKE 1 TABLET BY MOUTH THREE TIMES DAILY 270 tablet 1    traZODone (DESYREL) 50 MG tablet Take 1-2 tablets by mouth nightly as needed for Sleep 60 tablet 11    glucose blood VI test strips (GLUCOSE METER TEST) strip 1 each by In Vitro route daily As needed. 100 each 3    glucose monitoring kit (FREESTYLE) monitoring kit 1 kit by Does not apply route daily as needed (If blood sugars running high or low) 1 kit 0    MICROLET LANCETS MISC 1 each by Does not apply route daily 100 each 3     No current facility-administered medications on file prior to encounter. REVIEW OF SYSTEMS    Pertinent items are noted in HPI. Constitutional: Negative for systemic symptoms including fever, chills and malaise. Objective:      BP (!) 149/92   Pulse (!) 105   Temp 98.3 °F (36.8 °C) (Temporal)   Resp 20     PHYSICAL EXAM    General: The patient is in no acute distress. Mental status:  Patient is appropriate, is  oriented to place and plan of care.   Dermatologic exam: Visual inspection of the periwound reveals the skin to be completely calloused over  Wound exam: see wound description below in procedure note  Right foot- edematous      Assessment:     Problem List Items Addressed This Visit       [address], left, initial encounter    Type 2 diabetes, controlled, with neuropathy ([address]    Relevant Orders    Initiate Outpatient Wound Care Protocol    Non-pressure chronic ulcer of other part of left foot with fat layer exposed ([address] - Primary    Relevant Orders    Initiate Outpatient Wound Care Protocol     Procedure Note    Indications:  Based on my examination of this patient's wound(s) today, sharp excision into necrotic subcutaneous tissue is required to promote healing and evaluate the extent of previous healing. Performed by: Josie Rivas MD    Consent obtained: Yes    Time out taken:  Yes    Pain Control: none needed      Debridement:Excisional Debridement    Using curette the wound(s) was/were sharply debrided down through and including the removal of subcutaneous tissue.         Devitalized Tissue Debrided:  fibrin, biofilm, slough, and callus    Pre Debridement Measurements:  Are located in the Wound Documentation Flow Sheet    All active wounds listed below with today's date are evaluated  Wound(s)    debrided this date include # : 1     Post  Debridement Measurements:  Wound 02/19/22 Toe (Comment  which one) Left #1 (onset 2 months) Left Great Toe Plantar   (Active)   Wound Image   09/02/22 1548   Wound Etiology Diabetic 09/02/22 1548   Dressing Status New dressing applied 07/05/22 1628   Wound Cleansed Wound cleanser 09/02/22 1548   Offloading for Diabetic Foot Ulcers Post op shoe;Offloading ordered 09/02/22 1548   Wound Length (cm) 1 cm 09/02/22 1548   Wound Width (cm) 0.3 cm 09/02/22 1548   Wound Depth (cm) 0.3 cm 09/02/22 1548   Wound Surface Area (cm^2) 0.3 cm^2 09/02/22 1548   Change in Wound Size % (l*w) 70 09/02/22 1548   Wound Volume (cm^3) 0.09 cm^3 09/02/22 1548   Wound Healing % 10 09/02/22 1548 Post-Procedure Length (cm) 1 cm 09/02/22 1618   Post-Procedure Width (cm) 0.3 cm 09/02/22 1618   Post-Procedure Depth (cm) 0.3 cm 09/02/22 1618   Post-Procedure Surface Area (cm^2) 0.3 cm^2 09/02/22 1618   Post-Procedure Volume (cm^3) 0.09 cm^3 09/02/22 1618   Distance Tunneling (cm) 0 cm 09/02/22 1548   Tunneling Position ___ O'Clock 0 09/02/22 1548   Undermining Starts ___ O'Clock 12 09/02/22 1548   Undermining Ends___ O'Clock 12 09/02/22 1548   Undermining Maxium Distance (cm) 0.5 09/02/22 1548   Wound Assessment Pink/red 09/02/22 1548   Drainage Amount Small 09/02/22 1548   Drainage Description Serosanguinous 09/02/22 1548   Odor None 09/02/22 1548   Saira-wound Assessment Hyperkeratosis (callous) 09/02/22 1548   Margins Undefined edges 09/02/22 1548   Wound Thickness Description not for Pressure Injury Full thickness 09/02/22 1548   Number of days: 194       Percent of Wound(s) Debrided: approximately 100%    Total  Area  Debrided:  0.3 sq cm     Bleeding:  Minimal    Hemostasis Achieved:  by pressure    Procedural Pain:  0  / 10     Post Procedural Pain:  0 / 10     Response to treatment:  Well tolerated by patient. Status of wound progress and description from last visit:   wound is unchanged. He needs to be compliant with his WEEKLY wound care visits. Plan:     PT TO CALL HIS ORTHOPEDIC OFFICE ON TUESDAY  Discharge Instructions         PHYSICIAN ORDERS AND DISCHARGE INSTRUCTIONS     NOTE: Upon discharge from the [address], you will receive a patient experience survey. We would be grateful if you would take the time to fill this survey out. Continuing wound care orders and information:              Residence:               Continue home health care with:              Wound Medications:              Wound cleansing:                          Do not scrub or use excessive force. Wash hands with soap and water before and after dressing changes. Prior to applying a clean dressing, cleanse wound with normal saline,                                wound cleanser, or mild soap and water. Ask the physician or nurse before getting the wound(s) wet in a shower              Daily Wound management:                          Keep weight off wounds and reposition every 2 hours. Avoid standing for long periods of time. Apply wraps/stockings in AM and remove at bedtime. If swelling is present, elevate legs to the level of the heart or above for 30 minutes 4-5 times a day and/or when sitting. When taking antibiotics take entire prescription as ordered by physician do not stop taking until medicine is all gone. Wound Care Notes:  Pharm: [facility]: Pain medication ordered  Vascular studies: arterial studies  Date 22  Cultures:   Obtained on 2022  Antibiotics:  Doxycycline 100 mg BID for 10 days ordered on 2022                                         Orders for this week: 22     CT ordered for Right Foot 22    Left Great Toe and Left Lower Leg -- wash with soap and water, pat dry. Apply saline damp hydrofera blue cut to size of wound. Cover with gauze or ABD. Wrap with conform and secure with tape. Wrap right foot with 6 in ace wrap and wear surgical shoe        Follow up Instructions: at the [address] [place] [address] Road in 1 week on Friday  Primary Wound Care Provider: Dr Musa Jenkins  Call  for any questions or concerns.   Date__________   Time____________  [place] [phone]      Treatment Note Wound 22 Toe (Comment  which one) Left #1 (onset 2 months) Left Great Toe Plantar  -Dressing/Treatment:  (hydrofera blue abd conform tape)    Written Patient Dismissal Instructions Given            Electronically signed by Axel Cornell MD on 2022 at 5:34 PM

## 2022-09-16 ENCOUNTER — HOSPITAL ENCOUNTER (OUTPATIENT)
Dept: WOUND CARE | Age: 42
Discharge: HOME | End: 2022-09-16
Payer: COMMERCIAL

## 2022-09-16 VITALS
HEART RATE: 103 BPM | RESPIRATION RATE: 18 BRPM | SYSTOLIC BLOOD PRESSURE: 153 MMHG | DIASTOLIC BLOOD PRESSURE: 82 MMHG | TEMPERATURE: 98.9 F

## 2022-09-16 DIAGNOSIS — E11.40 TYPE 2 DIABETES, CONTROLLED, WITH NEUROPATHY (HCC): ICD-10-CM

## 2022-09-16 DIAGNOSIS — L97.522 NON-PRESSURE CHRONIC ULCER OF OTHER PART OF LEFT FOOT WITH FAT LAYER EXPOSED (HCC): Primary | ICD-10-CM

## 2022-09-16 PROCEDURE — 11042 DBRDMT SUBQ TIS 1ST 20SQCM/<: CPT

## 2022-09-16 RX ORDER — LIDOCAINE HYDROCHLORIDE 40 MG/ML
SOLUTION TOPICAL ONCE
Status: CANCELLED | OUTPATIENT
Start: 2022-09-16 | End: 2022-09-16

## 2022-09-16 RX ORDER — GINSENG 100 MG
CAPSULE ORAL ONCE
Status: CANCELLED | OUTPATIENT
Start: 2022-09-16 | End: 2022-09-16

## 2022-09-16 RX ORDER — CLOBETASOL PROPIONATE 0.5 MG/G
OINTMENT TOPICAL ONCE
Status: CANCELLED | OUTPATIENT
Start: 2022-09-16 | End: 2022-09-16

## 2022-09-16 RX ORDER — LIDOCAINE 40 MG/G
CREAM TOPICAL ONCE
Status: CANCELLED | OUTPATIENT
Start: 2022-09-16 | End: 2022-09-16

## 2022-09-16 RX ORDER — LIDOCAINE 50 MG/G
OINTMENT TOPICAL ONCE
Status: CANCELLED | OUTPATIENT
Start: 2022-09-16 | End: 2022-09-16

## 2022-09-16 RX ORDER — BACITRACIN, NEOMYCIN, POLYMYXIN B 400; 3.5; 5 [USP'U]/G; MG/G; [USP'U]/G
OINTMENT TOPICAL ONCE
Status: CANCELLED | OUTPATIENT
Start: 2022-09-16 | End: 2022-09-16

## 2022-09-16 RX ORDER — LIDOCAINE HYDROCHLORIDE 20 MG/ML
JELLY TOPICAL ONCE
Status: CANCELLED | OUTPATIENT
Start: 2022-09-16 | End: 2022-09-16

## 2022-09-16 RX ORDER — BACITRACIN ZINC AND POLYMYXIN B SULFATE 500; 1000 [USP'U]/G; [USP'U]/G
OINTMENT TOPICAL ONCE
Status: CANCELLED | OUTPATIENT
Start: 2022-09-16 | End: 2022-09-16

## 2022-09-16 RX ORDER — GENTAMICIN SULFATE 1 MG/G
OINTMENT TOPICAL ONCE
Status: CANCELLED | OUTPATIENT
Start: 2022-09-16 | End: 2022-09-16

## 2022-09-16 RX ORDER — BETAMETHASONE DIPROPIONATE 0.05 %
OINTMENT (GRAM) TOPICAL ONCE
Status: CANCELLED | OUTPATIENT
Start: 2022-09-16 | End: 2022-09-16

## 2022-09-16 ASSESSMENT — PAIN DESCRIPTION - ORIENTATION: ORIENTATION: RIGHT

## 2022-09-16 ASSESSMENT — PAIN - FUNCTIONAL ASSESSMENT: PAIN_FUNCTIONAL_ASSESSMENT: ACTIVITIES ARE NOT PREVENTED

## 2022-09-16 ASSESSMENT — PAIN DESCRIPTION - LOCATION: LOCATION: FOOT

## 2022-09-16 ASSESSMENT — PAIN DESCRIPTION - FREQUENCY: FREQUENCY: CONTINUOUS

## 2022-09-16 ASSESSMENT — PAIN DESCRIPTION - ONSET: ONSET: ON-GOING

## 2022-09-16 ASSESSMENT — PAIN DESCRIPTION - PAIN TYPE: TYPE: CHRONIC PAIN

## 2022-09-16 ASSESSMENT — PAIN SCALES - GENERAL: PAINLEVEL_OUTOF10: 9

## 2022-09-16 NOTE — PROGRESS NOTES
Wound Care Center Progress Note With Procedure    Simone Cerda  AGE: 38 y.o. GENDER: male  : 1980  EPISODE DATE:  2022     Subjective:     Chief Complaint   Patient presents with    Wound Check     Left Foot         HISTORY of PRESENT ILLNESS      Simone Cerda is a 38 y.o. male who presents today for wound evaluation of Chronic diabetic ulcer(s) of the left foot. The ulcer is of moderate severity. The underlying cause of the wound is diabetes. He is in for f/u- missed last week's appointment with me for his wound on the left foot. He failed to get his CT on the right foot, he has ortho f/u next week. He has NOT been off-loading as he was supposed to, he is wearing his orthopedic boot which he was told was not adequate. He has been walking on his right foot and in fact, told me he was up all last Friday working on tearing down a barn. He does not use his knee scooter since he has too many steps in his house. I point out to him that he should be using his knee scooter especially for the long walks that it takes him from car to exam room just now. He tells me that he didn't come last week because his foot wound looked good- I discuss that weekly debridement is needed to heal his left foot wound, along with off-loading. Wound Pain Timing/Severity: none- the wound is not painful, what is painful is his right foot with the fx.   Quality of pain: N/A  Severity of pain:  0 / 10   Modifying Factors: diabetes and chronic pressure  Associated Signs/Symptoms: none        PAST MEDICAL HISTORY        Diagnosis Date    Hypercholesterolemia 2019    Hypertension     Local infection of skin and subcutaneous tissue 2022    Type 2 diabetes, controlled, with neuropathy ([address] 2018    WD-Diabetic ulcer of toe of left foot associated with type 2 diabetes mellitus, with fat layer exposed ([address] 2022    WD-Lisfranc dislocation, left, initial encounter 2022       PAST SURGICAL HISTORY    History reviewed. No pertinent surgical history. FAMILY HISTORY    Family History   Problem Relation Age of Onset    High Blood Pressure Sister        SOCIAL HISTORY    Social History     Tobacco Use    Smoking status: Never    Smokeless tobacco: Never   Substance Use Topics    Alcohol use: Yes     Alcohol/week: 12.0 standard drinks     Types: 12 Standard drinks or equivalent per week     Comment: 6 drinks daily    Drug use: No       ALLERGIES    Allergies   Allergen Reactions    Paxil [Paroxetine Hcl]      Caused \"weird\" things to happen in his sleep       MEDICATIONS    Current Outpatient Medications on File Prior to Encounter   Medication Sig Dispense Refill    lidocaine (XYLOCAINE) 5 % ointment Apply topically as needed. 50 g 2    ibuprofen (ADVIL;MOTRIN) 800 MG tablet Take 1 tablet by mouth 2 times daily as needed for Pain 60 tablet 0    ibuprofen (ADVIL;MOTRIN) 600 MG tablet Take 1 tablet by mouth 3 times daily (with meals) 40 tablet 0    pregabalin (LYRICA) 100 MG capsule TAKE 1 CAPSULE BY MOUTH TWICE DAILY 180 capsule 1    busPIRone (BUSPAR) 15 MG tablet TAKE 1 TABLET BY MOUTH THREE TIMES DAILY 270 tablet 1    traZODone (DESYREL) 50 MG tablet Take 1-2 tablets by mouth nightly as needed for Sleep 60 tablet 11    glucose blood VI test strips (GLUCOSE METER TEST) strip 1 each by In Vitro route daily As needed. 100 each 3    glucose monitoring kit (FREESTYLE) monitoring kit 1 kit by Does not apply route daily as needed (If blood sugars running high or low) 1 kit 0    MICROLET LANCETS MISC 1 each by Does not apply route daily 100 each 3     No current facility-administered medications on file prior to encounter. REVIEW OF SYSTEMS    Pertinent items are noted in HPI. Constitutional: Negative for systemic symptoms including fever, chills and malaise.       Objective:      BP (!) 153/82   Pulse (!) 103   Temp 98.9 °F (37.2 °C)   Resp 18     PHYSICAL EXAM      General: The patient is in no acute distress. Mental status:  Patient is appropriate, is  oriented to place and plan of care. Dermatologic exam: Visual inspection of the periwound reveals the skin to be normal in turgor and texture  Wound exam: see wound description below in procedure note      Assessment:     Problem List Items Addressed This Visit       Type 2 diabetes, controlled, with neuropathy [address]    Relevant Orders    Initiate Outpatient Wound Care Protocol    Non-pressure chronic ulcer of other part of left foot with fat layer exposed ([address] - Primary    Relevant Orders    Initiate Outpatient Wound Care Protocol     Procedure Note    Indications:  Based on my examination of this patient's wound(s) today, sharp excision into necrotic epidermis, dermis, and subcutaneous tissue is required to promote healing and evaluate the extent of previous healing. Performed by: Ester Dhillon MD    Consent obtained: Yes    Time out taken:  Yes    Pain Control: none      Debridement:Excisional Debridement    Using curette the wound(s) was/were sharply debrided down through and including the removal of subcutaneous tissue.         Devitalized Tissue Debrided:  fibrin, biofilm, and slough    Pre Debridement Measurements:  Are located in the Wound Documentation Flow Sheet    All active wounds listed below with today's date are evaluated  Wound(s)    debrided this date include # : 1     Post  Debridement Measurements:  Wound 02/19/22 Toe (Comment  which one) Left #1 (onset 2 months) Left Great Toe Plantar   (Active)   Wound Image   09/16/22 1554   Wound Etiology Diabetic 09/16/22 1633   Dressing Status New dressing applied 09/16/22 1633   Wound Cleansed Wound cleanser 09/16/22 1554   Offloading for Diabetic Foot Ulcers Post op shoe;Offloading ordered 09/02/22 1548   Wound Length (cm) 0.5 cm 09/16/22 1554   Wound Width (cm) 0.3 cm 09/16/22 1554   Wound Depth (cm) 0.2 cm 09/16/22 1554   Wound Surface Area (cm^2) 0.15 cm^2 09/16/22 1554   Change in Wound Size % (l*w) 85 09/16/22 1554   Wound Volume (cm^3) 0.03 cm^3 09/16/22 1554   Wound Healing % 70 09/16/22 1554   Post-Procedure Length (cm) 0.5 cm 09/16/22 1613   Post-Procedure Width (cm) 0.3 cm 09/16/22 1613   Post-Procedure Depth (cm) 0.2 cm 09/16/22 1613   Post-Procedure Surface Area (cm^2) 0.15 cm^2 09/16/22 1613   Post-Procedure Volume (cm^3) 0.03 cm^3 09/16/22 1613   Distance Tunneling (cm) 0 cm 09/16/22 1554   Tunneling Position ___ O'Clock 0 09/16/22 1554   Undermining Starts ___ O'Clock 0 09/16/22 1554   Undermining Ends___ O'Clock 0 09/16/22 1554   Undermining Maxium Distance (cm) 0 09/16/22 1554   Wound Assessment Pink/red 09/16/22 1554   Drainage Amount Small 09/16/22 1554   Drainage Description Serosanguinous 09/16/22 1554   Odor None 09/16/22 1554   Saira-wound Assessment Hyperkeratosis (callous) 09/16/22 1554   Margins Undefined edges 09/16/22 1554   Wound Thickness Description not for Pressure Injury Full thickness 09/16/22 1554   Number of days: 208       Percent of Wound(s) Debrided: approximately 100%    Total  Area  Debrided:  0.15 sq cm     Bleeding:  Minimal    Hemostasis Achieved:  by silver nitrate stick    Procedural Pain:  0  / 10     Post Procedural Pain:  0 / 10     Response to treatment:  Well tolerated by patient. Status of wound progress and description from last visit:   wound is stable. He needs to be following with the ortho team for the right foot. I will supply some oxycodone enough for 5 days as this is an obviously acute issue. I will not be prescribing him additional medications pertaining to his right foot as this is out of the scope of my care at this point. I have discussion with this patient today about his repeated non-compliance with appointment, with therapy, with f/u. This is why his wound is not healing, this is why his foot has not improved at all. He needs to come weekly.         Plan:       Discharge Instructions         PHYSICIAN ORDERS AND DISCHARGE INSTRUCTIONS     NOTE: Upon discharge from the [address], you will receive a patient experience survey. We would be grateful if you would take the time to fill this survey out. Continuing wound care orders and information:              Residence:               Continue home health care with:              Wound Medications:              Wound cleansing:                          Do not scrub or use excessive force. Wash hands with soap and water before and after dressing changes. Prior to applying a clean dressing, cleanse wound with normal saline,                                wound cleanser, or mild soap and water. Ask the physician or nurse before getting the wound(s) wet in a shower              Daily Wound management:                          Keep weight off wounds and reposition every 2 hours. Avoid standing for long periods of time. Apply wraps/stockings in AM and remove at bedtime. If swelling is present, elevate legs to the level of the heart or above for 30 minutes 4-5 times a day and/or when sitting. When taking antibiotics take entire prescription as ordered by physician do not stop taking until medicine is all gone. Wound Care Notes:  Pharm: [facility]: Pain medication ordered  Vascular studies: arterial studies  Date 2/21/22  Cultures: Obtained on 5/13/2022  Antibiotics: Doxycycline 100 mg BID for 10 days ordered on 5/13/2022                                         Orders for this week: 9/16/22     CT ordered for Right Foot 9/2/22 (client states it is scheduled for 9/23/22). Left Great Toe and Left Lower Leg -- wash with soap and water, pat dry. Apply saline damp hydrofera blue cut to size of wound. Cover with gauze or ABD.   Wrap with conform and secure with tape.     Wrap right foot with 6 in ace wrap and wear surgical shoe. Follow up Instructions: at the [address] in 1 week on Friday  Primary Wound Care Provider: Dr Musa Scurry  Call  for any questions or concerns. Date__________   Time____________  [place] [phone]        Treatment Note Wound 22 Toe (Comment  which one) Left #1 (onset 2 months) Left Great Toe Plantar  -Dressing/Treatment:  (damp hydrofera blue,abd,conform,tape.  declined ace wrap and surgical shoe for the right foot)    Written Patient Dismissal Instructions Given            Electronically signed by Axel Cornell MD on 2022 at 4:42 PM

## 2022-09-16 NOTE — DISCHARGE INSTRUCTIONS
PHYSICIAN ORDERS AND DISCHARGE INSTRUCTIONS     NOTE: Upon discharge from the [address], you will receive a patient experience survey. We would be grateful if you would take the time to fill this survey out. Continuing wound care orders and information:              Residence:               Continue home health care with:              Wound Medications:              Wound cleansing:                          Do not scrub or use excessive force. Wash hands with soap and water before and after dressing changes. Prior to applying a clean dressing, cleanse wound with normal saline,                                wound cleanser, or mild soap and water. Ask the physician or nurse before getting the wound(s) wet in a shower              Daily Wound management:                          Keep weight off wounds and reposition every 2 hours. Avoid standing for long periods of time. Apply wraps/stockings in AM and remove at bedtime. If swelling is present, elevate legs to the level of the heart or above for 30 minutes 4-5 times a day and/or when sitting. When taking antibiotics take entire prescription as ordered by physician do not stop taking until medicine is all gone. Wound Care Notes:  Pharm: [facility]: Pain medication ordered  Vascular studies: arterial studies  Date 2/21/22  Cultures: Obtained on 5/13/2022  Antibiotics: Doxycycline 100 mg BID for 10 days ordered on 5/13/2022                                         Orders for this week: 9/16/22     CT ordered for Right Foot 9/2/22 (client states it is scheduled for 9/23/22). Left Great Toe and Left Lower Leg -- wash with soap and water, pat dry. Apply saline damp hydrofera blue cut to size of wound. Cover with gauze or ABD.   Wrap with conform and secure with tape. Wrap right foot with 6 in ace wrap and wear surgical shoe. Follow up Instructions: at the [address] in 1 week on Friday  Primary Wound Care Provider: Dr Abhilash Morin  Call  for any questions or concerns.   Date__________   Time____________  [place] [phone]

## 2022-09-20 ENCOUNTER — HOSPITAL ENCOUNTER (OUTPATIENT)
Dept: CT IMAGING | Age: 42
Discharge: HOME | End: 2022-09-20
Payer: COMMERCIAL

## 2022-09-20 PROCEDURE — 73702 CT LWR EXTREMITY W/O&W/DYE: CPT

## 2022-09-20 PROCEDURE — 6360000004 HC RX CONTRAST MEDICATION: Performed by: INTERNAL MEDICINE

## 2022-09-20 RX ADMIN — IOPAMIDOL 75 ML: 755 INJECTION, SOLUTION INTRAVENOUS at 11:23

## 2022-09-23 ENCOUNTER — HOSPITAL ENCOUNTER (OUTPATIENT)
Dept: WOUND CARE | Age: 42
Discharge: HOME | End: 2022-09-23
Payer: COMMERCIAL

## 2022-09-23 DIAGNOSIS — L97.522 DIABETIC ULCER OF TOE OF LEFT FOOT ASSOCIATED WITH TYPE 2 DIABETES MELLITUS, WITH FAT LAYER EXPOSED (HCC): ICD-10-CM

## 2022-09-23 DIAGNOSIS — L97.522 NON-PRESSURE CHRONIC ULCER OF OTHER PART OF LEFT FOOT WITH FAT LAYER EXPOSED (HCC): Primary | ICD-10-CM

## 2022-09-23 DIAGNOSIS — E11.40 TYPE 2 DIABETES, CONTROLLED, WITH NEUROPATHY (HCC): ICD-10-CM

## 2022-09-23 DIAGNOSIS — F41.9 ANXIETY: ICD-10-CM

## 2022-09-23 DIAGNOSIS — E11.621 DIABETIC ULCER OF TOE OF LEFT FOOT ASSOCIATED WITH TYPE 2 DIABETES MELLITUS, WITH FAT LAYER EXPOSED (HCC): ICD-10-CM

## 2022-09-23 PROCEDURE — 11042 DBRDMT SUBQ TIS 1ST 20SQCM/<: CPT

## 2022-09-23 RX ORDER — LIDOCAINE HYDROCHLORIDE 40 MG/ML
SOLUTION TOPICAL ONCE
Status: CANCELLED | OUTPATIENT
Start: 2022-09-23 | End: 2022-09-23

## 2022-09-23 RX ORDER — LIDOCAINE HYDROCHLORIDE 20 MG/ML
JELLY TOPICAL ONCE
Status: CANCELLED | OUTPATIENT
Start: 2022-09-23 | End: 2022-09-23

## 2022-09-23 RX ORDER — LIDOCAINE 50 MG/G
OINTMENT TOPICAL ONCE
Status: CANCELLED | OUTPATIENT
Start: 2022-09-23 | End: 2022-09-23

## 2022-09-23 RX ORDER — LIDOCAINE 40 MG/G
CREAM TOPICAL ONCE
Status: CANCELLED | OUTPATIENT
Start: 2022-09-23 | End: 2022-09-23

## 2022-09-23 RX ORDER — BETAMETHASONE DIPROPIONATE 0.05 %
OINTMENT (GRAM) TOPICAL ONCE
Status: CANCELLED | OUTPATIENT
Start: 2022-09-23 | End: 2022-09-23

## 2022-09-23 RX ORDER — CLOBETASOL PROPIONATE 0.5 MG/G
OINTMENT TOPICAL ONCE
Status: CANCELLED | OUTPATIENT
Start: 2022-09-23 | End: 2022-09-23

## 2022-09-23 RX ORDER — GENTAMICIN SULFATE 1 MG/G
OINTMENT TOPICAL ONCE
Status: CANCELLED | OUTPATIENT
Start: 2022-09-23 | End: 2022-09-23

## 2022-09-23 RX ORDER — BACITRACIN, NEOMYCIN, POLYMYXIN B 400; 3.5; 5 [USP'U]/G; MG/G; [USP'U]/G
OINTMENT TOPICAL ONCE
Status: CANCELLED | OUTPATIENT
Start: 2022-09-23 | End: 2022-09-23

## 2022-09-23 RX ORDER — GINSENG 100 MG
CAPSULE ORAL ONCE
Status: CANCELLED | OUTPATIENT
Start: 2022-09-23 | End: 2022-09-23

## 2022-09-23 RX ORDER — BACITRACIN ZINC AND POLYMYXIN B SULFATE 500; 1000 [USP'U]/G; [USP'U]/G
OINTMENT TOPICAL ONCE
Status: CANCELLED | OUTPATIENT
Start: 2022-09-23 | End: 2022-09-23

## 2022-09-23 ASSESSMENT — PAIN DESCRIPTION - DESCRIPTORS: DESCRIPTORS: SHOOTING

## 2022-09-23 ASSESSMENT — PAIN DESCRIPTION - ONSET: ONSET: ON-GOING

## 2022-09-23 ASSESSMENT — PAIN DESCRIPTION - ORIENTATION: ORIENTATION: LEFT

## 2022-09-23 ASSESSMENT — PAIN DESCRIPTION - PAIN TYPE: TYPE: CHRONIC PAIN

## 2022-09-23 ASSESSMENT — PAIN SCALES - GENERAL: PAINLEVEL_OUTOF10: 8

## 2022-09-23 ASSESSMENT — PAIN DESCRIPTION - FREQUENCY: FREQUENCY: CONTINUOUS

## 2022-09-23 ASSESSMENT — PAIN DESCRIPTION - LOCATION: LOCATION: FOOT

## 2022-09-23 NOTE — PROGRESS NOTES
Wound Care Center Progress Note With Procedure    Prerna Pierre  AGE: 38 y.o. GENDER: male  : 1980  EPISODE DATE:  2022     Subjective:     Chief Complaint   Patient presents with    Wound Check     Left foot         HISTORY of PRESENT ILLNESS      Prerna Pierre is a 38 y.o. male who presents today for wound evaluation of Chronic diabetic ulcer(s) of the left foot. The ulcer is of mild severity. The underlying cause of the wound is diabetes/callous and on-going pressure. He has been dealing with right foot swelling and pain- he did get CT, which I discuss is abnormal and will need to be discussed with the orthopedic surgeon next week, when his office visit is. I again remind him that he was supposed to be off-loading the right foot 100% with the knee scooter, which he did NOT bring with him today. The rest of this note pertains only to the left foot with the diabetic foot ulcer. Patient aware that I will not treat his right foot as there is no open wound and his problem is beyond the scope of care at the wound center. I have made the appropriate referral and that appointment is coming next week. Wound Pain Timing/Severity: none  Quality of pain: N/A  Severity of pain:  0 / 10   Modifying Factors: diabetes  Associated Signs/Symptoms: none        PAST MEDICAL HISTORY        Diagnosis Date    Hypercholesterolemia 2019    Hypertension     Local infection of skin and subcutaneous tissue 2022    Type 2 diabetes, controlled, with neuropathy ([address] 2018    WD-Diabetic ulcer of toe of left foot associated with type 2 diabetes mellitus, with fat layer exposed ([address] 2022    WD-Lisfranc dislocation, left, initial encounter 2022       PAST SURGICAL HISTORY    History reviewed. No pertinent surgical history.     FAMILY HISTORY    Family History   Problem Relation Age of Onset    High Blood Pressure Sister        SOCIAL HISTORY    Social History     Tobacco Use    Smoking status: Never    Smokeless tobacco: Never   Substance Use Topics    Alcohol use: Yes     Alcohol/week: 12.0 standard drinks     Types: 12 Standard drinks or equivalent per week     Comment: 6 drinks daily    Drug use: No       ALLERGIES    Allergies   Allergen Reactions    Paxil [Paroxetine Hcl]      Caused \"weird\" things to happen in his sleep       MEDICATIONS    Current Outpatient Medications on File Prior to Encounter   Medication Sig Dispense Refill    lidocaine (XYLOCAINE) 5 % ointment Apply topically as needed. 50 g 2    ibuprofen (ADVIL;MOTRIN) 800 MG tablet Take 1 tablet by mouth 2 times daily as needed for Pain 60 tablet 0    ibuprofen (ADVIL;MOTRIN) 600 MG tablet Take 1 tablet by mouth 3 times daily (with meals) 40 tablet 0    pregabalin (LYRICA) 100 MG capsule TAKE 1 CAPSULE BY MOUTH TWICE DAILY 180 capsule 1    busPIRone (BUSPAR) 15 MG tablet TAKE 1 TABLET BY MOUTH THREE TIMES DAILY 270 tablet 1    traZODone (DESYREL) 50 MG tablet Take 1-2 tablets by mouth nightly as needed for Sleep 60 tablet 11    glucose blood VI test strips (GLUCOSE METER TEST) strip 1 each by In Vitro route daily As needed. 100 each 3    glucose monitoring kit (FREESTYLE) monitoring kit 1 kit by Does not apply route daily as needed (If blood sugars running high or low) 1 kit 0    MICROLET LANCETS MISC 1 each by Does not apply route daily 100 each 3     No current facility-administered medications on file prior to encounter. REVIEW OF SYSTEMS    Pertinent items are noted in HPI. Constitutional: Negative for systemic symptoms including fever, chills and malaise. Objective: There were no vitals taken for this visit. PHYSICAL EXAM      General: The patient is in no acute distress. Mental status:  Patient is appropriate, is  oriented to place and plan of care.   Dermatologic exam: Visual inspection of the periwound reveals the skin to be dry and crusty  Wound exam: see wound description below in procedure note      Assessment:     Problem List Items Addressed This Visit       WD-Diabetic ulcer of toe of left foot associated with type 2 diabetes mellitus, with fat layer exposed ([address]    Type 2 diabetes, controlled, with neuropathy ([address]    Relevant Orders    Initiate Outpatient Wound Care Protocol    Non-pressure chronic ulcer of other part of left foot with fat layer exposed ([address] - Primary    Relevant Orders    Initiate Outpatient Wound Care Protocol     Procedure Note    Indications:  Based on my examination of this patient's wound(s) today, sharp excision into necrotic subcutaneous tissue is required to promote healing and evaluate the extent of previous healing. Performed by: Cindi Stover MD    Consent obtained: Yes    Time out taken:  Yes    Pain Control: lidocaine      Debridement:Excisional Debridement    Using curette the wound(s) was/were sharply debrided down through and including the removal of subcutaneous tissue.         Devitalized Tissue Debrided:  fibrin, biofilm, slough, and exudate    Pre Debridement Measurements:  Are located in the Wound Documentation Flow Sheet    All active wounds listed below with today's date are evaluated  Wound(s)    debrided this date include # : 1     Post  Debridement Measurements:  Wound 02/19/22 Toe (Comment  which one) Left #1 (onset 2 months) Left Great Toe Plantar   (Active)   Wound Image   09/16/22 1554   Wound Etiology Diabetic 09/23/22 1556   Dressing Status New dressing applied 09/16/22 1633   Wound Cleansed Wound cleanser 09/23/22 1556   Offloading for Diabetic Foot Ulcers Post op shoe 09/16/22 1633   Wound Length (cm) 0.5 cm 09/23/22 1556   Wound Width (cm) 0.4 cm 09/23/22 1556   Wound Depth (cm) 0.2 cm 09/23/22 1556   Wound Surface Area (cm^2) 0.2 cm^2 09/23/22 1556   Change in Wound Size % (l*w) 80 09/23/22 1556   Wound Volume (cm^3) 0.04 cm^3 09/23/22 1556   Wound Healing % 60 09/23/22 1556   Post-Procedure Length (cm) 0.5 cm 09/23/22 1610 Post-Procedure Width (cm) 0.4 cm 09/23/22 1610   Post-Procedure Depth (cm) 0.2 cm 09/23/22 1610   Post-Procedure Surface Area (cm^2) 0.2 cm^2 09/23/22 1610   Post-Procedure Volume (cm^3) 0.04 cm^3 09/23/22 1610   Distance Tunneling (cm) 0 cm 09/23/22 1556   Tunneling Position ___ O'Clock 0 09/23/22 1556   Undermining Starts ___ O'Clock 0 09/23/22 1556   Undermining Ends___ O'Clock 0 09/23/22 1556   Undermining Maxium Distance (cm) 0 09/23/22 1556   Wound Assessment Pink/red 09/23/22 1556   Drainage Amount Small 09/23/22 1556   Drainage Description Serosanguinous 09/23/22 1556   Odor None 09/23/22 1556   Saira-wound Assessment Hyperkeratosis (callous) 09/23/22 1556   Margins Defined edges 09/23/22 1556   Wound Thickness Description not for Pressure Injury Full thickness 09/23/22 1556   Number of days: 215       Percent of Wound(s) Debrided: approximately 100%    Total  Area  Debrided:  0.2 sq cm     Bleeding:  Minimal    Hemostasis Achieved:  by pressure and silver nitrate    Procedural Pain:  0  / 10     Post Procedural Pain:  0 / 10     Response to treatment:  Well tolerated by patient. Status of wound progress and description from last visit:  wound too dry- he admits to me that he has been using alcohol hand sanitizing wipes to clean around the wound. I do tell him to stop using this, use soap and water and keep the wound covered. .      Plan:       Discharge Instructions         PHYSICIAN ORDERS AND DISCHARGE INSTRUCTIONS     NOTE: Upon discharge from the [address], you will receive a patient experience survey. We would be grateful if you would take the time to fill this survey out. Continuing wound care orders and information:              Residence:               Continue home health care with:              Wound Medications:              Wound cleansing:                          Do not scrub or use excessive force.                           Wash hands with soap and water before and after dressing changes. Prior to applying a clean dressing, cleanse wound with normal saline,                                wound cleanser, or mild soap and water. Ask the physician or nurse before getting the wound(s) wet in a shower              Daily Wound management:                          Keep weight off wounds and reposition every 2 hours. Avoid standing for long periods of time. Apply wraps/stockings in AM and remove at bedtime. If swelling is present, elevate legs to the level of the heart or above for 30 minutes 4-5 times a day and/or when sitting. When taking antibiotics take entire prescription as ordered by physician do not stop taking until medicine is all gone. Wound Care Notes:  Pharm: [facility]: Pain medication ordered  Vascular studies: arterial studies  Date 2/21/22  Cultures: Obtained on 5/13/2022  Antibiotics: Doxycycline 100 mg BID for 10 days ordered on 5/13/2022                                         Orders for this week: 9/23/22     Left Great Toe and Left Lower Leg -- wash with soap and water, pat dry. Apply Aster to wound bed. Cover with gauze or ABD. Wrap with conform and secure with tape. Follow up Instructions: at the [address] [place] [address] in 2 weeks on Friday  Primary Wound Care Provider: Dr Allan Costa  Call  for any questions or concerns.   Date__________   Time____________  Central Scheduling: [phone]        Treatment Note Wound 02/19/22 Toe (Comment  which one) Left #1 (onset 2 months) Left Great Toe Plantar  -Dressing/Treatment:  (aster 2x2 gauze conform)    Written Patient Dismissal Instructions Given            Electronically signed by Lusi De Los Santos MD on 9/23/2022 at 4:54 PM

## 2022-09-23 NOTE — DISCHARGE INSTRUCTIONS
PHYSICIAN ORDERS AND DISCHARGE INSTRUCTIONS     NOTE: Upon discharge from the [address], you will receive a patient experience survey. We would be grateful if you would take the time to fill this survey out. Continuing wound care orders and information:              Residence:               Continue home health care with:              Wound Medications:              Wound cleansing:                          Do not scrub or use excessive force. Wash hands with soap and water before and after dressing changes. Prior to applying a clean dressing, cleanse wound with normal saline,                                wound cleanser, or mild soap and water. Ask the physician or nurse before getting the wound(s) wet in a shower              Daily Wound management:                          Keep weight off wounds and reposition every 2 hours. Avoid standing for long periods of time. Apply wraps/stockings in AM and remove at bedtime. If swelling is present, elevate legs to the level of the heart or above for 30 minutes 4-5 times a day and/or when sitting. When taking antibiotics take entire prescription as ordered by physician do not stop taking until medicine is all gone. Wound Care Notes:  Pharm: [facility]: Pain medication ordered  Vascular studies: arterial studies  Date 2/21/22  Cultures: Obtained on 5/13/2022  Antibiotics: Doxycycline 100 mg BID for 10 days ordered on 5/13/2022                                         Orders for this week: 9/23/22     Left Great Toe and Left Lower Leg -- wash with soap and water, pat dry. Apply Aster to wound bed. Cover with gauze or ABD. Wrap with conform and secure with tape.         Follow up Instructions: at the [facility] in 2 weeks on Friday  Primary Wound Care Provider: Dr Becky Swanson  Call  for any questions or concerns.   Date__________   Time____________  [place] Scheduling: [phone]

## 2022-09-25 RX ORDER — BUSPIRONE HYDROCHLORIDE 15 MG/1
TABLET ORAL
Qty: 270 TABLET | Refills: 1 | OUTPATIENT
Start: 2022-09-25

## 2022-09-27 DIAGNOSIS — F41.9 ANXIETY: ICD-10-CM

## 2022-09-27 RX ORDER — BUSPIRONE HYDROCHLORIDE 15 MG/1
TABLET ORAL
Qty: 270 TABLET | Refills: 1 | OUTPATIENT
Start: 2022-09-27

## 2022-09-28 ENCOUNTER — OFFICE VISIT (OUTPATIENT)
Dept: ORTHOPEDIC SURGERY | Age: 42
End: 2022-09-28
Payer: COMMERCIAL

## 2022-09-28 VITALS — SYSTOLIC BLOOD PRESSURE: 134 MMHG | HEART RATE: 95 BPM | DIASTOLIC BLOOD PRESSURE: 90 MMHG | OXYGEN SATURATION: 97 %

## 2022-09-28 DIAGNOSIS — S93.324A DISLOCATION OF TARSOMETATARSAL JOINT OF RIGHT FOOT, INITIAL ENCOUNTER: Primary | ICD-10-CM

## 2022-09-28 PROCEDURE — 99213 OFFICE O/P EST LOW 20 MIN: CPT | Performed by: STUDENT IN AN ORGANIZED HEALTH CARE EDUCATION/TRAINING PROGRAM

## 2022-09-28 RX ORDER — OXYCODONE HYDROCHLORIDE AND ACETAMINOPHEN 5; 325 MG/1; MG/1
1 TABLET ORAL EVERY 6 HOURS PRN
Qty: 28 TABLET | Refills: 0 | Status: SHIPPED | OUTPATIENT
Start: 2022-09-28 | End: 2022-10-05

## 2022-09-28 RX ORDER — HYDROCODONE BITARTRATE AND ACETAMINOPHEN 5; 325 MG/1; MG/1
2 TABLET ORAL 2 TIMES DAILY PRN
COMMUNITY
Start: 2022-09-16

## 2022-09-28 ASSESSMENT — ENCOUNTER SYMPTOMS
WHEEZING: 0
BACK PAIN: 0
VOICE CHANGE: 0
SORE THROAT: 0
NAUSEA: 0
VOMITING: 0
COUGH: 0
SHORTNESS OF BREATH: 0
COLOR CHANGE: 0

## 2022-09-28 NOTE — PATIENT INSTRUCTIONS
Wear CAM boot as directed non-weight bearing. Percocet prescribed for pain control.   Referral to foot specialist

## 2022-09-28 NOTE — PROGRESS NOTES
Patient is here for right foot injury. Patient states he tripped over a rug. Patient was seen in ED for right foot on 6/27/22. Xrays of ankle and foot performed in ED. Patient states pain has not been improving and swelling has not improved. ED Xray impression from 6/27/22    Impression   1. Lisfranc injury with subluxation of the 2nd through 5th metatarsals   laterally. 2. Suspected fractures noted through the cuneiforms and cuboid as well as   possibly the 2nd and 3rd metatarsal bases. Dedicated CT of the foot is   recommended for further evaluation. 3. Diffuse soft tissue swelling along the right ankle without evidence of a   discrete fracture. CT performed on 9/26/22, impression :     Impression   Extensive fragmentation of the bones of the midfoot on both sides of the   tarsometatarsal articulations with chronic dislocation of the   naviculocuneiform articulation along with associated severe synovitis and   surrounding soft tissue edema suggesting neuropathic arthropathy of the   midfoot. This may be secondary to previous trauma. Widening of the interspace between the 1st and 2nd metatarsals may represent   sequela of chronic Lisfranc ligamentous injury.

## 2022-09-28 NOTE — PROGRESS NOTES
9/28/2022   Chief Complaint   Patient presents with    Foot Injury     Patient is here for right foot injury, Lisfranc injury with subluxation of the 2nd through 5th metatarsals        History of Present Illness:                             Telma Rodriguez is a 38 y.o. male previously seen by myself for left ankle pain who is here today for right foot injury. This is evaluated as a personal injury. Patient states that over the summer he tripped over a rug and had a inversion injury to the foot. He was taken the emergency room where x-rays demonstrated a significant foot injury including several metatarsal fractures including a Lisfranc injury. He was being seen for a left toe injury at the wound clinic and followed up with them in regards to the right foot. He did not follow-up with orthopedics. A CT was eventually obtained and after reviewing the CT results he eventually made an appointment with our office for further evaluation of the right foot injury. He states he has been ambulating on it occasionally but tries to limit his weightbearing on the foot due to pain. He also uses a knee scooter to help with limiting his weightbearing. He has no other injuries to the right foot. No other treatment thus far. No other complaints. No additional advanced imaging thus far. The pain's location is diffuse throughout right foot. he describes the symptoms as aching, sharp and stabbing. Symptoms improve with rest. Symptoms worsen with ankle plantarflexion/dorsiflexion, weight bearing (especially stair climbing), twisting activities. Patient states he denies having sensation of instability, decreased ROM    Treatment to date has been ice, NSAID, PT, CSI, compression, bracing without significant relief. Patient admits to prior foot injury as discussed above but denies numbness, tingling, fever, chills. Is affecting ADLs. Pain is 10/10 at it's worst.    Outside reports reviewed:  Outside imaging reviewed    Patient's medications, allergies, past medical, surgical, social and family histories were reviewed and updated as appropriate. Medical History  Patient's medications, allergies, past medical, surgical, social and family histories were reviewed and updated as appropriate. Past Medical History:   Diagnosis Date    Hypercholesterolemia 2/18/2019    Hypertension     Local infection of skin and subcutaneous tissue 5/13/2022    Type 2 diabetes, controlled, with neuropathy ([address] 6/6/2018    WD-Diabetic ulcer of toe of left foot associated with type 2 diabetes mellitus, with fat layer exposed ([facility] 5/13/2022    WD-Lisfranc dislocation, left, initial encounter 9/2/2022     No past surgical history on file. Family History   Problem Relation Age of Onset    High Blood Pressure Sister      Social History     Socioeconomic History    Marital status:    Tobacco Use    Smoking status: Never    Smokeless tobacco: Never   Substance and Sexual Activity    Alcohol use: Yes     Alcohol/week: 12.0 standard drinks     Types: 12 Standard drinks or equivalent per week     Comment: 6 drinks daily    Drug use: No    Sexual activity: Yes     Partners: Male   Social History Narrative    [address] and receiving        Diet:Unrestricted    Exercise:work    Seat belts:Sometimes     Current Outpatient Medications   Medication Sig Dispense Refill    HYDROcodone-acetaminophen (NORCO) 5-325 MG per tablet Take 2 tablets by mouth 2 times daily as needed. lidocaine (XYLOCAINE) 5 % ointment Apply topically as needed.  50 g 2    ibuprofen (ADVIL;MOTRIN) 800 MG tablet Take 1 tablet by mouth 2 times daily as needed for Pain 60 tablet 0    ibuprofen (ADVIL;MOTRIN) 600 MG tablet Take 1 tablet by mouth 3 times daily (with meals) 40 tablet 0    pregabalin (LYRICA) 100 MG capsule TAKE 1 CAPSULE BY MOUTH TWICE DAILY 180 capsule 1    busPIRone (BUSPAR) 15 MG tablet TAKE 1 TABLET BY MOUTH THREE TIMES DAILY 270 tablet 1    traZODone (DESYREL) 50 MG tablet Take 1-2 tablets by mouth nightly as needed for Sleep 60 tablet 11    glucose blood VI test strips (GLUCOSE METER TEST) strip 1 each by In Vitro route daily As needed. 100 each 3    glucose monitoring kit (FREESTYLE) monitoring kit 1 kit by Does not apply route daily as needed (If blood sugars running high or low) 1 kit 0    MICROLET LANCETS MISC 1 each by Does not apply route daily 100 each 3     No current facility-administered medications for this visit. Allergies   Allergen Reactions    Paxil [Paroxetine Hcl]      Caused \"weird\" things to happen in his sleep         Review of Systems   Constitutional:  Positive for activity change. Negative for fatigue and fever. HENT:  Negative for sneezing, sore throat and voice change. Respiratory:  Negative for cough, shortness of breath and wheezing. Cardiovascular:  Negative for leg swelling. Gastrointestinal:  Negative for nausea and vomiting. Musculoskeletal:  Positive for arthralgias, gait problem, joint swelling and myalgias. Negative for back pain, neck pain and neck stiffness. Skin:  Negative for color change, rash and wound. Neurological:  Negative for weakness and numbness. Psychiatric/Behavioral:  Negative for behavioral problems, confusion and self-injury. Examination:  General Exam:  Vitals: BP (!) 134/90 (Site: Right Wrist, Position: Sitting)   Pulse 95   SpO2 97%    Physical Exam  Constitutional:       General: He is not in acute distress. Appearance: Normal appearance. He is normal weight. He is not ill-appearing. Eyes:      General:         Right eye: No discharge. Left eye: No discharge. Extraocular Movements: Extraocular movements intact. Cardiovascular:      Rate and Rhythm: Normal rate. Pulmonary:      Effort: Pulmonary effort is normal. No respiratory distress. Breath sounds: No wheezing. Musculoskeletal:         General: Swelling, tenderness, deformity and signs of injury present. Right knee: Normal.      Left knee: Normal.      Right lower leg: No swelling. No edema. Left lower leg: Normal. No swelling. No edema. Right ankle: Swelling present. No deformity, ecchymosis or lacerations. No tenderness. Normal range of motion. Normal pulse. Right Achilles Tendon: Normal.      Left ankle: Normal.      Left Achilles Tendon: Normal.      Right foot: Decreased range of motion. Normal capillary refill. Swelling, deformity, prominent metatarsal heads, tenderness, bony tenderness and crepitus present. Normal pulse. Left foot: Normal.   Skin:     General: Skin is warm and dry. Capillary Refill: Capillary refill takes less than 2 seconds. Findings: Bruising present. Neurological:      General: No focal deficit present. Mental Status: He is alert and oriented to person, place, and time. Sensory: No sensory deficit. Coordination: Coordination normal.      Gait: Gait normal.   Psychiatric:         Mood and Affect: Mood normal.         Behavior: Behavior normal.      RIGHT Foot and Ankle Exam      INSPECTION: ALIGNMENT:    Gait: Antalgic      Alignment:     Hindfoot: Normal       Midfoot: Significant swelling dorsally     Forefoot: Prominence and appearance of deformity    Scars: None   Color: Bruising and erythema throughout    Swelling: Moderate and diffuse    Atrophy: None Collective     Heel / Toe Walking: Unable to perform        TENDERNESS:    Sinus tarsi: None   Anteromedial joint line: none    Syndesmosis: none   Anterolateral joint line: none    ATFL: Negative   PTFL: Negative      CFL: Negative       Talonavicular: none     Anterior tibialis: none    Anterolateral gutter: none  Extensor tendons: none    Fibula: none    Peroneal tendons: none     Peroneal tubercle.  None     Medial/lateral achilles: none    Medial/lateral achilles insertion: none        Deltoid: none        Medial Malleolus: none   Retrocalcaneal: none    PTT: none    Medial achilles: none    Navicular: none   Lateral achilles: none    Calcaneal tuberosity: none        Calcaneal cuboid: +   MT / MT heads: +     Navicular: none   Medial cord origin PF: none    Cuneiforms: none   Web space: none    Lisfranc +        Base of the fifth metatarsal: none         RANGE OF MOTION:  RIGHT   LEFT    STRENGTH: (Right) (* = pain)     Ankle DF/PF:    15/45   15/45    Anterior tibialis: 5/5    Eversion/Inversion:   15/25   15/25   Posterior tibialis: 5/5    Midfoot ABD/ADD:   10/10   10/10   Gastroc-soleus: 5/5    First MTP DF/PF:   60/25 60/25   Peroneals: 5/5    EHL: 5/5            FHL: 5/5        SPECIAL TESTS:    Anterior drawer:  Grade 1      (C-W contralateral side)     Inversion: 30°     Eversion 10°      Collective Instability: (Ant-post and varus-valgus)    Stable      PROVOCATIVE TESTING:    Forced DF/ER: No pain at syndesmosis. Mid-leg squeeze No pain at syndesmosis    Forced DF: No pain anterior joint line. Forced PF: No pain posterior ankle. Forced INV: No pain present laterally    Forced EV: No pain medial     1st-2nd MT toggle demonstrates significant pain at Lisfranc    MT-T torque demonstrates significant pain at Lisfranc      NEUROLOGIC TESTING:    All dermatomes foot, ankle and leg have normal sensation light touch    Ankle Reflexes 2+, symmetric     Negative Babinski and No Clonus      VASCULAR:  2+ pulses PT/DT with brisk capillary refill toes. Diagnostic testing:  X-ray and CT images were reviewed by myself and discussed with the patient:    CT right foot with and without contrast demonstrates:  Bones: There is extensive fragmentation of the bones of the midfoot including   all the cuneiform bones and cuboid bone. There is dislocation of the   naviculocuneiform articulation.   There is widening of the interspace between   the 1st and 2nd metatarsals measuring approximately 17 mm suggesting   divergent malalignment from previous Lisfranc ligamentous injury. There is   periostitis and reactive bone formation surrounding the bases of the   metatarsals. Findings suggest neuropathic arthropathy. The phalanxes are   intact. The calcaneus, talus, navicular, distal tibia and distal fibula are   intact without evidence of acute fracture. Likely remote ununited fracture   of the anterior process of the calcaneus. Remote ununited avulsion fracture   of the inferior tip of the medial malleolus. Soft Tissue/joints: There is extensive soft tissue edema surrounding the   midfoot with severe joint effusion and synovitis of the midfoot. This   predominantly involves the tarsometatarsal articulations as well as the   naviculocuneiform and calcaneocuboid articulation. Findings suggest   neuropathic arthropathy and synovitis. The MTP joints as well as the   tibiotalar and subtalar joints demonstrate no acute abnormality. 3 views of the right foot from June 2022 from outside facility demonstrate:  1. Lisfranc injury with subluxation of the 2nd through 5th metatarsals   laterally. 2. Suspected fractures noted through the cuneiforms and cuboid as well as   possibly the 2nd and 3rd metatarsal bases. Dedicated CT of the foot is   recommended for further evaluation. 3. Diffuse soft tissue swelling along the right ankle without evidence of a   discrete fracture. Office Procedures:  No orders of the defined types were placed in this encounter. Assessment and Plan    A: Right foot displaced Lisfranc injury    P:   I had a thorough conversation with the patient regarding his right foot injury and treatment course. I explained this is a very severe foot injury and I am very surprised that he was not seen up until now for this injury due to the severity of it and likely pain and issues with ambulation associated with it.   At this time we will place the patient in a boot to help with the swelling and protection of the fracture site. He will continue using the knee scooter. Patient was requesting Percocet I did provide this today but explained that the bigger issue is getting his pain are controlled by fixing the issue rather than pain medication. He will continue to ice and elevate to help with swelling. He was given a referral to a foot and ankle surgeon to be seen as I explained he would likely need a foot fusion. All questions were answered and patient voices understanding. He will be seen by me as needed.     Electronically signed by Marga Marie DO on 9/28/2022 at 12:07 PM

## 2022-10-07 ENCOUNTER — HOSPITAL ENCOUNTER (OUTPATIENT)
Dept: WOUND CARE | Age: 42
Discharge: HOME | End: 2022-10-07

## 2022-10-14 ENCOUNTER — HOSPITAL ENCOUNTER (OUTPATIENT)
Dept: WOUND CARE | Age: 42
Discharge: HOME | End: 2022-10-14
Payer: COMMERCIAL

## 2022-10-14 VITALS
HEART RATE: 111 BPM | SYSTOLIC BLOOD PRESSURE: 160 MMHG | DIASTOLIC BLOOD PRESSURE: 100 MMHG | TEMPERATURE: 97.9 F | RESPIRATION RATE: 20 BRPM

## 2022-10-14 DIAGNOSIS — L97.522 DIABETIC ULCER OF TOE OF LEFT FOOT ASSOCIATED WITH TYPE 2 DIABETES MELLITUS, WITH FAT LAYER EXPOSED (HCC): ICD-10-CM

## 2022-10-14 DIAGNOSIS — E11.40 TYPE 2 DIABETES, CONTROLLED, WITH NEUROPATHY (HCC): ICD-10-CM

## 2022-10-14 DIAGNOSIS — L97.522 NON-PRESSURE CHRONIC ULCER OF OTHER PART OF LEFT FOOT WITH FAT LAYER EXPOSED (HCC): Primary | ICD-10-CM

## 2022-10-14 DIAGNOSIS — E11.621 DIABETIC ULCER OF TOE OF LEFT FOOT ASSOCIATED WITH TYPE 2 DIABETES MELLITUS, WITH FAT LAYER EXPOSED (HCC): ICD-10-CM

## 2022-10-14 PROCEDURE — 11042 DBRDMT SUBQ TIS 1ST 20SQCM/<: CPT

## 2022-10-14 RX ORDER — BACITRACIN, NEOMYCIN, POLYMYXIN B 400; 3.5; 5 [USP'U]/G; MG/G; [USP'U]/G
OINTMENT TOPICAL ONCE
OUTPATIENT
Start: 2022-10-14 | End: 2022-10-14

## 2022-10-14 RX ORDER — BETAMETHASONE DIPROPIONATE 0.05 %
OINTMENT (GRAM) TOPICAL ONCE
OUTPATIENT
Start: 2022-10-14 | End: 2022-10-14

## 2022-10-14 RX ORDER — CLOBETASOL PROPIONATE 0.5 MG/G
OINTMENT TOPICAL ONCE
OUTPATIENT
Start: 2022-10-14 | End: 2022-10-14

## 2022-10-14 RX ORDER — GINSENG 100 MG
CAPSULE ORAL ONCE
OUTPATIENT
Start: 2022-10-14 | End: 2022-10-14

## 2022-10-14 RX ORDER — LIDOCAINE HYDROCHLORIDE 20 MG/ML
JELLY TOPICAL ONCE
OUTPATIENT
Start: 2022-10-14 | End: 2022-10-14

## 2022-10-14 RX ORDER — LIDOCAINE 40 MG/G
CREAM TOPICAL ONCE
OUTPATIENT
Start: 2022-10-14 | End: 2022-10-14

## 2022-10-14 RX ORDER — GENTAMICIN SULFATE 1 MG/G
OINTMENT TOPICAL ONCE
OUTPATIENT
Start: 2022-10-14 | End: 2022-10-14

## 2022-10-14 RX ORDER — LIDOCAINE 50 MG/G
OINTMENT TOPICAL ONCE
OUTPATIENT
Start: 2022-10-14 | End: 2022-10-14

## 2022-10-14 RX ORDER — BACITRACIN ZINC AND POLYMYXIN B SULFATE 500; 1000 [USP'U]/G; [USP'U]/G
OINTMENT TOPICAL ONCE
OUTPATIENT
Start: 2022-10-14 | End: 2022-10-14

## 2022-10-14 RX ORDER — LIDOCAINE HYDROCHLORIDE 40 MG/ML
SOLUTION TOPICAL ONCE
OUTPATIENT
Start: 2022-10-14 | End: 2022-10-14

## 2022-10-14 ASSESSMENT — PAIN DESCRIPTION - ORIENTATION: ORIENTATION: LEFT

## 2022-10-14 ASSESSMENT — PAIN DESCRIPTION - PAIN TYPE: TYPE: CHRONIC PAIN

## 2022-10-14 ASSESSMENT — PAIN DESCRIPTION - FREQUENCY: FREQUENCY: INTERMITTENT

## 2022-10-14 ASSESSMENT — PAIN DESCRIPTION - ONSET: ONSET: ON-GOING

## 2022-10-14 ASSESSMENT — PAIN SCALES - GENERAL: PAINLEVEL_OUTOF10: 8

## 2022-10-14 ASSESSMENT — PAIN - FUNCTIONAL ASSESSMENT: PAIN_FUNCTIONAL_ASSESSMENT: PREVENTS OR INTERFERES SOME ACTIVE ACTIVITIES AND ADLS

## 2022-10-14 ASSESSMENT — PAIN DESCRIPTION - DESCRIPTORS: DESCRIPTORS: OTHER (COMMENT)

## 2022-10-14 ASSESSMENT — PAIN DESCRIPTION - LOCATION: LOCATION: LEG

## 2022-10-14 NOTE — DISCHARGE INSTRUCTIONS
PHYSICIAN ORDERS AND DISCHARGE INSTRUCTIONS     NOTE: Upon discharge from the [address], you will receive a patient experience survey. We would be grateful if you would take the time to fill this survey out. Continuing wound care orders and information:              Residence:               Continue home health care with:              Wound Medications:              Wound cleansing:                          Do not scrub or use excessive force. Wash hands with soap and water before and after dressing changes. Prior to applying a clean dressing, cleanse wound with normal saline,                                wound cleanser, or mild soap and water. Ask the physician or nurse before getting the wound(s) wet in a shower              Daily Wound management:                          Keep weight off wounds and reposition every 2 hours. Avoid standing for long periods of time. Apply wraps/stockings in AM and remove at bedtime. If swelling is present, elevate legs to the level of the heart or above for 30 minutes 4-5 times a day and/or when sitting. When taking antibiotics take entire prescription as ordered by physician do not stop taking until medicine is all gone. Wound Care Notes:  Pharm: [facility]: Pain medication ordered  Vascular studies: arterial studies  Date 2/21/22  Cultures: Obtained on 5/13/2022  Antibiotics: Doxycycline 100 mg BID for 10 days ordered on 5/13/2022                                         Orders for this week: 10/14/22     Left Great Toe and Left Lower Leg -- wash with soap and water, pat dry. Apply Aster to wound bed. Cover with gauze or ABD. Wrap with conform and secure with tape. Change every other day.         Follow up Instructions: at the [address] [facility] in 2 weeks on Friday  Primary Wound Care Provider: Dr Stephen Madera  Call  for any questions or concerns.   Date__________   Time____________  [place] [phone]

## 2022-10-14 NOTE — PROGRESS NOTES
Wound Care Center Progress Note With Procedure    Sd Greene  AGE: 38 y.o. GENDER: male  : 1980  EPISODE DATE:  10/14/2022     Subjective:     Chief Complaint   Patient presents with    Wound Check     Left foot         HISTORY of PRESENT ILLNESS      Sd Greene is a 38 y.o. male who presents today for wound evaluation of Chronic diabetic ulcer(s) of the toe of left foot. The ulcer is of moderate severity. The underlying cause of the wound is callous formation and diabetes. He has been off his foot a lot more than usual due to needing to be off the opposite foot and is now home off work. Wound Pain Timing/Severity: none  Quality of pain: N/A  Severity of pain:  0 / 10   Modifying Factors: diabetes  Associated Signs/Symptoms: none and edema        PAST MEDICAL HISTORY        Diagnosis Date    Hypercholesterolemia 2019    Hypertension     Local infection of skin and subcutaneous tissue 2022    Type 2 diabetes, controlled, with neuropathy ([address] 2018    WD-Diabetic ulcer of toe of left foot associated with type 2 diabetes mellitus, with fat layer exposed ([address] 2022    WD-Lisfranc dislocation, left, initial encounter 2022       PAST SURGICAL HISTORY    History reviewed. No pertinent surgical history. FAMILY HISTORY    Family History   Problem Relation Age of Onset    High Blood Pressure Sister        SOCIAL HISTORY    Social History     Tobacco Use    Smoking status: Never    Smokeless tobacco: Never   Substance Use Topics    Alcohol use:  Yes     Alcohol/week: 12.0 standard drinks     Types: 12 Standard drinks or equivalent per week     Comment: 6 drinks daily    Drug use: No       ALLERGIES    Allergies   Allergen Reactions    Paxil [Paroxetine Hcl]      Caused \"weird\" things to happen in his sleep       MEDICATIONS    Current Outpatient Medications on File Prior to Encounter   Medication Sig Dispense Refill    HYDROcodone-acetaminophen (NORCO) 5-325 MG per tablet Take 2 tablets by mouth 2 times daily as needed. lidocaine (XYLOCAINE) 5 % ointment Apply topically as needed. 50 g 2    ibuprofen (ADVIL;MOTRIN) 800 MG tablet Take 1 tablet by mouth 2 times daily as needed for Pain 60 tablet 0    ibuprofen (ADVIL;MOTRIN) 600 MG tablet Take 1 tablet by mouth 3 times daily (with meals) 40 tablet 0    pregabalin (LYRICA) 100 MG capsule TAKE 1 CAPSULE BY MOUTH TWICE DAILY 180 capsule 1    busPIRone (BUSPAR) 15 MG tablet TAKE 1 TABLET BY MOUTH THREE TIMES DAILY 270 tablet 1    traZODone (DESYREL) 50 MG tablet Take 1-2 tablets by mouth nightly as needed for Sleep 60 tablet 11    glucose blood VI test strips (GLUCOSE METER TEST) strip 1 each by In Vitro route daily As needed. 100 each 3    glucose monitoring kit (FREESTYLE) monitoring kit 1 kit by Does not apply route daily as needed (If blood sugars running high or low) 1 kit 0    MICROLET LANCETS MISC 1 each by Does not apply route daily 100 each 3     No current facility-administered medications on file prior to encounter. REVIEW OF SYSTEMS    Pertinent items are noted in HPI. Constitutional: Negative for systemic symptoms including fever, chills and malaise. Objective:      BP (!) 160/100   Pulse (!) 111   Temp 97.9 °F (36.6 °C) (Temporal)   Resp 20     PHYSICAL EXAM      General: The patient is in no acute distress. Mental status:  Patient is appropriate, is  oriented to place and plan of care.   Dermatologic exam: Visual inspection of the periwound reveals the skin to be normal in turgor and texture  Wound exam: see wound description below in procedure note      Assessment:     Problem List Items Addressed This Visit       WD-Diabetic ulcer of toe of left foot associated with type 2 diabetes mellitus, with fat layer exposed ([address]    Type 2 diabetes, controlled, with neuropathy ([address]    Relevant Orders    Initiate Outpatient Wound Care Protocol    Non-pressure chronic ulcer of other part of left foot with fat layer exposed [facility]) - Primary    Relevant Orders    Initiate Outpatient Wound Care Protocol     Procedure Note    Indications:  Based on my examination of this patient's wound(s) today, sharp excision into necrotic subcutaneous tissue is required to promote healing and evaluate the extent of previous healing. Performed by: Lorena Maynard MD    Consent obtained: Yes    Time out taken:  Yes    Pain Control: lidocaine      Debridement:Excisional Debridement    Using curette the wound(s) was/were sharply debrided down through and including the removal of subcutaneous tissue.         Devitalized Tissue Debrided:  fibrin, biofilm, slough, and callus    Pre Debridement Measurements:  Are located in the Wound Documentation Flow Sheet    All active wounds listed below with today's date are evaluated  Wound(s)    debrided this date include # : 1     Post  Debridement Measurements:  Wound 02/19/22 Toe (Comment  which one) Left #1 (onset 2 months) Left Great Toe Plantar   (Active)   Wound Image   09/16/22 1554   Wound Etiology Diabetic 10/14/22 1614   Dressing Status New dressing applied 10/14/22 1614   Wound Cleansed Wound cleanser 10/14/22 1558   Offloading for Diabetic Foot Ulcers Post op shoe 10/14/22 1614   Wound Length (cm) 0.4 cm 10/14/22 1558   Wound Width (cm) 0.2 cm 10/14/22 1558   Wound Depth (cm) 0.1 cm 10/14/22 1558   Wound Surface Area (cm^2) 0.08 cm^2 10/14/22 1558   Change in Wound Size % (l*w) 92 10/14/22 1558   Wound Volume (cm^3) 0.008 cm^3 10/14/22 1558   Wound Healing % 92 10/14/22 1558   Post-Procedure Length (cm) 0.4 cm 10/14/22 1609   Post-Procedure Width (cm) 0.2 cm 10/14/22 1609   Post-Procedure Depth (cm) 0.1 cm 10/14/22 1609   Post-Procedure Surface Area (cm^2) 0.08 cm^2 10/14/22 1609   Post-Procedure Volume (cm^3) 0.008 cm^3 10/14/22 1609   Distance Tunneling (cm) 0 cm 10/14/22 1558   Tunneling Position ___ O'Clock 0 10/14/22 1558   Undermining Starts ___ O'Clock 0 10/14/22 1558   Undermining Ends___ O'Clock 0 10/14/22 1558   Undermining Maxium Distance (cm) 0 10/14/22 1558   Wound Assessment Pink/red 10/14/22 1558   Drainage Amount Small 10/14/22 1558   Drainage Description Serosanguinous 10/14/22 1558   Odor None 10/14/22 1558   Saira-wound Assessment Hyperkeratosis (callous) 10/14/22 1558   Margins Defined edges 10/14/22 1558   Wound Thickness Description not for Pressure Injury Full thickness 10/14/22 1558   Number of days: 236       Percent of Wound(s) Debrided: approximately 100%    Total  Area  Debrided:  0.08 sq cm     Bleeding:  Minimal    Hemostasis Achieved:  by silver nitrate stick    Procedural Pain:  0  / 10     Post Procedural Pain:  0 / 10     Response to treatment:  Well tolerated by patient. Status of wound progress and description from last visit:   mostly unchanged,  Less callous than expected given the weeks without debridement. He really needs to come weekly to get the wound healed. .      Plan:       Discharge Instructions         PHYSICIAN ORDERS AND DISCHARGE INSTRUCTIONS     NOTE: Upon discharge from the [address], you will receive a patient experience survey. We would be grateful if you would take the time to fill this survey out. Continuing wound care orders and information:              Residence:               Continue home health care with:              Wound Medications:              Wound cleansing:                          Do not scrub or use excessive force. Wash hands with soap and water before and after dressing changes. Prior to applying a clean dressing, cleanse wound with normal saline,                                wound cleanser, or mild soap and water. Ask the physician or nurse before getting the wound(s) wet in a shower              Daily Wound management:                          Keep weight off wounds and reposition every 2 hours.                           Avoid standing for long periods of time. Apply wraps/stockings in AM and remove at bedtime. If swelling is present, elevate legs to the level of the heart or above for 30 minutes 4-5 times a day and/or when sitting. When taking antibiotics take entire prescription as ordered by physician do not stop taking until medicine is all gone. Wound Care Notes:  Pharm: [facility]: Pain medication ordered  Vascular studies: arterial studies  Date 22  Cultures: Obtained on 2022  Antibiotics: Doxycycline 100 mg BID for 10 days ordered on 2022                                         Orders for this week: 10/14/22     Left Great Toe and Left Lower Leg -- wash with soap and water, pat dry. Apply Arielle to wound bed. Cover with gauze or ABD. Wrap with conform and secure with tape. Change every other day. Follow up Instructions: at the [address] [place] [address] in 2 weeks on Friday  Primary Wound Care Provider: Dr Ronald Brambila  Call  for any questions or concerns.   Date__________   Time____________  [place] [phone]        Treatment Note Wound 22 Toe (Comment  which one) Left #1 (onset 2 months) Left Great Toe Plantar  -Dressing/Treatment:  (arielle gauze,conform tape)    Written Patient Dismissal Instructions Given            Electronically signed by Edison Araya MD on 10/14/2022 at 5:34 PM

## 2022-11-04 DIAGNOSIS — F51.01 PRIMARY INSOMNIA: ICD-10-CM

## 2022-11-04 RX ORDER — TRAZODONE HYDROCHLORIDE 50 MG/1
TABLET ORAL
Qty: 60 TABLET | Refills: 11 | OUTPATIENT
Start: 2022-11-04

## 2022-11-16 ENCOUNTER — HOSPITAL ENCOUNTER (INPATIENT)
Age: 42
LOS: 3 days | Discharge: HOME | DRG: 433 | End: 2022-11-19
Attending: EMERGENCY MEDICINE | Admitting: INTERNAL MEDICINE
Payer: COMMERCIAL

## 2022-11-16 ENCOUNTER — APPOINTMENT (OUTPATIENT)
Dept: GENERAL RADIOLOGY | Age: 42
DRG: 433 | End: 2022-11-16
Payer: COMMERCIAL

## 2022-11-16 ENCOUNTER — APPOINTMENT (OUTPATIENT)
Dept: CT IMAGING | Age: 42
DRG: 433 | End: 2022-11-16
Payer: COMMERCIAL

## 2022-11-16 ENCOUNTER — TELEPHONE (OUTPATIENT)
Dept: WOUND CARE | Age: 42
End: 2022-11-16

## 2022-11-16 DIAGNOSIS — K70.31 ALCOHOLIC CIRRHOSIS OF LIVER WITH ASCITES (HCC): Primary | ICD-10-CM

## 2022-11-16 DIAGNOSIS — F10.930 ALCOHOL WITHDRAWAL SYNDROME WITHOUT COMPLICATION (HCC): ICD-10-CM

## 2022-11-16 PROBLEM — F10.939 ALCOHOL WITHDRAWAL SYNDROME, WITH UNSPECIFIED COMPLICATION (HCC): Status: ACTIVE | Noted: 2022-11-16

## 2022-11-16 LAB
ACETAMINOPHEN LEVEL: <5 UG/ML (ref 15–30)
ALBUMIN SERPL-MCNC: 3.4 GM/DL (ref 3.4–5)
ALCOHOL SCREEN SERUM: <0.01 %WT/VOL
ALP BLD-CCNC: 203 IU/L (ref 40–129)
ALT SERPL-CCNC: 29 U/L (ref 10–40)
ANION GAP SERPL CALCULATED.3IONS-SCNC: 14 MMOL/L (ref 4–16)
AST SERPL-CCNC: 122 IU/L (ref 15–37)
BASOPHILS ABSOLUTE: 0.1 K/CU MM
BASOPHILS RELATIVE PERCENT: 0.9 % (ref 0–1)
BILIRUB SERPL-MCNC: 11.3 MG/DL (ref 0–1)
BUN BLDV-MCNC: 10 MG/DL (ref 6–23)
CALCIUM SERPL-MCNC: 8.5 MG/DL (ref 8.3–10.6)
CHLORIDE BLD-SCNC: 86 MMOL/L (ref 99–110)
CO2: 25 MMOL/L (ref 21–32)
CREAT SERPL-MCNC: 0.6 MG/DL (ref 0.9–1.3)
DIFFERENTIAL TYPE: ABNORMAL
DOSE AMOUNT: ABNORMAL
DOSE AMOUNT: ABNORMAL
DOSE TIME: ABNORMAL
DOSE TIME: ABNORMAL
EOSINOPHILS ABSOLUTE: 0.6 K/CU MM
EOSINOPHILS RELATIVE PERCENT: 4.1 % (ref 0–3)
GFR SERPL CREATININE-BSD FRML MDRD: >60 ML/MIN/1.73M2
GLUCOSE BLD-MCNC: 114 MG/DL (ref 70–99)
GLUCOSE BLD-MCNC: 117 MG/DL (ref 70–99)
HCT VFR BLD CALC: 34.5 % (ref 42–52)
HEMOGLOBIN: 11.8 GM/DL (ref 13.5–18)
IMMATURE NEUTROPHIL %: 0.9 % (ref 0–0.43)
INR BLD: 1.39 INDEX
LACTATE: 3.2 MMOL/L (ref 0.4–2)
LIPASE: 44 IU/L (ref 13–60)
LYMPHOCYTES ABSOLUTE: 2 K/CU MM
LYMPHOCYTES RELATIVE PERCENT: 13.3 % (ref 24–44)
MAGNESIUM: 1.1 MG/DL (ref 1.8–2.4)
MCH RBC QN AUTO: 31 PG (ref 27–31)
MCHC RBC AUTO-ENTMCNC: 34.2 % (ref 32–36)
MCV RBC AUTO: 90.6 FL (ref 78–100)
MONOCYTES ABSOLUTE: 1.8 K/CU MM
MONOCYTES RELATIVE PERCENT: 12.3 % (ref 0–4)
NUCLEATED RBC %: 0.2 %
PDW BLD-RTO: 21.8 % (ref 11.7–14.9)
PLATELET # BLD: 125 K/CU MM (ref 140–440)
PMV BLD AUTO: 10.3 FL (ref 7.5–11.1)
POTASSIUM SERPL-SCNC: 3.3 MMOL/L (ref 3.5–5.1)
PROTHROMBIN TIME: 18 SECONDS (ref 11.7–14.5)
RBC # BLD: 3.81 M/CU MM (ref 4.6–6.2)
SALICYLATE LEVEL: 2 MG/DL (ref 15–30)
SEGMENTED NEUTROPHILS ABSOLUTE COUNT: 10.1 K/CU MM
SEGMENTED NEUTROPHILS RELATIVE PERCENT: 68.5 % (ref 36–66)
SODIUM BLD-SCNC: 125 MMOL/L (ref 135–145)
SODIUM BLD-SCNC: 129 MMOL/L (ref 135–145)
TOTAL IMMATURE NEUTOROPHIL: 0.13 K/CU MM
TOTAL NUCLEATED RBC: 0 K/CU MM
TOTAL PROTEIN: 9.5 GM/DL (ref 6.4–8.2)
WBC # BLD: 14.7 K/CU MM (ref 4–10.5)

## 2022-11-16 PROCEDURE — 84145 PROCALCITONIN (PCT): CPT

## 2022-11-16 PROCEDURE — 85025 COMPLETE CBC W/AUTO DIFF WBC: CPT

## 2022-11-16 PROCEDURE — 83930 ASSAY OF BLOOD OSMOLALITY: CPT

## 2022-11-16 PROCEDURE — 71046 X-RAY EXAM CHEST 2 VIEWS: CPT

## 2022-11-16 PROCEDURE — 83605 ASSAY OF LACTIC ACID: CPT

## 2022-11-16 PROCEDURE — 83735 ASSAY OF MAGNESIUM: CPT

## 2022-11-16 PROCEDURE — 36415 COLL VENOUS BLD VENIPUNCTURE: CPT

## 2022-11-16 PROCEDURE — 6360000004 HC RX CONTRAST MEDICATION: Performed by: EMERGENCY MEDICINE

## 2022-11-16 PROCEDURE — 6370000000 HC RX 637 (ALT 250 FOR IP): Performed by: NURSE PRACTITIONER

## 2022-11-16 PROCEDURE — G0480 DRUG TEST DEF 1-7 CLASSES: HCPCS

## 2022-11-16 PROCEDURE — 2060000000 HC ICU INTERMEDIATE R&B

## 2022-11-16 PROCEDURE — 6370000000 HC RX 637 (ALT 250 FOR IP): Performed by: FAMILY MEDICINE

## 2022-11-16 PROCEDURE — 2580000003 HC RX 258: Performed by: NURSE PRACTITIONER

## 2022-11-16 PROCEDURE — 2580000003 HC RX 258: Performed by: INTERNAL MEDICINE

## 2022-11-16 PROCEDURE — 80053 COMPREHEN METABOLIC PANEL: CPT

## 2022-11-16 PROCEDURE — 93005 ELECTROCARDIOGRAM TRACING: CPT | Performed by: NURSE PRACTITIONER

## 2022-11-16 PROCEDURE — 99285 EMERGENCY DEPT VISIT HI MDM: CPT

## 2022-11-16 PROCEDURE — 85610 PROTHROMBIN TIME: CPT

## 2022-11-16 PROCEDURE — 84295 ASSAY OF SERUM SODIUM: CPT

## 2022-11-16 PROCEDURE — 6370000000 HC RX 637 (ALT 250 FOR IP): Performed by: EMERGENCY MEDICINE

## 2022-11-16 PROCEDURE — 83690 ASSAY OF LIPASE: CPT

## 2022-11-16 PROCEDURE — 6360000002 HC RX W HCPCS: Performed by: INTERNAL MEDICINE

## 2022-11-16 PROCEDURE — 2500000003 HC RX 250 WO HCPCS: Performed by: INTERNAL MEDICINE

## 2022-11-16 PROCEDURE — 6360000002 HC RX W HCPCS: Performed by: NURSE PRACTITIONER

## 2022-11-16 PROCEDURE — 74177 CT ABD & PELVIS W/CONTRAST: CPT

## 2022-11-16 PROCEDURE — 82962 GLUCOSE BLOOD TEST: CPT

## 2022-11-16 RX ORDER — LORAZEPAM 1 MG/1
4 TABLET ORAL
Status: DISCONTINUED | OUTPATIENT
Start: 2022-11-16 | End: 2022-11-16 | Stop reason: SDUPTHER

## 2022-11-16 RX ORDER — INSULIN LISPRO 100 [IU]/ML
0-4 INJECTION, SOLUTION INTRAVENOUS; SUBCUTANEOUS NIGHTLY
Status: DISCONTINUED | OUTPATIENT
Start: 2022-11-16 | End: 2022-11-19 | Stop reason: HOSPADM

## 2022-11-16 RX ORDER — LORAZEPAM 2 MG/ML
3 INJECTION INTRAMUSCULAR
Status: DISCONTINUED | OUTPATIENT
Start: 2022-11-16 | End: 2022-11-16 | Stop reason: SDUPTHER

## 2022-11-16 RX ORDER — ONDANSETRON 4 MG/1
4 TABLET, ORALLY DISINTEGRATING ORAL EVERY 8 HOURS PRN
Status: DISCONTINUED | OUTPATIENT
Start: 2022-11-16 | End: 2022-11-16

## 2022-11-16 RX ORDER — SODIUM CHLORIDE 0.9 % (FLUSH) 0.9 %
5-40 SYRINGE (ML) INJECTION EVERY 12 HOURS SCHEDULED
Status: DISCONTINUED | OUTPATIENT
Start: 2022-11-16 | End: 2022-11-17

## 2022-11-16 RX ORDER — LORAZEPAM 1 MG/1
1 TABLET ORAL
Status: DISCONTINUED | OUTPATIENT
Start: 2022-11-16 | End: 2022-11-19 | Stop reason: HOSPADM

## 2022-11-16 RX ORDER — LORAZEPAM 2 MG/ML
3 INJECTION INTRAMUSCULAR
Status: DISCONTINUED | OUTPATIENT
Start: 2022-11-16 | End: 2022-11-19 | Stop reason: HOSPADM

## 2022-11-16 RX ORDER — LORAZEPAM 2 MG/ML
4 INJECTION INTRAMUSCULAR
Status: DISCONTINUED | OUTPATIENT
Start: 2022-11-16 | End: 2022-11-19 | Stop reason: HOSPADM

## 2022-11-16 RX ORDER — LORAZEPAM 2 MG/ML
2 INJECTION INTRAMUSCULAR
Status: DISCONTINUED | OUTPATIENT
Start: 2022-11-16 | End: 2022-11-16 | Stop reason: SDUPTHER

## 2022-11-16 RX ORDER — LORAZEPAM 1 MG/1
2 TABLET ORAL
Status: DISCONTINUED | OUTPATIENT
Start: 2022-11-16 | End: 2022-11-16 | Stop reason: SDUPTHER

## 2022-11-16 RX ORDER — INSULIN LISPRO 100 [IU]/ML
0-4 INJECTION, SOLUTION INTRAVENOUS; SUBCUTANEOUS
Status: DISCONTINUED | OUTPATIENT
Start: 2022-11-16 | End: 2022-11-19 | Stop reason: HOSPADM

## 2022-11-16 RX ORDER — DEXTROSE MONOHYDRATE 100 MG/ML
INJECTION, SOLUTION INTRAVENOUS CONTINUOUS PRN
Status: DISCONTINUED | OUTPATIENT
Start: 2022-11-16 | End: 2022-11-19 | Stop reason: HOSPADM

## 2022-11-16 RX ORDER — LANOLIN ALCOHOL/MO/W.PET/CERES
100 CREAM (GRAM) TOPICAL DAILY
Status: DISCONTINUED | OUTPATIENT
Start: 2022-11-16 | End: 2022-11-16 | Stop reason: SDUPTHER

## 2022-11-16 RX ORDER — SODIUM CHLORIDE 9 MG/ML
INJECTION, SOLUTION INTRAVENOUS CONTINUOUS
Status: CANCELLED | OUTPATIENT
Start: 2022-11-16 | End: 2022-11-17

## 2022-11-16 RX ORDER — LABETALOL HYDROCHLORIDE 5 MG/ML
5 INJECTION, SOLUTION INTRAVENOUS EVERY 8 HOURS PRN
Status: DISCONTINUED | OUTPATIENT
Start: 2022-11-16 | End: 2022-11-19 | Stop reason: HOSPADM

## 2022-11-16 RX ORDER — LANOLIN ALCOHOL/MO/W.PET/CERES
100 CREAM (GRAM) TOPICAL DAILY
Status: DISCONTINUED | OUTPATIENT
Start: 2022-11-17 | End: 2022-11-19 | Stop reason: HOSPADM

## 2022-11-16 RX ORDER — MAGNESIUM SULFATE IN WATER 40 MG/ML
2000 INJECTION, SOLUTION INTRAVENOUS ONCE
Status: DISCONTINUED | OUTPATIENT
Start: 2022-11-16 | End: 2022-11-16

## 2022-11-16 RX ORDER — PROMETHAZINE HYDROCHLORIDE 25 MG/ML
6.25 INJECTION, SOLUTION INTRAMUSCULAR; INTRAVENOUS EVERY 6 HOURS PRN
Status: DISCONTINUED | OUTPATIENT
Start: 2022-11-16 | End: 2022-11-19 | Stop reason: HOSPADM

## 2022-11-16 RX ORDER — LORAZEPAM 1 MG/1
2 TABLET ORAL
Status: DISCONTINUED | OUTPATIENT
Start: 2022-11-16 | End: 2022-11-19 | Stop reason: HOSPADM

## 2022-11-16 RX ORDER — LORAZEPAM 2 MG/ML
1 INJECTION INTRAMUSCULAR
Status: DISCONTINUED | OUTPATIENT
Start: 2022-11-16 | End: 2022-11-19 | Stop reason: HOSPADM

## 2022-11-16 RX ORDER — SODIUM CHLORIDE 0.9 % (FLUSH) 0.9 %
5-40 SYRINGE (ML) INJECTION PRN
Status: DISCONTINUED | OUTPATIENT
Start: 2022-11-16 | End: 2022-11-17

## 2022-11-16 RX ORDER — FOLIC ACID 1 MG/1
1 TABLET ORAL DAILY
Status: DISCONTINUED | OUTPATIENT
Start: 2022-11-17 | End: 2022-11-19 | Stop reason: HOSPADM

## 2022-11-16 RX ORDER — SODIUM CHLORIDE 9 MG/ML
INJECTION, SOLUTION INTRAVENOUS PRN
Status: DISCONTINUED | OUTPATIENT
Start: 2022-11-16 | End: 2022-11-17

## 2022-11-16 RX ORDER — LORAZEPAM 1 MG/1
3 TABLET ORAL
Status: DISCONTINUED | OUTPATIENT
Start: 2022-11-16 | End: 2022-11-19 | Stop reason: HOSPADM

## 2022-11-16 RX ORDER — LORAZEPAM 1 MG/1
3 TABLET ORAL
Status: DISCONTINUED | OUTPATIENT
Start: 2022-11-16 | End: 2022-11-16 | Stop reason: SDUPTHER

## 2022-11-16 RX ORDER — POTASSIUM CHLORIDE 20 MEQ/1
20 TABLET, EXTENDED RELEASE ORAL 2 TIMES DAILY WITH MEALS
Status: DISCONTINUED | OUTPATIENT
Start: 2022-11-17 | End: 2022-11-16

## 2022-11-16 RX ORDER — LORAZEPAM 2 MG/ML
1 INJECTION INTRAMUSCULAR
Status: DISCONTINUED | OUTPATIENT
Start: 2022-11-16 | End: 2022-11-16 | Stop reason: SDUPTHER

## 2022-11-16 RX ORDER — MAGNESIUM SULFATE IN WATER 40 MG/ML
2000 INJECTION, SOLUTION INTRAVENOUS ONCE
Status: COMPLETED | OUTPATIENT
Start: 2022-11-16 | End: 2022-11-17

## 2022-11-16 RX ORDER — OXYCODONE HYDROCHLORIDE 5 MG/1
5 TABLET ORAL EVERY 4 HOURS PRN
Status: DISCONTINUED | OUTPATIENT
Start: 2022-11-16 | End: 2022-11-19 | Stop reason: HOSPADM

## 2022-11-16 RX ORDER — POTASSIUM CHLORIDE 20 MEQ/1
40 TABLET, EXTENDED RELEASE ORAL ONCE
Status: COMPLETED | OUTPATIENT
Start: 2022-11-16 | End: 2022-11-16

## 2022-11-16 RX ORDER — LORAZEPAM 2 MG/ML
2 INJECTION INTRAMUSCULAR
Status: DISCONTINUED | OUTPATIENT
Start: 2022-11-16 | End: 2022-11-19 | Stop reason: HOSPADM

## 2022-11-16 RX ORDER — LANOLIN ALCOHOL/MO/W.PET/CERES
6 CREAM (GRAM) TOPICAL ONCE
Status: COMPLETED | OUTPATIENT
Start: 2022-11-16 | End: 2022-11-16

## 2022-11-16 RX ORDER — LORAZEPAM 1 MG/1
1 TABLET ORAL
Status: DISCONTINUED | OUTPATIENT
Start: 2022-11-16 | End: 2022-11-16 | Stop reason: SDUPTHER

## 2022-11-16 RX ORDER — LORAZEPAM 2 MG/ML
4 INJECTION INTRAMUSCULAR
Status: DISCONTINUED | OUTPATIENT
Start: 2022-11-16 | End: 2022-11-16 | Stop reason: SDUPTHER

## 2022-11-16 RX ORDER — POLYETHYLENE GLYCOL 3350 17 G/17G
17 POWDER, FOR SOLUTION ORAL DAILY PRN
Status: DISCONTINUED | OUTPATIENT
Start: 2022-11-16 | End: 2022-11-19 | Stop reason: HOSPADM

## 2022-11-16 RX ORDER — ONDANSETRON 2 MG/ML
4 INJECTION INTRAMUSCULAR; INTRAVENOUS EVERY 6 HOURS PRN
Status: DISCONTINUED | OUTPATIENT
Start: 2022-11-16 | End: 2022-11-16

## 2022-11-16 RX ORDER — LORAZEPAM 1 MG/1
4 TABLET ORAL
Status: DISCONTINUED | OUTPATIENT
Start: 2022-11-16 | End: 2022-11-19 | Stop reason: HOSPADM

## 2022-11-16 RX ADMIN — Medication 6 MG: at 22:03

## 2022-11-16 RX ADMIN — Medication 100 MG: at 15:09

## 2022-11-16 RX ADMIN — IOPAMIDOL 80 ML: 755 INJECTION, SOLUTION INTRAVENOUS at 13:51

## 2022-11-16 RX ADMIN — MAGNESIUM SULFATE HEPTAHYDRATE 2000 MG: 2 INJECTION, SOLUTION INTRAVENOUS at 23:13

## 2022-11-16 RX ADMIN — POTASSIUM CHLORIDE 40 MEQ: 1500 TABLET, EXTENDED RELEASE ORAL at 21:24

## 2022-11-16 RX ADMIN — LORAZEPAM 1 MG: 1 TABLET ORAL at 15:25

## 2022-11-16 RX ADMIN — OXYCODONE HYDROCHLORIDE 5 MG: 5 TABLET ORAL at 21:24

## 2022-11-16 RX ADMIN — THIAMINE HYDROCHLORIDE: 100 INJECTION, SOLUTION INTRAMUSCULAR; INTRAVENOUS at 21:35

## 2022-11-16 RX ADMIN — SODIUM CHLORIDE, PRESERVATIVE FREE 10 ML: 5 INJECTION INTRAVENOUS at 21:34

## 2022-11-16 RX ADMIN — OXYCODONE HYDROCHLORIDE 5 MG: 5 TABLET ORAL at 15:09

## 2022-11-16 ASSESSMENT — PAIN DESCRIPTION - ORIENTATION: ORIENTATION: RIGHT

## 2022-11-16 ASSESSMENT — PAIN SCALES - GENERAL
PAINLEVEL_OUTOF10: 0
PAINLEVEL_OUTOF10: 8
PAINLEVEL_OUTOF10: 6

## 2022-11-16 ASSESSMENT — PAIN - FUNCTIONAL ASSESSMENT: PAIN_FUNCTIONAL_ASSESSMENT: ACTIVITIES ARE NOT PREVENTED

## 2022-11-16 ASSESSMENT — PAIN DESCRIPTION - DESCRIPTORS: DESCRIPTORS: ACHING;DISCOMFORT

## 2022-11-16 ASSESSMENT — PAIN DESCRIPTION - LOCATION: LOCATION: FOOT

## 2022-11-16 NOTE — H&P
History and Physical      Name:  Elizabeth Kong /Age/Sex: 1980  (38 y.o. male)   MRN & CSN:  [id number] & [id number] Admission Date/Time: 2022 11:18 AM   Location:  ED26/ED-26 PCP: Ted Caceres MD       Hospital Day: 1           Assessment and Plan:   # Cirrhosis, new diagnosis-likely alcohol. , ALT 29, Bili 11.3, albumin 3.4. CT of the abdomen and pelvis showed cirrhosis with portal hypertension, hepatosplenomegaly. Recanalization of the umbilical vein, with multiple umbilical varices. Gastroesophageal and gastric varices also noted. Mild edema seen at the root of the mesentery which is likely related to the portal hypertension. No mention of ascites. -We will obtain PT/INR, calculate meld score. Consult GI for eval recs. Avoid NSAIDs, hepatotoxic agents, repeat LFTs coags in a.m. # Coagulopathy in setting of liver cirrhosis - PT/INR pending, platelets 284, monitor    # Alcohol Withdrawal - Pt drinks 8-10 bears daily last drink 4 days ago. CIWA 13. Place on CIWA with ativan, IV banana bag x1, daily thiamin folic acid oral supplements, aspiration/sz precautions, monitor on tele/step down unit, monitor need for precedex gtt    # SIRS - Criteria met with tachycardia, leukocytosis, pt with low grade temp 99.4F. No source of infection.  Will check UA, lactic and procal, IV hydration caution with hyponatremia, repeat CBC in am    # Hyponatremia in setting of alcohol abuse and possible volume deficit - Will check UA and urine studies, monitor serial sodium levels, IV 0.9NS @ 50/hr for now, Consult Nephrology for eval/recs    # Hypokalemia - Replacement ordered will check mg level    Other chronic medical conditions resume home medications unless contraindicated  # Essential hypertension-not managed on antihypertensives, will and IV labetalol as needed  # Diabetes mellitus type 2 -managed on corrective insulin, monitor blood glucose before meals and at bedtime, ADA diet, A1c in a.m.  # Chronic left foot ulcer-managed by wound care as outpatient, will consult for management  # Right charcot foot - Followed by Podiatry. Last visit 10/10/22 with recs/tx:   -Right foot diabetic boot brace  -Nonweightbearing right foot  -Follow-up in 6 weeks repeat x-rays bilateral foot and ankle  -Crow walker: To be obtained from  prosthetics orthotics  # Chronic anemia -hemoglobin stable, patient denies active bleeding, monitor  # Hx MICHAEL - will verify pt is on Cpap  # Chronic anxiety- pt managed on ativan, will monitor if additional medications needed    Present on Admission:   Alcohol withdrawal syndrome, with unspecified complication ([facility] [address]             Diet No diet orders on file   DVT Prophylaxis [] Lovenox, []  Heparin, [x] SCDs, [] Ambulation  [] Long term AC   GI Prophylaxis [] PPI,  [] H2 Blocker,  [] Carafate,  [] Diet/Tube Feeds   Code Status Full code    Disposition Admit to step down. Patient plans to return home upon discharge         Chief Complaint: Abdominal Pain (Pt states concerned for Liver issues due to feeling fatigued, weak, and feels like more yellowing. No hx of liver issues. )      History obtained from EHR and patient  History of Present Illness:   Brendon Hills is a 38 y.o. male  with history of essential hypertension, diabetes mellitus type II, alcohol abuse, chronic left foot ulcer, right Charcot arthropathy, MICHAEL, anxiety  who presents with concern for liver cirrhosis. The patient reports over the past week he has noticed yellowing of his sclera and had lower abdominal pain. He admits to chronic alcohol abuse and drinks 8-10 beers daily. He states he stopped drinking 4 days ago due to yellowing of his eyes. He denies nausea vomiting however he reports poor oral intake for the last few days. He denies constipation or dysuria. He denies fever or chills. He denies chest pain or shortness of breath. He denies new medications. He denies any other acute issues.   He presented to ED with temp 99.4, pulse 172 respirations 22 blood pressure 154/99 O2 sat 97% on room air. Chest x-ray performed was nonacute, +cardiomegaly. CT of the abdomen and pelvis showed cirrhosis with portal hypertension. Hepatosplenomegaly. Recanalization of the umbilical vein, with multiple umbilical varices. Gastroesophageal and gastric varices also noted. Mild edema seen at the root of the mesentery which is likely related to the portal hypertension. Otherwise, no acute abnormality detected. Uncomplicated cholelithiasis. Chemistry panel showed sodium of 125 potassium 3.3 chloride 86 CO2 25 BUN 10 creatinine 0.6 glucose 114 calcium 8.5. LFTs showed albumin 3.4 alk phos 203  ALT 29 bilirubin 11.3 lipase 44. WBC 14.7, hemoglobin 11.8, hematocrit 34.5, platelets 894. No treatment in ED. The patient has been admitted for further management. Ten point ROS: reviewed and are negative, unless as noted in above HPI. Objective:   No intake or output data in the 24 hours ending 11/16/22 1549     Vitals:   Vitals:    11/16/22 1217 11/16/22 1258 11/16/22 1522 11/16/22 1526   BP: (!) 154/99 (!) 183/92 (!) 155/94 (!) 155/94   Pulse: (!) 117 (!) 101 (!) 101 (!) 115   Resp:    18   Temp:    99 °F (37.2 °C)   TempSrc:       SpO2:    99%   Weight:       Height:           Physical Exam: 11/16/22     GEN -Awake  appearing male, in NAD. Appears given age. EYES -sclera icterus, mild. HENT -Head is normocephalic, atraumatic. MM are moist. No evidence of thrush. NECK -Supple, no apparent thyromegaly or masses. RESP -CTA, no wheezes, rales or rhonchi. Symmetric chest movement   C/V -S1/S2 auscultated. RRR without appreciable M/R/G. No JVD. Cap refill <3 sec. Bilateral Lower ext edema   GI -Abdomen is soft with slight distension, mild TTP across lower abdomen, no rebound     -No CVA/ flank tenderness. LYMPH- No petechiae or ecchymoses.   MS Right charcot foot, left great toe with approx 1mm dry, dark ulceration to plantar surface, no drainage or surrounding erythema noted  SKIN -left lower extremity with scattered scabbed skin abrasions, scab  NEURO-Cranial nerves appear grossly intact, normal speech, no lateralizing weakness. PSYC-Awake, alert, oriented x 4 . Slightly anxious. Past Medical History:      Past Medical History:   Diagnosis Date    Hypercholesterolemia 2/18/2019    Hypertension     Local infection of skin and subcutaneous tissue 5/13/2022    Type 2 diabetes, controlled, with neuropathy ([facility] [address] 6/6/2018    WD-Diabetic ulcer of toe of left foot associated with type 2 diabetes mellitus, with fat layer exposed ([facility] [address] 5/13/2022    WD-Lisfranc dislocation, left, initial encounter 9/2/2022       PMH reviewed  Past Surgical  History:    has no past surgical history on file. Surgical history reviewed  Family  History:   family history includes High Blood Pressure in his sister. Family history reviewed  Social History:     Social History     Socioeconomic History    Marital status:      Spouse name: None    Number of children: None    Years of education: None    Highest education level: None   Tobacco Use    Smoking status: Never    Smokeless tobacco: Never   Substance and Sexual Activity    Alcohol use: Yes     Alcohol/week: 12.0 standard drinks     Types: 12 Standard drinks or equivalent per week     Comment: 6 drinks daily    Drug use: No    Sexual activity: Yes     Partners: Male   Social History Narrative    [address] and receiving        Diet:Unrestricted    Maria Esther Gentile      reports that he has never smoked. He has never used smokeless tobacco.   reports current alcohol use of about 12.0 standard drinks per week. reports no history of drug use. Social history reviewed  Allergies:      Allergies   Allergen Reactions    Paxil [Paroxetine Hcl]      Caused \"weird\" things to happen in his sleep       Home Medications:     Prior to Admission medications    Medication Sig Start Date End Date Taking? Authorizing Provider   HYDROcodone-acetaminophen (NORCO) 5-325 MG per tablet Take 2 tablets by mouth 2 times daily as needed. 9/16/22   Historical Provider, MD   lidocaine (XYLOCAINE) 5 % ointment Apply topically as needed. 5/27/22   TEE Carpenter CNP   ibuprofen (ADVIL;MOTRIN) 800 MG tablet Take 1 tablet by mouth 2 times daily as needed for Pain 5/27/22   TEE Carpenter CNP   ibuprofen (ADVIL;MOTRIN) 600 MG tablet Take 1 tablet by mouth 3 times daily (with meals) 3/30/22   Liz Wolf PA-C   pregabalin (LYRICA) 100 MG capsule TAKE 1 CAPSULE BY MOUTH TWICE DAILY 3/9/22 9/9/22  Dedrick Torres MD   busPIRone (BUSPAR) 15 MG tablet TAKE 1 TABLET BY MOUTH THREE TIMES DAILY 2/7/22   Dedrick Torres MD   traZODone (DESYREL) 50 MG tablet Take 1-2 tablets by mouth nightly as needed for Sleep 8/27/21   Dedrick Torres MD   glucose blood VI test strips (GLUCOSE METER TEST) strip 1 each by In Vitro route daily As needed.  5/30/18   Dedrick Torres MD   glucose monitoring kit (FREESTYLE) monitoring kit 1 kit by Does not apply route daily as needed (If blood sugars running high or low) 5/30/18   Dedrick Torres MD   MICROLET LANCETS MISC 1 each by Does not apply route daily 5/30/18   Dedrick Torres MD         Medications:   Medications:    sodium chloride flush  5-40 mL IntraVENous 2 times per day    thiamine  100 mg Oral Daily      Infusions:    sodium chloride       PRN Meds: sodium chloride flush, 5-40 mL, PRN  sodium chloride, , PRN  LORazepam, 1 mg, Q1H PRN   Or  LORazepam, 1 mg, Q1H PRN   Or  LORazepam, 2 mg, Q1H PRN   Or  LORazepam, 2 mg, Q1H PRN   Or  LORazepam, 3 mg, Q1H PRN   Or  LORazepam, 3 mg, Q1H PRN   Or  LORazepam, 4 mg, Q1H PRN   Or  LORazepam, 4 mg, Q1H PRN  oxyCODONE, 5 mg, Q4H PRN        Data:     Laboratory this visit:  Reviewed  Recent Labs     11/16/22  1130   WBC 14.7*   HGB 11.8*   HCT 34.5*   *      Recent Labs     11/16/22  1130   *   K 3.3*   CL 86*   CO2 25   BUN 10   CREATININE 0.6*     Recent Labs     11/16/22  1130   *   ALT 29   BILITOT 11.3*   ALKPHOS 203*     No results for input(s): INR in the last 72 hours. Radiology this visit:  Reviewed. XR CHEST (2 VW)    Result Date: 11/16/2022  EXAMINATION: TWO XRAY VIEWS OF THE CHEST 11/16/2022 12:17 pm COMPARISON: 08/03/2021 HISTORY: ORDERING SYSTEM PROVIDED HISTORY: edema, r/o pulmonary congestion TECHNOLOGIST PROVIDED HISTORY: Reason for exam:->edema, r/o pulmonary congestion Reason for Exam: edema, r/o pulmonary congestion FINDINGS: No consolidation, pleural effusion or pneumothorax. Stable circumscribed dense right upper lobe nodule consistent with granuloma. Cardiac silhouette enlarged. Right paratracheal calcifications. Old left rib fractures. No active pulmonary or pleural disease. Cardiomegaly. Findings consistent with old granulomatous disease. CT ABDOMEN PELVIS W IV CONTRAST Additional Contrast? None    Result Date: 11/16/2022  EXAMINATION: CT OF THE ABDOMEN AND PELVIS WITH CONTRAST 11/16/2022 10:02 am TECHNIQUE: CT of the abdomen and pelvis was performed with the administration of intravenous contrast. Multiplanar reformatted images are provided for review. Automated exposure control, iterative reconstruction, and/or weight based adjustment of the mA/kV was utilized to reduce the radiation dose to as low as reasonably achievable.  COMPARISON: 08/03/2021 HISTORY: ORDERING SYSTEM PROVIDED HISTORY: abdominal distension, cirrhosis TECHNOLOGIST PROVIDED HISTORY: Reason for exam:->abdominal distension, cirrhosis Additional Contrast?->None Decision Support Exception - unselect if not a suspected or confirmed emergency medical condition->Emergency Medical Condition (MA) Reason for Exam: abdominal distension, cirrhosis Relevant Medical/Surgical History: 80 ml isovue 370 FINDINGS: Lower Chest: The visualized lungs are clear. Base of the heart is unremarkable. Visualized extra thoracic soft tissues are unremarkable. Organs: There is a cirrhotic morphology liver, with hepatomegaly (liver measures over 24 cm at the mid axillary line). A focal hepatic abnormality is not identified. There is evidence of portal hypertension, with recanalization of the umbilical vein. Multiple gastroesophageal and gastric varices noted as well. Moderate splenomegaly is unchanged. Uncomplicated cholelithiasis is noted. Adrenals are unremarkable. Pancreas unremarkable. No acute or suspicious renal abnormalities are identified. GI/Bowel: Again, gastroesophageal and gastric varices. Distal esophagus and stomach otherwise unremarkable. No small bowel abnormalities are identified. Mild diverticulosis of the large bowel is present without CT evidence of diverticulitis. Pelvis: Urinary bladder unremarkable. No free pelvic fluid. Prostate unremarkable. Peritoneum/Retroperitoneum: Edema is seen within the retroperitoneum, with additional edema tracking along the pericolic gutters and along the anterior perirenal fascia, presumably related to the portal hypertension. The abdominal aorta is normal in caliber. Superior mesenteric artery appears to be enhancing. No lymphadenopathy. Bones/Soft Tissues: Numerous varices are seen in a periumbilical location. The extra-abdominal and extra pelvic soft tissues are otherwise unremarkable. No acute or suspicious bony abnormalities. Cirrhosis with portal hypertension. Hepatosplenomegaly. Recanalization of the umbilical vein, with multiple umbilical varices. Gastroesophageal and gastric varices also noted. Mild edema seen at the root of the mesentery which is likely related to the portal hypertension. Otherwise, no acute abnormality detected. Uncomplicated cholelithiasis.              Electronically signed by TEE Burch CNP on 11/16/2022 at 3:49 PM

## 2022-11-16 NOTE — ED PROVIDER NOTES
I independently examined and evaluated Sd Greene. In brief, 74-year-old male presents with concern for fatigue, yellowing of his eyes, concern for liver problems. He noted yellowing of his eyes several days ago and stopped drinking abruptly 3 to 4 days ago due to this. He is a chronic alcoholic, and does admit that he has been drinking for quite some time. He is having some tremors, general fatigue and nausea. He has been having hallucinations at nighttime. He does have a history of alcohol withdrawal when he is stopped drinking. He had never been diagnosed with cirrhosis. He is not having any abdominal pain but is feeling very bloated. No fevers. No chest pain or shortness of breath. He does have a nonhealing wound on his right foot from previous fracture, that has been unchanged. He is also a diabetic. Focused exam revealed patient no acute distress laying on the cot, tachycardic with regular rhythm, nonlabored respirations. Abdomen soft, mildly distended with fluid wave. Scleral icterus is noted, darkening of his skin is noted. No telangiectasias. .    ED course: Patient with cirrhosis, varices noted on CT, his hemoglobin is stable. His LFTs show elevated total bilirubin but ALT and AST are not significantly elevated. Plan for admission for his alcohol withdrawal- initial CIWA 13, case management consulted, plan for inpatient evaluation by GI. He is agreeable    Total critical care time today provided [id number]. This excludes seperately billable procedure. Critical care time provided for alcohol withdrawal. that required close evaluation and/or intervention with concern for patient decompensation. All diagnostic, treatment, and disposition decisions were made by myself in conjunction with the advanced practice provider. I personally saw the patient and performed a substantive portion of the visit including all aspects of the medical decision making.        For all further details of the patient's emergency department visit, please see the advanced practice provider's documentation. Comment: Please note this report has been produced using speech recognition software and may contain errors related to that system including errors in grammar, punctuation, and spelling, as well as words and phrases that may be inappropriate. If there are any questions or concerns please feel free to contact the dictating provider for clarification.        Janel Marquez MD  11/16/22 7464

## 2022-11-16 NOTE — CARE COORDINATION
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 Initial CIWA was 13, he is hyponatremic--currently awaiting the results of testing .    --14:30--- spoke with Mr Migue Galindo , very pleasant and articulate . He politely declined any outpatient services offered by  --did consider Gertrudis Mcdowell with [facility] -but for now that is on hold . Mr Migue Galindo definitely did not consider any inpatient services as he believes he has the resources and family connections to assist him with removing alcohol from his life . Mr Kat Darby lives in a house and  is  , stable , has commercial insurance has a PCP , and transportation available Mr Migue Galindo will contact  if he thinks he will need additional resources ----but for now , he has his own plan of action .      Summer Gotti / Angela Shrestha

## 2022-11-16 NOTE — CONSULTS
Nephrology Service Consultation      [address], [address] [address]  Phone: [phone]  Office Hours: 8:30AM - 4:30PM  Monday - Friday        MEDICAL DECISION MAKING and Recommendations   -Hyponatremia from alcohol abuse: 125 on admission  -Hypokalemia  -Cirrhosis and portal HTN, gastroesophageal varices  -Alcoholic hepatitis and hyperbili    Suggest:  -Obtain stat sodium level  -Replete potassium  -Dc the normal saline as I see that a banana bag was also ordered but run it at a low flow to avoid overcorrection of sodium level  -Give a regular diet for now please and limit oral fluids  -Obtain daily labs  -Avoid nsaids in this pt with decomp cirrhosis and who is at risk for DOREEN    Thank you      Patient Active Problem List    Diagnosis Date Noted    Alcohol withdrawal syndrome, with unspecified complication ([address] 86/18/4599    Lisfranc dislocation, left, initial encounter 09/02/2022    WD-Charcot ankle, right 07/05/2022    WD-Ankle injury, initial encounter 06/27/2022    WD-Acute right ankle pain 06/27/2022    WD-Right ankle swelling 06/27/2022    WD-Diabetic ulcer of toe of left foot associated with type 2 diabetes mellitus, with fat layer exposed ([address] 05/13/2022    Local infection of skin and subcutaneous tissue 05/13/2022    Non-pressure chronic ulcer of other part of left foot with fat layer exposed ([address] 04/19/2022    Cellulitis of right foot 04/13/2022    Alcohol abuse 04/13/2022    Hyperbilirubinemia 04/13/2022    Cellulitis 02/19/2022    Leukocytosis     Hyponatremia     Hypokalemia     Isolated proteinuria with minor glomerular abnormality     Closed displaced Maisonneuve's fracture of left leg 10/20/2021    Alcohol withdrawal delirium ([address] 07/31/2021    Hypercholesterolemia 02/18/2019    Anxiety 11/15/2018    Type 2 diabetes, controlled, with neuropathy ([address] 06/06/2018    Obstructive sleep apnea 10/31/2016    Fatty liver 03/11/2016    HTN (hypertension) 02/11/2015 Patient:  Estella Baer  MRN: [id number]  Consulting physician:  Janna Gamble MD  Reason for Consult: pt previously seen by me  PCP: Nisa Centeno MD    HISTORY OF PRESENT ILLNESS:   The patient is a 38 y.o. male with alcohol abuse, presented due to his [place] turning yellow\" and some abdominal pain. Renal consult as pt previously seen by me for low sodium from alcohol abuse. Sodium level 125 and potassium level 3.3  He denies diarrhea, vomiting but reports that he does not eat when he drinks alcohol  Wife is at bedside  He is on room air  Last alcohol binge was 5 days ago    REVIEW OF SYSTEMS:  14 point ROS is Negative. See positive ROS per HPI    Past Medical History:        Diagnosis Date    Hypercholesterolemia 2/18/2019    Hypertension     Local infection of skin and subcutaneous tissue 5/13/2022    Type 2 diabetes, controlled, with neuropathy ([address] 6/6/2018    WD-Diabetic ulcer of toe of left foot associated with type 2 diabetes mellitus, with fat layer exposed ([address] 5/13/2022    WD-Lisfranc dislocation, left, initial encounter 9/2/2022       Past Surgical History:    History reviewed. No pertinent surgical history. Medications:   Prior to Admission medications    Medication Sig Start Date End Date Taking? Authorizing Provider   HYDROcodone-acetaminophen (NORCO) 5-325 MG per tablet Take 2 tablets by mouth 2 times daily as needed. 9/16/22   Historical Provider, MD   lidocaine (XYLOCAINE) 5 % ointment Apply topically as needed.  5/27/22   TEE López - CNP   ibuprofen (ADVIL;MOTRIN) 800 MG tablet Take 1 tablet by mouth 2 times daily as needed for Pain 5/27/22   TEE López - CNP   ibuprofen (ADVIL;MOTRIN) 600 MG tablet Take 1 tablet by mouth 3 times daily (with meals) 3/30/22   Isrrael Valles PA-C   pregabalin (LYRICA) 100 MG capsule TAKE 1 CAPSULE BY MOUTH TWICE DAILY 3/9/22 9/9/22  Neil Barksdale MD   busPIRone (BUSPAR) 15 MG tablet TAKE 1 TABLET BY MOUTH THREE TIMES DAILY 2/7/22   Kristen Calrke MD   traZODone (DESYREL) 50 MG tablet Take 1-2 tablets by mouth nightly as needed for Sleep 8/27/21   Kristen Clarke MD   glucose blood VI test strips (GLUCOSE METER TEST) strip 1 each by In Vitro route daily As needed. 5/30/18   Kristen Clarke MD   glucose monitoring kit (FREESTYLE) monitoring kit 1 kit by Does not apply route daily as needed (If blood sugars running high or low) 5/30/18   Kristen Clarke MD   MICROLET LANCETS [address] [facility] 1 each by Does not apply route daily 5/30/18   Kristen Clarke MD        Allergies:  Paxil [paroxetine hcl]    Social History:   TOBACCO:   reports that he has never smoked. He has never used smokeless tobacco.  ETOH:   reports current alcohol use of about 12.0 standard drinks per week.   OCCUPATION:      Family History:       Problem Relation Age of Onset    High Blood Pressure Sister      Physical Exam:    Vitals: BP (!) 155/94   Pulse (!) 115   Temp 99 °F (37.2 °C)   Resp 18   Ht 6' (1.829 m)   Wt 260 lb (117.9 kg)   SpO2 99%   BMI 35.26 kg/m²   General appearance: in no acute distress, appears stated age  Skin: Skin color, texture, turgor normal. No rashes or lesions  HEENT: normocephalic, atraumatic, scleral icterus  Neck: supple, trachea midline  Lungs: clear to auscultation bilaterally, breathing comfortably  Heart[de-identified] tachycardic, S1, S2 normal,  Abdomen: soft, non-tender; bowel sounds normal; no masses,   Extremities: extremities normal, atraumatic, no cyanosis or edema  Neurologic: Mental status: alert, oriented, interactive, following commands  Psychiatric: mood and affect appropriate     CBC:   Recent Labs     11/16/22  1130   WBC 14.7*   HGB 11.8*   *     BMP:    Recent Labs     11/16/22  1130   *   K 3.3*   CL 86*   CO2 25   BUN 10   CREATININE 0.6*   GLUCOSE 114*     Hepatic:   Recent Labs     11/16/22  1130   *   ALT 29   BILITOT 11.3*   ALKPHOS 203* Electronically signed by Aminata Gaston DO on 11/16/2022 at 6:05 PM    [address] Kristen Hutchinson DO [address],  [place]  [facility], [place] [id number]  PHONE: [phone]  FAX: [phone]

## 2022-11-16 NOTE — TELEPHONE ENCOUNTER
SN called patient on 11/14/22 to check status of wounds ; Left message for patient to call back and schedule an appointment for wound check if needed.

## 2022-11-16 NOTE — ED PROVIDER NOTES
[facility] ICU STEPDOWN  EMERGENCY DEPARTMENT ENCOUNTER        Pt Name: Nupur Caldera  MRN: [id number]  [place] 1980  Date of evaluation: 11/16/2022  Provider: TEE Hair - CNP  PCP: Evangelista Jean-Baptiste MD     I have seen and evaluated this patient with my supervising physician Dr. Mariano Skelton   Patient presents with    Abdominal Pain     Pt states concerned for Liver issues due to feeling fatigued, weak, and feels like more yellowing. No hx of liver issues. HISTORY OF PRESENT ILLNESS      Chief Complaint: abdominal pain, concern for cirrhosis    Nupur Caldera is a 38 y.o. male who presents for evaluation of scleral icterus, abdominal pain and bloating, and scabbing rash on arms and legs for a week. He has a history of alcoholism and admits to drinking 12-18 beers a day for the last 2 years but states he has been an alcoholic for twenty years. He stopped drinking cold turkey a week ago after these symptoms started which scared him of worsening liver failure. His PCP did tell him he was concerned he had some early cirrhosis. He has been having shakes, nausea, mild diarrhea, and a couple of episodes of nighttime hallucinations over the past week. His wife denies any delirium. He has also been having nausea for a couple of months but denies vomiting. Urine has been tea colored. Denies chest pain, shortness of breath. He has been being treated for the last 4-5 months for a right foot fracture that has been malhealing. Nursing Notes were all reviewed and agreed with or any disagreements were addressed in the HPI.     REVIEW OF SYSTEMS     Constitutional:   Denies fever, chills, weight loss or weakness   HENT:   Denies sore throat or ear pain   Cardiovascular:   Denies chest pain, palpitations   Respiratory:  Denies cough or shortness of breath    GI:  See HPI  :  Denies any urinary symptoms   Musculoskeletal:   Denies neck, back, or extremity pain  Skin:  See HPI  Neurologic:   Denies headache, focal weakness or sensory changes   Endocrine:  Denies polyuria or polydypsia   Lymphatic:  Denies swollen glands     PAST MEDICAL HISTORY     Past Medical History:   Diagnosis Date    Hypercholesterolemia 2/18/2019    Hypertension     Local infection of skin and subcutaneous tissue 5/13/2022    Type 2 diabetes, controlled, with neuropathy ([facility] [address] 6/6/2018    WD-Diabetic ulcer of toe of left foot associated with type 2 diabetes mellitus, with fat layer exposed ([facility] [address] 5/13/2022    WD-Lisfranc dislocation, left, initial encounter 9/2/2022       SURGICAL HISTORY   History reviewed. No pertinent surgical history. [address]       Current Discharge Medication List        CONTINUE these medications which have NOT CHANGED    Details   HYDROcodone-acetaminophen (NORCO) 5-325 MG per tablet Take 2 tablets by mouth 2 times daily as needed. lidocaine (XYLOCAINE) 5 % ointment Apply topically as needed. Qty: 50 g, Refills: 2      !! ibuprofen (ADVIL;MOTRIN) 800 MG tablet Take 1 tablet by mouth 2 times daily as needed for Pain  Qty: 60 tablet, Refills: 0      !! ibuprofen (ADVIL;MOTRIN) 600 MG tablet Take 1 tablet by mouth 3 times daily (with meals)  Qty: 40 tablet, Refills: 0      pregabalin (LYRICA) 100 MG capsule TAKE 1 CAPSULE BY MOUTH TWICE DAILY  Qty: 180 capsule, Refills: 1    Associated Diagnoses: Numbness and tingling      busPIRone (BUSPAR) 15 MG tablet TAKE 1 TABLET BY MOUTH THREE TIMES DAILY  Qty: 270 tablet, Refills: 1    Associated Diagnoses: Anxiety      traZODone (DESYREL) 50 MG tablet Take 1-2 tablets by mouth nightly as needed for Sleep  Qty: 60 tablet, Refills: 11    Associated Diagnoses: Primary insomnia      glucose blood VI test strips (GLUCOSE METER TEST) strip 1 each by In Vitro route daily As needed.   Qty: 100 each, Refills: 3      glucose monitoring kit (FREESTYLE) monitoring kit 1 kit by Does not apply route daily as needed (If blood sugars running high or low)  Qty: 1 kit, Refills: 0      MICROLET LANCETS MISC 1 each by Does not apply route daily  Qty: 100 each, Refills: 3       !! - Potential duplicate medications found. Please discuss with provider. ALLERGIES     Paxil [paroxetine hcl]    FAMILYHISTORY       Family History   Problem Relation Age of Onset    High Blood Pressure Sister         SOCIAL HISTORY       Social History     Socioeconomic History    Marital status:      Spouse name: None    Number of children: None    Years of education: None    Highest education level: None   Tobacco Use    Smoking status: Never    Smokeless tobacco: Never   Substance and Sexual Activity    Alcohol use: Yes     Alcohol/week: 12.0 standard drinks     Types: 12 Standard drinks or equivalent per week     Comment: 6 drinks daily    Drug use: No    Sexual activity: Yes     Partners: Male   Social History Narrative    [address] and receiving        Diet:Unrestricted    Angélica Gasmen belts:Sometimes       SCREENINGS    Andrea Coma Scale  Eye Opening: Spontaneous  Best Verbal Response: Oriented  Best Motor Response: Obeys commands  Andrea Coma Scale Score: 15      PHYSICAL EXAM       ED Triage Vitals [11/16/22 1121]   BP Temp Temp Source Heart Rate Resp SpO2 Height Weight   (!) 154/99 99.4 °F (37.4 °C) Oral (!) 117 22 97 % 6' (1.829 m) 260 lb (117.9 kg)      Constitutional:  Well developed, Well nourished. No distress  HENT:  Normocephalic, Atraumatic, PERRL. EOMI. Sclera icteric. Moist mucus membranes. Neck/Lymphatics: supple, no JVD, no swollen nodes  Cardiovascular:   tachycardic, RRR,  no murmurs/rubs/gallops. Distal cap refill and pulses intact bilateral upper and lower extremities. Mild peripheral edema bilateral lower extremities distal to knee, right foot is grossly edematous which patient reports has been chronic for months since fracture. Respiratory:   Nonlabored breathing. Normal breath sounds, No wheezing  Abdomen: Bowel sounds normal, abdomen is firm and distended with moderate diffuse abdominal tenderness. Musculoskeletal:  Bilateral upper and lower extremity ROM intact without pain or obvious deficit  Integument:   Warm, Dry, skin is jaundiced. Neurologic:  Alert & oriented , No focal deficits noted. Cranial nerves II through XII grossly intact. Normal gross motor coordination & motor strength bilateral upper and lower extremities  Sensation intact.   Patient has slight tremor  Psychiatric:  Affect normal, appears mildly anxious but pleasant    DIAGNOSTIC RESULTS   LABS:    Labs Reviewed   CBC WITH AUTO DIFFERENTIAL - Abnormal; Notable for the following components:       Result Value    WBC 14.7 (*)     RBC 3.81 (*)     Hemoglobin 11.8 (*)     Hematocrit 34.5 (*)     RDW 21.8 (*)     Platelets 788 (*)     Segs Relative 68.5 (*)     Lymphocytes % 13.3 (*)     Monocytes % 12.3 (*)     Eosinophils % 4.1 (*)     Immature Neutrophil % 0.9 (*)     All other components within normal limits   COMPREHENSIVE METABOLIC PANEL - Abnormal; Notable for the following components:    Sodium 125 (*)     Potassium 3.3 (*)     Chloride 86 (*)     Creatinine 0.6 (*)     Glucose 114 (*)     Total Protein 9.5 (*)     Total Bilirubin 11.3 (*)      (*)     Alkaline Phosphatase 203 (*)     All other components within normal limits   ACETAMINOPHEN LEVEL - Abnormal; Notable for the following components:    Acetaminophen Level <5.0 (*)     All other components within normal limits   SALICYLATE LEVEL - Abnormal; Notable for the following components:    Salicylate Lvl 2.0 (*)     All other components within normal limits   PROTIME-INR - Abnormal; Notable for the following components:    Protime 18.0 (*)     All other components within normal limits   POCT GLUCOSE - Abnormal; Notable for the following components:    POC Glucose 117 (*)     All other components within normal limits   LIPASE 1526 11/16/22 1830   BP: (!) 183/92 (!) 155/94 (!) 155/94 (!) 144/79   Pulse: (!) 101 (!) 101 (!) 115 (!) 109   Resp:   18 18   Temp:   99 °F (37.2 °C) 98.3 °F (36.8 °C)   TempSrc:    Oral   SpO2:   99% 99%   Weight:       Height:           Patient was given thefollowing medications:  Medications   sodium chloride flush 0.9 % injection 5-40 mL (has no administration in time range)   sodium chloride flush 0.9 % injection 5-40 mL (has no administration in time range)   0.9 % sodium chloride infusion (has no administration in time range)   oxyCODONE (ROXICODONE) immediate release tablet 5 mg (5 mg Oral Given 11/16/22 7499)   sodium chloride flush 0.9 % injection 5-40 mL (has no administration in time range)   sodium chloride flush 0.9 % injection 5-40 mL (has no administration in time range)   0.9 % sodium chloride infusion (has no administration in time range)   thiamine tablet 100 mg (has no administration in time range)   ondansetron (ZOFRAN-ODT) disintegrating tablet 4 mg (has no administration in time range)     Or   ondansetron (ZOFRAN) injection 4 mg (has no administration in time range)   polyethylene glycol (GLYCOLAX) packet 17 g (has no administration in time range)   sodium chloride 0.9 % 7,518 mL with folic acid 1 mg, adult multi-vitamin with vitamin k 10 mL, thiamine 100 mg (has no administration in time range)   folic acid (FOLVITE) tablet 1 mg (has no administration in time range)   LORazepam (ATIVAN) tablet 1 mg (has no administration in time range)     Or   LORazepam (ATIVAN) injection 1 mg (has no administration in time range)     Or   LORazepam (ATIVAN) tablet 2 mg (has no administration in time range)     Or   LORazepam (ATIVAN) injection 2 mg (has no administration in time range)     Or   LORazepam (ATIVAN) tablet 3 mg (has no administration in time range)     Or   LORazepam (ATIVAN) injection 3 mg (has no administration in time range)     Or   LORazepam (ATIVAN) tablet 4 mg (has no administration in time range)     Or   LORazepam (ATIVAN) injection 4 mg (has no administration in time range)   insulin lispro (HUMALOG) injection vial 0-4 Units (0 Units SubCUTAneous Not Given 11/16/22 1838)   insulin lispro (HUMALOG) injection vial 0-4 Units (has no administration in time range)   glucose chewable tablet 16 g (has no administration in time range)   dextrose bolus 10% 125 mL (has no administration in time range)     Or   dextrose bolus 10% 250 mL (has no administration in time range)   glucagon (rDNA) injection 1 mg (has no administration in time range)   dextrose 10 % infusion (has no administration in time range)   potassium chloride (KLOR-CON M) extended release tablet 40 mEq (has no administration in time range)   labetalol (NORMODYNE;TRANDATE) injection 5 mg (has no administration in time range)   iopamidol (ISOVUE-370) 76 % injection 80 mL (80 mLs IntraVENous Given 11/16/22 1351)         Is this patient to be included in the SEP-1 Core Measure due to severe sepsis or septic shock? No   Exclusion criteria - the patient is NOT to be included for SEP-1 Core Measure due to: Infection is not suspected    MDM:  Patient presents as above. Emergent etiologies considered. Patient seen and examined. Work-up initiated secondary to presentation, physical exam findings, vital signs and medical chart review. In brief, patient presents for evaluation after noticing his eyes were yellow and his abdomen was distended. He has a 20-year history of alcoholism. He quit drinking 1 week ago cold turkey and is having some symptoms of alcohol withdrawal.  His initial CIWA score was 13. Withdrawal protocol was ordered. He was jaundiced and icteric on exam with distended abdomen concerning for ascites. Laboratory studies today including a CMP and CBC were significant for hyponatremia with a sodium of 125, potassium 3.3, alk phos 203, and bilirubin 11.3. His WBC was 14.7 and he was mildly anemic at 11.8/34.5.   Tylenol and salicylate levels were normal.  A CT of the abdomen and pelvis revealed cirrhosis with portal hypertension, hepatosplenomegaly, recanalization of the umbilical vein with umbilical varices, gastroesophageal and gastric varices as well as peritoneal edema. Patient was advised of the results. He will be admitted for further evaluation of acute liver failure secondary to his alcoholism. 1505: Spoke with Caron Luna CNP, hospitalist and updated on ED presentation, imaging, and lab results. Will accept patient. CLINICAL IMPRESSION      1. Alcoholic cirrhosis of liver with ascites ([facility] [address]    2. Alcohol withdrawal syndrome without complication [facility])          DISPOSITION/PLAN   DISPOSITION Admitted 11/16/2022 03:16:32 PM      PATIENT REFERREDTO:  No follow-up provider specified.     DISCHARGE MEDICATIONS:  Current Discharge Medication List          DISCONTINUED MEDICATIONS:  Current Discharge Medication List                 (Please note that portions ofthis note were completed with a voice recognition program.  Efforts were made to edit the dictations but occasionally words are mis-transcribed.)    TEE Mendez CNP (electronically signed)             TEE Mathews CNP  11/16/22 2056

## 2022-11-17 LAB
ALBUMIN SERPL-MCNC: 3.2 GM/DL (ref 3.4–5)
ALP BLD-CCNC: 191 IU/L (ref 40–128)
ALT SERPL-CCNC: 31 U/L (ref 10–40)
AMMONIA: 121 UMOL/L (ref 16–60)
AMYLASE: 56 U/L (ref 25–115)
ANION GAP SERPL CALCULATED.3IONS-SCNC: 14 MMOL/L (ref 4–16)
APTT: 40.9 SECONDS (ref 25.1–37.1)
AST SERPL-CCNC: 117 IU/L (ref 15–37)
BASOPHILS ABSOLUTE: 0.1 K/CU MM
BASOPHILS RELATIVE PERCENT: 1 % (ref 0–1)
BILIRUB SERPL-MCNC: 10.6 MG/DL (ref 0–1)
BUN BLDV-MCNC: 13 MG/DL (ref 6–23)
CALCIUM SERPL-MCNC: 8.2 MG/DL (ref 8.3–10.6)
CHLORIDE BLD-SCNC: 90 MMOL/L (ref 99–110)
CO2: 24 MMOL/L (ref 21–32)
CREAT SERPL-MCNC: 0.6 MG/DL (ref 0.9–1.3)
DIFFERENTIAL TYPE: ABNORMAL
EKG ATRIAL RATE: 94 BPM
EKG ATRIAL RATE: 97 BPM
EKG DIAGNOSIS: NORMAL
EKG DIAGNOSIS: NORMAL
EKG P AXIS: 64 DEGREES
EKG P AXIS: 65 DEGREES
EKG P-R INTERVAL: 144 MS
EKG P-R INTERVAL: 174 MS
EKG Q-T INTERVAL: 386 MS
EKG Q-T INTERVAL: 408 MS
EKG QRS DURATION: 100 MS
EKG QRS DURATION: 102 MS
EKG QTC CALCULATION (BAZETT): 490 MS
EKG QTC CALCULATION (BAZETT): 510 MS
EKG R AXIS: 72 DEGREES
EKG R AXIS: 95 DEGREES
EKG T AXIS: 18 DEGREES
EKG T AXIS: 30 DEGREES
EKG VENTRICULAR RATE: 94 BPM
EKG VENTRICULAR RATE: 97 BPM
EOSINOPHILS ABSOLUTE: 0.7 K/CU MM
EOSINOPHILS RELATIVE PERCENT: 4.7 % (ref 0–3)
GFR SERPL CREATININE-BSD FRML MDRD: >60 ML/MIN/1.73M2
GLUCOSE BLD-MCNC: 102 MG/DL (ref 70–99)
GLUCOSE BLD-MCNC: 104 MG/DL (ref 70–99)
GLUCOSE BLD-MCNC: 106 MG/DL (ref 70–99)
GLUCOSE BLD-MCNC: 94 MG/DL (ref 70–99)
GLUCOSE BLD-MCNC: 99 MG/DL (ref 70–99)
HCT VFR BLD CALC: 32.2 % (ref 42–52)
HEMOGLOBIN: 10.8 GM/DL (ref 13.5–18)
IMMATURE NEUTROPHIL %: 0.6 % (ref 0–0.43)
INR BLD: 1.35 INDEX
LACTATE: 1 MMOL/L (ref 0.4–2)
LACTATE: 1.4 MMOL/L (ref 0.4–2)
LIPASE: 39 IU/L (ref 13–60)
LYMPHOCYTES ABSOLUTE: 1.9 K/CU MM
LYMPHOCYTES RELATIVE PERCENT: 13.5 % (ref 24–44)
MCH RBC QN AUTO: 30.6 PG (ref 27–31)
MCHC RBC AUTO-ENTMCNC: 33.5 % (ref 32–36)
MCV RBC AUTO: 91.2 FL (ref 78–100)
MONOCYTES ABSOLUTE: 2 K/CU MM
MONOCYTES RELATIVE PERCENT: 14.3 % (ref 0–4)
NUCLEATED RBC %: 0.1 %
OSMOLALITY: 280 MOS/L (ref 280–300)
PDW BLD-RTO: 22.3 % (ref 11.7–14.9)
PLATELET # BLD: 137 K/CU MM (ref 140–440)
PMV BLD AUTO: 9.9 FL (ref 7.5–11.1)
POTASSIUM SERPL-SCNC: 3.4 MMOL/L (ref 3.5–5.1)
PROCALCITONIN: 0.32
PROTHROMBIN TIME: 17.5 SECONDS (ref 11.7–14.5)
RBC # BLD: 3.53 M/CU MM (ref 4.6–6.2)
SEGMENTED NEUTROPHILS ABSOLUTE COUNT: 9.4 K/CU MM
SEGMENTED NEUTROPHILS RELATIVE PERCENT: 65.9 % (ref 36–66)
SODIUM BLD-SCNC: 125 MMOL/L (ref 135–145)
SODIUM BLD-SCNC: 125 MMOL/L (ref 135–145)
SODIUM BLD-SCNC: 127 MMOL/L (ref 135–145)
SODIUM BLD-SCNC: 128 MMOL/L (ref 135–145)
SODIUM BLD-SCNC: 128 MMOL/L (ref 135–145)
TOTAL IMMATURE NEUTOROPHIL: 0.09 K/CU MM
TOTAL NUCLEATED RBC: 0 K/CU MM
TOTAL PROTEIN: 8.5 GM/DL (ref 6.4–8.2)
WBC # BLD: 14.3 K/CU MM (ref 4–10.5)

## 2022-11-17 PROCEDURE — 93010 ELECTROCARDIOGRAM REPORT: CPT | Performed by: INTERNAL MEDICINE

## 2022-11-17 PROCEDURE — 6370000000 HC RX 637 (ALT 250 FOR IP): Performed by: NURSE PRACTITIONER

## 2022-11-17 PROCEDURE — 84295 ASSAY OF SERUM SODIUM: CPT

## 2022-11-17 PROCEDURE — 83605 ASSAY OF LACTIC ACID: CPT

## 2022-11-17 PROCEDURE — 85610 PROTHROMBIN TIME: CPT

## 2022-11-17 PROCEDURE — 85730 THROMBOPLASTIN TIME PARTIAL: CPT

## 2022-11-17 PROCEDURE — 2060000000 HC ICU INTERMEDIATE R&B

## 2022-11-17 PROCEDURE — 82962 GLUCOSE BLOOD TEST: CPT

## 2022-11-17 PROCEDURE — 99211 OFF/OP EST MAY X REQ PHY/QHP: CPT

## 2022-11-17 PROCEDURE — 85025 COMPLETE CBC W/AUTO DIFF WBC: CPT

## 2022-11-17 PROCEDURE — 36415 COLL VENOUS BLD VENIPUNCTURE: CPT

## 2022-11-17 PROCEDURE — 83690 ASSAY OF LIPASE: CPT

## 2022-11-17 PROCEDURE — 82150 ASSAY OF AMYLASE: CPT

## 2022-11-17 PROCEDURE — 6370000000 HC RX 637 (ALT 250 FOR IP): Performed by: INTERNAL MEDICINE

## 2022-11-17 PROCEDURE — 82140 ASSAY OF AMMONIA: CPT

## 2022-11-17 PROCEDURE — 94761 N-INVAS EAR/PLS OXIMETRY MLT: CPT

## 2022-11-17 PROCEDURE — 93005 ELECTROCARDIOGRAM TRACING: CPT | Performed by: NURSE PRACTITIONER

## 2022-11-17 PROCEDURE — 6370000000 HC RX 637 (ALT 250 FOR IP): Performed by: SPECIALIST

## 2022-11-17 PROCEDURE — 80053 COMPREHEN METABOLIC PANEL: CPT

## 2022-11-17 RX ORDER — LACTULOSE 10 G/15ML
20 SOLUTION ORAL 3 TIMES DAILY
Status: DISCONTINUED | OUTPATIENT
Start: 2022-11-17 | End: 2022-11-19 | Stop reason: HOSPADM

## 2022-11-17 RX ORDER — SODIUM CHLORIDE 1000 MG
1 TABLET, SOLUBLE MISCELLANEOUS
Status: DISCONTINUED | OUTPATIENT
Start: 2022-11-17 | End: 2022-11-19

## 2022-11-17 RX ORDER — LANOLIN ALCOHOL/MO/W.PET/CERES
400 CREAM (GRAM) TOPICAL 2 TIMES DAILY
Status: DISCONTINUED | OUTPATIENT
Start: 2022-11-17 | End: 2022-11-19 | Stop reason: HOSPADM

## 2022-11-17 RX ORDER — POTASSIUM CHLORIDE 20 MEQ/1
20 TABLET, EXTENDED RELEASE ORAL ONCE
Status: DISCONTINUED | OUTPATIENT
Start: 2022-11-17 | End: 2022-11-17

## 2022-11-17 RX ADMIN — LACTULOSE 20 G: 10 SOLUTION ORAL at 20:52

## 2022-11-17 RX ADMIN — MAGNESIUM OXIDE TAB 400 MG (241.3 MG ELEMENTAL MG) 400 MG: 400 (241.3 MG) TAB at 20:52

## 2022-11-17 RX ADMIN — SODIUM CHLORIDE TAB 1 GM 1 G: 1 TAB at 18:16

## 2022-11-17 RX ADMIN — OXYCODONE HYDROCHLORIDE 5 MG: 5 TABLET ORAL at 08:59

## 2022-11-17 RX ADMIN — SODIUM CHLORIDE TAB 1 GM 1 G: 1 TAB at 14:56

## 2022-11-17 RX ADMIN — LORAZEPAM 2 MG: 1 TABLET ORAL at 20:52

## 2022-11-17 RX ADMIN — LACTULOSE 20 G: 10 SOLUTION ORAL at 14:56

## 2022-11-17 ASSESSMENT — PAIN DESCRIPTION - LOCATION
LOCATION: FOOT;ABDOMEN
LOCATION: FOOT

## 2022-11-17 ASSESSMENT — PAIN - FUNCTIONAL ASSESSMENT
PAIN_FUNCTIONAL_ASSESSMENT: PREVENTS OR INTERFERES SOME ACTIVE ACTIVITIES AND ADLS
PAIN_FUNCTIONAL_ASSESSMENT: ACTIVITIES ARE NOT PREVENTED

## 2022-11-17 ASSESSMENT — ENCOUNTER SYMPTOMS
WHEEZING: 0
EYE DISCHARGE: 0
COUGH: 0
SHORTNESS OF BREATH: 0
DIARRHEA: 0
BACK PAIN: 0
ABDOMINAL DISTENTION: 0
NAUSEA: 0
SORE THROAT: 0
CONSTIPATION: 0

## 2022-11-17 ASSESSMENT — PAIN DESCRIPTION - ONSET: ONSET: ON-GOING

## 2022-11-17 ASSESSMENT — PAIN DESCRIPTION - DESCRIPTORS
DESCRIPTORS: ACHING;DISCOMFORT
DESCRIPTORS: ACHING;DISCOMFORT

## 2022-11-17 ASSESSMENT — PAIN DESCRIPTION - FREQUENCY: FREQUENCY: CONTINUOUS

## 2022-11-17 ASSESSMENT — PAIN SCALES - GENERAL
PAINLEVEL_OUTOF10: 6
PAINLEVEL_OUTOF10: 3
PAINLEVEL_OUTOF10: 8

## 2022-11-17 ASSESSMENT — PAIN DESCRIPTION - PAIN TYPE: TYPE: ACUTE PAIN

## 2022-11-17 ASSESSMENT — PAIN DESCRIPTION - ORIENTATION
ORIENTATION: RIGHT
ORIENTATION: RIGHT

## 2022-11-17 NOTE — CONSULTS
[address] [address] Continence Nurse  Consult Note       Kylie Bobby  AGE: 38 y.o. GENDER: male  : 1980  TODAY'S DATE:  2022    Subjective:     Reason for CWOCN Evaluation and Assessment: wound assessment      Kylie Bobby is a 38 y.o. male referred by:   [x] Physician  [] Nursing  [] Other:     Wound Identification:  Wound Type: diabetic and possible traumatic  Contributing Factors: diabetes, poor glucose control, chronic pressure, and substance abuse        PAST MEDICAL HISTORY        Diagnosis Date    Hypercholesterolemia 2019    Hypertension     Local infection of skin and subcutaneous tissue 2022    Type 2 diabetes, controlled, with neuropathy ([facility] [address] 2018    WD-Diabetic ulcer of toe of left foot associated with type 2 diabetes mellitus, with fat layer exposed ([facility] [address] 2022    WD-Lisfranc dislocation, left, initial encounter 2022       PAST SURGICAL HISTORY    History reviewed. No pertinent surgical history. FAMILY HISTORY    Family History   Problem Relation Age of Onset    High Blood Pressure Sister        SOCIAL HISTORY    Social History     Tobacco Use    Smoking status: Never    Smokeless tobacco: Never   Substance Use Topics    Alcohol use: Yes     Alcohol/week: 12.0 standard drinks     Types: 12 Standard drinks or equivalent per week     Comment: 6 drinks daily    Drug use: No       ALLERGIES    Allergies   Allergen Reactions    Paxil [Paroxetine Hcl]      Caused \"weird\" things to happen in his sleep       MEDICATIONS    No current facility-administered medications on file prior to encounter. Current Outpatient Medications on File Prior to Encounter   Medication Sig Dispense Refill    HYDROcodone-acetaminophen (NORCO) 5-325 MG per tablet Take 2 tablets by mouth 2 times daily as needed. lidocaine (XYLOCAINE) 5 % ointment Apply topically as needed.  50 g 2    ibuprofen (ADVIL;MOTRIN) 800 MG tablet Take 1 tablet by mouth 2 times daily as needed for Pain 60 tablet 0    ibuprofen (ADVIL;MOTRIN) 600 MG tablet Take 1 tablet by mouth 3 times daily (with meals) 40 tablet 0    pregabalin (LYRICA) 100 MG capsule TAKE 1 CAPSULE BY MOUTH TWICE DAILY 180 capsule 1    busPIRone (BUSPAR) 15 MG tablet TAKE 1 TABLET BY MOUTH THREE TIMES DAILY 270 tablet 1    traZODone (DESYREL) 50 MG tablet Take 1-2 tablets by mouth nightly as needed for Sleep 60 tablet 11    glucose blood VI test strips (GLUCOSE METER TEST) strip 1 each by In Vitro route daily As needed.  100 each 3    glucose monitoring kit (FREESTYLE) monitoring kit 1 kit by Does not apply route daily as needed (If blood sugars running high or low) 1 kit 0    MICROLET LANCETS MISC 1 each by Does not apply route daily 100 each 3         Objective:      BP (!) 163/98   Pulse (!) 103   Temp 98.1 °F (36.7 °C) (Oral)   Resp 15   Ht 6' (1.829 m)   Wt 263 lb 0.1 oz (119.3 kg)   SpO2 93%   BMI 35.67 kg/m²   Bon Risk Score: Bon Scale Score: 20    LABS    CBC:   Lab Results   Component Value Date/Time    WBC 14.3 11/17/2022 03:24 AM    RBC 3.53 11/17/2022 03:24 AM    HGB 10.8 11/17/2022 03:24 AM    HCT 32.2 11/17/2022 03:24 AM    MCV 91.2 11/17/2022 03:24 AM    MCH 30.6 11/17/2022 03:24 AM    MCHC 33.5 11/17/2022 03:24 AM    RDW 22.3 11/17/2022 03:24 AM     11/17/2022 03:24 AM    MPV 9.9 11/17/2022 03:24 AM     CMP:    Lab Results   Component Value Date/Time     11/17/2022 08:41 AM    K 3.4 11/17/2022 03:24 AM    CL 90 11/17/2022 03:24 AM    CO2 24 11/17/2022 03:24 AM    BUN 13 11/17/2022 03:24 AM    CREATININE 0.6 11/17/2022 03:24 AM    GFRAA >60 05/02/2022 06:10 PM    AGRATIO 0.4 02/15/2022 11:45 AM    LABGLOM >60 11/17/2022 03:24 AM    GLUCOSE 94 11/17/2022 03:24 AM    PROT 8.5 11/17/2022 03:24 AM    PROT 7.9 05/01/2011 11:30 PM    LABALBU 3.2 11/17/2022 03:24 AM    LABALBU 3.5 03/10/2022 11:36 AM    CALCIUM 8.2 11/17/2022 03:24 AM    BILITOT 10.6 11/17/2022 03:24 AM    ALKPHOS 191 11/17/2022 03:24 AM     11/17/2022 03:24 AM    ALT 31 11/17/2022 03:24 AM     Albumin:    Lab Results   Component Value Date/Time    LABALBU 3.2 11/17/2022 03:24 AM    LABALBU 3.5 03/10/2022 11:36 AM     PT/INR:    Lab Results   Component Value Date/Time    PROTIME 17.5 11/17/2022 03:24 AM    PROTIME 16.1 03/10/2022 11:36 AM    INR 1.35 11/17/2022 03:24 AM     HgBA1c:    Lab Results   Component Value Date/Time    LABA1C 5.1 04/13/2022 10:46 PM         Assessment:     Patient Active Problem List   Diagnosis    HTN (hypertension)    Fatty liver    Obstructive sleep apnea    Type 2 diabetes, controlled, with neuropathy (HCC)    Anxiety    Hypercholesterolemia    Alcohol withdrawal delirium (HCC)    Closed displaced Maisonneuve's fracture of left leg    Cellulitis    Leukocytosis    Hyponatremia    Hypokalemia    Isolated proteinuria with minor glomerular abnormality    Cellulitis of right foot    Alcohol abuse    Hyperbilirubinemia    Non-pressure chronic ulcer of other part of left foot with fat layer exposed ([address]    WD-Diabetic ulcer of toe of left foot associated with type 2 diabetes mellitus, with fat layer exposed ([address]    Local infection of skin and subcutaneous tissue    WD-Ankle injury, initial encounter    WD-Acute right ankle pain    WD-Right ankle swelling    WD-Charcot ankle, right    Lisfranc dislocation, left, initial encounter    Alcohol withdrawal syndrome, with unspecified complication ([address]       Measurements:  Wound 02/19/22 Toe (Comment  which one) Left #1 (onset 2 months) Left Great Toe Plantar   (Active)   Wound Image   11/17/22 0910   Wound Etiology Diabetic 11/17/22 0910   Dressing Status Other (Comment) 11/17/22 0910   Wound Cleansed Cleansed with saline 11/17/22 0910   Dressing/Treatment Betadine swabs/povidone iodine;Open to air 11/17/22 0910   Wound Length (cm) 0.6 cm 11/17/22 0910   Wound Width (cm) 0.7 cm 11/17/22 0910   Wound Depth (cm) 0.1 cm 11/17/22 0910   Wound Surface Area (cm^2) 0.42 cm^2 11/17/22 0910 Change in Wound Size % (l*w) 58 11/17/22 0910   Wound Volume (cm^3) 0.042 cm^3 11/17/22 0910   Wound Healing % 58 11/17/22 0910   Distance Tunneling (cm) 0 cm 11/17/22 0910   Tunneling Position ___ O'Clock 0 11/17/22 0910   Undermining Starts ___ O'Clock 0 11/17/22 0910   Undermining Ends___ O'Clock 0 11/17/22 0910   Undermining Maxium Distance (cm) 0 11/17/22 0910   Wound Assessment Eschar dry 11/17/22 0910   Drainage Amount None 11/17/22 0910   Odor None 11/17/22 0910   Saira-wound Assessment Hyperkeratosis (callous) 11/17/22 0910   Margins Attached edges 11/17/22 0910   Number of days: 270       Wound 11/17/22 Toe (Comment  which one) Left;Lateral 5th toe (Active)   Wound Image   11/17/22 0910   Wound Etiology Diabetic 11/17/22 0910   Dressing Status Other (Comment) 11/17/22 0910   Wound Cleansed Cleansed with saline 11/17/22 0910   Dressing/Treatment Betadine swabs/povidone iodine;Open to air 11/17/22 0910   Wound Length (cm) 0.5 cm 11/17/22 0910   Wound Width (cm) 0.5 cm 11/17/22 0910   Wound Depth (cm) 0.1 cm 11/17/22 0910   Wound Surface Area (cm^2) 0.25 cm^2 11/17/22 0910   Wound Volume (cm^3) 0.025 cm^3 11/17/22 0910   Distance Tunneling (cm) 0 cm 11/17/22 0910   Tunneling Position ___ O'Clock 0 11/17/22 0910   Undermining Starts ___ O'Clock 0 11/17/22 0910   Undermining Ends___ O'Clock 0 11/17/22 0910   Undermining Maxium Distance (cm) 0 11/17/22 0910   Wound Assessment Eschar dry 11/17/22 0910   Drainage Amount None 11/17/22 0910   Odor None 11/17/22 0910   Saira-wound Assessment Intact 11/17/22 0910   Margins Attached edges 11/17/22 0910   Number of days: 0       Wound 11/17/22 Pretibial Left cluster (Active)   Wound Image   11/17/22 0910   Wound Etiology Other 11/17/22 0910   Dressing Status Other (Comment) 11/17/22 0910   Wound Cleansed Cleansed with saline 11/17/22 0910   Dressing/Treatment Betadine swabs/povidone iodine;Open to air 11/17/22 0910   Wound Length (cm) 14 cm 11/17/22 0910   Wound Width (cm) 3 cm 11/17/22 0910   Wound Depth (cm) 0.1 cm 11/17/22 0910   Wound Surface Area (cm^2) 42 cm^2 11/17/22 0910   Wound Volume (cm^3) 4.2 cm^3 11/17/22 0910   Distance Tunneling (cm) 0 cm 11/17/22 0910   Tunneling Position ___ O'Clock 0 11/17/22 0910   Undermining Starts ___ O'Clock 0 11/17/22 0910   Undermining Ends___ O'Clock 0 11/17/22 0910   Undermining Maxium Distance (cm) 0 11/17/22 0910   Wound Assessment Eschar dry 11/17/22 0910   Drainage Amount None 11/17/22 0910   Odor None 11/17/22 0910   Saira-wound Assessment Intact 11/17/22 0910   Margins Attached edges 11/17/22 0910   Number of days: 0       Response to treatment:  Well tolerated by patient. Pain Assessment:  Severity:  none  Quality of pain: na  Wound Pain Timing/Severity: na  Premedicated: no    Plan:     Plan of Care: Wound 02/19/22 Toe (Comment  which one) Left #1 (onset 2 months) Left Great Toe Plantar  -Dressing/Treatment: Betadine swabs/povidone iodine, Open to air  Wound 11/17/22 Toe (Comment  which one) Left;Lateral 5th toe-Dressing/Treatment: Betadine swabs/povidone iodine, Open to air  Wound 11/17/22 Pretibial Left cluster-Dressing/Treatment: Betadine swabs/povidone iodine, Open to air    Pt in bed. Agreeable to wound assessment. Active with outpatient wound clinic and notes reviewed. Diabetic wounds noted to left great toe/left lateral 5th toe. Currently YUSRA. Appear to be stable dry eschar. Left anterior leg with cluster of dry eschar-possible traumatic. Pt does not know how occurred. Pictures and measurements taken. Painted with betadine and left YUSRA. Pt is generally not at risk for skin breakdown AEB Bon.      Specialty Bed Required : no  [] Low Air Loss   [] Pressure Redistribution  [] Fluid Immersion  [] Bariatric  [] Total Pressure Relief  [] Other:     Discharge Plan:  Placement for patient upon discharge: tbd  Hospice Care: no  Patient appropriate for Outpatient [address] [place] [address] Road: active    Patient/Caregiver Teaching:  Level of patient/caregiver understanding able to:   Voiced understanding.         Electronically signed by Lesvia Bonilla RN, Kenton Harada on 11/17/2022 at 10:23 AM

## 2022-11-17 NOTE — PROGRESS NOTES
Pt keeps taking tele off. Pt's been educated that it's a continuous tele order, alert & oriented, pt states understanding but continues to remove the tele. Tele tech notified.

## 2022-11-17 NOTE — CONSULTS
[facility]               [address] [facility], [address] [facility]                                  CONSULTATION    PATIENT NAME: Danii Ha                       :        1980  MED REC NO:   [id number]                          ROOM:         ACCOUNT NO:   [de-identified]                           ADMIT DATE: 2022  PROVIDER:     Jose Ramon Brown MD    CONSULT DATE:  2022    PRIMARY PROVIDER:  Zina Bullock MD    CHIEF COMPLAINT:  History of EtOH abuse with abnormal LFTs, rule out  alcohol liver disease. HISTORY OF PRESENT ILLNESSES:  The patient is a 47-year-old white male,  a patient of Dr. Zina Blulock with past medical history significant  for hypertension, diabetes mellitus, hyperlipidemia, and a longstanding  history of EtOH abuse and recreational drug use; who presented to the  emergency room with upper abdominal discomfort and also noted that the  color of his eyes is yellow for the past four to five days. There is no  history of hematemesis, melena, or hematochezia. The patient denies  fever and chills either. Blood workup done in the emergency room  comprised of Chem profile, which was remarkable for a sodium of 125 and  the chloride is 86 and LFTs are abnormal with a total bilirubin of 11.3,  AST of 122, ALT of 29, alkaline phosphatase of 203. CBC shows a WBC  count of 14.7, hemoglobin of 11.8, and the platelet count is 210,887. The patient's INR on 2022 was 1.31. The patient has never had an  EGD or colonoscopy done. The patient's last drink was four or five days  ago, but the patient is actively smoking recreational drugs as well. The patient had a CAT scan done of the abdomen and pelvis, which showed  cirrhosis with portal hypertension along with hepatosplenomegaly,  recanalization of umbilical vein was noted with multiple umbilical  varices, also gastric and esophageal varices were noted.   The patient is  hemodynamically stable and is being admitted for further workup and  management. REVIEW OF SYSTEMS:  CENTRAL NERVOUS SYSTEM:  The patient denies headache or focal  sensorimotor symptoms. CARDIOVASCULAR SYSTEM:  No history of chest pain, shortness of breath,  or leg swelling. GENITOURINARY SYSTEM:  No history of dysuria, pyuria, or hematuria. MUSCULOSKELETAL SYSTEM:  The patient complains of generalized weakness. RESPIRATORY SYSTEM:  No history of cough, hemoptysis, fever, or chills. PAST MEDICAL HISTORY:  Significant for history of hypertension, diabetes  mellitus, hyperlipidemia, and obesity. FAMILY HISTORY:  Noncontributory. MEDICATIONS:  Please refer to the chart. SOCIOECONOMIC HISTORY:  The patient does not smoke cigarettes, but there  is a longstanding history of EtOH abuse and last drink was four or five  days ago and the patient is also actively using recreational drugs (the  patient has had medical marijuana card). There is no history of IV drug  abuse, however. PAST SURGICAL HISTORY:  None. ALLERGIES:  The patient is allergic to PAXIL. PHYSICAL EXAMINATION:  GENERAL:  Shows a 72-year-old white male who is obese, lying flat in  bed, in no acute distress. He is awake, alert, and oriented and  pleasant to talk with. VITAL SIGNS:  Stable. HEENT:  Examination shows sclerae to be icteric. NECK:  Supple. CHEST:  Shows diminished breath sounds. HEART:  S1, S2 is normal.  ABDOMEN:  Obese, distended with mild tenderness in the upper abdomen  with no guarding or rigidity. Liver and spleen are not palpable. Bowel  sounds are present. RECTAL:  Exam is deferred. CNS:  Exam shows the patient to be awake, alert, and oriented. There  are no focal sensorimotor sign. MUSCULOSKELETAL SYSTEM:  Exam shows yellow discoloration of the skin. LABORATORY DATA:  As above mentioned.     IMPRESSION:  A 72-year-old white male with a longstanding history of  EtOH abuse and recreational drug use presents with the new-onset  jaundice and upper abdominal discomfort with abnormal CAT scan, rule out  alcohol liver disease. RECOMMENDATIONS:  1. Agree with present management. 2.  We will monitor the patient's LFTs and observe for acute liver  decompensation. 3.  We will check CBC, CMP, amylase, lipase, PT/PTT, INR in a.m.  4.  We will also check blood ammonia level. 5.  The patient has been strongly instructed to quit drinking alcohol  and using recreational drugs. 6.  The patient also has been instructed to join an alcohol rehab  program.  7.  The patient has been instructed to have an outpatient screening EGD  also for esophageal/gastric varices. 8.  If the patient's LFTs continued to deteriorate, he might benefit  from a trial with the steroids if the Maddrey's discriminant function  index is more than 32.  9. The patient will need a vaccination for hepatitis B as well since  his serology has been negative. 10.  The case and plan have been discussed in detail with the patient  and all his questions have been answered. 11.  Liver biopsy later if needed.         Debi Louie MD    D: 11/16/2022 17:09:55       T: 11/16/2022 17:12:08     [email or web address]  Job#: [id number]     Doc#: [id number]    CC:  Bhanu Hamlin MD

## 2022-11-17 NOTE — FLOWSHEET NOTE
Responded to rapid response called at 1300, per tele tech for pt being off monitor. Pt is up to bathroom at this time and stable. Primary RN charted at 1225 \"pt not keeping leads on and tele tech notified. \" This RN assessed event to be as noted.

## 2022-11-17 NOTE — PROGRESS NOTES
Pt given new urinal and informed that urine specimen is needed for labs. Pt informed to let nurse know once he has urinated.

## 2022-11-17 NOTE — CARE COORDINATION
CM - Room 26 in Er -- Pt is admitted for alcohol withdrawal as an inpatient - the [facility] guidelines support this inpatient status.      Kaylah Mccloud / Mesha Sky

## 2022-11-17 NOTE — PROGRESS NOTES
V2.0  [facility] Hospitalist Progress Note      Name:  Martha Hannah /Age/Sex: 1980  (38 y.o. male)   MRN & CSN:  [id number] & [id number] Encounter Date/Time: 2022 4:01 PM EST    Location:  -A PCP: Pam Islas MD       Hospital Day: 2    Assessment and Plan:   Martha Hannah is a 38 y.o. male with pmh of essential hypertension, diabetes mellitus type II, alcohol abuse, chronic left foot ulcer, right Charcot arthropathy, MICHAEL, anxiety   who presents with Alcohol withdrawal syndrome, with unspecified complication ([facility] [address]      Plan:  # Cirrhosis, new diagnosis-likely 2/2 alcohol abuse. #Hyper ammonemia  #Hyperbilirubinemia  -imaging shows cirrhosis with portal HTN with no ascites  -GI consulted, appreciate recs. - Avoid NSAIDs, hepatotoxic agents, trend LFTs coags   -Patient counseled to quit drinking alcohol  -Trend ammonia and bilirubin     # Coagulopathy in setting of liver cirrhosis -improving  PT/INR slightly elevated,   platelets 433 -> 048, monitor     # Alcohol Withdrawal   - Pt drinks 8-10 bears daily last drink 4 days ago. CIWA 13.   -Place on CIWA with ativan,   - IV banana bag ,daily thiamin folic acid oral supplements, aspiration/sz precautions,      # SIRS   - Criteria met with tachycardia, leukocytosis, . No source of infection. lactic and procal negative    IV hydration caution with hyponatremia,      #Chronic hyponatremia in setting of alcohol abuse and possible volume deficit   - Will check UA and urine studies,   monitor serial sodium levels,   Consult Nephrology for eval/recs     # Hypokalemia - Replacement ordered will check mg level     Other chronic medical conditions resume home medications unless contraindicated  # Essential hypertension-not managed on antihypertensives, will and IV labetalol as needed  # Diabetes mellitus type 2 -managed on corrective insulin, monitor blood glucose before meals and at bedtime, ADA diet, A1c in a.m.   # Chronic left foot ulcer-managed by wound care as outpatient, will consult for management  # Right charcot foot - Followed by Podiatry. Last visit 10/10/22 with recs/tx:   -Right foot diabetic boot brace  -Nonweightbearing right foot  -Follow-up in 6 weeks repeat x-rays bilateral foot and ankle  -Crow walker: To be obtained from [employer] orth[facility]  # Chronic anemia -hemoglobin stable, patient denies active bleeding, monitor  # Hx MICHAEL - will verify pt is on Cpap  # Chronic anxiety- pt managed on ativan, will monitor if additional medications needed    Diet ADULT DIET; Regular; Low Sodium (2 gm); 2000 ml   DVT Prophylaxis [] Lovenox, []  Heparin, [x] SCDs, [] Ambulation,  [] Eliquis, [] Xarelto  [] Coumadin   Code Status Full Code   Disposition From: Home  Expected Disposition: Home  Estimated Date of Discharge: 2-3 days  Patient requires continued admission due to acute liver failure, alcohol dependence   Surrogate Decision Maker/ POA      Subjective:     Chief Complaint: Abdominal Pain (Pt states concerned for Liver issues due to feeling fatigued, weak, and feels like more yellowing. No hx of liver issues. )     Patient seen at bedside. Alert and oriented. Communicating appropriately. Denies any active complaints. Tolerating diet. States his last bowel movement was yesterday. Ambulatory. Review of Systems:    Review of Systems   Constitutional:  Positive for fatigue. Negative for activity change, appetite change and fever. HENT:  Negative for congestion and sore throat. Eyes:  Negative for discharge and visual disturbance. Respiratory:  Negative for cough, shortness of breath and wheezing. Cardiovascular:  Negative for chest pain, palpitations and leg swelling. Gastrointestinal:  Negative for abdominal distention, constipation, diarrhea and nausea. Genitourinary:  Negative for difficulty urinating and hematuria. Musculoskeletal:  Positive for arthralgias. Negative for back pain and myalgias. Neurological:  Negative for dizziness, syncope and headaches. Hematological:  Negative for adenopathy. Psychiatric/Behavioral:  Negative for agitation and confusion. Objective: Intake/Output Summary (Last 24 hours) at 11/17/2022 1601  Last data filed at 11/17/2022 0445  Gross per 24 hour   Intake 720 ml   Output --   Net 720 ml        Vitals:   Vitals:    11/17/22 1457   BP: (!) 152/94   Pulse: 97   Resp: 17   Temp: 98 °F (36.7 °C)   SpO2: 96%       Physical Exam:   Physical Exam  Vitals and nursing note reviewed. Constitutional:       General: He is not in acute distress. Appearance: He is obese. He is ill-appearing. Comments: Alert and oriented communicating appropriately   HENT:      Head: Normocephalic and atraumatic. Mouth/Throat:      Mouth: Mucous membranes are moist.   Eyes:      Extraocular Movements: Extraocular movements intact. Pupils: Pupils are equal, round, and reactive to light. Cardiovascular:      Rate and Rhythm: Normal rate and regular rhythm. Pulses: Normal pulses. Heart sounds: Normal heart sounds. Pulmonary:      Effort: Pulmonary effort is normal.      Breath sounds: Normal breath sounds. Abdominal:      Palpations: Abdomen is soft. Musculoskeletal:         General: No tenderness or signs of injury. Normal range of motion. Skin:     General: Skin is warm. Capillary Refill: Capillary refill takes less than 2 seconds. Neurological:      General: No focal deficit present. Mental Status: He is alert and oriented to person, place, and time. Psychiatric:         Mood and Affect: Mood normal.         Behavior: Behavior normal.         Thought Content:  Thought content normal.         Judgment: Judgment normal.          Medications:   Medications:    magnesium oxide  400 mg Oral BID    folic acid, thiamine, multi-vitamin with vitamin K infusion   IntraVENous Once    sodium chloride  1 g Oral TID WC    lactulose  20 g Oral TID thiamine  100 mg Oral Daily    folic acid  1 mg Oral Daily    insulin lispro  0-4 Units SubCUTAneous TID     insulin lispro  0-4 Units SubCUTAneous Nightly      Infusions:    dextrose       PRN Meds: oxyCODONE, 5 mg, Q4H PRN  polyethylene glycol, 17 g, Daily PRN  LORazepam, 1 mg, Q1H PRN   Or  LORazepam, 1 mg, Q1H PRN   Or  LORazepam, 2 mg, Q1H PRN   Or  LORazepam, 2 mg, Q1H PRN   Or  LORazepam, 3 mg, Q1H PRN   Or  LORazepam, 3 mg, Q1H PRN   Or  LORazepam, 4 mg, Q1H PRN   Or  LORazepam, 4 mg, Q1H PRN  glucose, 4 tablet, PRN  dextrose bolus, 125 mL, PRN   Or  dextrose bolus, 250 mL, PRN  glucagon (rDNA), 1 mg, PRN  dextrose, , Continuous PRN  labetalol, 5 mg, Q8H PRN  promethazine, 6.25 mg, Q6H PRN        Labs      Recent Results (from the past 24 hour(s))   Osmolality    Collection Time: 11/16/22  8:04 PM   Result Value Ref Range    Osmolality 280 280 - 300 MOS/L   Sodium    Collection Time: 11/16/22  8:04 PM   Result Value Ref Range    Sodium 129 (L) 135 - 145 MMOL/L   Protime-INR    Collection Time: 11/16/22  8:04 PM   Result Value Ref Range    Protime 18.0 (H) 11.7 - 14.5 SECONDS    INR 1.39 INDEX   Lactic Acid    Collection Time: 11/16/22  8:04 PM   Result Value Ref Range    Lactate 3.2 (HH) 0.4 - 2.0 mMOL/L   Procalcitonin    Collection Time: 11/16/22  8:04 PM   Result Value Ref Range    Procalcitonin 0.321    Magnesium    Collection Time: 11/16/22  8:04 PM   Result Value Ref Range    Magnesium 1.1 (L) 1.8 - 2.4 mg/dl   POCT Glucose    Collection Time: 11/16/22  8:19 PM   Result Value Ref Range    POC Glucose 117 (H) 70 - 99 MG/DL   EKG 12 Lead    Collection Time: 11/16/22 11:07 PM   Result Value Ref Range    Ventricular Rate 94 BPM    Atrial Rate 94 BPM    P-R Interval 144 ms    QRS Duration 102 ms    Q-T Interval 408 ms    QTc Calculation (Bazett) 510 ms    P Axis 64 degrees    R Axis 95 degrees    T Axis 18 degrees    Diagnosis       Normal sinus rhythm  Rightward axis  Possible Inferior infarct , age undetermined  Abnormal ECG  When compared with ECG of 02-MAY-2022 18:07,  Questionable change in QRS axis     Amylase    Collection Time: 11/17/22  3:24 AM   Result Value Ref Range    Amylase 56 25 - 115 U/L   CBC with Auto Differential    Collection Time: 11/17/22  3:24 AM   Result Value Ref Range    WBC 14.3 (H) 4.0 - 10.5 K/CU MM    RBC 3.53 (L) 4.6 - 6.2 M/CU MM    Hemoglobin 10.8 (L) 13.5 - 18.0 GM/DL    Hematocrit 32.2 (L) 42 - 52 %    MCV 91.2 78 - 100 FL    MCH 30.6 27 - 31 PG    MCHC 33.5 32.0 - 36.0 %    RDW 22.3 (H) 11.7 - 14.9 %    Platelets 995 (L) 411 - 440 K/CU MM    MPV 9.9 7.5 - 11.1 FL    Differential Type AUTOMATED DIFFERENTIAL     Segs Relative 65.9 36 - 66 %    Lymphocytes % 13.5 (L) 24 - 44 %    Monocytes % 14.3 (H) 0 - 4 %    Eosinophils % 4.7 (H) 0 - 3 %    Basophils % 1.0 0 - 1 %    Segs Absolute 9.4 K/CU MM    Lymphocytes Absolute 1.9 K/CU MM    Monocytes Absolute 2.0 K/CU MM    Eosinophils Absolute 0.7 K/CU MM    Basophils Absolute 0.1 K/CU MM    Nucleated RBC % 0.1 %    Total Nucleated RBC 0.0 K/CU MM    Total Immature Neutrophil 0.09 K/CU MM    Immature Neutrophil % 0.6 (H) 0 - 0.43 %   Comprehensive Metabolic Panel    Collection Time: 11/17/22  3:24 AM   Result Value Ref Range    Sodium 128 (L) 135 - 145 MMOL/L    Potassium 3.4 (L) 3.5 - 5.1 MMOL/L    Chloride 90 (L) 99 - 110 mMol/L    CO2 24 21 - 32 MMOL/L    BUN 13 6 - 23 MG/DL    Creatinine 0.6 (L) 0.9 - 1.3 MG/DL    Est, Glom Filt Rate >60 >60 mL/min/1.73m2    Glucose 94 70 - 99 MG/DL    Calcium 8.2 (L) 8.3 - 10.6 MG/DL    Albumin 3.2 (L) 3.4 - 5.0 GM/DL    Total Protein 8.5 (H) 6.4 - 8.2 GM/DL    Total Bilirubin 10.6 (H) 0.0 - 1.0 MG/DL    ALT 31 10 - 40 U/L     (H) 15 - 37 IU/L    Alkaline Phosphatase 191 (H) 40 - 128 IU/L    Anion Gap 14 4 - 16   Lipase    Collection Time: 11/17/22  3:24 AM   Result Value Ref Range    Lipase 39 13 - 60 IU/L   Protime/INR & PTT    Collection Time: 11/17/22  3:24 AM   Result Value Ref Range Protime 17.5 (H) 11.7 - 14.5 SECONDS    INR 1.35 INDEX    aPTT 40.9 (H) 25.1 - 37.1 SECONDS   Ammonia    Collection Time: 11/17/22  3:24 AM   Result Value Ref Range    Ammonia 121 (H) 16 - 60 UMOL/L   Sodium    Collection Time: 11/17/22  3:24 AM   Result Value Ref Range    Sodium 128 (L) 135 - 145 MMOL/L   Lactic Acid    Collection Time: 11/17/22  3:24 AM   Result Value Ref Range    Lactate 1.4 0.4 - 2.0 mMOL/L   EKG 12 lead    Collection Time: 11/17/22  6:16 AM   Result Value Ref Range    Ventricular Rate 97 BPM    Atrial Rate 97 BPM    P-R Interval 174 ms    QRS Duration 100 ms    Q-T Interval 386 ms    QTc Calculation (Bazett) 490 ms    P Axis 65 degrees    R Axis 72 degrees    T Axis 30 degrees    Diagnosis       Normal sinus rhythm  Prolonged QT  Abnormal ECG  When compared with ECG of 16-NOV-2022 23:07,  No significant change was found     POCT Glucose    Collection Time: 11/17/22  7:33 AM   Result Value Ref Range    POC Glucose 102 (H) 70 - 99 MG/DL   Sodium    Collection Time: 11/17/22  8:41 AM   Result Value Ref Range    Sodium 127 (L) 135 - 145 MMOL/L   Lactic Acid    Collection Time: 11/17/22  8:41 AM   Result Value Ref Range    Lactate 1.0 0.4 - 2.0 mMOL/L   POCT Glucose    Collection Time: 11/17/22 10:53 AM   Result Value Ref Range    POC Glucose 99 70 - 99 MG/DL   Sodium    Collection Time: 11/17/22 11:17 AM   Result Value Ref Range    Sodium 125 (L) 135 - 145 MMOL/L        Imaging/Diagnostics Last 24 Hours   XR CHEST (2 VW)    Result Date: 11/16/2022  EXAMINATION: TWO XRAY VIEWS OF THE CHEST 11/16/2022 12:17 pm COMPARISON: 08/03/2021 HISTORY: ORDERING SYSTEM PROVIDED HISTORY: edema, r/o pulmonary congestion TECHNOLOGIST PROVIDED HISTORY: Reason for exam:->edema, r/o pulmonary congestion Reason for Exam: edema, r/o pulmonary congestion FINDINGS: No consolidation, pleural effusion or pneumothorax. Stable circumscribed dense right upper lobe nodule consistent with granuloma.   Cardiac silhouette enlarged. Right paratracheal calcifications. Old left rib fractures. No active pulmonary or pleural disease. Cardiomegaly. Findings consistent with old granulomatous disease. CT ABDOMEN PELVIS W IV CONTRAST Additional Contrast? None    Result Date: 11/16/2022  EXAMINATION: CT OF THE ABDOMEN AND PELVIS WITH CONTRAST 11/16/2022 10:02 am TECHNIQUE: CT of the abdomen and pelvis was performed with the administration of intravenous contrast. Multiplanar reformatted images are provided for review. Automated exposure control, iterative reconstruction, and/or weight based adjustment of the mA/kV was utilized to reduce the radiation dose to as low as reasonably achievable. COMPARISON: 08/03/2021 HISTORY: ORDERING SYSTEM PROVIDED HISTORY: abdominal distension, cirrhosis TECHNOLOGIST PROVIDED HISTORY: Reason for exam:->abdominal distension, cirrhosis Additional Contrast?->None Decision Support Exception - unselect if not a suspected or confirmed emergency medical condition->Emergency Medical Condition (MA) Reason for Exam: abdominal distension, cirrhosis Relevant Medical/Surgical History: 80 ml isovue 370 FINDINGS: Lower Chest: The visualized lungs are clear. Base of the heart is unremarkable. Visualized extra thoracic soft tissues are unremarkable. Organs: There is a cirrhotic morphology liver, with hepatomegaly (liver measures over 24 cm at the mid axillary line). A focal hepatic abnormality is not identified. There is evidence of portal hypertension, with recanalization of the umbilical vein. Multiple gastroesophageal and gastric varices noted as well. Moderate splenomegaly is unchanged. Uncomplicated cholelithiasis is noted. Adrenals are unremarkable. Pancreas unremarkable. No acute or suspicious renal abnormalities are identified. GI/Bowel: Again, gastroesophageal and gastric varices. Distal esophagus and stomach otherwise unremarkable. No small bowel abnormalities are identified.  Mild diverticulosis of the large bowel is present without CT evidence of diverticulitis. Pelvis: Urinary bladder unremarkable. No free pelvic fluid. Prostate unremarkable. Peritoneum/Retroperitoneum: Edema is seen within the retroperitoneum, with additional edema tracking along the pericolic gutters and along the anterior perirenal fascia, presumably related to the portal hypertension. The abdominal aorta is normal in caliber. Superior mesenteric artery appears to be enhancing. No lymphadenopathy. Bones/Soft Tissues: Numerous varices are seen in a periumbilical location. The extra-abdominal and extra pelvic soft tissues are otherwise unremarkable. No acute or suspicious bony abnormalities. Cirrhosis with portal hypertension. Hepatosplenomegaly. Recanalization of the umbilical vein, with multiple umbilical varices. Gastroesophageal and gastric varices also noted. Mild edema seen at the root of the mesentery which is likely related to the portal hypertension. Otherwise, no acute abnormality detected. Uncomplicated cholelithiasis.        Electronically signed by Jose Mason MD on 11/17/2022 at 4:01 PM

## 2022-11-17 NOTE — CARE COORDINATION
CM reviewed chart and discussed in IDR. Pt has a PCP and insurance. He is from home with his wife. He has transportation. CM deferred meeting with pt today as a code was called on him. Will revisit for possible rehab needs.

## 2022-11-17 NOTE — PROGRESS NOTES
Nephrology Progress Note        [address], [address] [address]  Phone: [phone]  Office Hours: 8:30AM - 4:30PM  Monday - Friday 11/17/2022 6:49 AM  Subjective:   Admit Date: 11/16/2022  PCP: Xenia Landau, MD  Interval History:   On room air  Doing ok  On banana bag at 30ml/hr  Diet: ADULT DIET; Regular; 2000 ml      Data:   Scheduled Meds:   potassium chloride  20 mEq Oral Once    sodium chloride flush  5-40 mL IntraVENous 2 times per day    sodium chloride flush  5-40 mL IntraVENous 2 times per day    thiamine  100 mg Oral Daily    folic acid  1 mg Oral Daily    insulin lispro  0-4 Units SubCUTAneous TID WC    insulin lispro  0-4 Units SubCUTAneous Nightly     Continuous Infusions:   sodium chloride      sodium chloride      dextrose       PRN Meds:sodium chloride flush, sodium chloride, oxyCODONE, sodium chloride flush, sodium chloride, polyethylene glycol, LORazepam **OR** LORazepam **OR** LORazepam **OR** LORazepam **OR** LORazepam **OR** LORazepam **OR** LORazepam **OR** LORazepam, glucose, dextrose bolus **OR** dextrose bolus, glucagon (rDNA), dextrose, labetalol, promethazine  I/O last 3 completed shifts:   In: 120 [P.O.:120]  Out: -   I/O this shift:  In: 600 [P.O.:600]  Out: -     Intake/Output Summary (Last 24 hours) at 11/17/2022 0649  Last data filed at 11/17/2022 0445  Gross per 24 hour   Intake 720 ml   Output --   Net 720 ml       CBC:   Recent Labs     11/16/22 1130 11/17/22  0324   WBC 14.7* 14.3*   HGB 11.8* 10.8*   * 137*       BMP:    Recent Labs     11/16/22  1130 11/16/22 2004 11/17/22  0324   * 129* 128*  128*   K 3.3*  --  3.4*   CL 86*  --  90*   CO2 25  --  24   BUN 10  --  13   CREATININE 0.6*  --  0.6*   GLUCOSE 114*  --  94     Hepatic:   Recent Labs     11/16/22  1130 11/17/22  0324   * 117*   ALT 29 31   BILITOT 11.3* 10.6*   ALKPHOS 203* 191*       INR:   Recent Labs     11/16/22  2004 11/17/22  0324   INR 1.39 1.35       Objective:   Vitals: BP (!) 153/99   Pulse 96   Temp 98.5 °F (36.9 °C) (Oral)   Resp 19   Ht 6' (1.829 m)   Wt 263 lb 0.1 oz (119.3 kg)   SpO2 99%   BMI 35.67 kg/m²   General appearance: alert and cooperative with exam, in no acute distress  HEENT: normocephalic, atraumatic,   Neck: supple, trachea midline  Lungs: breathing comfortably on room air  Extremities: extremities atraumatic, no cyanosis or edema  Neurologic: alert, oriented, follows commands, interactive    MEDICAL DECISION MAKING     -Hyponatremia from alcohol abuse: 125 on admission  -Hypokalemia  -Hypomagnesemia  -Cirrhosis and portal HTN, gastroesophageal varices  -Alcoholic hepatitis and hyperbili     Suggest:  -Obtain stat sodium level this morning please  -Replete potassium, replete mag  -keep the banana bag IVF at 30ml/hr until am sodium level  -Give a regular diet for now please and limit oral fluids  -Avoid nsaids in this pt with decomp cirrhosis and who is at risk for DOREEN                  Electronically signed by Shiloh English DO on 11/17/2022 at 6:49 AM    [facility]  MD Carmenza Murrell DO [address],  [place] [address] [facility], [place] [address]  PHONE: [phone]  FAX: [phone]

## 2022-11-17 NOTE — PROGRESS NOTES
DOING FAIR AWAKE ALERT AND ORIENTED NO GROSS GIT BLEEDING SLIGHTLY TREMULOUS WATCH FOR DTS  VITALS STABLE   LABS NOTED AMMONIA ELEVATED WILL START LACTULOSE   F/U LFTS PT TOLD TO QUIT ETOH

## 2022-11-17 NOTE — PROGRESS NOTES
Will continue to monitor for ABX; inpt for hyponatremia from ETOH abuse, hypokalemia, Cirrhosis and portal HTN, ETOH hepatitis. Pt lactate 3.2; elevated WBC; pending ProCal and UA; ? Reactive; pt has LEFT diabetic foot ulcer that is stable, non open, and is followed by outpt wound care; C/S was ordered for wound care. Pt has RIGHT charcot foot with hard boot and is non-complaint with NWB ordered.

## 2022-11-17 NOTE — PROGRESS NOTES
Nurse has made multi attempts to get new scannable barcode for banana bag with rate of 100. Primary nurse has sent multi messages and called pharmacy and still does not have label on floor. Rate of current bag being turned up to 100 per order.

## 2022-11-18 LAB
ALBUMIN SERPL-MCNC: 3.2 GM/DL (ref 3.4–5)
ALP BLD-CCNC: 187 IU/L (ref 40–128)
ALT SERPL-CCNC: 30 U/L (ref 10–40)
AMMONIA: 45 UMOL/L (ref 16–60)
ANION GAP SERPL CALCULATED.3IONS-SCNC: 8 MMOL/L (ref 4–16)
APTT: 40.6 SECONDS (ref 25.1–37.1)
AST SERPL-CCNC: 101 IU/L (ref 15–37)
BASOPHILS ABSOLUTE: 0.1 K/CU MM
BASOPHILS RELATIVE PERCENT: 1 % (ref 0–1)
BILIRUB SERPL-MCNC: 10.2 MG/DL (ref 0–1)
BUN BLDV-MCNC: 11 MG/DL (ref 6–23)
CALCIUM SERPL-MCNC: 7.9 MG/DL (ref 8.3–10.6)
CHLORIDE BLD-SCNC: 92 MMOL/L (ref 99–110)
CO2: 25 MMOL/L (ref 21–32)
CREAT SERPL-MCNC: 0.4 MG/DL (ref 0.9–1.3)
DIFFERENTIAL TYPE: ABNORMAL
EOSINOPHILS ABSOLUTE: 0.6 K/CU MM
EOSINOPHILS RELATIVE PERCENT: 4.6 % (ref 0–3)
GFR SERPL CREATININE-BSD FRML MDRD: >60 ML/MIN/1.73M2
GLUCOSE BLD-MCNC: 108 MG/DL (ref 70–99)
GLUCOSE BLD-MCNC: 114 MG/DL (ref 70–99)
GLUCOSE BLD-MCNC: 117 MG/DL (ref 70–99)
GLUCOSE BLD-MCNC: 119 MG/DL (ref 70–99)
GLUCOSE BLD-MCNC: 97 MG/DL (ref 70–99)
HCT VFR BLD CALC: 29.5 % (ref 42–52)
HEMOGLOBIN: 9.8 GM/DL (ref 13.5–18)
IMMATURE NEUTROPHIL %: 0.5 % (ref 0–0.43)
INR BLD: 1.19 INDEX
LYMPHOCYTES ABSOLUTE: 1.3 K/CU MM
LYMPHOCYTES RELATIVE PERCENT: 10.2 % (ref 24–44)
MAGNESIUM: 1.4 MG/DL (ref 1.8–2.4)
MCH RBC QN AUTO: 30.7 PG (ref 27–31)
MCHC RBC AUTO-ENTMCNC: 33.2 % (ref 32–36)
MCV RBC AUTO: 92.5 FL (ref 78–100)
MONOCYTES ABSOLUTE: 1.8 K/CU MM
MONOCYTES RELATIVE PERCENT: 14.6 % (ref 0–4)
NUCLEATED RBC %: 0 %
PDW BLD-RTO: 22.2 % (ref 11.7–14.9)
PLATELET # BLD: 127 K/CU MM (ref 140–440)
PMV BLD AUTO: 10.1 FL (ref 7.5–11.1)
POTASSIUM SERPL-SCNC: 3.2 MMOL/L (ref 3.5–5.1)
PROTHROMBIN TIME: 15.4 SECONDS (ref 11.7–14.5)
RBC # BLD: 3.19 M/CU MM (ref 4.6–6.2)
SEGMENTED NEUTROPHILS ABSOLUTE COUNT: 8.6 K/CU MM
SEGMENTED NEUTROPHILS RELATIVE PERCENT: 69.1 % (ref 36–66)
SODIUM BLD-SCNC: 125 MMOL/L (ref 135–145)
SODIUM BLD-SCNC: 126 MMOL/L (ref 135–145)
SODIUM BLD-SCNC: 126 MMOL/L (ref 135–145)
TOTAL IMMATURE NEUTOROPHIL: 0.06 K/CU MM
TOTAL NUCLEATED RBC: 0 K/CU MM
TOTAL PROTEIN: 8 GM/DL (ref 6.4–8.2)
WBC # BLD: 12.4 K/CU MM (ref 4–10.5)

## 2022-11-18 PROCEDURE — 6370000000 HC RX 637 (ALT 250 FOR IP): Performed by: NURSE PRACTITIONER

## 2022-11-18 PROCEDURE — 6370000000 HC RX 637 (ALT 250 FOR IP): Performed by: INTERNAL MEDICINE

## 2022-11-18 PROCEDURE — 83735 ASSAY OF MAGNESIUM: CPT

## 2022-11-18 PROCEDURE — 94761 N-INVAS EAR/PLS OXIMETRY MLT: CPT

## 2022-11-18 PROCEDURE — 6370000000 HC RX 637 (ALT 250 FOR IP): Performed by: SPECIALIST

## 2022-11-18 PROCEDURE — 85730 THROMBOPLASTIN TIME PARTIAL: CPT

## 2022-11-18 PROCEDURE — 82140 ASSAY OF AMMONIA: CPT

## 2022-11-18 PROCEDURE — 85025 COMPLETE CBC W/AUTO DIFF WBC: CPT

## 2022-11-18 PROCEDURE — 84295 ASSAY OF SERUM SODIUM: CPT

## 2022-11-18 PROCEDURE — 82962 GLUCOSE BLOOD TEST: CPT

## 2022-11-18 PROCEDURE — 85610 PROTHROMBIN TIME: CPT

## 2022-11-18 PROCEDURE — 80053 COMPREHEN METABOLIC PANEL: CPT

## 2022-11-18 PROCEDURE — 36415 COLL VENOUS BLD VENIPUNCTURE: CPT

## 2022-11-18 PROCEDURE — 2060000000 HC ICU INTERMEDIATE R&B

## 2022-11-18 RX ADMIN — LORAZEPAM 1 MG: 1 TABLET ORAL at 14:44

## 2022-11-18 RX ADMIN — LACTULOSE 20 G: 10 SOLUTION ORAL at 08:15

## 2022-11-18 RX ADMIN — LORAZEPAM 1 MG: 1 TABLET ORAL at 08:25

## 2022-11-18 RX ADMIN — LACTULOSE 20 G: 10 SOLUTION ORAL at 14:39

## 2022-11-18 RX ADMIN — POTASSIUM BICARBONATE 40 MEQ: 782 TABLET, EFFERVESCENT ORAL at 14:38

## 2022-11-18 RX ADMIN — Medication 100 MG: at 08:15

## 2022-11-18 RX ADMIN — MAGNESIUM OXIDE TAB 400 MG (241.3 MG ELEMENTAL MG) 400 MG: 400 (241.3 MG) TAB at 20:08

## 2022-11-18 RX ADMIN — SODIUM CHLORIDE TAB 1 GM 1 G: 1 TAB at 08:15

## 2022-11-18 RX ADMIN — OXYCODONE HYDROCHLORIDE 5 MG: 5 TABLET ORAL at 20:13

## 2022-11-18 RX ADMIN — SODIUM CHLORIDE TAB 1 GM 1 G: 1 TAB at 14:44

## 2022-11-18 RX ADMIN — LACTULOSE 20 G: 10 SOLUTION ORAL at 20:08

## 2022-11-18 RX ADMIN — POTASSIUM BICARBONATE 40 MEQ: 782 TABLET, EFFERVESCENT ORAL at 08:15

## 2022-11-18 RX ADMIN — FOLIC ACID 1 MG: 1 TABLET ORAL at 08:15

## 2022-11-18 RX ADMIN — MAGNESIUM OXIDE TAB 400 MG (241.3 MG ELEMENTAL MG) 400 MG: 400 (241.3 MG) TAB at 08:15

## 2022-11-18 ASSESSMENT — ENCOUNTER SYMPTOMS
NAUSEA: 0
SHORTNESS OF BREATH: 0
BACK PAIN: 0
ABDOMINAL DISTENTION: 0
SORE THROAT: 0
WHEEZING: 0
CONSTIPATION: 0
DIARRHEA: 0
EYE DISCHARGE: 0
COUGH: 0

## 2022-11-18 ASSESSMENT — PAIN SCALES - GENERAL
PAINLEVEL_OUTOF10: 7
PAINLEVEL_OUTOF10: 2
PAINLEVEL_OUTOF10: 0

## 2022-11-18 ASSESSMENT — PAIN DESCRIPTION - ORIENTATION: ORIENTATION: RIGHT

## 2022-11-18 ASSESSMENT — PAIN DESCRIPTION - LOCATION: LOCATION: FOOT

## 2022-11-18 ASSESSMENT — PAIN SCALES - WONG BAKER: WONGBAKER_NUMERICALRESPONSE: 2

## 2022-11-18 ASSESSMENT — PAIN DESCRIPTION - DESCRIPTORS: DESCRIPTORS: SHARP;SHOOTING

## 2022-11-18 NOTE — DISCHARGE INSTRUCTIONS
Substance [address]   [phone] or [address]  Intensive Outpatient and Outpatient treatment for both men & women    Kait Orellana   [phone]                         [facility]   Intensive outpatient and residential for men & Intensive outpatient for women     [place]      [phone]                      [address] [place] [address] treatment for both men & women:   Call for insurance eligibility vs cost  [facility] [phone]  [employer] [phone]  Brianneluanne Waterskarishma [facility] [phone]    Drug and Alcohol Treatment Facilities:   Call for insurance eligibility vs cost  [phone]  [facility] [place] [address]  [facility] [address]/[place] [address] [address] [phone]  [address] [place] [address] for Addiction Treatment [phone]  [facility] [address] [facility] [phone]    Alcoholics Anonymous/ SELENE/CYNDI 116-970-6484 or [phone]  https://cogf. meetingfor. me/meetings or www. aacentral[place]. org      Narcotics Anonymous 614-5218 or [phone]   http://sascna.org/ or www.nacentralohio. org  Cocaine Anonymous [phone] www.caohio. org   Marijuana Anonymous 564-737-1820 www.marijuana-anonymous. org     Mental Health Crisis Line: [address]: [address]: (smoking) https://[place]. quitlogix. org 800-QUIT-NOW (578-877-9574)  24/7 crisis line for [facility]: [phone]  24-hour crisis text hotline: Text the word \"4hope\" to 705-341 for crisis support    Information & Referral for [facility]  Referral line to connect with available resources/services  [facility] [phone] or [facility] [phone]    Please limit water intake to 2L per day.

## 2022-11-18 NOTE — PROGRESS NOTES
Pt still hasn't provided a urine sample despite being educated multiple times. Pt reminded and re-educated about the need for a urine sample. Pt states \"I'll do it next time. \"

## 2022-11-18 NOTE — CARE COORDINATION
Chart reviewed. Consult received for possible rehab. Patient just medicated per CIWA scale; unable to participate. List of Substance Abuse assistance added to AVS. Will attempt to revisit.  Mayra Hernandez RN

## 2022-11-18 NOTE — PLAN OF CARE
Problem: Discharge Planning  Goal: Discharge to home or other facility with appropriate resources  Outcome: Progressing     Problem: Pain  Goal: Verbalizes/displays adequate comfort level or baseline comfort level  Outcome: Progressing     Problem: Chronic Conditions and Co-morbidities  Goal: Patient's chronic conditions and co-morbidity symptoms are monitored and maintained or improved  Outcome: Progressing     Problem: Cardiovascular - Adult  Goal: Maintains optimal cardiac output and hemodynamic stability  Outcome: Progressing  Goal: Absence of cardiac dysrhythmias or at baseline  Outcome: Progressing     Problem: Skin/Tissue Integrity - Adult  Goal: Skin integrity remains intact  Outcome: Progressing  Goal: Oral mucous membranes remain intact  Outcome: Progressing     Problem: Gastrointestinal - Adult  Goal: Minimal or absence of nausea and vomiting  Outcome: Progressing  Goal: Maintains or returns to baseline bowel function  Outcome: Progressing  Goal: Maintains adequate nutritional intake  Outcome: Progressing     Problem: Safety - Adult  Goal: Free from fall injury  Outcome: Progressing

## 2022-11-18 NOTE — PROGRESS NOTES
Pt removed tele wires. Pt educated on the risks of not keeping the tele wires on. Pt states understanding but still refusing tele at this time.

## 2022-11-18 NOTE — PROGRESS NOTES
Pt refusing to give urine specimen in urinal.  When asked if he has urinated yet he states \"yes but not in the urinal.\"  Pt taking himself to bathroom without notifying staff.

## 2022-11-18 NOTE — PROGRESS NOTES
Patient sitting on side of bed very anxious, eating breakfast. Patient ambulated to restroom tolerated well. Patient scored a 8 on the CIWA scale. Patient refused bed alarm, patient educated on safety and to please call nurse before getting out of bed. Will continue to monitor patient.

## 2022-11-18 NOTE — PROGRESS NOTES
V2.0  [facility] Hospitalist Progress Note      Name:  Dimple Rosales /Age/Sex: 1980  (38 y.o. male)   MRN & CSN:  [id number] & [id number] Encounter Date/Time: 2022 4:01 PM EST    Location:  -A PCP: Stevenson Benitez MD       Hospital Day: 3    Assessment and Plan:   Dimple Rosales is a 38 y.o. male with pmh of essential hypertension, diabetes mellitus type II, alcohol abuse, chronic left foot ulcer, right Charcot arthropathy, MICHAEL, anxiety   who presents with Alcohol withdrawal syndrome, with unspecified complication ([facility] [address]      Plan:  # Cirrhosis, new diagnosis-likely 2/2 alcohol abuse. #Hyper ammonemia - resolved  #Hyperbilirubinemia - improving  -imaging shows cirrhosis with portal HTN with no ascites  -GI consulted, appreciate recs. - Avoid NSAIDs, hepatotoxic agents, trend LFTs coags   -Patient counseled to quit drinking alcohol  -Trend bilirubin     # Coagulopathy in setting of liver cirrhosis -improving  PT/INR slightly elevated,   platelets 456 -> 791, monitor     # Alcohol Withdrawal   - Pt drinks 8-10 bears daily last drink 4 days ago. CIWA 13.   -Place on CIWA with ativan,   - IV banana bag ,daily thiamin folic acid oral supplements, aspiration/sz precautions,      # SIRS   - Criteria met with tachycardia, leukocytosis, . No source of infection. lactic and procal negative    IV hydration     #Chronic hyponatremia in setting of alcohol abuse and possible volume deficit   - Will check UA and urine studies,   monitor serial sodium levels,   Consult Nephrology for eval/recs  - c/w salt tabs     # Hypokalemia    - Replacement ordered will check mg level     Other chronic medical conditions resume home medications unless contraindicated  # Essential hypertension-not managed on antihypertensives, will and IV labetalol as needed  # Diabetes mellitus type 2 -managed on corrective insulin, monitor blood glucose before meals and at bedtime, ADA diet, A1c in a.m.   # Chronic left foot ulcer-managed by wound care as outpatient, will consult for management  # Right charcot foot - Followed by Podiatry. Last visit 10/10/22 with recs/tx:   -Right foot diabetic boot brace  -Nonweightbearing right foot  -Follow-up in 6 weeks repeat x-rays bilateral foot and ankle  -Crow walker: To be obtained from [employer]  # Chronic anemia -hemoglobin stable, patient denies active bleeding, monitor  # Hx MICHAEL - will verify pt is on Cpap  # Chronic anxiety- pt managed on ativan, will monitor if additional medications needed    Diet ADULT DIET; Regular; Low Sodium (2 gm); 2000 ml   DVT Prophylaxis [] Lovenox, []  Heparin, [x] SCDs, [] Ambulation,  [] Eliquis, [] Xarelto  [] Coumadin   Code Status Full Code   Disposition From: Home  Expected Disposition: Home  Estimated Date of Discharge: 2-3 days  Patient requires continued admission due to acute liver failure, alcohol dependence   Surrogate Decision Maker/ POA      Subjective:     Chief Complaint: Abdominal Pain (Pt states concerned for Liver issues due to feeling fatigued, weak, and feels like more yellowing. No hx of liver issues. )     Patient seen at bedside. Alert and oriented. Communicating appropriately. Denies any active complaints. Tolerating diet. +BM. Ambulatory. Review of Systems:    Review of Systems   Constitutional:  Positive for fatigue. Negative for activity change, appetite change and fever. HENT:  Negative for congestion and sore throat. Eyes:  Negative for discharge and visual disturbance. Respiratory:  Negative for cough, shortness of breath and wheezing. Cardiovascular:  Negative for chest pain, palpitations and leg swelling. Gastrointestinal:  Negative for abdominal distention, constipation, diarrhea and nausea. Genitourinary:  Negative for difficulty urinating and hematuria. Musculoskeletal:  Positive for arthralgias. Negative for back pain and myalgias.    Neurological:  Negative for dizziness, syncope and headaches. Hematological:  Negative for adenopathy. Psychiatric/Behavioral:  Negative for agitation and confusion. Objective: Intake/Output Summary (Last 24 hours) at 11/18/2022 0833  Last data filed at 11/18/2022 0800  Gross per 24 hour   Intake 480 ml   Output --   Net 480 ml          Vitals:   Vitals:    11/18/22 0800   BP: (!) 159/97   Pulse: (!) 102   Resp: 13   Temp: 98.7 °F (37.1 °C)   SpO2: 98%       Physical Exam:   Physical Exam  Vitals and nursing note reviewed. Constitutional:       General: He is not in acute distress. Appearance: He is obese. He is ill-appearing. Comments: Alert and oriented communicating appropriately   HENT:      Head: Normocephalic and atraumatic. Mouth/Throat:      Mouth: Mucous membranes are moist.   Eyes:      Extraocular Movements: Extraocular movements intact. Pupils: Pupils are equal, round, and reactive to light. Cardiovascular:      Rate and Rhythm: Normal rate and regular rhythm. Pulses: Normal pulses. Heart sounds: Normal heart sounds. Pulmonary:      Effort: Pulmonary effort is normal.      Breath sounds: Normal breath sounds. Abdominal:      Palpations: Abdomen is soft. Musculoskeletal:         General: No tenderness or signs of injury. Normal range of motion. Skin:     General: Skin is warm. Capillary Refill: Capillary refill takes less than 2 seconds. Neurological:      General: No focal deficit present. Mental Status: He is alert and oriented to person, place, and time. Psychiatric:         Mood and Affect: Mood normal.         Behavior: Behavior normal.         Thought Content:  Thought content normal.         Judgment: Judgment normal.          Medications:   Medications:    potassium bicarb-citric acid  40 mEq Oral Q6H    magnesium oxide  400 mg Oral BID    sodium chloride  1 g Oral TID WC    lactulose  20 g Oral TID    thiamine  100 mg Oral Daily    folic acid  1 mg Oral Daily insulin lispro  0-4 Units SubCUTAneous TID     insulin lispro  0-4 Units SubCUTAneous Nightly      Infusions:    dextrose       PRN Meds: oxyCODONE, 5 mg, Q4H PRN  polyethylene glycol, 17 g, Daily PRN  LORazepam, 1 mg, Q1H PRN   Or  LORazepam, 1 mg, Q1H PRN   Or  LORazepam, 2 mg, Q1H PRN   Or  LORazepam, 2 mg, Q1H PRN   Or  LORazepam, 3 mg, Q1H PRN   Or  LORazepam, 3 mg, Q1H PRN   Or  LORazepam, 4 mg, Q1H PRN   Or  LORazepam, 4 mg, Q1H PRN  glucose, 4 tablet, PRN  dextrose bolus, 125 mL, PRN   Or  dextrose bolus, 250 mL, PRN  glucagon (rDNA), 1 mg, PRN  dextrose, , Continuous PRN  labetalol, 5 mg, Q8H PRN  promethazine, 6.25 mg, Q6H PRN      Labs      Recent Results (from the past 24 hour(s))   Sodium    Collection Time: 11/17/22  8:41 AM   Result Value Ref Range    Sodium 127 (L) 135 - 145 MMOL/L   Lactic Acid    Collection Time: 11/17/22  8:41 AM   Result Value Ref Range    Lactate 1.0 0.4 - 2.0 mMOL/L   POCT Glucose    Collection Time: 11/17/22 10:53 AM   Result Value Ref Range    POC Glucose 99 70 - 99 MG/DL   Sodium    Collection Time: 11/17/22 11:17 AM   Result Value Ref Range    Sodium 125 (L) 135 - 145 MMOL/L   POCT Glucose    Collection Time: 11/17/22  4:21 PM   Result Value Ref Range    POC Glucose 104 (H) 70 - 99 MG/DL   Sodium    Collection Time: 11/17/22  4:52 PM   Result Value Ref Range    Sodium 125 (L) 135 - 145 MMOL/L   POCT Glucose    Collection Time: 11/17/22  8:48 PM   Result Value Ref Range    POC Glucose 106 (H) 70 - 99 MG/DL   Protime/INR & PTT    Collection Time: 11/18/22  3:10 AM   Result Value Ref Range    Protime 15.4 (H) 11.7 - 14.5 SECONDS    INR 1.19 INDEX    aPTT 40.6 (H) 25.1 - 37.1 SECONDS   CBC with Auto Differential    Collection Time: 11/18/22  3:10 AM   Result Value Ref Range    WBC 12.4 (H) 4.0 - 10.5 K/CU MM    RBC 3.19 (L) 4.6 - 6.2 M/CU MM    Hemoglobin 9.8 (L) 13.5 - 18.0 GM/DL    Hematocrit 29.5 (L) 42 - 52 %    MCV 92.5 78 - 100 FL    MCH 30.7 27 - 31 PG    MCHC 33.2 32.0 - 36.0 %    RDW 22.2 (H) 11.7 - 14.9 %    Platelets 712 (L) 596 - 440 K/CU MM    MPV 10.1 7.5 - 11.1 FL    Differential Type AUTOMATED DIFFERENTIAL     Segs Relative 69.1 (H) 36 - 66 %    Lymphocytes % 10.2 (L) 24 - 44 %    Monocytes % 14.6 (H) 0 - 4 %    Eosinophils % 4.6 (H) 0 - 3 %    Basophils % 1.0 0 - 1 %    Segs Absolute 8.6 K/CU MM    Lymphocytes Absolute 1.3 K/CU MM    Monocytes Absolute 1.8 K/CU MM    Eosinophils Absolute 0.6 K/CU MM    Basophils Absolute 0.1 K/CU MM    Nucleated RBC % 0.0 %    Total Nucleated RBC 0.0 K/CU MM    Total Immature Neutrophil 0.06 K/CU MM    Immature Neutrophil % 0.5 (H) 0 - 0.43 %   Comprehensive Metabolic Panel    Collection Time: 11/18/22  3:10 AM   Result Value Ref Range    Sodium 125 (L) 135 - 145 MMOL/L    Potassium 3.2 (L) 3.5 - 5.1 MMOL/L    Chloride 92 (L) 99 - 110 mMol/L    CO2 25 21 - 32 MMOL/L    BUN 11 6 - 23 MG/DL    Creatinine 0.4 (L) 0.9 - 1.3 MG/DL    Est, Glom Filt Rate >60 >60 mL/min/1.73m2    Glucose 119 (H) 70 - 99 MG/DL    Calcium 7.9 (L) 8.3 - 10.6 MG/DL    Albumin 3.2 (L) 3.4 - 5.0 GM/DL    Total Protein 8.0 6.4 - 8.2 GM/DL    Total Bilirubin 10.2 (H) 0.0 - 1.0 MG/DL    ALT 30 10 - 40 U/L     (H) 15 - 37 IU/L    Alkaline Phosphatase 187 (H) 40 - 128 IU/L    Anion Gap 8 4 - 16   Sodium    Collection Time: 11/18/22  3:10 AM   Result Value Ref Range    Sodium 126 (L) 135 - 145 MMOL/L   Ammonia    Collection Time: 11/18/22  5:44 AM   Result Value Ref Range    Ammonia 45 16 - 60 UMOL/L   Sodium    Collection Time: 11/18/22  5:44 AM   Result Value Ref Range    Sodium 126 (L) 135 - 145 MMOL/L   POCT Glucose    Collection Time: 11/18/22  7:21 AM   Result Value Ref Range    POC Glucose 108 (H) 70 - 99 MG/DL        Imaging/Diagnostics Last 24 Hours   XR CHEST (2 VW)    Result Date: 11/16/2022  EXAMINATION: TWO XRAY VIEWS OF THE CHEST 11/16/2022 12:17 pm COMPARISON: 08/03/2021 HISTORY: ORDERING SYSTEM PROVIDED HISTORY: edema, r/o pulmonary congestion TECHNOLOGIST PROVIDED HISTORY: Reason for exam:->edema, r/o pulmonary congestion Reason for Exam: edema, r/o pulmonary congestion FINDINGS: No consolidation, pleural effusion or pneumothorax. Stable circumscribed dense right upper lobe nodule consistent with granuloma. Cardiac silhouette enlarged. Right paratracheal calcifications. Old left rib fractures. No active pulmonary or pleural disease. Cardiomegaly. Findings consistent with old granulomatous disease. CT ABDOMEN PELVIS W IV CONTRAST Additional Contrast? None    Result Date: 11/16/2022  EXAMINATION: CT OF THE ABDOMEN AND PELVIS WITH CONTRAST 11/16/2022 10:02 am TECHNIQUE: CT of the abdomen and pelvis was performed with the administration of intravenous contrast. Multiplanar reformatted images are provided for review. Automated exposure control, iterative reconstruction, and/or weight based adjustment of the mA/kV was utilized to reduce the radiation dose to as low as reasonably achievable. COMPARISON: 08/03/2021 HISTORY: ORDERING SYSTEM PROVIDED HISTORY: abdominal distension, cirrhosis TECHNOLOGIST PROVIDED HISTORY: Reason for exam:->abdominal distension, cirrhosis Additional Contrast?->None Decision Support Exception - unselect if not a suspected or confirmed emergency medical condition->Emergency Medical Condition (MA) Reason for Exam: abdominal distension, cirrhosis Relevant Medical/Surgical History: 80 ml isovue 370 FINDINGS: Lower Chest: The visualized lungs are clear. Base of the heart is unremarkable. Visualized extra thoracic soft tissues are unremarkable. Organs: There is a cirrhotic morphology liver, with hepatomegaly (liver measures over 24 cm at the mid axillary line). A focal hepatic abnormality is not identified. There is evidence of portal hypertension, with recanalization of the umbilical vein. Multiple gastroesophageal and gastric varices noted as well. Moderate splenomegaly is unchanged. Uncomplicated cholelithiasis is noted. Adrenals are unremarkable. Pancreas unremarkable. No acute or suspicious renal abnormalities are identified. GI/Bowel: Again, gastroesophageal and gastric varices. Distal esophagus and stomach otherwise unremarkable. No small bowel abnormalities are identified. Mild diverticulosis of the large bowel is present without CT evidence of diverticulitis. Pelvis: Urinary bladder unremarkable. No free pelvic fluid. Prostate unremarkable. Peritoneum/Retroperitoneum: Edema is seen within the retroperitoneum, with additional edema tracking along the pericolic gutters and along the anterior perirenal fascia, presumably related to the portal hypertension. The abdominal aorta is normal in caliber. Superior mesenteric artery appears to be enhancing. No lymphadenopathy. Bones/Soft Tissues: Numerous varices are seen in a periumbilical location. The extra-abdominal and extra pelvic soft tissues are otherwise unremarkable. No acute or suspicious bony abnormalities. Cirrhosis with portal hypertension. Hepatosplenomegaly. Recanalization of the umbilical vein, with multiple umbilical varices. Gastroesophageal and gastric varices also noted. Mild edema seen at the root of the mesentery which is likely related to the portal hypertension. Otherwise, no acute abnormality detected. Uncomplicated cholelithiasis.        Electronically signed by Kal Pitt MD on 11/18/2022 at 8:33 AM

## 2022-11-19 VITALS
TEMPERATURE: 98.4 F | BODY MASS INDEX: 36.07 KG/M2 | OXYGEN SATURATION: 98 % | DIASTOLIC BLOOD PRESSURE: 90 MMHG | HEIGHT: 72 IN | RESPIRATION RATE: 18 BRPM | SYSTOLIC BLOOD PRESSURE: 132 MMHG | WEIGHT: 266.32 LBS | HEART RATE: 98 BPM

## 2022-11-19 LAB
ALBUMIN SERPL-MCNC: 3.3 GM/DL (ref 3.4–5)
ALP BLD-CCNC: 188 IU/L (ref 40–129)
ALT SERPL-CCNC: 42 U/L (ref 10–40)
ANION GAP SERPL CALCULATED.3IONS-SCNC: 9 MMOL/L (ref 4–16)
AST SERPL-CCNC: 125 IU/L (ref 15–37)
BILIRUB SERPL-MCNC: 10.9 MG/DL (ref 0–1)
BILIRUBIN DIRECT: 7.6 MG/DL (ref 0–0.3)
BILIRUBIN, INDIRECT: 3.3 MG/DL (ref 0–0.7)
BUN BLDV-MCNC: 8 MG/DL (ref 6–23)
CALCIUM SERPL-MCNC: 8.3 MG/DL (ref 8.3–10.6)
CHLORIDE BLD-SCNC: 90 MMOL/L (ref 99–110)
CO2: 25 MMOL/L (ref 21–32)
CREAT SERPL-MCNC: 0.4 MG/DL (ref 0.9–1.3)
GFR SERPL CREATININE-BSD FRML MDRD: >60 ML/MIN/1.73M2
GLUCOSE BLD-MCNC: 100 MG/DL (ref 70–99)
GLUCOSE BLD-MCNC: 107 MG/DL (ref 70–99)
GLUCOSE BLD-MCNC: 89 MG/DL (ref 70–99)
POTASSIUM SERPL-SCNC: 4 MMOL/L (ref 3.5–5.1)
SODIUM BLD-SCNC: 124 MMOL/L (ref 135–145)
TOTAL PROTEIN: 7.9 GM/DL (ref 6.4–8.2)

## 2022-11-19 PROCEDURE — 6370000000 HC RX 637 (ALT 250 FOR IP): Performed by: NURSE PRACTITIONER

## 2022-11-19 PROCEDURE — 36415 COLL VENOUS BLD VENIPUNCTURE: CPT

## 2022-11-19 PROCEDURE — 80053 COMPREHEN METABOLIC PANEL: CPT

## 2022-11-19 PROCEDURE — 6370000000 HC RX 637 (ALT 250 FOR IP): Performed by: SPECIALIST

## 2022-11-19 PROCEDURE — 82248 BILIRUBIN DIRECT: CPT

## 2022-11-19 PROCEDURE — 94761 N-INVAS EAR/PLS OXIMETRY MLT: CPT

## 2022-11-19 PROCEDURE — 6370000000 HC RX 637 (ALT 250 FOR IP): Performed by: INTERNAL MEDICINE

## 2022-11-19 PROCEDURE — 82962 GLUCOSE BLOOD TEST: CPT

## 2022-11-19 RX ORDER — SODIUM CHLORIDE 1000 MG
1 TABLET, SOLUBLE MISCELLANEOUS 2 TIMES DAILY WITH MEALS
Qty: 14 TABLET | Refills: 0 | Status: SHIPPED | OUTPATIENT
Start: 2022-11-19 | End: 2022-11-26

## 2022-11-19 RX ORDER — SODIUM CHLORIDE 1000 MG
1 TABLET, SOLUBLE MISCELLANEOUS 2 TIMES DAILY WITH MEALS
Status: DISCONTINUED | OUTPATIENT
Start: 2022-11-19 | End: 2022-11-19 | Stop reason: HOSPADM

## 2022-11-19 RX ORDER — IBUPROFEN 400 MG/1
400 TABLET ORAL EVERY 6 HOURS PRN
Status: DISCONTINUED | OUTPATIENT
Start: 2022-11-19 | End: 2022-11-19 | Stop reason: HOSPADM

## 2022-11-19 RX ORDER — SODIUM CHLORIDE 9 MG/ML
1000 INJECTION, SOLUTION INTRAVENOUS CONTINUOUS
Status: DISCONTINUED | OUTPATIENT
Start: 2022-11-19 | End: 2022-11-19 | Stop reason: HOSPADM

## 2022-11-19 RX ADMIN — Medication 100 MG: at 09:01

## 2022-11-19 RX ADMIN — LACTULOSE 20 G: 10 SOLUTION ORAL at 09:02

## 2022-11-19 RX ADMIN — OXYCODONE HYDROCHLORIDE 5 MG: 5 TABLET ORAL at 13:37

## 2022-11-19 RX ADMIN — FOLIC ACID 1 MG: 1 TABLET ORAL at 09:01

## 2022-11-19 RX ADMIN — SODIUM CHLORIDE TAB 1 GM 1 G: 1 TAB at 09:01

## 2022-11-19 RX ADMIN — LACTULOSE 20 G: 10 SOLUTION ORAL at 13:37

## 2022-11-19 RX ADMIN — MAGNESIUM OXIDE TAB 400 MG (241.3 MG ELEMENTAL MG) 400 MG: 400 (241.3 MG) TAB at 09:02

## 2022-11-19 ASSESSMENT — ENCOUNTER SYMPTOMS
EYE DISCHARGE: 0
SHORTNESS OF BREATH: 0
COUGH: 0
ABDOMINAL DISTENTION: 0
CONSTIPATION: 0
NAUSEA: 0
BACK PAIN: 0
WHEEZING: 0
DIARRHEA: 0
SORE THROAT: 0

## 2022-11-19 ASSESSMENT — PAIN - FUNCTIONAL ASSESSMENT
PAIN_FUNCTIONAL_ASSESSMENT: PREVENTS OR INTERFERES SOME ACTIVE ACTIVITIES AND ADLS
PAIN_FUNCTIONAL_ASSESSMENT: PREVENTS OR INTERFERES SOME ACTIVE ACTIVITIES AND ADLS

## 2022-11-19 ASSESSMENT — PAIN DESCRIPTION - ORIENTATION: ORIENTATION: RIGHT

## 2022-11-19 ASSESSMENT — PAIN SCALES - GENERAL
PAINLEVEL_OUTOF10: 8
PAINLEVEL_OUTOF10: 4
PAINLEVEL_OUTOF10: 8

## 2022-11-19 ASSESSMENT — PAIN DESCRIPTION - LOCATION
LOCATION: FOOT
LOCATION: FOOT

## 2022-11-19 ASSESSMENT — PAIN DESCRIPTION - FREQUENCY: FREQUENCY: CONTINUOUS

## 2022-11-19 ASSESSMENT — PAIN SCALES - WONG BAKER: WONGBAKER_NUMERICALRESPONSE: 2

## 2022-11-19 ASSESSMENT — PAIN DESCRIPTION - ONSET: ONSET: ON-GOING

## 2022-11-19 ASSESSMENT — PAIN DESCRIPTION - DESCRIPTORS
DESCRIPTORS: ACHING
DESCRIPTORS: ACHING

## 2022-11-19 ASSESSMENT — PAIN DESCRIPTION - PAIN TYPE: TYPE: ACUTE PAIN

## 2022-11-19 NOTE — PLAN OF CARE
Problem: Discharge Planning  Goal: Discharge to home or other facility with appropriate resources  11/19/2022 0047 by Braydon Ahumada RN  Outcome: Progressing  11/18/2022 1103 by Rachell Murillo. AURY Aguilar  Outcome: Progressing     Problem: Pain  Goal: Verbalizes/displays adequate comfort level or baseline comfort level  11/19/2022 0047 by Braydon Ahumada RN  Outcome: Progressing  11/18/2022 1103 by Rachell Murillo.  Henrry Enciso RN  Outcome: Progressing

## 2022-11-19 NOTE — PROGRESS NOTES
DOING WELL NO ABD COMPLAINTS AWAKE ALERT AND ORIENTED NO GROSS GIT BLEEDING  VITALS STABLE   LABS NOTED  T. BILI 10.2 INR  1.19   [facility] DISCRIMINANT FUNCTION SCORE 14.34 NO NEED FOR STEROIDS AT PRESENT PT CAN BE D/C TOLD TO F/U IN OFFICE FOR RECHECK OF LFTS IN 1 WK AND QUIT DRINKING ALCOHOL

## 2022-11-19 NOTE — DISCHARGE SUMMARY
V2.0  Discharge Summary    Name:  Jennifer Hutchins /Age/Sex: 1980 (38 y.o. male)   Admit Date: 2022  Discharge Date: 22    MRN & CSN:  [id number] & [id number] Encounter Date and Time 22 1:44 PM EST    Attending:  Albaro Agee MD Discharging Provider: Albaro Agee MD       Hospital Course:     Brief HPI: Jennifer Hutchins is a 38 y.o. male who presented with alcohol withdrawal syndrome. Work-up revealed cirrhosis likely secondary to chronic alcohol abuse with hyperbilirubinemia. Initial work-up also notable for hyper ammonemia which improved with conservative management. GI was consulted. Patient counseled to quit drinking and scheduled for follow-up with gastroenterology. Patient optimized for discharge with improvement in LFTs noted. Brief Problem Based Course:   # Cirrhosis, new diagnosis likely 2/2 alcohol abuse. #Hyper ammonemia - resolved  #Hyperbilirubinemia - improving  -imaging shows cirrhosis with portal HTN with no ascites  -GI consulted, appreciate recs. - Avoid NSAIDs, hepatotoxic agents, trend LFTs coags   -Patient counseled to quit drinking alcohol  -Trend bilirubin  -lactulose with goal 1-2 Bms       # Coagulopathy in setting of liver cirrhosis -improving  PT/INR slightly elevated,   platelets 809 -> 328, monitor     # Alcohol Withdrawal   - Pt drinks 8-10 bears daily last drink 4 days ago prior to admission. CIWA 13.   -on CIWA with ativan,   -now outside the window for DTs     # SIRS   - Criteria met with tachycardia, leukocytosis, . No source of infection. lactic and procal negative      #Chronic hyponatremia in setting of alcohol abuse and possible volume deficit   - Will check UA and urine studies,   monitor serial sodium levels,   Consult Nephrology for eval/recs  - c/w salt tabs      The patient expressed appropriate understanding of, and agreement with the discharge recommendations, medications, and plan.      Consults this admission:  IP CONSULT TO SOCIAL WORK  IP CONSULT TO NEPHROLOGY  IP CONSULT TO GI  IP CONSULT TO SOCIAL WORK    Discharge Diagnosis:   Alcohol withdrawal syndrome, with unspecified complication ([facility] [address]    Chronic hyponatremia  Hepatic cirrhosis  Alcohol withdrawl    Discharge Instruction:   Follow up appointments: GI, renal   Primary care physician: Xenia Landau, MD within 2 weeks  Diet: cardiac diet   Activity: activity as tolerated  Disposition: Discharged to:   [x]Home, [][facility], []SNF, []Acute Rehab, []Hospice   Condition on discharge: Stable  Labs and Tests to be Followed up as an outpatient by PCP or Specialist:     Discharge Medications:        Medication List        START taking these medications      sodium chloride 1 g tablet  Take 1 tablet by mouth 2 times daily (with meals) for 7 days            CONTINUE taking these medications      busPIRone 15 MG tablet  Commonly known as: BUSPAR  TAKE 1 TABLET BY MOUTH THREE TIMES DAILY     glucose monitoring kit  1 kit by Does not apply route daily as needed (If blood sugars running high or low)     * ibuprofen 600 MG tablet  Commonly known as: ADVIL;MOTRIN  Take 1 tablet by mouth 3 times daily (with meals)     * ibuprofen 800 MG tablet  Commonly known as: ADVIL;MOTRIN  Take 1 tablet by mouth 2 times daily as needed for Pain     Microlet Lancets Misc  1 each by Does not apply route daily     pregabalin 100 MG capsule  Commonly known as: LYRICA  TAKE 1 CAPSULE BY MOUTH TWICE DAILY     traZODone 50 MG tablet  Commonly known as: DESYREL  Take 1-2 tablets by mouth nightly as needed for Sleep           * This list has 2 medication(s) that are the same as other medications prescribed for you. Read the directions carefully, and ask your doctor or other care provider to review them with you.                 STOP taking these medications      blood glucose test strips strip  Commonly known as: Glucose Meter Test     HYDROcodone-acetaminophen 5-325 MG per tablet  Commonly known as: NORCO     lidocaine 5 % ointment  Commonly known as: XYLOCAINE               Where to Get Your Medications        These medications were sent to [address], [address] [facility] [address] [address]      Phone: [phone]   sodium chloride 1 g tablet        Objective Findings at Discharge:   BP (!) 132/90   Pulse 98   Temp 98.4 °F (36.9 °C) (Oral)   Resp 20   Ht 6' (1.829 m)   Wt 266 lb 5.1 oz (120.8 kg)   SpO2 98%   BMI 36.12 kg/m²       Physical Exam:   Physical Exam  Vitals and nursing note reviewed. Constitutional:       General: He is not in acute distress. Appearance: Normal appearance. He is obese. He is not ill-appearing. HENT:      Head: Normocephalic and atraumatic. Mouth/Throat:      Mouth: Mucous membranes are moist.   Eyes:      Extraocular Movements: Extraocular movements intact. Pupils: Pupils are equal, round, and reactive to light. Cardiovascular:      Rate and Rhythm: Regular rhythm. Tachycardia present. Pulses: Normal pulses. Heart sounds: Normal heart sounds. Pulmonary:      Effort: Pulmonary effort is normal.      Breath sounds: Normal breath sounds. Abdominal:      Palpations: Abdomen is soft. Musculoskeletal:         General: No tenderness or signs of injury. Normal range of motion. Skin:     General: Skin is warm. Capillary Refill: Capillary refill takes less than 2 seconds. Neurological:      General: No focal deficit present. Mental Status: He is alert and oriented to person, place, and time. Psychiatric:         Mood and Affect: Mood normal.         Behavior: Behavior normal.         Thought Content:  Thought content normal.         Judgment: Judgment normal.            Labs and Imaging   XR CHEST (2 VW)    Result Date: 11/16/2022  EXAMINATION: TWO XRAY VIEWS OF THE CHEST 11/16/2022 12:17 pm COMPARISON: 08/03/2021 HISTORY: ORDERING SYSTEM PROVIDED HISTORY: edema, r/o pulmonary congestion TECHNOLOGIST PROVIDED HISTORY: Reason for exam:->edema, r/o pulmonary congestion Reason for Exam: edema, r/o pulmonary congestion FINDINGS: No consolidation, pleural effusion or pneumothorax. Stable circumscribed dense right upper lobe nodule consistent with granuloma. Cardiac silhouette enlarged. Right paratracheal calcifications. Old left rib fractures. No active pulmonary or pleural disease. Cardiomegaly. Findings consistent with old granulomatous disease. CT ABDOMEN PELVIS W IV CONTRAST Additional Contrast? None    Result Date: 11/16/2022  EXAMINATION: CT OF THE ABDOMEN AND PELVIS WITH CONTRAST 11/16/2022 10:02 am TECHNIQUE: CT of the abdomen and pelvis was performed with the administration of intravenous contrast. Multiplanar reformatted images are provided for review. Automated exposure control, iterative reconstruction, and/or weight based adjustment of the mA/kV was utilized to reduce the radiation dose to as low as reasonably achievable. COMPARISON: 08/03/2021 HISTORY: ORDERING SYSTEM PROVIDED HISTORY: abdominal distension, cirrhosis TECHNOLOGIST PROVIDED HISTORY: Reason for exam:->abdominal distension, cirrhosis Additional Contrast?->None Decision Support Exception - unselect if not a suspected or confirmed emergency medical condition->Emergency Medical Condition (MA) Reason for Exam: abdominal distension, cirrhosis Relevant Medical/Surgical History: 80 ml isovue 370 FINDINGS: Lower Chest: The visualized lungs are clear. Base of the heart is unremarkable. Visualized extra thoracic soft tissues are unremarkable. Organs: There is a cirrhotic morphology liver, with hepatomegaly (liver measures over 24 cm at the mid axillary line). A focal hepatic abnormality is not identified. There is evidence of portal hypertension, with recanalization of the umbilical vein. Multiple gastroesophageal and gastric varices noted as well. Moderate splenomegaly is unchanged.  Uncomplicated cholelithiasis is noted. Adrenals are unremarkable. Pancreas unremarkable. No acute or suspicious renal abnormalities are identified. GI/Bowel: Again, gastroesophageal and gastric varices. Distal esophagus and stomach otherwise unremarkable. No small bowel abnormalities are identified. Mild diverticulosis of the large bowel is present without CT evidence of diverticulitis. Pelvis: Urinary bladder unremarkable. No free pelvic fluid. Prostate unremarkable. Peritoneum/Retroperitoneum: Edema is seen within the retroperitoneum, with additional edema tracking along the pericolic gutters and along the anterior perirenal fascia, presumably related to the portal hypertension. The abdominal aorta is normal in caliber. Superior mesenteric artery appears to be enhancing. No lymphadenopathy. Bones/Soft Tissues: Numerous varices are seen in a periumbilical location. The extra-abdominal and extra pelvic soft tissues are otherwise unremarkable. No acute or suspicious bony abnormalities. Cirrhosis with portal hypertension. Hepatosplenomegaly. Recanalization of the umbilical vein, with multiple umbilical varices. Gastroesophageal and gastric varices also noted. Mild edema seen at the root of the mesentery which is likely related to the portal hypertension. Otherwise, no acute abnormality detected. Uncomplicated cholelithiasis. CBC:   Recent Labs     11/17/22 0324 11/18/22 0310   WBC 14.3* 12.4*   HGB 10.8* 9.8*   * 127*     BMP:    Recent Labs     11/17/22 0324 11/17/22  0841 11/18/22 0310 11/18/22  0544 11/19/22  1116   *  128*   < > 125*  126* 126* 124*   K 3.4*  --  3.2*  --  4.0   CL 90*  --  92*  --  90*   CO2 24  --  25  --  25   BUN 13  --  11  --  8   CREATININE 0.6*  --  0.4*  --  0.4*   GLUCOSE 94  --  119*  --  89    < > = values in this interval not displayed.      Hepatic:   Recent Labs     11/17/22 0324 11/18/22  0310 11/19/22  1116   * 101* 125*   ALT 31 30 42*   BILITOT 10.6* 10.2* 10.9*   ALKPHOS 191* 187* 188*     Lipids:   Lab Results   Component Value Date/Time    CHOL 106 02/07/2022 10:46 AM    HDL 30 02/07/2022 10:46 AM    TRIG 91 02/07/2022 10:46 AM     Hemoglobin A1C:   Lab Results   Component Value Date/Time    LABA1C 5.1 04/13/2022 10:46 PM     TSH:   Lab Results   Component Value Date/Time    TSH 2.05 01/27/2020 03:11 PM     Troponin:   Lab Results   Component Value Date/Time    TROPONINT 0.014 07/31/2021 04:26 PM     Lactic Acid: No results for input(s): LACTA in the last 72 hours. BNP: No results for input(s): PROBNP in the last 72 hours.   UA:  Lab Results   Component Value Date/Time    NITRU NEGATIVE 05/03/2022 12:03 PM    COLORU YELLOW 05/03/2022 12:03 PM    WBCUA 1 05/03/2022 12:03 PM    RBCUA 13 05/03/2022 12:03 PM    MUCUS RARE 05/03/2022 12:03 PM    TRICHOMONAS NONE SEEN 05/03/2022 12:03 PM    YEAST RARE 05/03/2022 12:03 PM    BACTERIA RARE 05/03/2022 12:03 PM    CLARITYU CLEAR 05/03/2022 12:03 PM    SPECGRAV 1.005 05/03/2022 12:03 PM    LEUKOCYTESUR NEGATIVE 05/03/2022 12:03 PM    UROBILINOGEN 0.2 05/03/2022 12:03 PM    BILIRUBINUR NEGATIVE 05/03/2022 12:03 PM    BLOODU LARGE 05/03/2022 12:03 PM    KETUA NEGATIVE 05/03/2022 12:03 PM     Urine Cultures: No results found for: LABURIN  Blood Cultures: No results found for: BC  No results found for: BLOODCULT2  Organism: No results found for: ORG    Time Spent Discharging patient 35 minutes    Electronically signed by David Rivers MD on 11/19/2022 at 1:44 PM

## 2022-11-19 NOTE — PLAN OF CARE
Problem: Discharge Planning  Goal: Discharge to home or other facility with appropriate resources  11/19/2022 1428 by Mana Young RN  Outcome: Adequate for Discharge  11/19/2022 0047 by Jose Hopkins RN  Outcome: Progressing     Problem: Pain  Goal: Verbalizes/displays adequate comfort level or baseline comfort level  11/19/2022 1428 by Mana Young RN  Outcome: Adequate for Discharge  11/19/2022 0047 by Jose Hopkins RN  Outcome: Progressing     Problem: Chronic Conditions and Co-morbidities  Goal: Patient's chronic conditions and co-morbidity symptoms are monitored and maintained or improved  11/19/2022 1428 by Mana Young RN  Outcome: Adequate for Discharge  11/19/2022 0047 by Jose Hopkins RN  Outcome: Progressing     Problem: Cardiovascular - Adult  Goal: Maintains optimal cardiac output and hemodynamic stability  11/19/2022 1428 by Mana Young RN  Outcome: Adequate for Discharge  11/19/2022 0047 by Jose Hopkins RN  Outcome: Progressing  Goal: Absence of cardiac dysrhythmias or at baseline  11/19/2022 1428 by Mana Young RN  Outcome: Adequate for Discharge  11/19/2022 0047 by Jose Hopkins RN  Outcome: Progressing     Problem: Skin/Tissue Integrity - Adult  Goal: Skin integrity remains intact  11/19/2022 1428 by Mana Young RN  Outcome: Adequate for Discharge  11/19/2022 0047 by Jose Hopkins RN  Outcome: Progressing  Goal: Oral mucous membranes remain intact  11/19/2022 1428 by Mana Young RN  Outcome: Adequate for Discharge  11/19/2022 0047 by Jose Hopkins RN  Outcome: Progressing     Problem: Gastrointestinal - Adult  Goal: Minimal or absence of nausea and vomiting  11/19/2022 1428 by Mana Young RN  Outcome: Adequate for Discharge  11/19/2022 0047 by Jose Hopkins RN  Outcome: Progressing  Goal: Maintains or returns to baseline bowel function  11/19/2022 1428 by Mana Young RN  Outcome: Adequate for Discharge  11/19/2022 0047 by Sara Milton Brayan Buenrostro RN  Outcome: Progressing  Goal: Maintains adequate nutritional intake  11/19/2022 1428 by Luisa Mc RN  Outcome: Adequate for Discharge  11/19/2022 0047 by Aydin Payne RN  Outcome: Progressing     Problem: Safety - Adult  Goal: Free from fall injury  11/19/2022 1428 by Luisa Mc RN  Outcome: Adequate for Discharge  11/19/2022 0047 by Aydin Payne RN  Outcome: Progressing

## 2022-11-19 NOTE — PROGRESS NOTES
Macario served Dr. Radha De La Torre about patient's discharge because patient was told he would be discharged today and he had questions about discharge. Dr. Chad Mckeon put in orders for urinalysis and continuous 0.9% infusion as paient's sodium came back at 124, but Dr. Radha De La Torre said to not start the infusion because he is discharging the patient today on salt tabs. All questions answered at this time for patient.

## 2022-11-19 NOTE — PROGRESS NOTES
Nephrology Progress Note        [address], [address] [place] [address]  Phone: [phone]  Office Hours: 8:30AM - 4:30PM  Monday - Friday 11/19/2022 9:50 AM  Subjective:   Admit Date: 11/16/2022  PCP: Troy Brown MD  Interval History:   Doing well  Eager to go home    Diet: ADULT DIET; Regular; Low Sodium (2 gm); 2000 ml      Data:   Scheduled Meds:   sodium chloride  1 g Oral BID WC    magnesium oxide  400 mg Oral BID    lactulose  20 g Oral TID    thiamine  100 mg Oral Daily    folic acid  1 mg Oral Daily    insulin lispro  0-4 Units SubCUTAneous TID WC    insulin lispro  0-4 Units SubCUTAneous Nightly     Continuous Infusions:   dextrose       PRN Meds:ibuprofen, oxyCODONE, polyethylene glycol, LORazepam **OR** LORazepam **OR** LORazepam **OR** LORazepam **OR** LORazepam **OR** LORazepam **OR** LORazepam **OR** LORazepam, glucose, dextrose bolus **OR** dextrose bolus, glucagon (rDNA), dextrose, labetalol, promethazine  I/O last 3 completed shifts: In: 1140 [P.O.:1140]  Out: -   No intake/output data recorded. Intake/Output Summary (Last 24 hours) at 11/19/2022 0950  Last data filed at 11/18/2022 1700  Gross per 24 hour   Intake 660 ml   Output --   Net 660 ml       CBC:   Recent Labs     11/16/22  1130 11/17/22  0324 11/18/22  0310   WBC 14.7* 14.3* 12.4*   HGB 11.8* 10.8* 9.8*   * 137* 127*       BMP:    Recent Labs     11/16/22  1130 11/16/22  2004 11/17/22  0324 11/17/22  0841 11/17/22  1652 11/18/22  0310 11/18/22  0544   *   < > 128*  128*   < > 125* 125*  126* 126*   K 3.3*  --  3.4*  --   --  3.2*  --    CL 86*  --  90*  --   --  92*  --    CO2 25  --  24  --   --  25  --    BUN 10  --  13  --   --  11  --    CREATININE 0.6*  --  0.6*  --   --  0.4*  --    GLUCOSE 114*  --  94  --   --  119*  --     < > = values in this interval not displayed.      Hepatic:   Recent Labs     11/16/22  1130 11/17/22  0324 11/18/22  0310   * 117* 101*   ALT 29 31 30   BILITOT 11.3* 10.6* 10.2*   ALKPHOS 203* 191* 187*     Troponin: No results for input(s): TROPONINI in the last 72 hours. BNP: No results for input(s): BNP in the last 72 hours. Lipids: No results for input(s): CHOL, HDL in the last 72 hours.     Invalid input(s): LDLCALCU  ABGs: No results found for: PHART, PO2ART, SJH4LNO  INR:   Recent Labs     11/16/22 2004 11/17/22  0324 11/18/22  0310   INR 1.39 1.35 1.19       Objective:   Vitals: /69   Pulse (!) 102   Temp 98.4 °F (36.9 °C)   Resp 23   Ht 6' (1.829 m)   Wt 266 lb 5.1 oz (120.8 kg)   SpO2 96%   BMI 36.12 kg/m²   General appearance: alert and cooperative with exam, in no acute distress  HEENT: normocephalic, atraumatic,   Neck: supple, trachea midline  Lungs: clear to auscultation bilaterally, breathing comfortably on room air  Heart[de-identified] regular rate and rhythm, S1, S2 normal,  Extremities: extremities atraumatic, no cyanosis or edema  Neurologic: alert, oriented, follows commands, interactive    MEDICAL DECISION MAKING     -Hyponatremia from alcohol abuse and cirrhosis: 125 on admission  -Hypokalemia  -Hypomagnesemia  -Cirrhosis and portal HTN, gastroesophageal varices  -Alcoholic hepatitis and hyperbili     Suggest:  -Replete the potassium  -if sodium stable then ok to dc and continue salt tab 1g bid on dc  -Avoid nsaids in this pt with decomp cirrhosis and who is at risk for DOREEN  -Discussed 2L oral fluid restriction even as outpt  -Discussed alcohol cessation                  Electronically signed by Oscar Hamman, DO on 11/19/2022 at 9:50 AM    MD Vinay Puente DO [address]  [place], [place] [address]  PHONE: [phone]  FAX: [phone]

## 2022-11-19 NOTE — PROGRESS NOTES
Discharge paperwork went over by this nurse with patient. All questions answered at this time. IV removed with no complications. Patient taken to front entrance by wheelchair. No complaints at this time.

## 2022-11-19 NOTE — PROGRESS NOTES
V2.0  [facility] Hospitalist Progress Note      Name:  Josey Shahid /Age/Sex: 1980  (38 y.o. male)   MRN & CSN:  [id number] & [id number] Encounter Date/Time: 2022 4:01 PM EST    Location:  -A PCP: Troy Brown MD       Hospital Day: 4    Assessment and Plan:   Josey Shahid is a 38 y.o. male with pmh of essential hypertension, diabetes mellitus type II, alcohol abuse, chronic left foot ulcer, right Charcot arthropathy, MICHAEL, anxiety   who presents with Alcohol withdrawal syndrome, with unspecified complication ([facility] [address]      Plan:  # Cirrhosis, new diagnosis likely 2/2 alcohol abuse. #Hyper ammonemia - resolved  #Hyperbilirubinemia - improving  -imaging shows cirrhosis with portal HTN with no ascites  -GI consulted, appreciate recs. - Avoid NSAIDs, hepatotoxic agents, trend LFTs coags   -Patient counseled to quit drinking alcohol  -Trend bilirubin  -lactulose with goal 1-2 Bms  -awaiting labs for today, if LFTs down trending and Na improving then will d/c patient today, discussed with GI      # Coagulopathy in setting of liver cirrhosis -improving  PT/INR slightly elevated,   platelets 255 -> 799, monitor     # Alcohol Withdrawal   - Pt drinks 8-10 bears daily last drink 4 days ago prior to admission. CIWA 13.   -on CIWA with ativan,   -now outside the window for DTs     # SIRS   - Criteria met with tachycardia, leukocytosis, . No source of infection.     lactic and procal negative      #Chronic hyponatremia in setting of alcohol abuse and possible volume deficit   - Will check UA and urine studies,   monitor serial sodium levels,   Consult Nephrology for eval/recs  - c/w salt tabs     # Hypokalemia    - Replacement ordered will check mg level     Other chronic medical conditions resume home medications unless contraindicated  # Essential hypertension-not managed on antihypertensives, will and IV labetalol as needed  # Diabetes mellitus type 2 -managed on corrective insulin, monitor blood glucose before meals and at bedtime, ADA diet, A1c in a.m. # Chronic left foot ulcer-managed by wound care as outpatient, will consult for management  # Right charcot foot - Followed by Podiatry. Last visit 10/10/22 with recs/tx:   -Right foot diabetic boot brace  -Nonweightbearing right foot  -Follow-up in 6 weeks repeat x-rays bilateral foot and ankle  -Crow walker: To be obtained from [employer]  # Chronic anemia -hemoglobin stable, patient denies active bleeding, monitor  # Hx MICHAEL - will verify pt is on Cpap  # Chronic anxiety- pt managed on ativan, will monitor if additional medications needed    Diet ADULT DIET; Regular; Low Sodium (2 gm); 2000 ml   DVT Prophylaxis [] Lovenox, []  Heparin, [x] SCDs, [] Ambulation,  [] Eliquis, [] Xarelto  [] Coumadin   Code Status Full Code   Disposition From: Home  Expected Disposition: Home  Estimated Date of Discharge: 2-3 days  Patient requires continued admission due to acute liver failure, alcohol dependence   Surrogate Decision Maker/ POA      Subjective:     Chief Complaint: Abdominal Pain (Pt states concerned for Liver issues due to feeling fatigued, weak, and feels like more yellowing. No hx of liver issues. )     Patient seen at bedside. Alert and oriented. Communicating appropriately. States he wants to go home. Tolerating diet. +BM. Ambulatory. Review of Systems:    Review of Systems   Constitutional:  Positive for fatigue. Negative for activity change, appetite change and fever. HENT:  Negative for congestion and sore throat. Eyes:  Negative for discharge and visual disturbance. Respiratory:  Negative for cough, shortness of breath and wheezing. Cardiovascular:  Negative for chest pain, palpitations and leg swelling. Gastrointestinal:  Negative for abdominal distention, constipation, diarrhea and nausea. Genitourinary:  Negative for difficulty urinating and hematuria. Musculoskeletal:  Positive for arthralgias. Negative for back pain and myalgias. Neurological:  Negative for dizziness, syncope and headaches. Hematological:  Negative for adenopathy. Psychiatric/Behavioral:  Negative for agitation and confusion. Objective: Intake/Output Summary (Last 24 hours) at 11/19/2022 0817  Last data filed at 11/18/2022 1700  Gross per 24 hour   Intake 660 ml   Output --   Net 660 ml          Vitals:   Vitals:    11/19/22 0744   BP:    Pulse: 99   Resp: 21   Temp:    SpO2: 96%       Physical Exam:   Physical Exam  Vitals and nursing note reviewed. Constitutional:       General: He is not in acute distress. Appearance: He is obese. He is not ill-appearing. Comments: Alert and oriented communicating appropriately   HENT:      Head: Normocephalic and atraumatic. Mouth/Throat:      Mouth: Mucous membranes are moist.   Eyes:      Extraocular Movements: Extraocular movements intact. Pupils: Pupils are equal, round, and reactive to light. Cardiovascular:      Rate and Rhythm: Normal rate and regular rhythm. Pulses: Normal pulses. Heart sounds: Normal heart sounds. Pulmonary:      Effort: Pulmonary effort is normal.      Breath sounds: Normal breath sounds. Abdominal:      Palpations: Abdomen is soft. Musculoskeletal:         General: No tenderness or signs of injury. Normal range of motion. Skin:     General: Skin is warm. Capillary Refill: Capillary refill takes less than 2 seconds. Neurological:      General: No focal deficit present. Mental Status: He is alert and oriented to person, place, and time. Psychiatric:         Mood and Affect: Mood normal.         Behavior: Behavior normal.         Thought Content:  Thought content normal.         Judgment: Judgment normal.          Medications:   Medications:    sodium chloride  1 g Oral BID WC    magnesium oxide  400 mg Oral BID    lactulose  20 g Oral TID    thiamine  100 mg Oral Daily    folic acid  1 mg Oral Daily insulin lispro  0-4 Units SubCUTAneous TID WC    insulin lispro  0-4 Units SubCUTAneous Nightly      Infusions:    dextrose       PRN Meds: oxyCODONE, 5 mg, Q4H PRN  polyethylene glycol, 17 g, Daily PRN  LORazepam, 1 mg, Q1H PRN   Or  LORazepam, 1 mg, Q1H PRN   Or  LORazepam, 2 mg, Q1H PRN   Or  LORazepam, 2 mg, Q1H PRN   Or  LORazepam, 3 mg, Q1H PRN   Or  LORazepam, 3 mg, Q1H PRN   Or  LORazepam, 4 mg, Q1H PRN   Or  LORazepam, 4 mg, Q1H PRN  glucose, 4 tablet, PRN  dextrose bolus, 125 mL, PRN   Or  dextrose bolus, 250 mL, PRN  glucagon (rDNA), 1 mg, PRN  dextrose, , Continuous PRN  labetalol, 5 mg, Q8H PRN  promethazine, 6.25 mg, Q6H PRN      Labs      Recent Results (from the past 24 hour(s))   Magnesium    Collection Time: 11/18/22 10:21 AM   Result Value Ref Range    Magnesium 1.4 (L) 1.8 - 2.4 mg/dl   POCT Glucose    Collection Time: 11/18/22 11:48 AM   Result Value Ref Range    POC Glucose 114 (H) 70 - 99 MG/DL   POCT Glucose    Collection Time: 11/18/22  4:30 PM   Result Value Ref Range    POC Glucose 97 70 - 99 MG/DL   POCT Glucose    Collection Time: 11/18/22  9:08 PM   Result Value Ref Range    POC Glucose 117 (H) 70 - 99 MG/DL   POCT Glucose    Collection Time: 11/19/22  6:49 AM   Result Value Ref Range    POC Glucose 107 (H) 70 - 99 MG/DL        Imaging/Diagnostics Last 24 Hours   XR CHEST (2 VW)    Result Date: 11/16/2022  EXAMINATION: TWO XRAY VIEWS OF THE CHEST 11/16/2022 12:17 pm COMPARISON: 08/03/2021 HISTORY: ORDERING SYSTEM PROVIDED HISTORY: edema, r/o pulmonary congestion TECHNOLOGIST PROVIDED HISTORY: Reason for exam:->edema, r/o pulmonary congestion Reason for Exam: edema, r/o pulmonary congestion FINDINGS: No consolidation, pleural effusion or pneumothorax. Stable circumscribed dense right upper lobe nodule consistent with granuloma. Cardiac silhouette enlarged. Right paratracheal calcifications. Old left rib fractures. No active pulmonary or pleural disease. Cardiomegaly.  Findings consistent with old granulomatous disease. CT ABDOMEN PELVIS W IV CONTRAST Additional Contrast? None    Result Date: 11/16/2022  EXAMINATION: CT OF THE ABDOMEN AND PELVIS WITH CONTRAST 11/16/2022 10:02 am TECHNIQUE: CT of the abdomen and pelvis was performed with the administration of intravenous contrast. Multiplanar reformatted images are provided for review. Automated exposure control, iterative reconstruction, and/or weight based adjustment of the mA/kV was utilized to reduce the radiation dose to as low as reasonably achievable. COMPARISON: 08/03/2021 HISTORY: ORDERING SYSTEM PROVIDED HISTORY: abdominal distension, cirrhosis TECHNOLOGIST PROVIDED HISTORY: Reason for exam:->abdominal distension, cirrhosis Additional Contrast?->None Decision Support Exception - unselect if not a suspected or confirmed emergency medical condition->Emergency Medical Condition (MA) Reason for Exam: abdominal distension, cirrhosis Relevant Medical/Surgical History: 80 ml isovue 370 FINDINGS: Lower Chest: The visualized lungs are clear. Base of the heart is unremarkable. Visualized extra thoracic soft tissues are unremarkable. Organs: There is a cirrhotic morphology liver, with hepatomegaly (liver measures over 24 cm at the mid axillary line). A focal hepatic abnormality is not identified. There is evidence of portal hypertension, with recanalization of the umbilical vein. Multiple gastroesophageal and gastric varices noted as well. Moderate splenomegaly is unchanged. Uncomplicated cholelithiasis is noted. Adrenals are unremarkable. Pancreas unremarkable. No acute or suspicious renal abnormalities are identified. GI/Bowel: Again, gastroesophageal and gastric varices. Distal esophagus and stomach otherwise unremarkable. No small bowel abnormalities are identified. Mild diverticulosis of the large bowel is present without CT evidence of diverticulitis. Pelvis: Urinary bladder unremarkable. No free pelvic fluid.   Prostate unremarkable. Peritoneum/Retroperitoneum: Edema is seen within the retroperitoneum, with additional edema tracking along the pericolic gutters and along the anterior perirenal fascia, presumably related to the portal hypertension. The abdominal aorta is normal in caliber. Superior mesenteric artery appears to be enhancing. No lymphadenopathy. Bones/Soft Tissues: Numerous varices are seen in a periumbilical location. The extra-abdominal and extra pelvic soft tissues are otherwise unremarkable. No acute or suspicious bony abnormalities. Cirrhosis with portal hypertension. Hepatosplenomegaly. Recanalization of the umbilical vein, with multiple umbilical varices. Gastroesophageal and gastric varices also noted. Mild edema seen at the root of the mesentery which is likely related to the portal hypertension. Otherwise, no acute abnormality detected. Uncomplicated cholelithiasis.        Electronically signed by Jayshree Gaxiola MD on 11/19/2022 at 8:17 AM

## 2022-11-21 ENCOUNTER — CARE COORDINATION (OUTPATIENT)
Dept: CASE MANAGEMENT | Age: 42
End: 2022-11-21

## 2022-11-21 NOTE — CARE COORDINATION
Care Transitions Outreach Attempt    Call within 2 business days of discharge: Yes   Attempted to reach patient for transitions of care follow up. Unable to reach patient. Patient: Martha Hannah Patient : 1980 MRN: [id number]    Last Discharge  Street       Date Complaint Diagnosis Description Type Department Provider    22 Abdominal Pain Alcoholic cirrhosis of liver with ascites ([facility] [address] . .. ED to Hosp-Admission (Discharged) (ADMITTED) Jose Juan Bullard MD; Alex Mullins MD... Was this an external facility discharge? No [facility]    Noted following upcoming appointments from discharge chart review:   [facility] follow up appointment(s): No future appointments. 1st attempt to reach for Care Transition discharge call unsuccessful. HIPAA compliant message left requesting call back.     [address] [place] [address] [phone]

## 2022-11-22 ENCOUNTER — CARE COORDINATION (OUTPATIENT)
Dept: CASE MANAGEMENT | Age: 42
End: 2022-11-22

## 2022-11-22 NOTE — CARE COORDINATION
Care Transitions Outreach Attempt    Call within 2 business days of discharge: Yes   Patient: Kylie Bobby Patient : 1980 MRN: [id number]    Last Discharge [address]       Date Complaint Diagnosis Description Type Department Provider    22 Abdominal Pain Alcoholic cirrhosis of liver with ascites ([facility] [address] . .. ED to Hosp-Admission (Discharged) (ADMITTED) Ranjana Kovacs MD; Corby Hylton MD... Was this an external facility discharge? No   Discharge Facility: [facility]      Noted following upcoming appointments from discharge chart review:   [facility] follow up appointment(s): No future appointments. 2nd unsuccessful attempt to reach for Care Transition discharge call. HIPAA compliant message left requesting call back. Episode closed per protocol, no further outreach scheduled. UTR letter to Patient via [address] Challenges to be reviewed by the provider   Additional needs identified to be addressed with provider    22-22 [facility] Cirrhosis d/t Alcohol Abuse    Please contact Patient for scheduling of 7 day HFU/TCM appt.        Method of communication with provider : chart routing      [address] [place] [address] [phone]

## 2022-11-22 NOTE — TELEPHONE ENCOUNTER
Called the pt to schedule him for a hospital follow up but no answer at this time, I left a vm with the pt on his vm to return my call here at the office so that he could be scheduled for a hospital follow up. I also reached out to the patients spouse and she said that she will have the patient call the office to schedule and OV .

## 2022-11-29 ENCOUNTER — OFFICE VISIT (OUTPATIENT)
Dept: INTERNAL MEDICINE CLINIC | Age: 42
End: 2022-11-29

## 2022-11-29 VITALS
RESPIRATION RATE: 18 BRPM | DIASTOLIC BLOOD PRESSURE: 62 MMHG | OXYGEN SATURATION: 95 % | SYSTOLIC BLOOD PRESSURE: 140 MMHG | HEART RATE: 115 BPM

## 2022-11-29 DIAGNOSIS — F41.9 ANXIETY: ICD-10-CM

## 2022-11-29 DIAGNOSIS — Z09 HOSPITAL DISCHARGE FOLLOW-UP: ICD-10-CM

## 2022-11-29 DIAGNOSIS — E87.1 HYPONATREMIA: ICD-10-CM

## 2022-11-29 DIAGNOSIS — F10.931 ALCOHOL WITHDRAWAL DELIRIUM (HCC): ICD-10-CM

## 2022-11-29 DIAGNOSIS — K70.30 ALCOHOLIC CIRRHOSIS OF LIVER WITHOUT ASCITES (HCC): Primary | ICD-10-CM

## 2022-11-29 RX ORDER — CITALOPRAM 10 MG/1
10 TABLET ORAL DAILY
Qty: 30 TABLET | Refills: 2 | Status: SHIPPED | OUTPATIENT
Start: 2022-11-29

## 2022-11-29 SDOH — ECONOMIC STABILITY: FOOD INSECURITY: WITHIN THE PAST 12 MONTHS, THE FOOD YOU BOUGHT JUST DIDN'T LAST AND YOU DIDN'T HAVE MONEY TO GET MORE.: NEVER TRUE

## 2022-11-29 SDOH — ECONOMIC STABILITY: FOOD INSECURITY: WITHIN THE PAST 12 MONTHS, YOU WORRIED THAT YOUR FOOD WOULD RUN OUT BEFORE YOU GOT MONEY TO BUY MORE.: NEVER TRUE

## 2022-11-29 ASSESSMENT — SOCIAL DETERMINANTS OF HEALTH (SDOH): HOW HARD IS IT FOR YOU TO PAY FOR THE VERY BASICS LIKE FOOD, HOUSING, MEDICAL CARE, AND HEATING?: NOT HARD AT ALL

## 2022-11-29 ASSESSMENT — PATIENT HEALTH QUESTIONNAIRE - PHQ9
1. LITTLE INTEREST OR PLEASURE IN DOING THINGS: 2
3. TROUBLE FALLING OR STAYING ASLEEP: 1
SUM OF ALL RESPONSES TO PHQ QUESTIONS 1-9: 7
7. TROUBLE CONCENTRATING ON THINGS, SUCH AS READING THE NEWSPAPER OR WATCHING TELEVISION: 0
SUM OF ALL RESPONSES TO PHQ9 QUESTIONS 1 & 2: 3
5. POOR APPETITE OR OVEREATING: 0
8. MOVING OR SPEAKING SO SLOWLY THAT OTHER PEOPLE COULD HAVE NOTICED. OR THE OPPOSITE, BEING SO FIGETY OR RESTLESS THAT YOU HAVE BEEN MOVING AROUND A LOT MORE THAN USUAL: 0
4. FEELING TIRED OR HAVING LITTLE ENERGY: 2
10. IF YOU CHECKED OFF ANY PROBLEMS, HOW DIFFICULT HAVE THESE PROBLEMS MADE IT FOR YOU TO DO YOUR WORK, TAKE CARE OF THINGS AT HOME, OR GET ALONG WITH OTHER PEOPLE: 0
SUM OF ALL RESPONSES TO PHQ QUESTIONS 1-9: 7
6. FEELING BAD ABOUT YOURSELF - OR THAT YOU ARE A FAILURE OR HAVE LET YOURSELF OR YOUR FAMILY DOWN: 1
SUM OF ALL RESPONSES TO PHQ QUESTIONS 1-9: 7
2. FEELING DOWN, DEPRESSED OR HOPELESS: 1
9. THOUGHTS THAT YOU WOULD BE BETTER OFF DEAD, OR OF HURTING YOURSELF: 0
SUM OF ALL RESPONSES TO PHQ QUESTIONS 1-9: 7

## 2022-11-29 NOTE — PROGRESS NOTES
Post-Discharge Transitional Care Follow Up      Karina Velasco   YOB: 1980    Date of Office Visit:  11/29/2022  Date of Hospital Admission: 11/16/22  Date of Hospital Discharge: 11/19/22  Readmission Risk Score (high >=14%. Medium >=10%):Readmission Risk Score: 14.6      Care management risk score Rising risk (score 2-5) and Complex Care (Scores >=6): No Risk Score On File     Non face to face  following discharge, date last encounter closed (first attempt may have been earlier): 11/22/2022     Call initiated 2 business days of discharge: Yes     Alcoholic cirrhosis of liver without ascites ([facility] [address] - recheck labs; unclear how much was alcohol hepatitis and how much is from the underlying cirrhosis. Encourage alcohol avoidance. Encourage hep B and C vaccinations. Needs to f/u with Dr Marciano Mendez, likely needs EGD and maybe propranolol.  -     Protime-INR; Future  -     APTT; Future  -     Comprehensive Metabolic Panel; Future  -     CBC with Auto Differential; Future  Alcohol withdrawal delirium (HCC) - improved, and not drinking currently  -     Protime-INR; Future  -     APTT; Future  -     Comprehensive Metabolic Panel; Future  -     CBC with Auto Differential; Future  Hyponatremia - recheck labs  -     Comprehensive Metabolic Panel; Future  -     Magnesium; Future  -     Phosphorus; Future  Anxiety - will try celexa. Many meds are contraindicated given pts liver function. Cancel trazodone for insomnia. Will try low dose melatonin, but the sleep disruption may be from the cirrhosis    Hospital discharge follow-up  -     AK DISCHARGE MEDS RECONCILED W/ CURRENT OUTPATIENT MED LIST    Medical Decision Making: high complexity  Return in about 3 weeks (around 12/20/2022).     On this date 11/29/2022 I have spent 45 minutes reviewing previous notes, test results and face to face with the patient discussing the diagnosis and importance of compliance with the treatment plan as well as documenting on the day of the visit. Subjective:   HPI    Inpatient course: Discharge summary reviewed- see chart. Interval history/Current status:     Pt was admitted to the hospital after he developed jaundice from alcoholic cirrhosis and hepatitis. He had elevated ammonia. He has portal HTN. He was seen by Dr Marciano Mendez, was treated with lactulose. He was found to have a coagulopathy, required CIWA protocol for withdrawal. He also had hyponatremia for which he was prescribed salt tablets. He has not had a drink in >20 days. He continues to have a broken right foot, is wearing a boot. He is following with Dr Beverley Almazan in [place]. The foot has been healing very slowly. He has been off his anxiety pills, but he has been using his wife's anxiety meds and trazodone. He was taking sodium pills but finished these 2 days ago. He will see Dr Marciano Mendez 12/10.       Patient Active Problem List   Diagnosis    HTN (hypertension)    Fatty liver    Obstructive sleep apnea    Type 2 diabetes, controlled, with neuropathy (HCC)    Anxiety    Hypercholesterolemia    Alcohol withdrawal delirium (HCC)    Closed displaced Maisonneuve's fracture of left leg    Cellulitis    Leukocytosis    Hyponatremia    Hypokalemia    Isolated proteinuria with minor glomerular abnormality    Cellulitis of right foot    Alcohol abuse    Hyperbilirubinemia    Non-pressure chronic ulcer of other part of left foot with fat layer exposed ([address]    WD-Diabetic ulcer of toe of left foot associated with type 2 diabetes mellitus, with fat layer exposed ([address]    Local infection of skin and subcutaneous tissue    WD-Ankle injury, initial encounter    WD-Acute right ankle pain    WD-Right ankle swelling    WD-Charcot ankle, right    Lisfranc dislocation, left, initial encounter    Alcohol withdrawal syndrome, with unspecified complication ([address]       Medications listed as ordered at the time of discharge from hospital     Medication List            Accurate as of November 29, 2022  4:37 PM. If you have any questions, ask your nurse or doctor. START taking these medications      citalopram 10 MG tablet  Commonly known as: CeleXA  Take 1 tablet by mouth daily  Started by: Claudia Ward MD            CONTINUE taking these medications      sodium chloride 1 g tablet  Take 1 tablet by mouth 2 times daily (with meals) for 7 days            STOP taking these medications      busPIRone 15 MG tablet  Commonly known as: BUSPAR  Stopped by: Claudia Ward MD     glucose monitoring kit  Stopped by: Claudia Ward MD     ibuprofen 600 MG tablet  Commonly known as: ADVIL;MOTRIN  Stopped by: Claudia Ward MD     Microlet Lancets Misc  Stopped by: Claudia Ward MD     pregabalin 100 MG capsule  Commonly known as: Stormy Colander by: Claudia Ward MD     traZODone 50 MG tablet  Commonly known as: Karen Pickle by: Claudia Ward MD               Where to Get Your Medications        These medications were sent to [address] [phone] - F [phone] [address] [place] [id number]      Phone: [phone]   citalopram 10 MG tablet          Medications marked \"taking\" at this time  Outpatient Medications Marked as Taking for the 11/29/22 encounter (Office Visit) with Vinay Lazo MD   Medication Sig Dispense Refill    citalopram (CELEXA) 10 MG tablet Take 1 tablet by mouth daily 30 tablet 2        Medications patient taking as of now reconciled against medications ordered at time of hospital discharge: No    Review of Systems    Objective:    BP (!) 140/62   Pulse (!) 115   Resp 18   SpO2 95%   Physical Exam  Constitutional:       Appearance: He is well-developed. Eyes:      General: Scleral icterus present. Conjunctiva/sclera: Conjunctivae normal.   Cardiovascular:      Rate and Rhythm: Normal rate and regular rhythm.       Heart sounds: Normal heart sounds. No murmur heard. Pulmonary:      Effort: Pulmonary effort is normal. No respiratory distress. Breath sounds: Normal breath sounds. No wheezing. Abdominal:      Palpations: There is hepatomegaly. There is no fluid wave. An electronic signature was used to authenticate this note.   --Karen Siu MD

## 2022-12-05 ENCOUNTER — TELEPHONE (OUTPATIENT)
Dept: WOUND CARE | Age: 42
End: 2022-12-05

## 2022-12-05 NOTE — TELEPHONE ENCOUNTER
This SN called patient this morning to follow up; left voicemail for patient to call the [address] [place] [address] back to schedule an appointment to come for Wound Check.

## 2022-12-20 ENCOUNTER — TELEPHONE (OUTPATIENT)
Dept: WOUND CARE | Age: 42
End: 2022-12-20

## 2022-12-20 NOTE — TELEPHONE ENCOUNTER
This SN called patient on 12/15/22 to follow up on status of wounds; Left message for patient to call the [address] [place] [address] Road back to schedule an appointment.

## 2023-01-04 ENCOUNTER — TELEPHONE (OUTPATIENT)
Dept: WOUND CARE | Age: 43
End: 2023-01-04

## 2023-02-05 NOTE — PROGRESS NOTES
Contact Cast    NAME:  Camilo Alarcon  YOB: 1980  MEDICAL RECORD NUMBER:  [id number]  DATE:  6/24/2022    Goal:  Patient will maintain integrity of cast, avoid mobility hazards, and report complications that may occur (foul odor, pain, numbness, cracked cast).  [] Removed old contact cast if indicated and wash extremity with soap and water.  [x] Applied ordered dressing.    Applied per Tayler Alonso, CNP     Applied to Left Lower extremity   [x] Allow cast to dry.  [x] Instructed patient to report to health care provider, including wound care center, any back pain, hip pain, or leg pain, numbness of toes, or any odor  coming from the cast.    [x] Instructed patient not to stick any foreign objects down into cast.    [x] Instructed patient to utilize assistive devices(crutches, cane or walker) as ordered    [x] Instructed patient to continue offloading as directed.      Applied cast per  Guidelines    Electronically signed by Fredy Abrams RN on 6/24/2022 at 4:04 PM Patient given tobacco quitline number [phone] at this time, refusing to call at this time, states \" I just dont want to quit now\"- patient given information as to the dangers of long term tobacco use. Continue to reinforce the importance of tobacco cessation.

## 2023-07-19 ENCOUNTER — HOSPITAL ENCOUNTER (INPATIENT)
Age: 43
LOS: 3 days | Discharge: HOME | End: 2023-07-22
Attending: STUDENT IN AN ORGANIZED HEALTH CARE EDUCATION/TRAINING PROGRAM
Payer: COMMERCIAL

## 2023-07-19 ENCOUNTER — APPOINTMENT (OUTPATIENT)
Dept: GENERAL RADIOLOGY | Age: 43
End: 2023-07-19
Payer: COMMERCIAL

## 2023-07-19 ENCOUNTER — APPOINTMENT (OUTPATIENT)
Dept: ULTRASOUND IMAGING | Age: 43
End: 2023-07-19
Payer: COMMERCIAL

## 2023-07-19 ENCOUNTER — APPOINTMENT (OUTPATIENT)
Dept: CT IMAGING | Age: 43
End: 2023-07-19
Payer: COMMERCIAL

## 2023-07-19 DIAGNOSIS — M14.671 CHARCOT'S JOINT OF RIGHT FOOT: ICD-10-CM

## 2023-07-19 DIAGNOSIS — S91.114A LACERATION OF LESSER TOE OF RIGHT FOOT WITHOUT FOREIGN BODY PRESENT OR DAMAGE TO NAIL, INITIAL ENCOUNTER: ICD-10-CM

## 2023-07-19 DIAGNOSIS — E83.42 HYPOMAGNESEMIA: ICD-10-CM

## 2023-07-19 DIAGNOSIS — E80.6 HYPERBILIRUBINEMIA: ICD-10-CM

## 2023-07-19 DIAGNOSIS — S91.111A LACERATION OF RIGHT GREAT TOE WITHOUT FOREIGN BODY PRESENT OR DAMAGE TO NAIL, INITIAL ENCOUNTER: ICD-10-CM

## 2023-07-19 DIAGNOSIS — E83.51 HYPOCALCEMIA: ICD-10-CM

## 2023-07-19 DIAGNOSIS — E87.1 HYPONATREMIA: ICD-10-CM

## 2023-07-19 DIAGNOSIS — K70.30 ALCOHOLIC CIRRHOSIS OF LIVER WITHOUT ASCITES (HCC): Primary | ICD-10-CM

## 2023-07-19 LAB
ABO/RH: NORMAL
ALBUMIN SERPL-MCNC: 2.7 GM/DL (ref 3.4–5)
ALCOHOL SCREEN SERUM: 0.31 %WT/VOL
ALP BLD-CCNC: 230 IU/L (ref 40–129)
ALT SERPL-CCNC: 52 U/L (ref 10–40)
AMMONIA: 54 UMOL/L (ref 16–60)
AMPHETAMINES: NEGATIVE
ANION GAP SERPL CALCULATED.3IONS-SCNC: 10 MMOL/L (ref 4–16)
ANION GAP SERPL CALCULATED.3IONS-SCNC: 9 MMOL/L (ref 4–16)
ANTIBODY SCREEN: NEGATIVE
APTT: 46.7 SECONDS (ref 25.1–37.1)
AST SERPL-CCNC: 161 IU/L (ref 15–37)
BACTERIA: NEGATIVE /HPF
BANDED NEUTROPHILS ABSOLUTE COUNT: 1.16 K/CU MM
BANDED NEUTROPHILS RELATIVE PERCENT: 9 % (ref 5–11)
BARBITURATE SCREEN URINE: NEGATIVE
BENZODIAZEPINE SCREEN, URINE: NEGATIVE
BILIRUB SERPL-MCNC: 13.5 MG/DL (ref 0–1)
BILIRUBIN URINE: ABNORMAL MG/DL
BLOOD, URINE: ABNORMAL
BUN SERPL-MCNC: 3 MG/DL (ref 6–23)
BUN SERPL-MCNC: 4 MG/DL (ref 6–23)
CALCIUM SERPL-MCNC: 7.5 MG/DL (ref 8.3–10.6)
CALCIUM SERPL-MCNC: 7.9 MG/DL (ref 8.3–10.6)
CANNABINOID SCREEN URINE: NEGATIVE
CHLORIDE BLD-SCNC: 79 MMOL/L (ref 99–110)
CHLORIDE BLD-SCNC: 84 MMOL/L (ref 99–110)
CHLORIDE URINE RANDOM: 13 MMOL/L (ref 43–210)
CHLORIDE URINE RANDOM: 33 MMOL/L (ref 43–210)
CLARITY: CLEAR
CO2: 25 MMOL/L (ref 21–32)
CO2: 25 MMOL/L (ref 21–32)
COCAINE METABOLITE: NEGATIVE
COLOR: YELLOW
CREAT SERPL-MCNC: 0.2 MG/DL (ref 0.9–1.3)
CREAT SERPL-MCNC: 0.2 MG/DL (ref 0.9–1.3)
DIFFERENTIAL TYPE: ABNORMAL
EOSINOPHILS ABSOLUTE: 0.4 K/CU MM
EOSINOPHILS RELATIVE PERCENT: 3 % (ref 0–3)
FENTANYL URINE: NEGATIVE
GFR SERPL CREATININE-BSD FRML MDRD: >60 ML/MIN/1.73M2
GFR SERPL CREATININE-BSD FRML MDRD: >60 ML/MIN/1.73M2
GLUCOSE BLD-MCNC: 203 MG/DL (ref 70–99)
GLUCOSE BLD-MCNC: 210 MG/DL (ref 70–99)
GLUCOSE SERPL-MCNC: 170 MG/DL (ref 70–99)
GLUCOSE SERPL-MCNC: 179 MG/DL (ref 70–99)
GLUCOSE, URINE: NEGATIVE MG/DL
HCT VFR BLD CALC: 25.9 % (ref 42–52)
HEMOGLOBIN: 8.7 GM/DL (ref 13.5–18)
INR BLD: 1.5 INDEX
KETONES, URINE: NEGATIVE MG/DL
LEUKOCYTE ESTERASE, URINE: NEGATIVE
LIPASE: 25 IU/L (ref 13–60)
LYMPHOCYTES ABSOLUTE: 2.1 K/CU MM
LYMPHOCYTES RELATIVE PERCENT: 16 % (ref 24–44)
MAGNESIUM: 1.5 MG/DL (ref 1.8–2.4)
MCH RBC QN AUTO: 30.7 PG (ref 27–31)
MCHC RBC AUTO-ENTMCNC: 33.6 % (ref 32–36)
MCV RBC AUTO: 91.5 FL (ref 78–100)
METAMYELOCYTES ABSOLUTE COUNT: 0.26 K/CU MM
METAMYELOCYTES PERCENT: 2 %
MONOCYTES ABSOLUTE: 1.8 K/CU MM
MONOCYTES RELATIVE PERCENT: 14 % (ref 0–4)
NITRITE URINE, QUANTITATIVE: NEGATIVE
OPIATES, URINE: NEGATIVE
OSMOLALITY UR: 238 MOS/L (ref 292–1090)
OSMOLALITY UR: 310 MOS/L (ref 292–1090)
OXYCODONE, OPI5M: NEGATIVE
PDW BLD-RTO: 21.7 % (ref 11.7–14.9)
PH, URINE: 5.5 (ref 5–8)
PLATELET # BLD: 289 K/CU MM (ref 140–440)
PMV BLD AUTO: 8.6 FL (ref 7.5–11.1)
POTASSIUM SERPL-SCNC: 4.1 MMOL/L (ref 3.5–5.1)
POTASSIUM SERPL-SCNC: 4.4 MMOL/L (ref 3.5–5.1)
POTASSIUM, UR: 16.2 MMOL/L (ref 22–119)
POTASSIUM, UR: 41.7 MMOL/L (ref 22–119)
PROTEIN UA: 30 MG/DL
PROTHROMBIN TIME: 18 SECONDS (ref 11.7–14.5)
RBC # BLD: 2.83 M/CU MM (ref 4.6–6.2)
RBC URINE: 1 /HPF (ref 0–3)
SEGMENTED NEUTROPHILS ABSOLUTE COUNT: 7.2 K/CU MM
SEGMENTED NEUTROPHILS RELATIVE PERCENT: 56 % (ref 36–66)
SODIUM BLD-SCNC: 114 MMOL/L (ref 135–145)
SODIUM BLD-SCNC: 118 MMOL/L (ref 135–145)
SODIUM URINE: 10 MMOL/L (ref 35–167)
SODIUM URINE: 11 MMOL/L (ref 35–167)
SPECIFIC GRAVITY UA: <1.005 (ref 1–1.03)
TOTAL PROTEIN: 8.1 GM/DL (ref 6.4–8.2)
TRICHOMONAS: NORMAL /HPF
UROBILINOGEN, URINE: 2 MG/DL (ref 0.2–1)
WBC # BLD: 12.9 K/CU MM (ref 4–10.5)
WBC UA: 1 /HPF (ref 0–2)

## 2023-07-19 PROCEDURE — 82436 ASSAY OF URINE CHLORIDE: CPT

## 2023-07-19 PROCEDURE — 2580000003 HC RX 258: Performed by: STUDENT IN AN ORGANIZED HEALTH CARE EDUCATION/TRAINING PROGRAM

## 2023-07-19 PROCEDURE — 80307 DRUG TEST PRSMV CHEM ANLYZR: CPT

## 2023-07-19 PROCEDURE — 2580000003 HC RX 258: Performed by: INTERNAL MEDICINE

## 2023-07-19 PROCEDURE — 6360000004 HC RX CONTRAST MEDICATION: Performed by: PHYSICIAN ASSISTANT

## 2023-07-19 PROCEDURE — 6360000002 HC RX W HCPCS: Performed by: STUDENT IN AN ORGANIZED HEALTH CARE EDUCATION/TRAINING PROGRAM

## 2023-07-19 PROCEDURE — 6370000000 HC RX 637 (ALT 250 FOR IP): Performed by: STUDENT IN AN ORGANIZED HEALTH CARE EDUCATION/TRAINING PROGRAM

## 2023-07-19 PROCEDURE — C9113 INJ PANTOPRAZOLE SODIUM, VIA: HCPCS | Performed by: SPECIALIST

## 2023-07-19 PROCEDURE — 96374 THER/PROPH/DIAG INJ IV PUSH: CPT

## 2023-07-19 PROCEDURE — 81001 URINALYSIS AUTO W/SCOPE: CPT

## 2023-07-19 PROCEDURE — 83935 ASSAY OF URINE OSMOLALITY: CPT

## 2023-07-19 PROCEDURE — 86850 RBC ANTIBODY SCREEN: CPT

## 2023-07-19 PROCEDURE — 2060000000 HC ICU INTERMEDIATE R&B

## 2023-07-19 PROCEDURE — 85730 THROMBOPLASTIN TIME PARTIAL: CPT

## 2023-07-19 PROCEDURE — 6370000000 HC RX 637 (ALT 250 FOR IP): Performed by: PHYSICIAN ASSISTANT

## 2023-07-19 PROCEDURE — 76705 ECHO EXAM OF ABDOMEN: CPT

## 2023-07-19 PROCEDURE — 85610 PROTHROMBIN TIME: CPT

## 2023-07-19 PROCEDURE — 83735 ASSAY OF MAGNESIUM: CPT

## 2023-07-19 PROCEDURE — 73630 X-RAY EXAM OF FOOT: CPT

## 2023-07-19 PROCEDURE — 99285 EMERGENCY DEPT VISIT HI MDM: CPT

## 2023-07-19 PROCEDURE — 6360000002 HC RX W HCPCS: Performed by: PHYSICIAN ASSISTANT

## 2023-07-19 PROCEDURE — 74177 CT ABD & PELVIS W/CONTRAST: CPT

## 2023-07-19 PROCEDURE — 96366 THER/PROPH/DIAG IV INF ADDON: CPT

## 2023-07-19 PROCEDURE — 80048 BASIC METABOLIC PNL TOTAL CA: CPT

## 2023-07-19 PROCEDURE — 82962 GLUCOSE BLOOD TEST: CPT

## 2023-07-19 PROCEDURE — 84133 ASSAY OF URINE POTASSIUM: CPT

## 2023-07-19 PROCEDURE — 82140 ASSAY OF AMMONIA: CPT

## 2023-07-19 PROCEDURE — G0480 DRUG TEST DEF 1-7 CLASSES: HCPCS

## 2023-07-19 PROCEDURE — 86901 BLOOD TYPING SEROLOGIC RH(D): CPT

## 2023-07-19 PROCEDURE — HZ2ZZZZ DETOXIFICATION SERVICES FOR SUBSTANCE ABUSE TREATMENT: ICD-10-PCS | Performed by: STUDENT IN AN ORGANIZED HEALTH CARE EDUCATION/TRAINING PROGRAM

## 2023-07-19 PROCEDURE — 2580000003 HC RX 258: Performed by: PHYSICIAN ASSISTANT

## 2023-07-19 PROCEDURE — 85027 COMPLETE CBC AUTOMATED: CPT

## 2023-07-19 PROCEDURE — 81003 URINALYSIS AUTO W/O SCOPE: CPT

## 2023-07-19 PROCEDURE — 80053 COMPREHEN METABOLIC PANEL: CPT

## 2023-07-19 PROCEDURE — 84300 ASSAY OF URINE SODIUM: CPT

## 2023-07-19 PROCEDURE — 86900 BLOOD TYPING SEROLOGIC ABO: CPT

## 2023-07-19 PROCEDURE — 83690 ASSAY OF LIPASE: CPT

## 2023-07-19 PROCEDURE — 85007 BL SMEAR W/DIFF WBC COUNT: CPT

## 2023-07-19 PROCEDURE — 96375 TX/PRO/DX INJ NEW DRUG ADDON: CPT

## 2023-07-19 PROCEDURE — 36415 COLL VENOUS BLD VENIPUNCTURE: CPT

## 2023-07-19 PROCEDURE — 96365 THER/PROPH/DIAG IV INF INIT: CPT

## 2023-07-19 PROCEDURE — 6360000002 HC RX W HCPCS: Performed by: SPECIALIST

## 2023-07-19 RX ORDER — DEXTROSE MONOHYDRATE 100 MG/ML
INJECTION, SOLUTION INTRAVENOUS CONTINUOUS PRN
Status: DISCONTINUED | OUTPATIENT
Start: 2023-07-19 | End: 2023-07-22 | Stop reason: HOSPADM

## 2023-07-19 RX ORDER — ACETAMINOPHEN 650 MG/1
650 SUPPOSITORY RECTAL EVERY 6 HOURS PRN
Status: DISCONTINUED | OUTPATIENT
Start: 2023-07-19 | End: 2023-07-22 | Stop reason: HOSPADM

## 2023-07-19 RX ORDER — LORAZEPAM 1 MG/1
2 TABLET ORAL
Status: DISCONTINUED | OUTPATIENT
Start: 2023-07-19 | End: 2023-07-22 | Stop reason: HOSPADM

## 2023-07-19 RX ORDER — SODIUM CHLORIDE 0.9 % (FLUSH) 0.9 %
5-40 SYRINGE (ML) INJECTION PRN
Status: DISCONTINUED | OUTPATIENT
Start: 2023-07-19 | End: 2023-07-22 | Stop reason: HOSPADM

## 2023-07-19 RX ORDER — LANOLIN ALCOHOL/MO/W.PET/CERES
100 CREAM (GRAM) TOPICAL DAILY
Status: DISCONTINUED | OUTPATIENT
Start: 2023-07-19 | End: 2023-07-22 | Stop reason: HOSPADM

## 2023-07-19 RX ORDER — SODIUM CHLORIDE 9 MG/ML
INJECTION, SOLUTION INTRAVENOUS CONTINUOUS
Status: DISCONTINUED | OUTPATIENT
Start: 2023-07-19 | End: 2023-07-20

## 2023-07-19 RX ORDER — ENOXAPARIN SODIUM 100 MG/ML
30 INJECTION SUBCUTANEOUS 2 TIMES DAILY
Status: DISCONTINUED | OUTPATIENT
Start: 2023-07-19 | End: 2023-07-22 | Stop reason: HOSPADM

## 2023-07-19 RX ORDER — LORAZEPAM 1 MG/1
3 TABLET ORAL
Status: DISCONTINUED | OUTPATIENT
Start: 2023-07-19 | End: 2023-07-22 | Stop reason: HOSPADM

## 2023-07-19 RX ORDER — MAGNESIUM SULFATE IN WATER 40 MG/ML
2000 INJECTION, SOLUTION INTRAVENOUS ONCE
Status: COMPLETED | OUTPATIENT
Start: 2023-07-19 | End: 2023-07-19

## 2023-07-19 RX ORDER — CHLORDIAZEPOXIDE HYDROCHLORIDE 25 MG/1
50 CAPSULE, GELATIN COATED ORAL 3 TIMES DAILY
Status: DISCONTINUED | OUTPATIENT
Start: 2023-07-19 | End: 2023-07-22 | Stop reason: HOSPADM

## 2023-07-19 RX ORDER — THIAMINE HYDROCHLORIDE 100 MG/ML
100 INJECTION, SOLUTION INTRAMUSCULAR; INTRAVENOUS DAILY
Status: DISCONTINUED | OUTPATIENT
Start: 2023-07-19 | End: 2023-07-19 | Stop reason: SDUPTHER

## 2023-07-19 RX ORDER — M-VIT,TX,IRON,MINS/CALC/FOLIC 27MG-0.4MG
1 TABLET ORAL DAILY
Status: DISCONTINUED | OUTPATIENT
Start: 2023-07-19 | End: 2023-07-22 | Stop reason: HOSPADM

## 2023-07-19 RX ORDER — LORAZEPAM 1 MG/1
1 TABLET ORAL
Status: DISCONTINUED | OUTPATIENT
Start: 2023-07-19 | End: 2023-07-22 | Stop reason: HOSPADM

## 2023-07-19 RX ORDER — LORAZEPAM 2 MG/ML
3 INJECTION INTRAMUSCULAR
Status: DISCONTINUED | OUTPATIENT
Start: 2023-07-19 | End: 2023-07-22 | Stop reason: HOSPADM

## 2023-07-19 RX ORDER — PANTOPRAZOLE SODIUM 40 MG/10ML
40 INJECTION, POWDER, LYOPHILIZED, FOR SOLUTION INTRAVENOUS DAILY
Status: DISCONTINUED | OUTPATIENT
Start: 2023-07-19 | End: 2023-07-22 | Stop reason: HOSPADM

## 2023-07-19 RX ORDER — GLUCAGON 1 MG/ML
1 KIT INJECTION PRN
Status: DISCONTINUED | OUTPATIENT
Start: 2023-07-19 | End: 2023-07-22 | Stop reason: HOSPADM

## 2023-07-19 RX ORDER — GINSENG 100 MG
CAPSULE ORAL ONCE
Status: COMPLETED | OUTPATIENT
Start: 2023-07-19 | End: 2023-07-19

## 2023-07-19 RX ORDER — LORAZEPAM 1 MG/1
4 TABLET ORAL
Status: DISCONTINUED | OUTPATIENT
Start: 2023-07-19 | End: 2023-07-22 | Stop reason: HOSPADM

## 2023-07-19 RX ORDER — LORAZEPAM 2 MG/ML
4 INJECTION INTRAMUSCULAR
Status: DISCONTINUED | OUTPATIENT
Start: 2023-07-19 | End: 2023-07-22 | Stop reason: HOSPADM

## 2023-07-19 RX ORDER — M-VIT,TX,IRON,MINS/CALC/FOLIC 27MG-0.4MG
1 TABLET ORAL DAILY
Status: DISCONTINUED | OUTPATIENT
Start: 2023-07-19 | End: 2023-07-19 | Stop reason: SDUPTHER

## 2023-07-19 RX ORDER — INSULIN LISPRO 100 [IU]/ML
0-4 INJECTION, SOLUTION INTRAVENOUS; SUBCUTANEOUS NIGHTLY
Status: DISCONTINUED | OUTPATIENT
Start: 2023-07-19 | End: 2023-07-22 | Stop reason: HOSPADM

## 2023-07-19 RX ORDER — SODIUM CHLORIDE 0.9 % (FLUSH) 0.9 %
5-40 SYRINGE (ML) INJECTION EVERY 12 HOURS SCHEDULED
Status: DISCONTINUED | OUTPATIENT
Start: 2023-07-19 | End: 2023-07-22 | Stop reason: HOSPADM

## 2023-07-19 RX ORDER — SODIUM CHLORIDE 9 MG/ML
INJECTION, SOLUTION INTRAVENOUS CONTINUOUS
Status: DISCONTINUED | OUTPATIENT
Start: 2023-07-19 | End: 2023-07-19

## 2023-07-19 RX ORDER — INSULIN LISPRO 100 [IU]/ML
0-4 INJECTION, SOLUTION INTRAVENOUS; SUBCUTANEOUS
Status: DISCONTINUED | OUTPATIENT
Start: 2023-07-19 | End: 2023-07-22 | Stop reason: HOSPADM

## 2023-07-19 RX ORDER — POLYETHYLENE GLYCOL 3350 17 G/17G
17 POWDER, FOR SOLUTION ORAL DAILY PRN
Status: DISCONTINUED | OUTPATIENT
Start: 2023-07-19 | End: 2023-07-22 | Stop reason: HOSPADM

## 2023-07-19 RX ORDER — ONDANSETRON 2 MG/ML
4 INJECTION INTRAMUSCULAR; INTRAVENOUS EVERY 6 HOURS PRN
Status: DISCONTINUED | OUTPATIENT
Start: 2023-07-19 | End: 2023-07-22 | Stop reason: HOSPADM

## 2023-07-19 RX ORDER — ACETAMINOPHEN 325 MG/1
650 TABLET ORAL EVERY 6 HOURS PRN
Status: DISCONTINUED | OUTPATIENT
Start: 2023-07-19 | End: 2023-07-22 | Stop reason: HOSPADM

## 2023-07-19 RX ORDER — LORAZEPAM 2 MG/ML
1 INJECTION INTRAMUSCULAR
Status: DISCONTINUED | OUTPATIENT
Start: 2023-07-19 | End: 2023-07-22 | Stop reason: HOSPADM

## 2023-07-19 RX ORDER — ONDANSETRON 4 MG/1
4 TABLET, ORALLY DISINTEGRATING ORAL EVERY 8 HOURS PRN
Status: DISCONTINUED | OUTPATIENT
Start: 2023-07-19 | End: 2023-07-22 | Stop reason: HOSPADM

## 2023-07-19 RX ORDER — SODIUM CHLORIDE 9 MG/ML
INJECTION, SOLUTION INTRAVENOUS PRN
Status: DISCONTINUED | OUTPATIENT
Start: 2023-07-19 | End: 2023-07-22 | Stop reason: HOSPADM

## 2023-07-19 RX ORDER — LORAZEPAM 2 MG/ML
2 INJECTION INTRAMUSCULAR
Status: DISCONTINUED | OUTPATIENT
Start: 2023-07-19 | End: 2023-07-22 | Stop reason: HOSPADM

## 2023-07-19 RX ADMIN — MAGNESIUM SULFATE HEPTAHYDRATE 2000 MG: 40 INJECTION, SOLUTION INTRAVENOUS at 10:35

## 2023-07-19 RX ADMIN — ENOXAPARIN SODIUM 30 MG: 100 INJECTION SUBCUTANEOUS at 22:45

## 2023-07-19 RX ADMIN — SODIUM CHLORIDE 100 ML/HR: 9 INJECTION, SOLUTION INTRAVENOUS at 10:33

## 2023-07-19 RX ADMIN — CHLORDIAZEPOXIDE HYDROCHLORIDE 50 MG: 25 CAPSULE ORAL at 22:45

## 2023-07-19 RX ADMIN — SODIUM CHLORIDE: 9 INJECTION, SOLUTION INTRAVENOUS at 22:41

## 2023-07-19 RX ADMIN — MULTIPLE VITAMINS W/ MINERALS TAB 1 TABLET: TAB at 22:45

## 2023-07-19 RX ADMIN — MAGNESIUM SULFATE HEPTAHYDRATE 2000 MG: 40 INJECTION, SOLUTION INTRAVENOUS at 18:52

## 2023-07-19 RX ADMIN — Medication 100 MG: at 22:45

## 2023-07-19 RX ADMIN — SODIUM CHLORIDE, PRESERVATIVE FREE 10 ML: 5 INJECTION INTRAVENOUS at 22:32

## 2023-07-19 RX ADMIN — MULTIPLE VITAMINS W/ MINERALS TAB 1 TABLET: TAB at 10:28

## 2023-07-19 RX ADMIN — IOPAMIDOL 75 ML: 755 INJECTION, SOLUTION INTRAVENOUS at 11:15

## 2023-07-19 RX ADMIN — PANTOPRAZOLE SODIUM 40 MG: 40 INJECTION, POWDER, FOR SOLUTION INTRAVENOUS at 16:58

## 2023-07-19 RX ADMIN — LORAZEPAM 4 MG: 2 INJECTION INTRAMUSCULAR; INTRAVENOUS at 18:22

## 2023-07-19 RX ADMIN — Medication: at 10:35

## 2023-07-19 RX ADMIN — LORAZEPAM 3 MG: 2 INJECTION INTRAMUSCULAR; INTRAVENOUS at 22:44

## 2023-07-19 RX ADMIN — THIAMINE HYDROCHLORIDE 100 MG: 100 INJECTION, SOLUTION INTRAMUSCULAR; INTRAVENOUS at 10:28

## 2023-07-19 ASSESSMENT — PAIN DESCRIPTION - FREQUENCY
FREQUENCY: CONTINUOUS
FREQUENCY: INTERMITTENT

## 2023-07-19 ASSESSMENT — ENCOUNTER SYMPTOMS
CONSTIPATION: 0
BACK PAIN: 0
ABDOMINAL PAIN: 0
SINUS PRESSURE: 0
SHORTNESS OF BREATH: 0
NAUSEA: 0
CHEST TIGHTNESS: 0
TROUBLE SWALLOWING: 0
VOMITING: 0
COLOR CHANGE: 0
DIARRHEA: 0
WHEEZING: 0
COUGH: 0
SINUS PAIN: 0
SORE THROAT: 0
VOICE CHANGE: 0

## 2023-07-19 ASSESSMENT — PAIN DESCRIPTION - ORIENTATION
ORIENTATION: LEFT
ORIENTATION: LEFT

## 2023-07-19 ASSESSMENT — PAIN SCALES - GENERAL
PAINLEVEL_OUTOF10: 3
PAINLEVEL_OUTOF10: 3
PAINLEVEL_OUTOF10: 5

## 2023-07-19 ASSESSMENT — PAIN DESCRIPTION - PAIN TYPE
TYPE: ACUTE PAIN
TYPE: ACUTE PAIN

## 2023-07-19 ASSESSMENT — PAIN DESCRIPTION - ONSET
ONSET: ON-GOING
ONSET: SUDDEN

## 2023-07-19 ASSESSMENT — PAIN DESCRIPTION - LOCATION
LOCATION: FOOT
LOCATION: FOOT

## 2023-07-19 ASSESSMENT — PAIN - FUNCTIONAL ASSESSMENT: PAIN_FUNCTIONAL_ASSESSMENT: 0-10

## 2023-07-19 ASSESSMENT — PAIN DESCRIPTION - DESCRIPTORS
DESCRIPTORS: THROBBING
DESCRIPTORS: ACHING

## 2023-07-19 NOTE — ED NOTES
Patient noted scooting down to bottom of the bed to urinate after being asked by this RN not to do so d/t fall risk. Patient has hx of neuropathy, wears a brace on foot, and has sutures in foot. Jayna Lucia.  AURY Newby  07/19/23 [id number]

## 2023-07-19 NOTE — ED PROVIDER NOTES
EMERGENCY DEPARTMENT ENCOUNTER        Pt Name: Trevon Reyes  MRN: [id number]  [facility] [facility] 1980  Date of evaluation: 7/19/2023  Provider: LILY Gardner  PCP: William Marmolejo MD    RANDOLPH. I have evaluated this patient. Triage CHIEF COMPLAINT       Chief Complaint   Patient presents with    Laceration     States has a lac to toe on right foot, reports cant stop the bleeding, not on blood thinners. HISTORY OF PRESENT ILLNESS      Chief Complaint: Laceration to right foot, generalized malaise-alcohol abuse    Trevon Reyes is a 38 y.o.  male who presents to the emergency department today via private vehicle initially for a laceration to the right great toe and the third foot. Patient states that he is unsure of how he injured his foot, he has a history of Charcot's foot and has bad neuropathy. Has a history of significant alcohol abuse and diabetes. Has been off his medications, states he has been drinking. Is present with significant bleeding to the right foot area. Patient also jaundiced. He describing no abdominal pain. States he does have a history of alcohol problems. States he was recently taken off all his medications and has not resumed them. Has not been controlling his blood sugar. He continues to drink, he states that his last drink was \"several hours ago\". No agitation, confusion, delirium. No fevers or chills. Nursing Notes were all reviewed and agreed with or any disagreements were addressed in the HPI. REVIEW OF SYSTEMS     At least 10 systems reviewed and otherwise acutely negative except as in the [address] [facility] [address]      PAST MEDICAL HISTORY     Past Medical History:   Diagnosis Date    Hypercholesterolemia 2/18/2019    Hypertension     Local infection of skin and subcutaneous tissue 5/13/2022    Type 2 diabetes, controlled, with neuropathy ([address]) 6/6/2018    WD-Diabetic ulcer of toe of left foot associated with type 2 diabetes mellitus, with fat layer exposed no administration in time range)     Or   LORazepam (ATIVAN) tablet 2 mg (has no administration in time range)     Or   LORazepam (ATIVAN) injection 2 mg (has no administration in time range)     Or   LORazepam (ATIVAN) tablet 3 mg (has no administration in time range)     Or   LORazepam (ATIVAN) injection 3 mg (has no administration in time range)     Or   LORazepam (ATIVAN) tablet 4 mg (has no administration in time range)     Or   LORazepam (ATIVAN) injection 4 mg (has no administration in time range)   0.9 % sodium chloride infusion (100 mL/hr IntraVENous New Bag 7/19/23 1033)   pantoprazole (PROTONIX) injection 40 mg (has no administration in time range)   bacitracin ointment ( Topical Given 7/19/23 1035)   magnesium sulfate 2000 mg in 50 mL IVPB premix (0 mg IntraVENous Stopped 7/19/23 1328)   iopamidol (ISOVUE-370) 76 % injection 75 mL (75 mLs IntraVENous Given 7/19/23 1115)       Independent Imaging Interpretation by me: CT scan of the abdomen pelvis demonstrating his cirrhotic liver morphology, consideration for acute cholecystitis. Ultrasound was interpreted and demonstrate no signs of acute cholecystitis, no obvious obstructing stone. EKG (if obtained): See supervising physician note for EKG interpretation. Chronic conditions affecting care: Alcohol abuse    Discussion with Other Profesionals : Admitting Team   and Consultant GI-DR pearce-we will see patient in consultation. Nephrology-Dr. Guillory-recommending normal saline for patient's hyponatremia. Critical care consulted-recommending admission to stepdown    Social Determinants : None    Records Reviewed : Inpatient Notes reviewed recent EMR and inpatient notes, history of liver failure, alcohol abuse. Admission    I am the Primary Clinician of Record. CLINICAL IMPRESSION      1. Alcoholic cirrhosis of liver without ascites ([address])    2. Hyponatremia    3. Hyperbilirubinemia    4. Hypocalcemia    5. Hypomagnesemia    6.  Charcot's joint of

## 2023-07-19 NOTE — ED NOTES
ED TO INPATIENT SBAR HANDOFF    Patient Name: Saadia Roy   :  1980  38 y.o. Preferred Name  miryam randle  Family/Caregiver Present no   Restraints no   C-SSRS: Risk of Suicide: No Risk  Sitter no   Sepsis Risk Score Sepsis Risk Score: 5.28      Situation  Chief Complaint   Patient presents with    Laceration     States has a lac to toe on right foot, reports cant stop the bleeding, not on blood thinners. Brief Description of Patient's Condition: alcoholic cirrhosis of the liver w/o ascities. Laceration to left great toe and 3 rd toe  Mental Status: oriented  Arrived from: home    Imaging:   US ABDOMEN LIMITED   Final Result   Hepatomegaly and echotexture consistent with hepatic steatosis. Gallstones and sludge without sonographic evidence of acute cholecystitis. Common bile duct projects at the upper limit of normal.  Otherwise, no acute   sonographic abnormality visualized abdomen. CT ABDOMEN PELVIS W IV CONTRAST Additional Contrast? None   Final Result   Hepatomegaly, splenomegaly, findings consistent with hepatic steatosis,   cirrhosis and portal hypertension with portal-systemic shunts and mild   diffuse infiltrative change throughout the mesentery and retroperitoneal fat. No drainable ascites noted. Acute hepatitis may contribute to appearance and   size of liver. Gallstones, mild gallbladder wall thickening and pericholecystic infiltrative   changes. Findings may partially reflect changes associated with cirrhosis   and portal hypertension however, acute cholecystitis may contribute to this   appearance. Hepatobiliary scan and/or gallbladder ultrasound suggested for   further evaluation if clinically indicated. Nodular subsegmental atelectasis subpleural region left lower lobe with   largest nodular component measuring 5.5 mm. Finding was not present on prior   studies. Small neoplasm considered unlikely but not excluded.   Chest CT in 1   year would be helpful to

## 2023-07-19 NOTE — ED NOTES
Medication History  [facility]    Patient Name: Rajesh Edwards 1980     Medication history has been completed by: Audie George CPhT    Source(s) of information: patient and insurance claims     Primary Care Physician: Diana Schulz MD     Ph[facility]: [facility]    Allergies as of 07/19/2023 - Fully Reviewed 11/29/2022   Allergen Reaction Noted    Paxil [paroxetine hcl]  02/26/2018        Prior to Admission medications    Medication Sig Start Date End Date Taking? Authorizing Provider   citalopram (CELEXA) 10 MG tablet Take 1 tablet by mouth daily  Patient not taking: Reported on 7/19/2023 11/29/22   Diana Schulz MD     Comments:  Patient reports he has been taking wife's buspirone and trazodone at night. States \"not every night but when I feel I need it\".  Reports took both medications last evening    To my knowledge the above medication history is accurate as of 7/19/2023 9:15 AM.   Audie George CPhT   7/19/2023 9:15 AM

## 2023-07-19 NOTE — ED NOTES
Patient provided with a urinal per request. Patient instructed not to get up from bed. Noam Yang.  AURY Newby  07/19/23 6945

## 2023-07-19 NOTE — DISCHARGE INSTR - LAB
Alcohol [facility] At [address] [place] [address]   [phone] or [phone]  Intensive Outpatient and Outpatient treatment for both men & women  Walk in potential Mon - Fri: 8 am - 4 pm; closed 11:30 am - 1 pm     Isaac Bence   [phone]   Intensive Outpatient and Residential for men & Intensive Outpatient for women   Walk in potential Mon - Fri: 10 am - 1 pm     [facility]      [phone]  Comprehensive Outpatient Addiction Treatment  Walk in potential Mon -Fri: 8 am - 3 pm     Alcohol Treatment Facilities:   Call for insurance eligibility vs cost  [place] [phone]  [facility] at [address] [facility]  [employer] [address] [facility]/[address] [phone]  [address] [phone]  [facility] [address] [facility] [address] [facility] [phone]    Alcoholics Anonymous/ ALATAN/ALATEEN [phone] or [phone]  https://cogf. meetingfor. me/meetings or www. aacentral[place]. org      Mental Health Crisis Line: [phone]    [place] [employer] with [address]: [address] [place] [address] Rd: [phone]  [facility]: www.smartrecovery. org  [place] Quit Line: (smoking) https://[place]. quitlogix. org 800-QUIT-NOW (611-207-3562)  24/7 crisis line for [facility]: [phone]  24-hour crisis text hotline: Text the word \"4hope\" to 144-213 for crisis support    Information & Referral for [facility]  Referral line to connect with available resources/services  [facility] [phone]    Case Management to call Admissions [phone] with [facility] one day before discharge. [facility] will arrange transportation at one of their facilities. [facility] does focus on Alcohol Treatment. They have many facilities and stated that they will be able to accommodate patient.      If Patient

## 2023-07-19 NOTE — H&P
End Date Taking? Authorizing Provider   citalopram (CELEXA) 10 MG tablet Take 1 tablet by mouth daily  Patient not taking: Reported on 7/19/2023 11/29/22   Jany Harrison MD       Physical Exam: Need 8 Elements     Physical Exam  Constitutional:       General: He is not in acute distress. Appearance: Normal appearance. HENT:      Head: Normocephalic and atraumatic. Nose: Nose normal.      Mouth/Throat:      Mouth: Mucous membranes are moist.      Pharynx: Oropharynx is clear. Eyes:      Extraocular Movements: Extraocular movements intact. Conjunctiva/sclera: Conjunctivae normal.      Pupils: Pupils are equal, round, and reactive to light. Cardiovascular:      Rate and Rhythm: Normal rate and regular rhythm. Pulses: Normal pulses. Heart sounds: Normal heart sounds. Pulmonary:      Effort: Pulmonary effort is normal.   Abdominal:      General: Abdomen is flat. Bowel sounds are normal.      Palpations: Abdomen is soft. Musculoskeletal:         General: Normal range of motion. Cervical back: Normal range of motion and neck supple. Skin:     General: Skin is warm and dry. Coloration: Skin is jaundiced. Neurological:      General: No focal deficit present. Mental Status: He is alert and oriented to person, place, and time. Mental status is at baseline. Psychiatric:         Mood and Affect: Mood normal.         Behavior: Behavior normal.         Thought Content:  Thought content normal.       Past Medical History:   PMHx   Past Medical History:   Diagnosis Date    Hypercholesterolemia 2/18/2019    Hypertension     Local infection of skin and subcutaneous tissue 5/13/2022    Type 2 diabetes, controlled, with neuropathy ([address]) 6/6/2018    WD-Diabetic ulcer of toe of left foot associated with type 2 diabetes mellitus, with fat layer exposed ([address]) 5/13/2022    WD-Lisfranc dislocation, left, initial encounter 9/2/2022     PSHX:  has no past surgical history on portions of the pancreas are unremarkable. OTHER: No evidence of right upper quadrant ascites. Hepatomegaly and echotexture consistent with hepatic steatosis. Gallstones and sludge without sonographic evidence of acute cholecystitis. Common bile duct projects at the upper limit of normal.  Otherwise, no acute sonographic abnormality visualized abdomen. Comment: Please note this report has been produced using speech recognition software and may contain errors related to that system including errors in grammar, punctuation, and spelling, as well as words and phrases that may be inappropriate. If there are any questions or concerns please feel free to contact the dictating provider for clarification.     Electronically signed by Ozzy Dickinson MD on 7/19/2023 at 5:14 PM

## 2023-07-19 NOTE — ED NOTES
ED TO INPATIENT SBAR HANDOFF    Patient Name: Melinda So   :  1980  38 y.o. Preferred Name    Family/Caregiver Present no   Restraints no   C-SSRS: Risk of Suicide: No Risk  Sitter no   Sepsis Risk Score Sepsis Risk Score: 5.31      Situation  Chief Complaint   Patient presents with    Laceration     States has a lac to toe on right foot, reports cant stop the bleeding, not on blood thinners. Brief Description of Patient's Condition:   Mental Status: oriented  Arrived from: home    Imaging:   US ABDOMEN LIMITED   Final Result   Hepatomegaly and echotexture consistent with hepatic steatosis. Gallstones and sludge without sonographic evidence of acute cholecystitis. Common bile duct projects at the upper limit of normal.  Otherwise, no acute   sonographic abnormality visualized abdomen. CT ABDOMEN PELVIS W IV CONTRAST Additional Contrast? None   Final Result   Hepatomegaly, splenomegaly, findings consistent with hepatic steatosis,   cirrhosis and portal hypertension with portal-systemic shunts and mild   diffuse infiltrative change throughout the mesentery and retroperitoneal fat. No drainable ascites noted. Acute hepatitis may contribute to appearance and   size of liver. Gallstones, mild gallbladder wall thickening and pericholecystic infiltrative   changes. Findings may partially reflect changes associated with cirrhosis   and portal hypertension however, acute cholecystitis may contribute to this   appearance. Hepatobiliary scan and/or gallbladder ultrasound suggested for   further evaluation if clinically indicated. Nodular subsegmental atelectasis subpleural region left lower lobe with   largest nodular component measuring 5.5 mm. Finding was not present on prior   studies. Small neoplasm considered unlikely but not excluded. Chest CT in 1   year would be helpful to evaluate stability.       Umbilical/periumbilical hernia containing fat, portions of recanalized umbilical vein. No strangulation or infarction of the hernia sac and no   significant interval changes noted compared to prior studies. XR FOOT RIGHT (MIN 3 VIEWS)   Final Result   Increasing/worse facture dislocation throughout midfoot articulations   compared to 06/27/2022 with increasing distraction of the 1st-2nd metatarsals   measuring 14 mm on the current study. Appearance is consistent with   radiographically worse neuropathic/Charcot joint changes of the midfoot. Findings consistent with incompletely healed nondisplaced fractures proximal   phalanxes of the 1st, 4th and 5th toes. Diffuse soft tissue swelling consistent with cellulitis. No subcutaneous gas   density or radiopaque foreign body.            Abnormal labs:   Abnormal Labs Reviewed   CBC WITH AUTO DIFFERENTIAL - Abnormal; Notable for the following components:       Result Value    WBC 12.9 (*)     RBC 2.83 (*)     Hemoglobin 8.7 (*)     Hematocrit 25.9 (*)     RDW 21.7 (*)     Metamyelocytes Relative 2 (*)     Lymphocytes % 16.0 (*)     Monocytes % 14.0 (*)     All other components within normal limits   COMPREHENSIVE METABOLIC PANEL - Abnormal; Notable for the following components:    Sodium 114 (*)     Chloride 79 (*)     BUN 3 (*)     Creatinine 0.2 (*)     Glucose 179 (*)     Calcium 7.9 (*)     Albumin 2.7 (*)     Total Bilirubin 13.5 (*)     ALT 52 (*)      (*)     Alkaline Phosphatase 230 (*)     All other components within normal limits   ETHANOL - Abnormal; Notable for the following components:    Alcohol Scrn 0.31 (*)     All other components within normal limits   URINALYSIS - Abnormal; Notable for the following components:    Bilirubin Urine LARGE NUMBER OR AMOUNT OBSERVED (*)     Blood, Urine MODERATE NUMBER OR AMOUNT OBSERVED (*)     Protein, UA 30 (*)     Urobilinogen, Urine 2.0 (*)     All other components within normal limits   PROTIME/INR & PTT - Abnormal; Notable for the following components:

## 2023-07-19 NOTE — CARE COORDINATION
CM received consult on patient. Patient here for possible alcohol rehab. CM spoke to [facility]. Jaden BAUTISTA stated that patient is getting admitted for jaundice and alcohol related health concerns. Patient did stated to [place] PA that patient is interested in getting inpatient rehab. CM in to patients' room. CM introduced self and explained job role and how CM could assist.     Patient stated that he lives at home with his spouse. Patient stated that he has drank alcohol for \"a long time. \" Patient drank a 12 pack of beer last night. 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 Patient does have a PCP. Patient stated that he is interested in going to [facility] inpatient substance abuse rehab to help him with his alcohol. \" After stated this. . patient then stated that he wanted to go home for one day first and then leave from home for rehab. CM explained that if [facility] had available beds for patient; [facility] could pick patient up from his residence and return him there upon completion of program. CM encouraged patient to go straight from hospital to [facility] [place] in order that he doesn't change his mind and gets the help that he needs. CM called [facility] @ [phone] and spoke with admissions Herb Ferreira. [facility] do [facility] and would most likely have one of their facilities that would be able to accommodate patient upon his discharge dated. [facility] can do transportation but will need a day ahead notice if possible in order to arrange transportation. Admissions stated that they would like to place patient in [place], [place] facility as they focus on alcohol recovery. [facility] has facilities in [place], [place] throughout [place] and should be able to take patient. Please call upon discharge.  If patient states that he does not want to go straightway from hospital; please encourage patient to call [facility] from home and [facility] will still

## 2023-07-20 LAB
ALBUMIN SERPL-MCNC: 2.9 GM/DL (ref 3.4–5)
ALP BLD-CCNC: 217 IU/L (ref 40–129)
ALT SERPL-CCNC: 45 U/L (ref 10–40)
AMYLASE: 59 U/L (ref 25–115)
ANION GAP SERPL CALCULATED.3IONS-SCNC: 6 MMOL/L (ref 4–16)
ANION GAP SERPL CALCULATED.3IONS-SCNC: 8 MMOL/L (ref 4–16)
ANION GAP SERPL CALCULATED.3IONS-SCNC: 8 MMOL/L (ref 4–16)
ANISOCYTOSIS: ABNORMAL
APTT: 47 SECONDS (ref 25.1–37.1)
AST SERPL-CCNC: 139 IU/L (ref 15–37)
BANDED NEUTROPHILS ABSOLUTE COUNT: 0.88 K/CU MM
BANDED NEUTROPHILS RELATIVE PERCENT: 6 % (ref 5–11)
BASOPHILS ABSOLUTE: 0.3 K/CU MM
BASOPHILS RELATIVE PERCENT: 2 % (ref 0–1)
BILIRUB SERPL-MCNC: 14.8 MG/DL (ref 0–1)
BUN SERPL-MCNC: 5 MG/DL (ref 6–23)
CALCIUM SERPL-MCNC: 7.2 MG/DL (ref 8.3–10.6)
CALCIUM SERPL-MCNC: 7.5 MG/DL (ref 8.3–10.6)
CALCIUM SERPL-MCNC: 7.6 MG/DL (ref 8.3–10.6)
CHLORIDE BLD-SCNC: 83 MMOL/L (ref 99–110)
CHLORIDE BLD-SCNC: 85 MMOL/L (ref 99–110)
CHLORIDE BLD-SCNC: 85 MMOL/L (ref 99–110)
CO2: 28 MMOL/L (ref 21–32)
CO2: 28 MMOL/L (ref 21–32)
CO2: 29 MMOL/L (ref 21–32)
CREAT SERPL-MCNC: 0.2 MG/DL (ref 0.9–1.3)
DIFFERENTIAL TYPE: ABNORMAL
EOSINOPHILS ABSOLUTE: 0.1 K/CU MM
EOSINOPHILS RELATIVE PERCENT: 1 % (ref 0–3)
ESTIMATED AVERAGE GLUCOSE: 100 MG/DL
GFR SERPL CREATININE-BSD FRML MDRD: >60 ML/MIN/1.73M2
GLUCOSE BLD-MCNC: 126 MG/DL (ref 70–99)
GLUCOSE BLD-MCNC: 165 MG/DL (ref 70–99)
GLUCOSE BLD-MCNC: 178 MG/DL (ref 70–99)
GLUCOSE BLD-MCNC: 189 MG/DL (ref 70–99)
GLUCOSE SERPL-MCNC: 136 MG/DL (ref 70–99)
GLUCOSE SERPL-MCNC: 144 MG/DL (ref 70–99)
GLUCOSE SERPL-MCNC: 158 MG/DL (ref 70–99)
HBA1C MFR BLD: 5.1 % (ref 4.2–6.3)
HCT VFR BLD CALC: 21.1 % (ref 42–52)
HEMOGLOBIN: 7 GM/DL (ref 13.5–18)
HYPOCHROMIA: ABNORMAL
INR BLD: 1.5 INDEX
LIPASE: 26 IU/L (ref 13–60)
LYMPHOCYTES ABSOLUTE: 2.8 K/CU MM
LYMPHOCYTES RELATIVE PERCENT: 19 % (ref 24–44)
MCH RBC QN AUTO: 31.3 PG (ref 27–31)
MCHC RBC AUTO-ENTMCNC: 33.2 % (ref 32–36)
MCV RBC AUTO: 94.2 FL (ref 78–100)
MONOCYTES ABSOLUTE: 0.7 K/CU MM
MONOCYTES RELATIVE PERCENT: 5 % (ref 0–4)
PDW BLD-RTO: 22.3 % (ref 11.7–14.9)
PLATELET # BLD: 217 K/CU MM (ref 140–440)
PMV BLD AUTO: 8.7 FL (ref 7.5–11.1)
POTASSIUM SERPL-SCNC: 3.9 MMOL/L (ref 3.5–5.1)
POTASSIUM SERPL-SCNC: 4.1 MMOL/L (ref 3.5–5.1)
POTASSIUM SERPL-SCNC: 4.4 MMOL/L (ref 3.5–5.1)
PROTHROMBIN TIME: 18.6 SECONDS (ref 11.7–14.5)
RBC # BLD: 2.24 M/CU MM (ref 4.6–6.2)
SEGMENTED NEUTROPHILS ABSOLUTE COUNT: 9.9 K/CU MM
SEGMENTED NEUTROPHILS RELATIVE PERCENT: 67 % (ref 36–66)
SODIUM BLD-SCNC: 118 MMOL/L (ref 135–145)
SODIUM BLD-SCNC: 121 MMOL/L (ref 135–145)
SODIUM BLD-SCNC: 121 MMOL/L (ref 135–145)
T4 FREE SERPL-MCNC: 0.83 NG/DL (ref 0.9–1.8)
TOTAL PROTEIN: 7.3 GM/DL (ref 6.4–8.2)
TSH SERPL DL<=0.005 MIU/L-ACNC: 8.68 UIU/ML (ref 0.27–4.2)
WBC # BLD: 14.7 K/CU MM (ref 4–10.5)

## 2023-07-20 PROCEDURE — 80048 BASIC METABOLIC PNL TOTAL CA: CPT

## 2023-07-20 PROCEDURE — 80053 COMPREHEN METABOLIC PANEL: CPT

## 2023-07-20 PROCEDURE — 85730 THROMBOPLASTIN TIME PARTIAL: CPT

## 2023-07-20 PROCEDURE — C9113 INJ PANTOPRAZOLE SODIUM, VIA: HCPCS | Performed by: STUDENT IN AN ORGANIZED HEALTH CARE EDUCATION/TRAINING PROGRAM

## 2023-07-20 PROCEDURE — 85007 BL SMEAR W/DIFF WBC COUNT: CPT

## 2023-07-20 PROCEDURE — 6360000002 HC RX W HCPCS: Performed by: STUDENT IN AN ORGANIZED HEALTH CARE EDUCATION/TRAINING PROGRAM

## 2023-07-20 PROCEDURE — 82150 ASSAY OF AMYLASE: CPT

## 2023-07-20 PROCEDURE — 6370000000 HC RX 637 (ALT 250 FOR IP): Performed by: STUDENT IN AN ORGANIZED HEALTH CARE EDUCATION/TRAINING PROGRAM

## 2023-07-20 PROCEDURE — 85027 COMPLETE CBC AUTOMATED: CPT

## 2023-07-20 PROCEDURE — 2580000003 HC RX 258: Performed by: STUDENT IN AN ORGANIZED HEALTH CARE EDUCATION/TRAINING PROGRAM

## 2023-07-20 PROCEDURE — 36415 COLL VENOUS BLD VENIPUNCTURE: CPT

## 2023-07-20 PROCEDURE — 83690 ASSAY OF LIPASE: CPT

## 2023-07-20 PROCEDURE — 2060000000 HC ICU INTERMEDIATE R&B

## 2023-07-20 PROCEDURE — 84443 ASSAY THYROID STIM HORMONE: CPT

## 2023-07-20 PROCEDURE — 99211 OFF/OP EST MAY X REQ PHY/QHP: CPT

## 2023-07-20 PROCEDURE — 85610 PROTHROMBIN TIME: CPT

## 2023-07-20 PROCEDURE — 2580000003 HC RX 258: Performed by: INTERNAL MEDICINE

## 2023-07-20 PROCEDURE — 83930 ASSAY OF BLOOD OSMOLALITY: CPT

## 2023-07-20 PROCEDURE — 82962 GLUCOSE BLOOD TEST: CPT

## 2023-07-20 PROCEDURE — 84439 ASSAY OF FREE THYROXINE: CPT

## 2023-07-20 PROCEDURE — 83036 HEMOGLOBIN GLYCOSYLATED A1C: CPT

## 2023-07-20 RX ORDER — SODIUM CHLORIDE 9 MG/ML
INJECTION, SOLUTION INTRAVENOUS CONTINUOUS
Status: DISCONTINUED | OUTPATIENT
Start: 2023-07-20 | End: 2023-07-21

## 2023-07-20 RX ORDER — FUROSEMIDE 40 MG/1
40 TABLET ORAL DAILY
Status: DISCONTINUED | OUTPATIENT
Start: 2023-07-20 | End: 2023-07-20

## 2023-07-20 RX ADMIN — SODIUM CHLORIDE: 9 INJECTION, SOLUTION INTRAVENOUS at 22:05

## 2023-07-20 RX ADMIN — CHLORDIAZEPOXIDE HYDROCHLORIDE 50 MG: 25 CAPSULE ORAL at 15:02

## 2023-07-20 RX ADMIN — CHLORDIAZEPOXIDE HYDROCHLORIDE 50 MG: 25 CAPSULE ORAL at 21:46

## 2023-07-20 RX ADMIN — SODIUM CHLORIDE, PRESERVATIVE FREE 10 ML: 5 INJECTION INTRAVENOUS at 21:47

## 2023-07-20 RX ADMIN — SODIUM CHLORIDE, PRESERVATIVE FREE 10 ML: 5 INJECTION INTRAVENOUS at 10:25

## 2023-07-20 RX ADMIN — ENOXAPARIN SODIUM 30 MG: 100 INJECTION SUBCUTANEOUS at 10:24

## 2023-07-20 RX ADMIN — ENOXAPARIN SODIUM 30 MG: 100 INJECTION SUBCUTANEOUS at 21:46

## 2023-07-20 RX ADMIN — CHLORDIAZEPOXIDE HYDROCHLORIDE 50 MG: 25 CAPSULE ORAL at 10:23

## 2023-07-20 RX ADMIN — LORAZEPAM 2 MG: 1 TABLET ORAL at 06:36

## 2023-07-20 RX ADMIN — Medication 100 MG: at 10:24

## 2023-07-20 RX ADMIN — PANTOPRAZOLE SODIUM 40 MG: 40 INJECTION, POWDER, FOR SOLUTION INTRAVENOUS at 10:24

## 2023-07-20 RX ADMIN — MULTIPLE VITAMINS W/ MINERALS TAB 1 TABLET: TAB at 10:24

## 2023-07-20 NOTE — CONSULTS
[address], [address] [place] [address]                                  CONSULTATION    PATIENT NAME: Jacquelyne Dakins                       :        1980  MED REC NO:   [id number]                          ROOM:       ED28  ACCOUNT NO:   [de-identified]                           ADMIT DATE: 2023  PROVIDER:     Teresa Medellin MD    CONSULT DATE:  2023    CHIEF COMPLAINT:  1. History of EtOH abuse with abnormal LFTs, rule out alcohol liver  disease. 2.  Electrolyte abnormalities. HISTORY OF PRESENT ILLNESS:  The patient is a 77-year-old white male  patient known to me from his previous hospitalization, last seen in  consult on 2022 with past medical history significant for  longstanding history of EtOH abuse with alcohol liver disease,  recreational drug use, hypertension, diabetes mellitus, hyperlipidemia,  and obesity, who presented to the emergency room with bleeding in the  right foot. The patient also has developed progressive swelling of his  lower extremities. Upon evaluation in the emergency room, the patient  was noted to be jaundiced and the blood workup done comprised of Chem  profile which showed multiple electrolyte abnormalities with a sodium of  114 and chloride was 79. LFTs were significant for total bilirubin of  13.5 and AST of 161, ALT of 52 and alkaline phosphatase of 230. CBC  showed a WBC count of 12.9, hemoglobin was 8.7, platelet count was  669,263, and the patient's INR was 1.5. The patient's blood ammonia  level is 54. The patient is being admitted for further workup and  management. CT scan of the abdomen and pelvis was done which showed  changes consistent with portal hypertension with hepatosplenomegaly. Clinically, there is no evidence of gross GI bleeding.     The patient has never had an EGD or colonoscopy done and according to  the patient, his last alcohol drink was yesterday and

## 2023-07-20 NOTE — PROGRESS NOTES
4 Eyes Skin Assessment     NAME:  Alison Greene  YOB: 1980  MEDICAL RECORD NUMBER:  [id number]    The patient is being assessed for  Admission    I agree that at least one RN has performed a thorough Head to Toe Skin Assessment on the patient. ALL assessment sites listed below have been assessed. Areas assessed by both nurses:    Head, Face, Ears, Shoulders, Back, Chest, Arms, Elbows, Hands, Sacrum. Buttock, Coccyx, Ischium, and Legs. Feet and Heels        Does the Patient have a Wound? Yes wound(s) were present on assessment.  LDA wound assessment was Initiated and completed by RN       Bon Prevention initiated by RN: No  Wound Care Orders initiated by RN: Yes    Pressure Injury (Stage 3,4, Unstageable, DTI, NWPT, and Complex wounds) if present, place Wound referral order by RN under : No    New Ostomies, if present place, Ostomy referral order under : No     Nurse 1 eSignature: Electronically signed by Paulino Blanton RN on 7/20/23 at 8:57 AM EDT    **SHARE this note so that the co-signing nurse can place an eSignature**    Nurse 2 eSignature: Electronically signed by Oscar Workman RN on 7/20/23 at 2:57 PM EDT

## 2023-07-20 NOTE — PROGRESS NOTES
V2.0  [facility] [place]      Name:  Oscar Ojeda /Age/Sex: 1980  (38 y.o. male)   MRN & CSN:  [id number] & [id number] Encounter Date/Time: 2023 11:58 AM EDT    Location:  -A PCP: Dash Antony MD       Hospital Day: 2    Assessment and Plan:   Oscar Ojeda is a 38 y.o. male with pmh of  Charcot's foot, neuropathy, cirrhosis and alcohol abuse  who presents with Hyponatremia      Plan:  Acute on chronic hyponatremia: 114 on admission, secondary to alcohol abuse and decompensated liver cirrhosis, improved to 121 this morning, nephrology on board on 0.9% normal saline at 100 cc/h,   Decompensated liver cirrhosis: In the setting of active alcohol use, MELD-Na score 28, Maddrey score of 45.2, may benefit from glucocorticoid use, total bilirubin 14.8, GI on board, EGD patient, will hold off on Lasix and spironolactone for now but may benefit from it on discharge. Alcohol abuse with history of withdrawal: On CIWA protocol thiamine, folic acid and Librium. Hyperglycemia: On sliding scale insulin and hypoglycemia protocol, HbA1c 5.7    Diet ADULT DIET; Regular; 1500 ml   DVT Prophylaxis [x] Lovenox, []  Heparin, [] SCDs, [] Ambulation,  [] Eliquis, [] Xarelto  [] Coumadin   Code Status Full Code   Disposition From: Home  Expected Disposition: TBD  Estimated Date of Discharge: 2 to 3 days  Patient requires continued admission due to hyponatremia, hyperbilirubinemia   Surrogate Decision Maker/ POA      Subjective:     Chief Complaint: Laceration (States has a lac to toe on right foot, reports cant stop the bleeding, not on blood thinners. )       Oscar Ojeda is a 38 y.o. male who presents with right toe laceration. Seen and examined at bedside denies any active complaints feels tired. Review of Systems:    Review of Systems 10 point ROS negative except as noted above. Objective:      Intake/Output Summary (Last 24 hours) at 2023 1158  Last data filed at

## 2023-07-20 NOTE — PROGRESS NOTES
DOING FAIR ORAL INTAKE POOR NO GROSS GIT BLEEDING AWAKE ALERT AND ORIENTED  VITALS STABLE   LABS NOTED HB 7.0 T.  BILI 14.8   WILL CPM F/U LABS  EGD / COLONOSCOPY LATER

## 2023-07-20 NOTE — CONSULTS
Nephrology Service Consultation      [address], [facility], [address] [place] [address]  Phone: [phone]  Office Hours: 8:30AM - 4:30PM  Monday - Friday        MEDICAL DECISION MAKING and Recommendations     -Acute on chronic hypernatremia: sodium 114//ddx; volume depletion , alcohol abuxe, cirrhosis  -Decompensated cirrhosis  -Hyperbilirubinemia/transaminitis  -Right great toe laceration  -BLE edema  -Alcohol abuse    Suggest;  -Hold NS 50ml/hr for now, hopefully a sodium level will be obtained today  -Limit oral fluids to 1500ml per day    Thank you          Patient Active Problem List    Diagnosis Date Noted    Alcohol withdrawal syndrome, with unspecified complication ([address]) 86/33/8795    Lisfranc dislocation, left, initial encounter 09/02/2022    WD-Charcot ankle, right 07/05/2022    WD-Ankle injury, initial encounter 06/27/2022    WD-Acute right ankle pain 06/27/2022    WD-Right ankle swelling 06/27/2022    WD-Diabetic ulcer of toe of left foot associated with type 2 diabetes mellitus, with fat layer exposed ([address]) 05/13/2022    Local infection of skin and subcutaneous tissue 05/13/2022    Non-pressure chronic ulcer of other part of left foot with fat layer exposed ([address]) 04/19/2022    Cellulitis of right foot 04/13/2022    Alcohol abuse 04/13/2022    Hyperbilirubinemia 04/13/2022    Cellulitis 02/19/2022    Leukocytosis     Hyponatremia     Hypokalemia     Isolated proteinuria with minor glomerular abnormality     Closed displaced Maisonneuve's fracture of left leg 10/20/2021    Alcohol withdrawal delirium ([address]) 07/31/2021    Hypercholesterolemia 02/18/2019    Anxiety 11/15/2018    Type 2 diabetes, controlled, with neuropathy ([address]) 06/06/2018    Obstructive sleep apnea 10/31/2016    Fatty liver 03/11/2016    HTN (hypertension) 02/11/2015         Patient:  Sonia Gomez  MRN: [id number]  Consulting physician:  Prabhu Elliott, *  Reason for Consult: sodium 114  PCP: Salvador Gonzales

## 2023-07-20 NOTE — CARE COORDINATION
CM - Room # 28-[facility] criteria for Hypocalcemia / hyponatremia  reviewed at this time, criteria supports the INPATIENT admission

## 2023-07-21 LAB
ALBUMIN SERPL-MCNC: 2.6 GM/DL (ref 3.4–5)
ALP BLD-CCNC: 188 IU/L (ref 40–129)
ALT SERPL-CCNC: 36 U/L (ref 10–40)
ANION GAP SERPL CALCULATED.3IONS-SCNC: 7 MMOL/L (ref 4–16)
ANION GAP SERPL CALCULATED.3IONS-SCNC: 8 MMOL/L (ref 4–16)
AST SERPL-CCNC: 107 IU/L (ref 15–37)
BASOPHILS ABSOLUTE: 0.3 K/CU MM
BASOPHILS RELATIVE PERCENT: 1.4 % (ref 0–1)
BILIRUB SERPL-MCNC: 15.1 MG/DL (ref 0–1)
BUN SERPL-MCNC: 5 MG/DL (ref 6–23)
BUN SERPL-MCNC: 5 MG/DL (ref 6–23)
CALCIUM SERPL-MCNC: 7.5 MG/DL (ref 8.3–10.6)
CALCIUM SERPL-MCNC: 7.6 MG/DL (ref 8.3–10.6)
CHLORIDE BLD-SCNC: 88 MMOL/L (ref 99–110)
CHLORIDE BLD-SCNC: 88 MMOL/L (ref 99–110)
CO2: 28 MMOL/L (ref 21–32)
CO2: 28 MMOL/L (ref 21–32)
CREAT SERPL-MCNC: 0.2 MG/DL (ref 0.9–1.3)
CREAT SERPL-MCNC: 0.3 MG/DL (ref 0.9–1.3)
DIFFERENTIAL TYPE: ABNORMAL
EOSINOPHILS ABSOLUTE: 0.3 K/CU MM
EOSINOPHILS RELATIVE PERCENT: 1.9 % (ref 0–3)
GFR SERPL CREATININE-BSD FRML MDRD: >60 ML/MIN/1.73M2
GFR SERPL CREATININE-BSD FRML MDRD: >60 ML/MIN/1.73M2
GLUCOSE BLD-MCNC: 139 MG/DL (ref 70–99)
GLUCOSE BLD-MCNC: 149 MG/DL (ref 70–99)
GLUCOSE BLD-MCNC: 161 MG/DL (ref 70–99)
GLUCOSE BLD-MCNC: 229 MG/DL (ref 70–99)
GLUCOSE SERPL-MCNC: 109 MG/DL (ref 70–99)
GLUCOSE SERPL-MCNC: 151 MG/DL (ref 70–99)
HCT VFR BLD CALC: 21.7 % (ref 42–52)
HEMOGLOBIN: 7 GM/DL (ref 13.5–18)
IMMATURE NEUTROPHIL %: 2.2 % (ref 0–0.43)
LYMPHOCYTES ABSOLUTE: 1.8 K/CU MM
LYMPHOCYTES RELATIVE PERCENT: 10.1 % (ref 24–44)
MCH RBC QN AUTO: 31.4 PG (ref 27–31)
MCHC RBC AUTO-ENTMCNC: 32.3 % (ref 32–36)
MCV RBC AUTO: 97.3 FL (ref 78–100)
MONOCYTES ABSOLUTE: 1.7 K/CU MM
MONOCYTES RELATIVE PERCENT: 9.3 % (ref 0–4)
NUCLEATED RBC %: 0.8 %
PDW BLD-RTO: 22.5 % (ref 11.7–14.9)
PLATELET # BLD: 306 K/CU MM (ref 140–440)
PMV BLD AUTO: 8.8 FL (ref 7.5–11.1)
POTASSIUM SERPL-SCNC: 3.9 MMOL/L (ref 3.5–5.1)
POTASSIUM SERPL-SCNC: 4 MMOL/L (ref 3.5–5.1)
RBC # BLD: 2.23 M/CU MM (ref 4.6–6.2)
SEGMENTED NEUTROPHILS ABSOLUTE COUNT: 13.6 K/CU MM
SEGMENTED NEUTROPHILS RELATIVE PERCENT: 75.1 % (ref 36–66)
SODIUM BLD-SCNC: 123 MMOL/L (ref 135–145)
SODIUM BLD-SCNC: 124 MMOL/L (ref 135–145)
TOTAL IMMATURE NEUTOROPHIL: 0.4 K/CU MM
TOTAL NUCLEATED RBC: 0.2 K/CU MM
TOTAL PROTEIN: 6.8 GM/DL (ref 6.4–8.2)
WBC # BLD: 18.1 K/CU MM (ref 4–10.5)

## 2023-07-21 PROCEDURE — 80048 BASIC METABOLIC PNL TOTAL CA: CPT

## 2023-07-21 PROCEDURE — 82962 GLUCOSE BLOOD TEST: CPT

## 2023-07-21 PROCEDURE — 80053 COMPREHEN METABOLIC PANEL: CPT

## 2023-07-21 PROCEDURE — 85025 COMPLETE CBC W/AUTO DIFF WBC: CPT

## 2023-07-21 PROCEDURE — 6370000000 HC RX 637 (ALT 250 FOR IP): Performed by: STUDENT IN AN ORGANIZED HEALTH CARE EDUCATION/TRAINING PROGRAM

## 2023-07-21 PROCEDURE — 94761 N-INVAS EAR/PLS OXIMETRY MLT: CPT

## 2023-07-21 PROCEDURE — 6360000002 HC RX W HCPCS: Performed by: STUDENT IN AN ORGANIZED HEALTH CARE EDUCATION/TRAINING PROGRAM

## 2023-07-21 PROCEDURE — C9113 INJ PANTOPRAZOLE SODIUM, VIA: HCPCS | Performed by: STUDENT IN AN ORGANIZED HEALTH CARE EDUCATION/TRAINING PROGRAM

## 2023-07-21 PROCEDURE — 6370000000 HC RX 637 (ALT 250 FOR IP): Performed by: INTERNAL MEDICINE

## 2023-07-21 PROCEDURE — 36415 COLL VENOUS BLD VENIPUNCTURE: CPT

## 2023-07-21 PROCEDURE — 2580000003 HC RX 258: Performed by: STUDENT IN AN ORGANIZED HEALTH CARE EDUCATION/TRAINING PROGRAM

## 2023-07-21 PROCEDURE — 6370000000 HC RX 637 (ALT 250 FOR IP): Performed by: SPECIALIST

## 2023-07-21 PROCEDURE — 2060000000 HC ICU INTERMEDIATE R&B

## 2023-07-21 PROCEDURE — 2580000003 HC RX 258: Performed by: INTERNAL MEDICINE

## 2023-07-21 RX ORDER — SPIRONOLACTONE 50 MG/1
50 TABLET, FILM COATED ORAL DAILY
Status: DISCONTINUED | OUTPATIENT
Start: 2023-07-21 | End: 2023-07-22 | Stop reason: HOSPADM

## 2023-07-21 RX ORDER — PREDNISONE 10 MG/1
40 TABLET ORAL DAILY
Status: DISCONTINUED | OUTPATIENT
Start: 2023-07-21 | End: 2023-07-22 | Stop reason: HOSPADM

## 2023-07-21 RX ORDER — SODIUM CHLORIDE 1 G/1
1 TABLET ORAL
Status: DISCONTINUED | OUTPATIENT
Start: 2023-07-21 | End: 2023-07-22

## 2023-07-21 RX ORDER — FUROSEMIDE 40 MG/1
40 TABLET ORAL DAILY
Status: DISCONTINUED | OUTPATIENT
Start: 2023-07-21 | End: 2023-07-22 | Stop reason: HOSPADM

## 2023-07-21 RX ADMIN — SODIUM CHLORIDE: 9 INJECTION, SOLUTION INTRAVENOUS at 06:05

## 2023-07-21 RX ADMIN — Medication 100 MG: at 10:53

## 2023-07-21 RX ADMIN — ENOXAPARIN SODIUM 30 MG: 100 INJECTION SUBCUTANEOUS at 20:44

## 2023-07-21 RX ADMIN — LORAZEPAM 1 MG: 2 INJECTION INTRAMUSCULAR; INTRAVENOUS at 02:11

## 2023-07-21 RX ADMIN — SODIUM CHLORIDE 1 G: 1 TABLET ORAL at 18:33

## 2023-07-21 RX ADMIN — FUROSEMIDE 40 MG: 40 TABLET ORAL at 11:36

## 2023-07-21 RX ADMIN — LORAZEPAM 1 MG: 2 INJECTION INTRAMUSCULAR; INTRAVENOUS at 10:53

## 2023-07-21 RX ADMIN — CHLORDIAZEPOXIDE HYDROCHLORIDE 50 MG: 25 CAPSULE ORAL at 20:44

## 2023-07-21 RX ADMIN — SODIUM CHLORIDE, PRESERVATIVE FREE 10 ML: 5 INJECTION INTRAVENOUS at 20:45

## 2023-07-21 RX ADMIN — ENOXAPARIN SODIUM 30 MG: 100 INJECTION SUBCUTANEOUS at 10:54

## 2023-07-21 RX ADMIN — CHLORDIAZEPOXIDE HYDROCHLORIDE 50 MG: 25 CAPSULE ORAL at 14:04

## 2023-07-21 RX ADMIN — MULTIPLE VITAMINS W/ MINERALS TAB 1 TABLET: TAB at 10:53

## 2023-07-21 RX ADMIN — CHLORDIAZEPOXIDE HYDROCHLORIDE 50 MG: 25 CAPSULE ORAL at 10:53

## 2023-07-21 RX ADMIN — SPIRONOLACTONE 50 MG: 50 TABLET ORAL at 11:36

## 2023-07-21 RX ADMIN — PREDNISONE 40 MG: 10 TABLET ORAL at 16:37

## 2023-07-21 RX ADMIN — SODIUM CHLORIDE, PRESERVATIVE FREE 10 ML: 5 INJECTION INTRAVENOUS at 10:52

## 2023-07-21 RX ADMIN — PANTOPRAZOLE SODIUM 40 MG: 40 INJECTION, POWDER, FOR SOLUTION INTRAVENOUS at 10:54

## 2023-07-21 NOTE — PROGRESS NOTES
DOING FAIR NO ABD PAIN GROSS GIT BLEEDING AWAKE ALERT AND ORIENTED  VITALS STABLE   LABS NOTED T.  BILI INCREASE TO 15.1  O/E HAS SWELLING OF L/ EXT WILL START ON LASIX AND ALDACTONE IF WITH NEPHROLOGY  CONSULTANT  WILL CPM F/U LABS  D/W PT EGD / COLONOSCOPY LATER AS OUTPT

## 2023-07-21 NOTE — CARE COORDINATION
07/21/23 1205   Service Assessment   Patient Orientation Alert and Oriented   Cognition Alert   History Provided By Patient   Primary Caregiver Self   Support Systems Spouse/Significant Other   PCP Verified by CM Yes   Prior Functional Level Independent in ADLs/IADLs   Current Functional Level Independent in ADLs/IADLs   Can patient return to prior living arrangement Yes   Ability to make needs known: Good   Family able to assist with home care needs: Yes   Would you like for me to discuss the discharge plan with any other family members/significant others, and if so, who? No   Financial Resources Other (Comment)  (commercial insurance)   Social/Functional History   Lives With Spouse   Type of Home House   ADL Assistance Independent   Homemaking Assistance Independent   Ambulation Assistance Independent   Transfer Assistance Independent   Active  Yes  (spouse drives as well.)   Discharge [address]; Other (Comment)  (Home vs substance use rehab facility)   Living Arrangements Spouse/Significant Other   Patient expects to be discharged to: Behavioral Health/Substance/Detox     Pt has insurance and a PCP. Pt is from home with spouse. Pt & spouse drive. Pt is independent. Plan home vs substance use rehab. From: Kimberlee Stout  To: Seb Khan  Sent: 3/12/2022 11:51 AM CST  Subject: URGENT     I have been experiencing very severe dry mouth for the last 5 days along with cold like symptoms. Its exactly like it was when I was first diagnosed. My medication cevemilne that I've been taking that gives me relief has not been any help as of the last 5 days. It's like it just stopped working so I'm up all day and night drinking water to avoid sores, burning and numbness in my mouth from it being so dry. I'm tired and exhausted from lack of sleep and I would like to come in as soon as possible to figure things out. Thank you.

## 2023-07-21 NOTE — PROGRESS NOTES
V2.0  [facility] Hospitalist Progress Note      Name:  Oscar Ojeda /Age/Sex: 1980  (38 y.o. male)   MRN & CSN:  [id number] & [id number] Encounter Date/Time: 2023 11:58 AM EDT    Location:  -A PCP: Dash Antony MD       Hospital Day: 3    Assessment and Plan:   Oscar Ojeda is a 38 y.o. male with pmh of  Charcot's foot, neuropathy, cirrhosis and alcohol abuse  who presents with Hyponatremia      Plan:  Acute on chronic hyponatremia: 114 on admission, secondary to alcohol abuse and decompensated liver cirrhosis, improved to 123 this morning, nephrology on board on 0.9% normal saline at 150 cc/h,   Decompensated liver cirrhosis: In the setting of active alcohol use, MELD-Na score 28, Maddrey score of 45.2, may benefit from glucocorticoid use, starte prednisone 40 mg PO daily total bilirubin 14.8, GI on board, EGD patient, will hold off on Lasix and spironolactone for now but may benefit from it on discharge as patient is on IV fluid  Alcohol abuse with history of withdrawal: On CIWA protocol thiamine, folic acid and Librium. Hyperglycemia: On sliding scale insulin and hypoglycemia protocol, HbA1c 5.7    Diet ADULT DIET; Regular; 1500 ml   DVT Prophylaxis [x] Lovenox, []  Heparin, [] SCDs, [] Ambulation,  [] Eliquis, [] Xarelto  [] Coumadin   Code Status Full Code   Disposition From: Home  Expected Disposition: TBD  Estimated Date of Discharge: 2 to 3 days  Patient requires continued admission due to hyponatremia, hyperbilirubinemia   Surrogate Decision Maker/ POA      Subjective:     Chief Complaint: Laceration (States has a lac to toe on right foot, reports cant stop the bleeding, not on blood thinners. )       Oscar Ojeda is a 38 y.o. male who presents with right toe laceration. Seen and examined at bedside denies any active complaints feels tired.  Appears jaundiced feels tired         Review of Systems:    Review of Systems 10 point ROS negative except as noted

## 2023-07-22 VITALS
HEART RATE: 95 BPM | TEMPERATURE: 97.6 F | RESPIRATION RATE: 19 BRPM | SYSTOLIC BLOOD PRESSURE: 158 MMHG | OXYGEN SATURATION: 99 % | BODY MASS INDEX: 40.01 KG/M2 | DIASTOLIC BLOOD PRESSURE: 85 MMHG | WEIGHT: 295.42 LBS | HEIGHT: 72 IN

## 2023-07-22 LAB
ALBUMIN SERPL-MCNC: 2.7 GM/DL (ref 3.4–5)
ALP BLD-CCNC: 197 IU/L (ref 40–129)
ALT SERPL-CCNC: 34 U/L (ref 10–40)
ANION GAP SERPL CALCULATED.3IONS-SCNC: 7 MMOL/L (ref 4–16)
APTT: 43.8 SECONDS (ref 25.1–37.1)
AST SERPL-CCNC: 103 IU/L (ref 15–37)
BASOPHILS ABSOLUTE: 0.1 K/CU MM
BASOPHILS RELATIVE PERCENT: 0.5 % (ref 0–1)
BILIRUB SERPL-MCNC: 15.4 MG/DL (ref 0–1)
BUN SERPL-MCNC: 7 MG/DL (ref 6–23)
CALCIUM SERPL-MCNC: 7.9 MG/DL (ref 8.3–10.6)
CHLORIDE BLD-SCNC: 95 MMOL/L (ref 99–110)
CO2: 28 MMOL/L (ref 21–32)
CREAT SERPL-MCNC: 0.3 MG/DL (ref 0.9–1.3)
DIFFERENTIAL TYPE: ABNORMAL
EOSINOPHILS ABSOLUTE: 0.1 K/CU MM
EOSINOPHILS RELATIVE PERCENT: 0.3 % (ref 0–3)
GFR SERPL CREATININE-BSD FRML MDRD: >60 ML/MIN/1.73M2
GLUCOSE BLD-MCNC: 167 MG/DL (ref 70–99)
GLUCOSE BLD-MCNC: 174 MG/DL (ref 70–99)
GLUCOSE SERPL-MCNC: 186 MG/DL (ref 70–99)
HCT VFR BLD CALC: 21.8 % (ref 42–52)
HEMOGLOBIN: 7 GM/DL (ref 13.5–18)
IMMATURE NEUTROPHIL %: 2.4 % (ref 0–0.43)
INR BLD: 1.5 INDEX
LYMPHOCYTES ABSOLUTE: 1.2 K/CU MM
LYMPHOCYTES RELATIVE PERCENT: 7.1 % (ref 24–44)
MCH RBC QN AUTO: 32 PG (ref 27–31)
MCHC RBC AUTO-ENTMCNC: 32.1 % (ref 32–36)
MCV RBC AUTO: 99.5 FL (ref 78–100)
MONOCYTES ABSOLUTE: 1.1 K/CU MM
MONOCYTES RELATIVE PERCENT: 6.5 % (ref 0–4)
NUCLEATED RBC %: 0.6 %
PDW BLD-RTO: 22.5 % (ref 11.7–14.9)
PLATELET # BLD: 217 K/CU MM (ref 140–440)
PMV BLD AUTO: 8.6 FL (ref 7.5–11.1)
POTASSIUM SERPL-SCNC: 4.1 MMOL/L (ref 3.5–5.1)
PROTHROMBIN TIME: 18.3 SECONDS (ref 11.7–14.5)
RBC # BLD: 2.19 M/CU MM (ref 4.6–6.2)
SEGMENTED NEUTROPHILS ABSOLUTE COUNT: 14.3 K/CU MM
SEGMENTED NEUTROPHILS RELATIVE PERCENT: 83.2 % (ref 36–66)
SODIUM BLD-SCNC: 130 MMOL/L (ref 135–145)
TOTAL IMMATURE NEUTOROPHIL: 0.41 K/CU MM
TOTAL NUCLEATED RBC: 0.1 K/CU MM
TOTAL PROTEIN: 7.2 GM/DL (ref 6.4–8.2)
WBC # BLD: 17.2 K/CU MM (ref 4–10.5)

## 2023-07-22 PROCEDURE — 36415 COLL VENOUS BLD VENIPUNCTURE: CPT

## 2023-07-22 PROCEDURE — 2580000003 HC RX 258: Performed by: STUDENT IN AN ORGANIZED HEALTH CARE EDUCATION/TRAINING PROGRAM

## 2023-07-22 PROCEDURE — 6360000002 HC RX W HCPCS: Performed by: STUDENT IN AN ORGANIZED HEALTH CARE EDUCATION/TRAINING PROGRAM

## 2023-07-22 PROCEDURE — 6370000000 HC RX 637 (ALT 250 FOR IP): Performed by: STUDENT IN AN ORGANIZED HEALTH CARE EDUCATION/TRAINING PROGRAM

## 2023-07-22 PROCEDURE — 85730 THROMBOPLASTIN TIME PARTIAL: CPT

## 2023-07-22 PROCEDURE — 85025 COMPLETE CBC W/AUTO DIFF WBC: CPT

## 2023-07-22 PROCEDURE — 82962 GLUCOSE BLOOD TEST: CPT

## 2023-07-22 PROCEDURE — C9113 INJ PANTOPRAZOLE SODIUM, VIA: HCPCS | Performed by: STUDENT IN AN ORGANIZED HEALTH CARE EDUCATION/TRAINING PROGRAM

## 2023-07-22 PROCEDURE — 85610 PROTHROMBIN TIME: CPT

## 2023-07-22 PROCEDURE — 80053 COMPREHEN METABOLIC PANEL: CPT

## 2023-07-22 RX ORDER — SPIRONOLACTONE 50 MG/1
50 TABLET, FILM COATED ORAL DAILY
Qty: 30 TABLET | Refills: 3 | Status: SHIPPED | OUTPATIENT
Start: 2023-07-22

## 2023-07-22 RX ORDER — PREDNISONE 20 MG/1
40 TABLET ORAL DAILY
Qty: 56 TABLET | Refills: 0 | Status: SHIPPED | OUTPATIENT
Start: 2023-07-23 | End: 2023-08-20

## 2023-07-22 RX ORDER — LANOLIN ALCOHOL/MO/W.PET/CERES
100 CREAM (GRAM) TOPICAL DAILY
Qty: 30 TABLET | Refills: 3 | Status: SHIPPED | OUTPATIENT
Start: 2023-07-23

## 2023-07-22 RX ORDER — PANTOPRAZOLE SODIUM 40 MG/1
40 TABLET, DELAYED RELEASE ORAL
Qty: 30 TABLET | Refills: 0 | Status: SHIPPED | OUTPATIENT
Start: 2023-07-22

## 2023-07-22 RX ORDER — FUROSEMIDE 40 MG/1
40 TABLET ORAL DAILY
Qty: 60 TABLET | Refills: 3 | Status: SHIPPED | OUTPATIENT
Start: 2023-07-22

## 2023-07-22 RX ADMIN — CHLORDIAZEPOXIDE HYDROCHLORIDE 50 MG: 25 CAPSULE ORAL at 14:21

## 2023-07-22 RX ADMIN — PREDNISONE 40 MG: 10 TABLET ORAL at 09:07

## 2023-07-22 RX ADMIN — Medication 100 MG: at 09:06

## 2023-07-22 RX ADMIN — PANTOPRAZOLE SODIUM 40 MG: 40 INJECTION, POWDER, FOR SOLUTION INTRAVENOUS at 09:07

## 2023-07-22 RX ADMIN — ENOXAPARIN SODIUM 30 MG: 100 INJECTION SUBCUTANEOUS at 09:07

## 2023-07-22 RX ADMIN — CHLORDIAZEPOXIDE HYDROCHLORIDE 50 MG: 25 CAPSULE ORAL at 09:06

## 2023-07-22 RX ADMIN — SODIUM CHLORIDE, PRESERVATIVE FREE 10 ML: 5 INJECTION INTRAVENOUS at 09:07

## 2023-07-22 RX ADMIN — MULTIPLE VITAMINS W/ MINERALS TAB 1 TABLET: TAB at 09:07

## 2023-07-22 NOTE — PLAN OF CARE
Problem: Discharge Planning  Goal: Discharge to home or other facility with appropriate resources  7/22/2023 1542 by Sonido Dover RN  Outcome: Completed  7/22/2023 1043 by Sonido Dover RN  Outcome: Progressing     Problem: Pain  Goal: Verbalizes/displays adequate comfort level or baseline comfort level  7/22/2023 1542 by Sonido Dover RN  Outcome: Completed  7/22/2023 1043 by Sonido Dover RN  Outcome: Progressing     Problem: Skin/Tissue Integrity  Goal: Absence of new skin breakdown  Description: 1. Monitor for areas of redness and/or skin breakdown  2. Assess vascular access sites hourly  3. Every 4-6 hours minimum:  Change oxygen saturation probe site  4. Every 4-6 hours:  If on nasal continuous positive airway pressure, respiratory therapy assess nares and determine need for appliance change or resting period.   7/22/2023 1542 by Sonido Dover RN  Outcome: Completed  7/22/2023 1043 by Sonido Dover RN  Outcome: Progressing     Problem: Safety - Adult  Goal: Free from fall injury  7/22/2023 1542 by Sonido Dover RN  Outcome: Completed  7/22/2023 1043 by Sonido Dover RN  Outcome: Progressing     Problem: Chronic Conditions and Co-morbidities  Goal: Patient's chronic conditions and co-morbidity symptoms are monitored and maintained or improved  7/22/2023 1542 by Sonido Dover RN  Outcome: Completed  7/22/2023 1043 by Sonido Dover RN  Outcome: Progressing

## 2023-07-22 NOTE — PROGRESS NOTES
Pt stable upon discharge. Discharge instructions, follow up and any new and or changed medications, reviewed with patient, pt verbalizes understanding.   Pt given pcp list for followup with foot laceration

## 2023-07-22 NOTE — CARE COORDINATION
CM into see pt to follow up on consult for CM. CM offered and discussed setting up inpt rehab. Pt politely declined assistance. CM informed pt that resources have been placed on the discharge instructions.  [address]

## 2023-07-22 NOTE — PROGRESS NOTES
Nephrology Progress Note        [address], [address], [address] [place] [address]  Phone: [phone]  Office Hours: 8:30AM - 4:30PM  Monday - Friday 7/22/2023 7:51 AM  Subjective:   Admit Date: 7/19/2023  PCP: Bandar Hinton MD  Interval History:   Doing ok  edematous  Diet: ADULT DIET; Regular; 1500 ml      Data:   Scheduled Meds:   [Held by provider] furosemide  40 mg Oral Daily    [Held by provider] spironolactone  50 mg Oral Daily    predniSONE  40 mg Oral Daily    pantoprazole  40 mg IntraVENous Daily    sodium chloride flush  5-40 mL IntraVENous 2 times per day    enoxaparin  30 mg SubCUTAneous BID    chlordiazePOXIDE  50 mg Oral TID    multivitamin  1 tablet Oral Daily    thiamine  100 mg Oral Daily    insulin lispro  0-4 Units SubCUTAneous TID WC    insulin lispro  0-4 Units SubCUTAneous Nightly     Continuous Infusions:   sodium chloride      dextrose       PRN Meds:LORazepam **OR** LORazepam **OR** LORazepam **OR** LORazepam **OR** LORazepam **OR** LORazepam **OR** LORazepam **OR** LORazepam, sodium chloride flush, sodium chloride, ondansetron **OR** ondansetron, polyethylene glycol, acetaminophen **OR** acetaminophen, glucose, dextrose bolus **OR** dextrose bolus, glucagon (rDNA), dextrose  I/O last 3 completed shifts: In: 550 [P.O.:540; I.V.:10]  Out: -   No intake/output data recorded.     Intake/Output Summary (Last 24 hours) at 7/22/2023 0751  Last data filed at 7/21/2023 2239  Gross per 24 hour   Intake 550 ml   Output --   Net 550 ml       CBC:   Recent Labs     07/20/23  0543 07/21/23  1452 07/22/23  0410   WBC 14.7* 18.1* 17.2*   HGB 7.0* 7.0* 7.0*    306 217       BMP:    Recent Labs     07/21/23  0305 07/21/23  1452 07/22/23  0410   * 124* 130*   K 4.0 3.9 4.1   CL 88* 88* 95*   CO2 28 28 28   BUN 5* 5* 7   CREATININE 0.2* 0.3* 0.3*   GLUCOSE 109* 151* 186*     Hepatic:   Recent Labs     07/20/23  0543 07/21/23  0305 07/22/23  0410   * 107* 103*

## 2023-07-22 NOTE — DISCHARGE SUMMARY
V2.0  Discharge Summary    Name:  Sonia Gomez /Age/Sex: 1980 (38 y.o. male)   Admit Date: 2023  Discharge Date: 23    MRN & CSN:  [id number] & [id number] Encounter Date and Time 23 1:53 PM EDT    Attending:  Adrian Cade MD Discharging Provider: Adrian Cade MD       Hospital Course:     Brief HPI: Sonia Gomez is a 38 y.o. male with pmh of Charcot's foot, neuropathy, and alcohol abuse who presents with a laceration of his right great toe. In the ED patient admits to stopping all of his medications as well as daily drinking. Patient does not have any acute complaints related to his drinking. Denies abdominal pain, nausea, vomiting, but was found to be severely hyponatremic to 114. Patient also hypomagnesemic to 1.5. Nephrology was consulted from the ED and the plan is for saline resuscitation with close monitoring of his sodium. Brief Problem Based Course:   Acute on chronic hyponatremia: 114 on admission, secondary to alcohol abuse and decompensated liver cirrhosis, improved to 130 with IV fluid resuscitation, discussed regarding limiting oral fluids to 1500 mL/day,   decompensated liver cirrhosis: In the setting of active alcohol use, MELD-Na score 26, Maddrey score of 45.2, start on prednisone 40 mg daily on discharge total bilirubin significantly elevated, patient still actively using alcohol, discussed with quitting alcohol, inpatient rehab, patient states that he is not interested in inpatient rehab at this moment he has had gone to rehab in the past and would try 1 as outpatient, discussed with close follow-up with gastroenterology and need of EGD as outpatient, is being discharged on Lasix 40 mg daily and spironolactone 50 mg daily. The patient expressed appropriate understanding of, and agreement with the discharge recommendations, medications, and plan.      Consults this admission:  IP CONSULT TO SOCIAL WORK  IP CONSULT TO GI  IP CONSULT TO CRITICAL CARE  IP CONSULT TO Hepatobiliary scan and/or gallbladder ultrasound suggested for further evaluation if clinically indicated. Nodular subsegmental atelectasis subpleural region left lower lobe with largest nodular component measuring 5.5 mm. Finding was not present on prior studies. Small neoplasm considered unlikely but not excluded. Chest CT in 1 year would be helpful to evaluate stability. Umbilical/periumbilical hernia containing fat, portions of recanalized umbilical vein. No strangulation or infarction of the hernia sac and no significant interval changes noted compared to prior studies. US ABDOMEN LIMITED    Result Date: 7/19/2023  EXAMINATION: RIGHT UPPER QUADRANT ULTRASOUND 7/19/2023 1:27 pm COMPARISON: None. HISTORY: ORDERING SYSTEM PROVIDED HISTORY: RUQ pain TECHNOLOGIST PROVIDED HISTORY: Reason for exam:->RUQ pain Reason for Exam: ruq pain FINDINGS: LIVER:  There is diffusely increased hepatic echotexture consistent with steatosis and hepatomegaly. No intrahepatic biliary ductal dilatation or mass. BILIARY SYSTEM:  Gallstones and small amount of gallbladder sludge noted within otherwise unremarkable appearing gallbladder. No pathologic distention the gallbladder lumen, gallbladder wall thickening or pericholecystic fluid. Technologist notes reflect no abdominal tenderness during the study/negative sonographic Lee sign. Common bile duct is at upper limit of normal measuring 4.8 mm. RIGHT KIDNEY: The right kidney is grossly unremarkable without evidence of hydronephrosis. PANCREAS:  Visualized portions of the pancreas are unremarkable. OTHER: No evidence of right upper quadrant ascites. Hepatomegaly and echotexture consistent with hepatic steatosis. Gallstones and sludge without sonographic evidence of acute cholecystitis. Common bile duct projects at the upper limit of normal.  Otherwise, no acute sonographic abnormality visualized abdomen.         CBC:   Recent Labs     07/20/23  0543 07/21/23  1452

## 2023-07-22 NOTE — DISCHARGE INSTRUCTIONS
[address] (women only)   [address]  [address] [facility], [address]  [phone]  [address]   [address] [facility], [address] [place] [address]  [phone]    [facility] (pregnant women)   [address] [place] [address], [address] [place] [address]  [phone]

## 2023-07-24 ENCOUNTER — CARE COORDINATION (OUTPATIENT)
Dept: CASE MANAGEMENT | Age: 43
End: 2023-07-24

## 2023-07-24 ENCOUNTER — HOSPITAL ENCOUNTER (EMERGENCY)
Age: 43
Discharge: HOME | End: 2023-07-25
Attending: EMERGENCY MEDICINE
Payer: COMMERCIAL

## 2023-07-24 VITALS
TEMPERATURE: 97.9 F | BODY MASS INDEX: 39.96 KG/M2 | OXYGEN SATURATION: 96 % | HEART RATE: 109 BPM | SYSTOLIC BLOOD PRESSURE: 173 MMHG | RESPIRATION RATE: 18 BRPM | DIASTOLIC BLOOD PRESSURE: 84 MMHG | HEIGHT: 72 IN | WEIGHT: 295 LBS

## 2023-07-24 DIAGNOSIS — S91.113A: Primary | ICD-10-CM

## 2023-07-24 PROCEDURE — 99283 EMERGENCY DEPT VISIT LOW MDM: CPT

## 2023-07-24 PROCEDURE — 12001 RPR S/N/AX/GEN/TRNK 2.5CM/<: CPT

## 2023-07-24 PROCEDURE — 2500000003 HC RX 250 WO HCPCS: Performed by: PHYSICIAN ASSISTANT

## 2023-07-24 PROCEDURE — 6370000000 HC RX 637 (ALT 250 FOR IP): Performed by: PHYSICIAN ASSISTANT

## 2023-07-24 RX ORDER — DOXYCYCLINE HYCLATE 100 MG
100 TABLET ORAL EVERY 12 HOURS SCHEDULED
Status: DISCONTINUED | OUTPATIENT
Start: 2023-07-24 | End: 2023-07-25 | Stop reason: HOSPADM

## 2023-07-24 RX ORDER — HYDROCODONE BITARTRATE AND ACETAMINOPHEN 5; 325 MG/1; MG/1
1 TABLET ORAL ONCE
Status: COMPLETED | OUTPATIENT
Start: 2023-07-24 | End: 2023-07-24

## 2023-07-24 RX ORDER — LIDOCAINE HYDROCHLORIDE AND EPINEPHRINE BITARTRATE 20; .01 MG/ML; MG/ML
20 INJECTION, SOLUTION SUBCUTANEOUS ONCE
Status: COMPLETED | OUTPATIENT
Start: 2023-07-24 | End: 2023-07-24

## 2023-07-24 RX ADMIN — HYDROCODONE BITARTRATE AND ACETAMINOPHEN 1 TABLET: 5; 325 TABLET ORAL at 23:42

## 2023-07-24 RX ADMIN — DOXYCYCLINE HYCLATE 100 MG: 100 TABLET, COATED ORAL at 23:42

## 2023-07-24 RX ADMIN — LIDOCAINE HYDROCHLORIDE AND EPINEPHRINE 20 ML: 20; 10 INJECTION, SOLUTION INFILTRATION; PERINEURAL at 23:43

## 2023-07-24 ASSESSMENT — PAIN DESCRIPTION - DESCRIPTORS: DESCRIPTORS: SHOOTING

## 2023-07-24 ASSESSMENT — PAIN - FUNCTIONAL ASSESSMENT: PAIN_FUNCTIONAL_ASSESSMENT: PREVENTS OR INTERFERES SOME ACTIVE ACTIVITIES AND ADLS

## 2023-07-24 ASSESSMENT — PAIN DESCRIPTION - ORIENTATION: ORIENTATION: RIGHT

## 2023-07-24 ASSESSMENT — PAIN SCALES - GENERAL: PAINLEVEL_OUTOF10: 9

## 2023-07-24 ASSESSMENT — PAIN DESCRIPTION - LOCATION: LOCATION: FOOT

## 2023-07-24 NOTE — CARE COORDINATION
Care Transitions Outreach Attempt-Laceration of right great toe, Alcohol abuse, Hyponatremia    Call within 2 business days of discharge: Yes   Attempted to reach patient for transitions of care follow up. Unable to reach patient. Patient: Thomas Thibodeaux Patient : 1980 MRN: [id number]    Last Discharge [address]       Date Complaint Diagnosis Description Type Department Provider    23 Laceration Alcoholic cirrhosis of liver without ascites ([address]) . .. ED to Hosp-Admission (Discharged) (Vu Srivastava) Lee Rey MD; Evelyn Cross D'Entre. .. Was this an external facility discharge? No Discharge Facility: [facility]     Noted following upcoming appointments from discharge chart review:   [facility] follow up appointment(s): No future appointments. 1st attempt to contact pt for initial care transition follow up. Unable to reach pt. Left message with contact information and request for call back. CTN sent email to Jonathon Pena, Practice Manager with request for hospital follow up.       Meena Grubbs, RN  Care Transition Nurse

## 2023-07-25 ENCOUNTER — CARE COORDINATION (OUTPATIENT)
Dept: CASE MANAGEMENT | Age: 43
End: 2023-07-25

## 2023-07-25 RX ORDER — IBUPROFEN 600 MG/1
600 TABLET ORAL EVERY 6 HOURS PRN
Qty: 20 TABLET | Refills: 0 | Status: SHIPPED | OUTPATIENT
Start: 2023-07-25 | End: 2023-08-24

## 2023-07-25 RX ORDER — DOXYCYCLINE HYCLATE 100 MG
100 TABLET ORAL ONCE
Status: DISCONTINUED | OUTPATIENT
Start: 2023-07-25 | End: 2023-07-25 | Stop reason: HOSPADM

## 2023-07-25 RX ORDER — DOXYCYCLINE HYCLATE 100 MG
100 TABLET ORAL 2 TIMES DAILY
Qty: 20 TABLET | Refills: 0 | Status: SHIPPED | OUTPATIENT
Start: 2023-07-25 | End: 2023-08-04

## 2023-07-25 NOTE — CARE COORDINATION
Care Transitions Outreach Attempt-Laceration of right great toe, Alcohol abuse, Hyponatremia    Call within 2 business days of discharge: Yes   Attempted to reach patient for transitions of care follow up. Unable to reach patient. Patient: Juarez Brown Patient : 1980 MRN: [id number]    Last Discharge [facility]       Date Complaint Diagnosis Description Type Department Provider    23 Other Laceration of great toe without foreign body present or damage to nail, unspecified laterality, initial encounter ED (DISCHARGE) [facility] ED Pat Nina MD              Was this an external facility discharge? No Discharge Facility: [facility]     Noted following upcoming appointments from discharge chart review:   [facility] follow up appointment(s): No future appointments. 2nd attempt to contact pt for initial care transition follow up. Unable to reach pt. Left message with contact information and request for call back. Episode to be resolved and CTN to sign off if return call is not received from the patient. CTN contacted Dr. Imelda Mckenzie, GI office. CTN informed that pt does not have any appts scheduled at this time.       Griselda Lynn, RN  Care Transition Nurse

## 2023-07-25 NOTE — ED PROVIDER NOTES
ED supervisory note:     Patient was seen in coordination with [facility]. In brief, patient is here for evaluation of 5-day old laceration to the foot which has come open and has some dehiscence and some of the sutures appear to have fallen out. Essentially, the wound will need to heal with mostly secondary intention at this point, we will place some anchoring sutures for loose approximation and patient will go home on antibiotics. Wound was repaired by PHILIP Main MD  07/25/23 8577
7/19/2023 1:27 pm COMPARISON: None. HISTORY: ORDERING SYSTEM PROVIDED HISTORY: RUQ pain TECHNOLOGIST PROVIDED HISTORY: Reason for exam:->RUQ pain Reason for Exam: ruq pain FINDINGS: LIVER:  There is diffusely increased hepatic echotexture consistent with steatosis and hepatomegaly. No intrahepatic biliary ductal dilatation or mass. BILIARY SYSTEM:  Gallstones and small amount of gallbladder sludge noted within otherwise unremarkable appearing gallbladder. No pathologic distention the gallbladder lumen, gallbladder wall thickening or pericholecystic fluid. Technologist notes reflect no abdominal tenderness during the study/negative sonographic Lee sign. Common bile duct is at upper limit of normal measuring 4.8 mm. RIGHT KIDNEY: The right kidney is grossly unremarkable without evidence of hydronephrosis. PANCREAS:  Visualized portions of the pancreas are unremarkable. OTHER: No evidence of right upper quadrant ascites. Hepatomegaly and echotexture consistent with hepatic steatosis. Gallstones and sludge without sonographic evidence of acute cholecystitis. Common bile duct projects at the upper limit of normal.  Otherwise, no acute sonographic abnormality visualized abdomen. MDM:   Patient presents today with laceration. Laceration repair performed by myself without complications. Patient did tolerate procedure well. Wound care discussed with patient/family. As well as return precautions, suture staple/removal.    Wound care and scar minimization education was provided. Instructions were given to return for increasing pain, redness, streaking, discharge, or any other worsening or worrisome concerns. I'm very pleased with the results of the wound repair. Norco provided for pain    Pt is to be discharged home. Pt is  to return immediately to the emergency department if he has any new, worrisome or worsening symptoms. Pt is to follow up with PCP/DOD within 2 days.   Patient/Surrogate vocalizes

## 2023-08-10 ENCOUNTER — HOSPITAL ENCOUNTER (OUTPATIENT)
Dept: WOUND CARE | Age: 43
Discharge: HOME | End: 2023-08-10
Payer: COMMERCIAL

## 2023-08-10 VITALS — TEMPERATURE: 97.6 F | HEART RATE: 111 BPM | SYSTOLIC BLOOD PRESSURE: 138 MMHG | DIASTOLIC BLOOD PRESSURE: 73 MMHG

## 2023-08-10 DIAGNOSIS — E11.40 TYPE 2 DIABETES, CONTROLLED, WITH NEUROPATHY (HCC): Primary | ICD-10-CM

## 2023-08-10 DIAGNOSIS — F10.939 ALCOHOL WITHDRAWAL SYNDROME, WITH UNSPECIFIED COMPLICATION (HCC): ICD-10-CM

## 2023-08-10 DIAGNOSIS — L97.512 SKIN ULCER OF RIGHT GREAT TOE WITH FAT LAYER EXPOSED (HCC): ICD-10-CM

## 2023-08-10 DIAGNOSIS — L97.511 SKIN ULCER OF THIRD TOE OF RIGHT FOOT, LIMITED TO BREAKDOWN OF SKIN (HCC): ICD-10-CM

## 2023-08-10 DIAGNOSIS — S82.862G: ICD-10-CM

## 2023-08-10 DIAGNOSIS — M14.671 CHARCOT ANKLE, RIGHT: ICD-10-CM

## 2023-08-10 DIAGNOSIS — L97.512 TRAUMATIC ULCER OF RIGHT FOOT WITH FAT LAYER EXPOSED (HCC): ICD-10-CM

## 2023-08-10 PROBLEM — M25.471 RIGHT ANKLE SWELLING: Status: RESOLVED | Noted: 2022-06-27 | Resolved: 2023-08-10

## 2023-08-10 PROBLEM — L97.522 DIABETIC ULCER OF TOE OF LEFT FOOT ASSOCIATED WITH TYPE 2 DIABETES MELLITUS, WITH FAT LAYER EXPOSED (HCC): Status: RESOLVED | Noted: 2022-05-13 | Resolved: 2023-08-10

## 2023-08-10 PROBLEM — M25.571 ACUTE RIGHT ANKLE PAIN: Status: RESOLVED | Noted: 2022-06-27 | Resolved: 2023-08-10

## 2023-08-10 PROBLEM — L97.522 NON-PRESSURE CHRONIC ULCER OF OTHER PART OF LEFT FOOT WITH FAT LAYER EXPOSED (HCC): Status: RESOLVED | Noted: 2022-04-19 | Resolved: 2023-08-10

## 2023-08-10 PROBLEM — S93.325A LISFRANC DISLOCATION, LEFT, INITIAL ENCOUNTER: Status: RESOLVED | Noted: 2022-09-02 | Resolved: 2023-08-10

## 2023-08-10 PROBLEM — L03.115 CELLULITIS OF RIGHT FOOT: Status: RESOLVED | Noted: 2022-04-13 | Resolved: 2023-08-10

## 2023-08-10 PROBLEM — E11.621 DIABETIC ULCER OF TOE OF LEFT FOOT ASSOCIATED WITH TYPE 2 DIABETES MELLITUS, WITH FAT LAYER EXPOSED (HCC): Status: RESOLVED | Noted: 2022-05-13 | Resolved: 2023-08-10

## 2023-08-10 PROBLEM — S99.919A ANKLE INJURY, INITIAL ENCOUNTER: Status: RESOLVED | Noted: 2022-06-27 | Resolved: 2023-08-10

## 2023-08-10 PROCEDURE — 11042 DBRDMT SUBQ TIS 1ST 20SQCM/<: CPT

## 2023-08-10 PROCEDURE — 99213 OFFICE O/P EST LOW 20 MIN: CPT

## 2023-08-10 RX ORDER — BACITRACIN ZINC AND POLYMYXIN B SULFATE 500; 1000 [USP'U]/G; [USP'U]/G
OINTMENT TOPICAL ONCE
OUTPATIENT
Start: 2023-08-10 | End: 2023-08-10

## 2023-08-10 RX ORDER — SODIUM CHLOR/HYPOCHLOROUS ACID 0.033 %
SOLUTION, IRRIGATION IRRIGATION ONCE
OUTPATIENT
Start: 2023-08-10 | End: 2023-08-10

## 2023-08-10 RX ORDER — IBUPROFEN 200 MG
TABLET ORAL ONCE
OUTPATIENT
Start: 2023-08-10 | End: 2023-08-10

## 2023-08-10 RX ORDER — GENTAMICIN SULFATE 1 MG/G
OINTMENT TOPICAL ONCE
OUTPATIENT
Start: 2023-08-10 | End: 2023-08-10

## 2023-08-10 RX ORDER — LIDOCAINE HYDROCHLORIDE 40 MG/ML
SOLUTION TOPICAL ONCE
OUTPATIENT
Start: 2023-08-10 | End: 2023-08-10

## 2023-08-10 RX ORDER — LIDOCAINE 40 MG/G
CREAM TOPICAL ONCE
OUTPATIENT
Start: 2023-08-10 | End: 2023-08-10

## 2023-08-10 RX ORDER — LIDOCAINE 50 MG/G
OINTMENT TOPICAL ONCE
OUTPATIENT
Start: 2023-08-10 | End: 2023-08-10

## 2023-08-10 RX ORDER — DOXYCYCLINE HYCLATE 100 MG/1
CAPSULE ORAL
COMMUNITY
Start: 2023-08-07

## 2023-08-10 RX ORDER — CLOBETASOL PROPIONATE 0.5 MG/G
OINTMENT TOPICAL ONCE
OUTPATIENT
Start: 2023-08-10 | End: 2023-08-10

## 2023-08-10 RX ORDER — GINSENG 100 MG
CAPSULE ORAL ONCE
OUTPATIENT
Start: 2023-08-10 | End: 2023-08-10

## 2023-08-10 RX ORDER — BETAMETHASONE DIPROPIONATE 0.05 %
OINTMENT (GRAM) TOPICAL ONCE
OUTPATIENT
Start: 2023-08-10 | End: 2023-08-10

## 2023-08-10 ASSESSMENT — PAIN SCALES - GENERAL: PAINLEVEL_OUTOF10: 9

## 2023-08-10 ASSESSMENT — PAIN DESCRIPTION - ORIENTATION: ORIENTATION: RIGHT

## 2023-08-10 ASSESSMENT — PAIN DESCRIPTION - PAIN TYPE: TYPE: ACUTE PAIN

## 2023-08-10 ASSESSMENT — PAIN - FUNCTIONAL ASSESSMENT: PAIN_FUNCTIONAL_ASSESSMENT: PREVENTS OR INTERFERES SOME ACTIVE ACTIVITIES AND ADLS

## 2023-08-10 ASSESSMENT — PAIN DESCRIPTION - LOCATION: LOCATION: TOE (COMMENT WHICH ONE)

## 2023-08-10 ASSESSMENT — PAIN DESCRIPTION - DESCRIPTORS: DESCRIPTORS: SHOOTING

## 2023-08-10 NOTE — DISCHARGE INSTRUCTIONS
PHYSICIAN ORDERS AND DISCHARGE INSTRUCTIONS     NOTE: Upon discharge from the  [facility], you will receive a patient experience survey. We would be grateful if you would take the time to fill this survey out. Continuing wound care orders and information:              Residence:               Continue home health care with:              Wound Medications:              Wound cleansing:                          Do not scrub or use excessive force. Wash hands with soap and water before and after dressing changes. Prior to applying a clean dressing, cleanse wound with normal saline,                                wound cleanser, or mild soap and water. Ask the physician or nurse before getting the wound(s) wet in a shower              Daily Wound management:                          Keep weight off wounds and reposition every 2 hours. Avoid standing for long periods of time. Apply wraps/stockings in AM and remove at bedtime. If swelling is present, elevate legs to the level of the heart or above for 30 minutes 4-5 times a day and/or when sitting. When taking antibiotics take entire prescription as ordered by physician do not stop taking until medicine is all gone. .compression    Wound Care Notes:  Pharm: [facility]: Pain medication ordered  Vascular studies: arterial studies  Date 2/21/22  Cultures: Obtained on 5/13/2022  Antibiotics: Doxycycline 100 mg BID for 10 days ordered on 5/13/2022   KENYA 1.23 RLE 8/10/23                                      Orders for this week: 8/10/23     Right Great Toe -- wash with soap and water, pat dry. Apply stimulen powder to wound bed and arielle to medial and lateral great toe. Cover with steristrips to bolster secured with mastisol and sorbex.    Wrap with

## 2023-08-10 NOTE — PROGRESS NOTES
Multilayer Compression Wrap   (Not Unna) Below the Knee    NAME:  Evelia Mccauley  YOB: 1980  MEDICAL RECORD NUMBER:  [id number]  DATE:  8/10/2023    Multilayer compression wrap: Removed old Multilayer wrap if indicated and wash leg with mild soap/water. Applied moisturizing agent to dry skin as needed. Applied primary and secondary dressing as ordered. Applied multilayered dressing below the knee to right lower leg. Instructed patient/caregiver not to remove dressing and to keep it clean and dry. Instructed patient/caregiver on complications to report to provider, such as pain, numbness in toes, heavy drainage, and slippage of dressing. Instructed patient on purpose of compression dressing and on activity and exercise recommendations.       Electronically signed by Zachary Loving RN on 8/10/2023 at 3:32 PM
with:              Wound Medications:              Wound cleansing:                          Do not scrub or use excessive force. Wash hands with soap and water before and after dressing changes. Prior to applying a clean dressing, cleanse wound with normal saline,                                wound cleanser, or mild soap and water. Ask the physician or nurse before getting the wound(s) wet in a shower              Daily Wound management:                          Keep weight off wounds and reposition every 2 hours. Avoid standing for long periods of time. Apply wraps/stockings in AM and remove at bedtime. If swelling is present, elevate legs to the level of the heart or above for 30 minutes 4-5 times a day and/or when sitting. When taking antibiotics take entire prescription as ordered by physician do not stop taking until medicine is all gone. .compression    Wound Care Notes:  Pharm: [facility]: Pain medication ordered  Vascular studies: arterial studies  Date 2/21/22  Cultures: Obtained on 5/13/2022  Antibiotics: Doxycycline 100 mg BID for 10 days ordered on 5/13/2022   KENYA 1.23 RLE 8/10/23                                      Orders for this week: 8/10/23     Right Great Toe -- wash with soap and water, pat dry. Apply stimulen powder to wound bed and arielle to medial and lateral great toe. Cover with steristrips to bolster secured with mastisol and sorbex. Wrap with coban 2 lite  ( enclose the toes) and secure with tape.  May go a little above ankle (per Lilton Chalet)   Leave in place until Monday     Right 3rd toe paint with betadine and stimulen and arielle then wrap with coban 2 lite (enclose toes)     Left 2nd Finger Burn - (Please add LDA) Patient forgot to mention it during intake) wash

## 2023-08-14 ENCOUNTER — HOSPITAL ENCOUNTER (OUTPATIENT)
Dept: WOUND CARE | Age: 43
Discharge: HOME | End: 2023-08-14
Payer: COMMERCIAL

## 2023-08-14 VITALS
HEART RATE: 111 BPM | TEMPERATURE: 96.6 F | RESPIRATION RATE: 20 BRPM | DIASTOLIC BLOOD PRESSURE: 75 MMHG | SYSTOLIC BLOOD PRESSURE: 159 MMHG

## 2023-08-14 DIAGNOSIS — M14.671 CHARCOT ANKLE, RIGHT: Primary | ICD-10-CM

## 2023-08-14 DIAGNOSIS — L97.512 SKIN ULCER OF RIGHT GREAT TOE WITH FAT LAYER EXPOSED (HCC): ICD-10-CM

## 2023-08-14 DIAGNOSIS — E11.40 TYPE 2 DIABETES, CONTROLLED, WITH NEUROPATHY (HCC): ICD-10-CM

## 2023-08-14 DIAGNOSIS — L97.511 SKIN ULCER OF THIRD TOE OF RIGHT FOOT, LIMITED TO BREAKDOWN OF SKIN (HCC): ICD-10-CM

## 2023-08-14 DIAGNOSIS — F10.10 ALCOHOL ABUSE: ICD-10-CM

## 2023-08-14 DIAGNOSIS — L97.512 TRAUMATIC ULCER OF RIGHT FOOT WITH FAT LAYER EXPOSED (HCC): ICD-10-CM

## 2023-08-14 PROCEDURE — 2500000003 HC RX 250 WO HCPCS: Performed by: NURSE PRACTITIONER

## 2023-08-14 PROCEDURE — 16020 DRESS/DEBRID P-THICK BURN S: CPT

## 2023-08-14 PROCEDURE — 16020 DRESS/DEBRID P-THICK BURN S: CPT | Performed by: NURSE PRACTITIONER

## 2023-08-14 PROCEDURE — 11042 DBRDMT SUBQ TIS 1ST 20SQCM/<: CPT

## 2023-08-14 PROCEDURE — 11042 DBRDMT SUBQ TIS 1ST 20SQCM/<: CPT | Performed by: NURSE PRACTITIONER

## 2023-08-14 RX ORDER — SODIUM CHLOR/HYPOCHLOROUS ACID 0.033 %
SOLUTION, IRRIGATION IRRIGATION ONCE
OUTPATIENT
Start: 2023-08-14 | End: 2023-08-14

## 2023-08-14 RX ORDER — LIDOCAINE 40 MG/G
CREAM TOPICAL ONCE
OUTPATIENT
Start: 2023-08-14 | End: 2023-08-14

## 2023-08-14 RX ORDER — BACITRACIN ZINC AND POLYMYXIN B SULFATE 500; 1000 [USP'U]/G; [USP'U]/G
OINTMENT TOPICAL ONCE
OUTPATIENT
Start: 2023-08-14 | End: 2023-08-14

## 2023-08-14 RX ORDER — CLOBETASOL PROPIONATE 0.5 MG/G
OINTMENT TOPICAL ONCE
OUTPATIENT
Start: 2023-08-14 | End: 2023-08-14

## 2023-08-14 RX ORDER — GENTAMICIN SULFATE 1 MG/G
OINTMENT TOPICAL ONCE
OUTPATIENT
Start: 2023-08-14 | End: 2023-08-14

## 2023-08-14 RX ORDER — IBUPROFEN 200 MG
TABLET ORAL ONCE
OUTPATIENT
Start: 2023-08-14 | End: 2023-08-14

## 2023-08-14 RX ORDER — SULFAMETHOXAZOLE AND TRIMETHOPRIM 800; 160 MG/1; MG/1
1 TABLET ORAL 2 TIMES DAILY
Qty: 20 TABLET | Refills: 0 | Status: SHIPPED | OUTPATIENT
Start: 2023-08-14 | End: 2023-08-24

## 2023-08-14 RX ORDER — TIZANIDINE 4 MG/1
4 TABLET ORAL 3 TIMES DAILY PRN
Qty: 30 TABLET | Refills: 0 | Status: SHIPPED | OUTPATIENT
Start: 2023-08-14

## 2023-08-14 RX ORDER — BETAMETHASONE DIPROPIONATE 0.05 %
OINTMENT (GRAM) TOPICAL ONCE
OUTPATIENT
Start: 2023-08-14 | End: 2023-08-14

## 2023-08-14 RX ORDER — LIDOCAINE 50 MG/G
OINTMENT TOPICAL ONCE
OUTPATIENT
Start: 2023-08-14 | End: 2023-08-14

## 2023-08-14 RX ORDER — GINSENG 100 MG
CAPSULE ORAL ONCE
OUTPATIENT
Start: 2023-08-14 | End: 2023-08-14

## 2023-08-14 RX ORDER — LIDOCAINE HYDROCHLORIDE 40 MG/ML
SOLUTION TOPICAL ONCE
OUTPATIENT
Start: 2023-08-14 | End: 2023-08-14

## 2023-08-14 RX ADMIN — SILVER SULFADIAZINE: 10 CREAM TOPICAL at 12:04

## 2023-08-14 ASSESSMENT — PAIN DESCRIPTION - ONSET: ONSET: ON-GOING

## 2023-08-14 ASSESSMENT — PAIN DESCRIPTION - PAIN TYPE: TYPE: ACUTE PAIN

## 2023-08-14 ASSESSMENT — PAIN DESCRIPTION - ORIENTATION: ORIENTATION: RIGHT

## 2023-08-14 ASSESSMENT — PAIN - FUNCTIONAL ASSESSMENT: PAIN_FUNCTIONAL_ASSESSMENT: PREVENTS OR INTERFERES SOME ACTIVE ACTIVITIES AND ADLS

## 2023-08-14 ASSESSMENT — PAIN DESCRIPTION - FREQUENCY: FREQUENCY: INTERMITTENT

## 2023-08-14 ASSESSMENT — PAIN DESCRIPTION - LOCATION: LOCATION: TOE (COMMENT WHICH ONE)

## 2023-08-14 ASSESSMENT — PAIN DESCRIPTION - DESCRIPTORS: DESCRIPTORS: OTHER (COMMENT)

## 2023-08-14 ASSESSMENT — PAIN SCALES - GENERAL: PAINLEVEL_OUTOF10: 9

## 2023-08-14 NOTE — PROGRESS NOTES
Multilayer Compression Wrap   (Not Unna) Below the Knee    NAME:  Dav Morales  YOB: 1980  MEDICAL RECORD NUMBER:  [id number]  DATE:  8/14/2023    Multilayer compression wrap: Removed old Multilayer wrap if indicated and wash leg with mild soap/water. Applied moisturizing agent to dry skin as needed. Applied primary and secondary dressing as ordered. Applied multilayered dressing below the knee to right lower leg. Instructed patient/caregiver not to remove dressing and to keep it clean and dry. Instructed patient/caregiver on complications to report to provider, such as pain, numbness in toes, heavy drainage, and slippage of dressing. Instructed patient on purpose of compression dressing and on activity and exercise recommendations.       Electronically signed by Esme Eric LPN on 8/88/1702 at 16:71 PM
established with patient, will follow up next week. Discussed protein needs for healing. Continues to wear an off loading boot for a previous fracture with right foot Charcot deformity. The patients records were reviewed and discussed. Time was given for questions . All questions were answered to the patients satisfaction. A total time of 20 minutes was spent with at least 50% related to face to face counseling and coordination of care. Plan:     Discharge instructions:    Discharge Instructions         PHYSICIAN ORDERS AND DISCHARGE INSTRUCTIONS     NOTE: Upon discharge from the  Medical [facility], you will receive a patient experience survey. We would be grateful if you would take the time to fill this survey out. Continuing wound care orders and information:              Residence:               Continue home health care with:              Wound Medications:              Wound cleansing:                          Do not scrub or use excessive force. Wash hands with soap and water before and after dressing changes. Prior to applying a clean dressing, cleanse wound with normal saline,                                wound cleanser, or mild soap and water. Ask the physician or nurse before getting the wound(s) wet in a shower              Daily Wound management:                          Keep weight off wounds and reposition every 2 hours. Avoid standing for long periods of time. Apply wraps/stockings in AM and remove at bedtime. If swelling is present, elevate legs to the level of the heart or above for 30 minutes 4-5 times a day and/or when sitting. When taking antibiotics take entire prescription as ordered by physician do not stop taking until medicine is all gone.                    .compression     Wound Care

## 2023-08-14 NOTE — DISCHARGE INSTRUCTIONS
PHYSICIAN ORDERS AND DISCHARGE INSTRUCTIONS     NOTE: Upon discharge from the  Medical [facility], you will receive a patient experience survey. We would be grateful if you would take the time to fill this survey out. Continuing wound care orders and information:              Residence:               Continue home health care with:              Wound Medications:              Wound cleansing:                          Do not scrub or use excessive force. Wash hands with soap and water before and after dressing changes. Prior to applying a clean dressing, cleanse wound with normal saline,                                wound cleanser, or mild soap and water. Ask the physician or nurse before getting the wound(s) wet in a shower              Daily Wound management:                          Keep weight off wounds and reposition every 2 hours. Avoid standing for long periods of time. Apply wraps/stockings in AM and remove at bedtime. If swelling is present, elevate legs to the level of the heart or above for 30 minutes 4-5 times a day and/or when sitting. When taking antibiotics take entire prescription as ordered by physician do not stop taking until medicine is all gone. .compression     Wound Care Notes:  Pharm: [facility]: Pain medication ordered  Vascular studies: arterial studies  Date 2/21/22  Cultures: Obtained on 5/13/2022  Antibiotics: Doxycycline 100 mg BID for 10 days ordered on 5/13/2022   KENYA 1.23 RLE 8/10/23                                      Orders for this week: 8/14/23     Right Great Toe -- wash with soap and water, pat dry. Apply stimulen powder to wound bed and arielle to medial and lateral great toe. Cover with steristrips to bolster secured with mastisol and sorbex.    Wrap with

## 2023-08-17 ENCOUNTER — HOSPITAL ENCOUNTER (OUTPATIENT)
Dept: WOUND CARE | Age: 43
Discharge: HOME | End: 2023-08-17
Payer: COMMERCIAL

## 2023-08-17 VITALS
HEART RATE: 76 BPM | TEMPERATURE: 97.5 F | RESPIRATION RATE: 18 BRPM | DIASTOLIC BLOOD PRESSURE: 61 MMHG | SYSTOLIC BLOOD PRESSURE: 109 MMHG

## 2023-08-17 PROCEDURE — 29581 APPL MULTLAYER CMPRN SYS LEG: CPT

## 2023-08-17 ASSESSMENT — PAIN - FUNCTIONAL ASSESSMENT: PAIN_FUNCTIONAL_ASSESSMENT: PREVENTS OR INTERFERES SOME ACTIVE ACTIVITIES AND ADLS

## 2023-08-17 ASSESSMENT — PAIN DESCRIPTION - PAIN TYPE: TYPE: ACUTE PAIN

## 2023-08-17 ASSESSMENT — PAIN DESCRIPTION - FREQUENCY: FREQUENCY: INTERMITTENT

## 2023-08-17 ASSESSMENT — PAIN SCALES - GENERAL: PAINLEVEL_OUTOF10: 8

## 2023-08-17 ASSESSMENT — PAIN DESCRIPTION - DESCRIPTORS: DESCRIPTORS: ACHING

## 2023-08-17 ASSESSMENT — PAIN DESCRIPTION - ONSET: ONSET: ON-GOING

## 2023-08-17 ASSESSMENT — PAIN DESCRIPTION - ORIENTATION: ORIENTATION: RIGHT

## 2023-08-17 ASSESSMENT — PAIN DESCRIPTION - LOCATION: LOCATION: FOOT

## 2023-08-17 NOTE — PROGRESS NOTES
Multilayer Compression Wrap   (Not Unna) Below the Knee    NAME:  Gina Gray  YOB: 1980  MEDICAL RECORD NUMBER:  [id number]  DATE:  8/17/2023    Multilayer compression wrap: Removed old Multilayer wrap if indicated and wash leg with mild soap/water. Applied moisturizing agent to dry skin as needed. Applied primary and secondary dressing as ordered. Applied multilayered dressing below the knee to right lower leg. Instructed patient/caregiver not to remove dressing and to keep it clean and dry. Instructed patient/caregiver on complications to report to provider, such as pain, numbness in toes, heavy drainage, and slippage of dressing. Instructed patient on purpose of compression dressing and on activity and exercise recommendations.     Wrapped to mid-calf per Patient request.      Electronically signed by Aron Owens LPN on 7/82/3586 at 38:87 PM

## 2023-08-21 ENCOUNTER — HOSPITAL ENCOUNTER (OUTPATIENT)
Dept: WOUND CARE | Age: 43
Discharge: HOME | End: 2023-08-21
Payer: COMMERCIAL

## 2023-08-21 DIAGNOSIS — E11.40 TYPE 2 DIABETES, CONTROLLED, WITH NEUROPATHY (HCC): ICD-10-CM

## 2023-08-21 DIAGNOSIS — L97.511 SKIN ULCER OF THIRD TOE OF RIGHT FOOT, LIMITED TO BREAKDOWN OF SKIN (HCC): ICD-10-CM

## 2023-08-21 DIAGNOSIS — F10.10 ALCOHOL ABUSE: ICD-10-CM

## 2023-08-21 DIAGNOSIS — M14.671 CHARCOT ANKLE, RIGHT: Primary | ICD-10-CM

## 2023-08-21 DIAGNOSIS — L97.512 TRAUMATIC ULCER OF RIGHT FOOT WITH FAT LAYER EXPOSED (HCC): ICD-10-CM

## 2023-08-21 DIAGNOSIS — L97.512 SKIN ULCER OF RIGHT GREAT TOE WITH FAT LAYER EXPOSED (HCC): ICD-10-CM

## 2023-08-21 PROCEDURE — 11042 DBRDMT SUBQ TIS 1ST 20SQCM/<: CPT | Performed by: NURSE PRACTITIONER

## 2023-08-21 PROCEDURE — 11042 DBRDMT SUBQ TIS 1ST 20SQCM/<: CPT

## 2023-08-21 RX ORDER — LIDOCAINE HYDROCHLORIDE 40 MG/ML
SOLUTION TOPICAL ONCE
Status: CANCELLED | OUTPATIENT
Start: 2023-08-21 | End: 2023-08-21

## 2023-08-21 RX ORDER — SODIUM CHLOR/HYPOCHLOROUS ACID 0.033 %
SOLUTION, IRRIGATION IRRIGATION ONCE
Status: CANCELLED | OUTPATIENT
Start: 2023-08-21 | End: 2023-08-21

## 2023-08-21 RX ORDER — LIDOCAINE 40 MG/G
CREAM TOPICAL ONCE
Status: CANCELLED | OUTPATIENT
Start: 2023-08-21 | End: 2023-08-21

## 2023-08-21 RX ORDER — GENTAMICIN SULFATE 1 MG/G
OINTMENT TOPICAL ONCE
Status: CANCELLED | OUTPATIENT
Start: 2023-08-21 | End: 2023-08-21

## 2023-08-21 RX ORDER — BACITRACIN ZINC AND POLYMYXIN B SULFATE 500; 1000 [USP'U]/G; [USP'U]/G
OINTMENT TOPICAL ONCE
Status: CANCELLED | OUTPATIENT
Start: 2023-08-21 | End: 2023-08-21

## 2023-08-21 RX ORDER — LIDOCAINE 50 MG/G
OINTMENT TOPICAL ONCE
Status: CANCELLED | OUTPATIENT
Start: 2023-08-21 | End: 2023-08-21

## 2023-08-21 RX ORDER — IBUPROFEN 200 MG
TABLET ORAL ONCE
Status: CANCELLED | OUTPATIENT
Start: 2023-08-21 | End: 2023-08-21

## 2023-08-21 RX ORDER — GINSENG 100 MG
CAPSULE ORAL ONCE
Status: CANCELLED | OUTPATIENT
Start: 2023-08-21 | End: 2023-08-21

## 2023-08-21 RX ORDER — BETAMETHASONE DIPROPIONATE 0.05 %
OINTMENT (GRAM) TOPICAL ONCE
Status: CANCELLED | OUTPATIENT
Start: 2023-08-21 | End: 2023-08-21

## 2023-08-21 RX ORDER — CLOBETASOL PROPIONATE 0.5 MG/G
OINTMENT TOPICAL ONCE
Status: CANCELLED | OUTPATIENT
Start: 2023-08-21 | End: 2023-08-21

## 2023-08-21 NOTE — PROGRESS NOTES
Multilayer Compression Wrap   (Not Unna) Below the Knee    NAME:  Sasha Mclean  YOB: 1980  MEDICAL RECORD NUMBER:  [id number]  DATE:  8/21/2023    Multilayer compression wrap: Removed old Multilayer wrap if indicated and wash leg with mild soap/water. Applied moisturizing agent to dry skin as needed. Applied primary and secondary dressing as ordered. Applied multilayered dressing below the knee to right lower leg. Instructed patient/caregiver not to remove dressing and to keep it clean and dry. Instructed patient/caregiver on complications to report to provider, such as pain, numbness in toes, heavy drainage, and slippage of dressing. Instructed patient on purpose of compression dressing and on activity and exercise recommendations.       Electronically signed by Thi Frank RN on 8/21/2023 at 12:14 PM
sitting. When taking antibiotics take entire prescription as ordered by physician do not stop taking until medicine is all gone. .compression     Wound Care Notes:  Pharm: [facility]: Pain medication ordered  Vascular studies: arterial studies  Date 22  Cultures: Obtained on 2022  Antibiotics: Doxycycline 100 mg BID for 10 days ordered on 2022   KENYA 1.23 RLE 8/10/23                                      Orders for this week: 23     Right Great Toe -- wash with soap and water, pat dry. Apply stimulen powder to wound bed and  to medial and lateral great toe. Cover with steri strips to bolster secured with mastisol and sorbex. Wrap with coban 2 lite  ( enclose the toes) and secure with tape. May go a little above ankle (per Nellie Mayen)   Leave in place until Monday       Dispense 30 day quantity when ordering supplies. Follow up Instructions: at the [address] [place] [address] in Monday at the [facility]  Primary Wound Care Provider:  Javi Deleon [facility]  for any questions or concerns. Date__________   Time____________  Central [phone]        Treatment Note Wound 08/10/23 #1 Right great toe wound-Dressing/Treatment:  (stimulen powder steri strips to bolster secured mastisol sorbex.  coban 2 lite  ( enclose th)    Written Patient Dismissal Instructions Given            Electronically signed by TEE Deleon CNP on 2023 at 2:35 PM

## 2023-08-21 NOTE — DISCHARGE INSTRUCTIONS
PHYSICIAN ORDERS AND DISCHARGE INSTRUCTIONS     NOTE: Upon discharge from the  Medical [facility], you will receive a patient experience survey. We would be grateful if you would take the time to fill this survey out. Continuing wound care orders and information:              Residence:               Continue home health care with:              Wound Medications:              Wound cleansing:                          Do not scrub or use excessive force. Wash hands with soap and water before and after dressing changes. Prior to applying a clean dressing, cleanse wound with normal saline,                                wound cleanser, or mild soap and water. Ask the physician or nurse before getting the wound(s) wet in a shower              Daily Wound management:                          Keep weight off wounds and reposition every 2 hours. Avoid standing for long periods of time. Apply wraps/stockings in AM and remove at bedtime. If swelling is present, elevate legs to the level of the heart or above for 30 minutes 4-5 times a day and/or when sitting. When taking antibiotics take entire prescription as ordered by physician do not stop taking until medicine is all gone. .compression     Wound Care Notes:  Pharm: [facility]: Pain medication ordered  Vascular studies: arterial studies  Date 2/21/22  Cultures: Obtained on 5/13/2022  Antibiotics: Doxycycline 100 mg BID for 10 days ordered on 5/13/2022   KENYA 1.23 RLE 8/10/23                                      Orders for this week: 8/21/23     Right Great Toe -- wash with soap and water, pat dry. Apply stimulen powder to wound bed and  to medial and lateral great toe. Cover with steri strips to bolster secured with mastisol and sorbex.    Wrap with coban 2

## 2023-08-24 NOTE — DISCHARGE INSTRUCTIONS
PHYSICIAN ORDERS AND DISCHARGE INSTRUCTIONS     NOTE: Upon discharge from the  Medical [facility], you will receive a patient experience survey. We would be grateful if you would take the time to fill this survey out. Continuing wound care orders and information:              Residence:               Continue home health care with:              Wound Medications:              Wound cleansing:                          Do not scrub or use excessive force. Wash hands with soap and water before and after dressing changes. Prior to applying a clean dressing, cleanse wound with normal saline,                                wound cleanser, or mild soap and water. Ask the physician or nurse before getting the wound(s) wet in a shower              Daily Wound management:                          Keep weight off wounds and reposition every 2 hours. Avoid standing for long periods of time. Apply wraps/stockings in AM and remove at bedtime. If swelling is present, elevate legs to the level of the heart or above for 30 minutes 4-5 times a day and/or when sitting. When taking antibiotics take entire prescription as ordered by physician do not stop taking until medicine is all gone. .compression     Wound Care Notes:  Pharm: [facility]: Pain medication ordered  Vascular studies: arterial studies  Date 2/21/22  Cultures: Obtained on 5/13/2022  Antibiotics: Doxycycline 100 mg BID for 10 days ordered on 5/13/2022   KENYA 1.23 RLE 8/10/23                                      Orders for this week: 8/25/23     Right Great Toe -- wash with soap and water, pat dry. Apply stimulen powder to wound bed and  to medial and lateral great toe.  (Joanne applied)  Cover with steri strips (joanne applied) to bolster secured with

## 2023-08-25 ENCOUNTER — HOSPITAL ENCOUNTER (OUTPATIENT)
Dept: WOUND CARE | Age: 43
Discharge: HOME | End: 2023-08-25
Payer: COMMERCIAL

## 2023-08-25 VITALS
HEART RATE: 112 BPM | TEMPERATURE: 98.2 F | DIASTOLIC BLOOD PRESSURE: 76 MMHG | RESPIRATION RATE: 18 BRPM | SYSTOLIC BLOOD PRESSURE: 154 MMHG

## 2023-08-25 DIAGNOSIS — M14.671 CHARCOT ANKLE, RIGHT: Primary | ICD-10-CM

## 2023-08-25 DIAGNOSIS — E11.40 TYPE 2 DIABETES, CONTROLLED, WITH NEUROPATHY (HCC): ICD-10-CM

## 2023-08-25 DIAGNOSIS — L97.512 SKIN ULCER OF RIGHT GREAT TOE WITH FAT LAYER EXPOSED (HCC): ICD-10-CM

## 2023-08-25 DIAGNOSIS — F10.10 ALCOHOL ABUSE: ICD-10-CM

## 2023-08-25 DIAGNOSIS — L97.511 SKIN ULCER OF THIRD TOE OF RIGHT FOOT, LIMITED TO BREAKDOWN OF SKIN (HCC): ICD-10-CM

## 2023-08-25 DIAGNOSIS — L97.512 TRAUMATIC ULCER OF RIGHT FOOT WITH FAT LAYER EXPOSED (HCC): ICD-10-CM

## 2023-08-25 PROCEDURE — 11042 DBRDMT SUBQ TIS 1ST 20SQCM/<: CPT

## 2023-08-25 RX ORDER — CLOBETASOL PROPIONATE 0.5 MG/G
OINTMENT TOPICAL ONCE
OUTPATIENT
Start: 2023-08-25 | End: 2023-08-25

## 2023-08-25 RX ORDER — LIDOCAINE 40 MG/G
CREAM TOPICAL ONCE
OUTPATIENT
Start: 2023-08-25 | End: 2023-08-25

## 2023-08-25 RX ORDER — GINSENG 100 MG
CAPSULE ORAL ONCE
OUTPATIENT
Start: 2023-08-25 | End: 2023-08-25

## 2023-08-25 RX ORDER — IBUPROFEN 200 MG
TABLET ORAL ONCE
OUTPATIENT
Start: 2023-08-25 | End: 2023-08-25

## 2023-08-25 RX ORDER — GENTAMICIN SULFATE 1 MG/G
OINTMENT TOPICAL ONCE
OUTPATIENT
Start: 2023-08-25 | End: 2023-08-25

## 2023-08-25 RX ORDER — SODIUM CHLOR/HYPOCHLOROUS ACID 0.033 %
SOLUTION, IRRIGATION IRRIGATION ONCE
OUTPATIENT
Start: 2023-08-25 | End: 2023-08-25

## 2023-08-25 RX ORDER — LIDOCAINE 50 MG/G
OINTMENT TOPICAL ONCE
OUTPATIENT
Start: 2023-08-25 | End: 2023-08-25

## 2023-08-25 RX ORDER — BACITRACIN ZINC AND POLYMYXIN B SULFATE 500; 1000 [USP'U]/G; [USP'U]/G
OINTMENT TOPICAL ONCE
OUTPATIENT
Start: 2023-08-25 | End: 2023-08-25

## 2023-08-25 RX ORDER — BETAMETHASONE DIPROPIONATE 0.05 %
OINTMENT (GRAM) TOPICAL ONCE
OUTPATIENT
Start: 2023-08-25 | End: 2023-08-25

## 2023-08-25 RX ORDER — LIDOCAINE HYDROCHLORIDE 40 MG/ML
SOLUTION TOPICAL ONCE
OUTPATIENT
Start: 2023-08-25 | End: 2023-08-25

## 2023-08-25 ASSESSMENT — PAIN DESCRIPTION - PAIN TYPE: TYPE: ACUTE PAIN

## 2023-08-25 ASSESSMENT — PAIN DESCRIPTION - FREQUENCY: FREQUENCY: INTERMITTENT

## 2023-08-25 ASSESSMENT — PAIN - FUNCTIONAL ASSESSMENT: PAIN_FUNCTIONAL_ASSESSMENT: PREVENTS OR INTERFERES SOME ACTIVE ACTIVITIES AND ADLS

## 2023-08-25 ASSESSMENT — PAIN DESCRIPTION - ONSET: ONSET: ON-GOING

## 2023-08-25 ASSESSMENT — PAIN SCALES - GENERAL: PAINLEVEL_OUTOF10: 7

## 2023-08-25 ASSESSMENT — PAIN DESCRIPTION - LOCATION: LOCATION: FOOT

## 2023-08-25 ASSESSMENT — PAIN DESCRIPTION - ORIENTATION: ORIENTATION: RIGHT

## 2023-08-25 ASSESSMENT — PAIN DESCRIPTION - DESCRIPTORS: DESCRIPTORS: ACHING

## 2023-08-25 NOTE — PROGRESS NOTES
Multilayer Compression Wrap   (Not Unna) Below the Knee    NAME:  Romel Stage OF BIRTH:  1980  MEDICAL RECORD NUMBER:  [id number]  DATE:  8/25/2023    Multilayer compression wrap: Removed old Multilayer wrap if indicated and wash leg with mild soap/water. Applied moisturizing agent to dry skin as needed. Applied primary and secondary dressing as ordered. Applied multilayered dressing below the knee to right lower leg. Instructed patient/caregiver not to remove dressing and to keep it clean and dry. Instructed patient/caregiver on complications to report to provider, such as pain, numbness in toes, heavy drainage, and slippage of dressing. Instructed patient on purpose of compression dressing and on activity and exercise recommendations.       Electronically signed by Alexsander Ingram LPN on 3/09/2565 at 6:16 PM

## 2023-08-25 NOTE — PROGRESS NOTES
Wound Care Center Progress Visit      Sasha Mclean  AGE: 41 y.o. GENDER: male  : 1980  EPISODE DATE:  2023   Referred by: Laurence Duarte MD     Subjective:     CHIEF COMPLAINT  WOUND   Problem List Items Addressed This Visit          Endocrine    Type 2 diabetes, controlled, with neuropathy ([address])    Relevant Orders    Initiate Outpatient Wound Care Protocol       Other    Alcohol abuse (Chronic)    Relevant Orders    Initiate Outpatient Wound Care Protocol    WD-Charcot ankle, right - Primary    Relevant Orders    Initiate Outpatient Wound Care Protocol    WD-Skin ulcer of right great toe with fat layer exposed ([address])    Relevant Orders    Initiate Outpatient Wound Care Protocol    WD-Skin ulcer of third toe of right foot, limited to breakdown of skin [facility])    Relevant Orders    Initiate Outpatient Wound Care Protocol    WD-Traumatic ulcer of right foot with fat layer exposed [facility])    Relevant Orders    Initiate Outpatient Wound Care Protocol     Chief Complaint   Patient presents with    Wound Check        HISTORY of PRESENT ILLNESS      Sasha Mclean is a 41 y.o. male who presents to the [address] [place] [address] [address] for an initial visit for evaluation and treatment of Acute traumatic  ulcer(s) of  right hallux and right third toe. The condition is of moderate severity. The ulcer has been present since 23 post laceration at initial visit to the wound clinic 8/10/23. The underlying cause is thought to be traumatic from a laceration from walking bare foot. The patients care to date has included evaluation at the ED with sutures, radiology negative. He was evaluated 23 with the hallux dehiscence, started on Doxycycline. The patient has just gotten out of rehab. The patient has significant underlying medical conditions as below. Laurence Duarte MD is PCP. The patient also has a burn left second finger of moderate severity from spilling coffee on it.     Wound Pain Timing/Severity: waxing and

## 2023-09-15 ENCOUNTER — TELEPHONE (OUTPATIENT)
Dept: WOUND CARE | Age: 43
End: 2023-09-15

## 2023-09-15 DIAGNOSIS — M14.671 CHARCOT ANKLE, RIGHT: ICD-10-CM

## 2023-09-15 DIAGNOSIS — L97.512 TRAUMATIC ULCER OF RIGHT FOOT WITH FAT LAYER EXPOSED (HCC): ICD-10-CM

## 2023-09-15 DIAGNOSIS — F10.10 ALCOHOL ABUSE: Chronic | ICD-10-CM

## 2023-09-15 DIAGNOSIS — E11.40 TYPE 2 DIABETES, CONTROLLED, WITH NEUROPATHY (HCC): Primary | ICD-10-CM

## 2023-09-15 DIAGNOSIS — L97.511 SKIN ULCER OF THIRD TOE OF RIGHT FOOT, LIMITED TO BREAKDOWN OF SKIN (HCC): ICD-10-CM

## 2023-09-15 DIAGNOSIS — L97.512 SKIN ULCER OF RIGHT GREAT TOE WITH FAT LAYER EXPOSED (HCC): ICD-10-CM

## 2023-09-15 NOTE — TELEPHONE ENCOUNTER
Patient called and reported they were healed. No need for further followup.     Electronically signed by Shani Bhat RN on 9/15/2023 at 2:57 PM

## 2023-12-10 ENCOUNTER — APPOINTMENT (OUTPATIENT)
Dept: CT IMAGING | Age: 43
DRG: 432 | End: 2023-12-10
Payer: COMMERCIAL

## 2023-12-10 ENCOUNTER — HOSPITAL ENCOUNTER (INPATIENT)
Age: 43
LOS: 3 days | DRG: 432 | End: 2023-12-13
Attending: STUDENT IN AN ORGANIZED HEALTH CARE EDUCATION/TRAINING PROGRAM | Admitting: STUDENT IN AN ORGANIZED HEALTH CARE EDUCATION/TRAINING PROGRAM
Payer: COMMERCIAL

## 2023-12-10 ENCOUNTER — APPOINTMENT (OUTPATIENT)
Dept: GENERAL RADIOLOGY | Age: 43
DRG: 432 | End: 2023-12-10
Payer: COMMERCIAL

## 2023-12-10 DIAGNOSIS — E87.1 HYPONATREMIA: Primary | ICD-10-CM

## 2023-12-10 DIAGNOSIS — D64.9 ANEMIA REQUIRING TRANSFUSIONS: ICD-10-CM

## 2023-12-10 DIAGNOSIS — N17.9 AKI (ACUTE KIDNEY INJURY) (HCC): ICD-10-CM

## 2023-12-10 DIAGNOSIS — E87.20 LACTIC ACIDOSIS: ICD-10-CM

## 2023-12-10 LAB
ALBUMIN SERPL-MCNC: 2.8 GM/DL (ref 3.4–5)
ALCOHOL SCREEN SERUM: <0.01 %WT/VOL
ALP BLD-CCNC: 182 IU/L (ref 40–129)
ALT SERPL-CCNC: 31 U/L (ref 10–40)
ANION GAP SERPL CALCULATED.3IONS-SCNC: 17 MMOL/L (ref 4–16)
ANISOCYTOSIS: ABNORMAL
AST SERPL-CCNC: 89 IU/L (ref 15–37)
BANDED NEUTROPHILS ABSOLUTE COUNT: 1.12 K/CU MM
BANDED NEUTROPHILS RELATIVE PERCENT: 6 % (ref 5–11)
BILIRUB SERPL-MCNC: 42.8 MG/DL (ref 0–1)
BILIRUBIN DIRECT: >10 MG/DL (ref 0–0.3)
BILIRUBIN, INDIRECT: ABNORMAL MG/DL (ref 0–0.7)
BUN SERPL-MCNC: 19 MG/DL (ref 6–23)
CALCIUM SERPL-MCNC: 7.7 MG/DL (ref 8.3–10.6)
CHLORIDE BLD-SCNC: 75 MMOL/L (ref 99–110)
CO2: 18 MMOL/L (ref 21–32)
CORTISOL - AM: 17.9 UG/DL (ref 6–18.4)
CREAT SERPL-MCNC: 2.5 MG/DL (ref 0.9–1.3)
DIFFERENTIAL TYPE: ABNORMAL
EOSINOPHILS ABSOLUTE: 0.4 K/CU MM
EOSINOPHILS RELATIVE PERCENT: 2 % (ref 0–3)
FERRITIN: 984 NG/ML (ref 30–400)
GFR SERPL CREATININE-BSD FRML MDRD: 32 ML/MIN/1.73M2
GLUCOSE BLD-MCNC: 104 MG/DL (ref 70–99)
GLUCOSE SERPL-MCNC: 109 MG/DL (ref 70–99)
HCT VFR BLD CALC: 18.6 % (ref 42–52)
HCT VFR BLD CALC: 19.5 % (ref 42–52)
HEMOGLOBIN: 6.2 GM/DL (ref 13.5–18)
HEMOGLOBIN: 6.6 GM/DL (ref 13.5–18)
INR BLD: 2 INDEX
LACTIC ACID, SEPSIS: 1.4 MMOL/L (ref 0.4–2)
LACTIC ACID, SEPSIS: 2.7 MMOL/L (ref 0.4–2)
LIPASE: 18 IU/L (ref 13–60)
LYMPHOCYTES ABSOLUTE: 0.6 K/CU MM
LYMPHOCYTES RELATIVE PERCENT: 3 % (ref 24–44)
MAGNESIUM: 1.6 MG/DL (ref 1.8–2.4)
MCH RBC QN AUTO: 32.6 PG (ref 27–31)
MCHC RBC AUTO-ENTMCNC: 33.3 % (ref 32–36)
MCV RBC AUTO: 97.9 FL (ref 78–100)
METAMYELOCYTES ABSOLUTE COUNT: 0.19 K/CU MM
METAMYELOCYTES PERCENT: 1 %
MONOCYTES ABSOLUTE: 1.1 K/CU MM
MONOCYTES RELATIVE PERCENT: 6 % (ref 0–4)
PAPPENHEIMER BODIES: PRESENT
PDW BLD-RTO: 25.2 % (ref 11.7–14.9)
PLATELET # BLD: 49 K/CU MM (ref 140–440)
PLT MORPHOLOGY: ABNORMAL
PMV BLD AUTO: 9.9 FL (ref 7.5–11.1)
POLYCHROMASIA: ABNORMAL
POTASSIUM SERPL-SCNC: 3.9 MMOL/L (ref 3.5–5.1)
PROCALCITONIN SERPL IA-MCNC: 0.91 NG/ML (ref 0–0.15)
PROTHROMBIN TIME: 22.5 SECONDS (ref 11.7–14.5)
RBC # BLD: 1.9 M/CU MM (ref 4.6–6.2)
RETICULOCYTE COUNT PCT: 12.3 % (ref 0.2–2.2)
SEGMENTED NEUTROPHILS ABSOLUTE COUNT: 15.3 K/CU MM
SEGMENTED NEUTROPHILS RELATIVE PERCENT: 82 % (ref 36–66)
SODIUM BLD-SCNC: 110 MMOL/L (ref 135–145)
T4 FREE SERPL-MCNC: 0.74 NG/DL (ref 0.9–1.8)
TOTAL PROTEIN: 7.9 GM/DL (ref 6.4–8.2)
TSH SERPL DL<=0.005 MIU/L-ACNC: 2.29 UIU/ML (ref 0.27–4.2)
WBC # BLD: 18.7 K/CU MM (ref 4–10.5)

## 2023-12-10 PROCEDURE — 83550 IRON BINDING TEST: CPT

## 2023-12-10 PROCEDURE — 80048 BASIC METABOLIC PNL TOTAL CA: CPT

## 2023-12-10 PROCEDURE — 74176 CT ABD & PELVIS W/O CONTRAST: CPT

## 2023-12-10 PROCEDURE — 80307 DRUG TEST PRSMV CHEM ANLYZR: CPT

## 2023-12-10 PROCEDURE — 93005 ELECTROCARDIOGRAM TRACING: CPT | Performed by: STUDENT IN AN ORGANIZED HEALTH CARE EDUCATION/TRAINING PROGRAM

## 2023-12-10 PROCEDURE — 84300 ASSAY OF URINE SODIUM: CPT

## 2023-12-10 PROCEDURE — 6360000002 HC RX W HCPCS: Performed by: STUDENT IN AN ORGANIZED HEALTH CARE EDUCATION/TRAINING PROGRAM

## 2023-12-10 PROCEDURE — 84100 ASSAY OF PHOSPHORUS: CPT

## 2023-12-10 PROCEDURE — 82533 TOTAL CORTISOL: CPT

## 2023-12-10 PROCEDURE — 80053 COMPREHEN METABOLIC PANEL: CPT

## 2023-12-10 PROCEDURE — C9113 INJ PANTOPRAZOLE SODIUM, VIA: HCPCS | Performed by: STUDENT IN AN ORGANIZED HEALTH CARE EDUCATION/TRAINING PROGRAM

## 2023-12-10 PROCEDURE — G0480 DRUG TEST DEF 1-7 CLASSES: HCPCS

## 2023-12-10 PROCEDURE — 2580000003 HC RX 258: Performed by: STUDENT IN AN ORGANIZED HEALTH CARE EDUCATION/TRAINING PROGRAM

## 2023-12-10 PROCEDURE — 81003 URINALYSIS AUTO W/O SCOPE: CPT

## 2023-12-10 PROCEDURE — 96375 TX/PRO/DX INJ NEW DRUG ADDON: CPT

## 2023-12-10 PROCEDURE — 86850 RBC ANTIBODY SCREEN: CPT

## 2023-12-10 PROCEDURE — 83690 ASSAY OF LIPASE: CPT

## 2023-12-10 PROCEDURE — 99285 EMERGENCY DEPT VISIT HI MDM: CPT

## 2023-12-10 PROCEDURE — 84443 ASSAY THYROID STIM HORMONE: CPT

## 2023-12-10 PROCEDURE — 85610 PROTHROMBIN TIME: CPT

## 2023-12-10 PROCEDURE — 86901 BLOOD TYPING SEROLOGIC RH(D): CPT

## 2023-12-10 PROCEDURE — 2580000003 HC RX 258: Performed by: NURSE PRACTITIONER

## 2023-12-10 PROCEDURE — 84439 ASSAY OF FREE THYROXINE: CPT

## 2023-12-10 PROCEDURE — 85045 AUTOMATED RETICULOCYTE COUNT: CPT

## 2023-12-10 PROCEDURE — 86922 COMPATIBILITY TEST ANTIGLOB: CPT

## 2023-12-10 PROCEDURE — 83605 ASSAY OF LACTIC ACID: CPT

## 2023-12-10 PROCEDURE — 96365 THER/PROPH/DIAG IV INF INIT: CPT

## 2023-12-10 PROCEDURE — 30233N1 TRANSFUSION OF NONAUTOLOGOUS RED BLOOD CELLS INTO PERIPHERAL VEIN, PERCUTANEOUS APPROACH: ICD-10-PCS | Performed by: STUDENT IN AN ORGANIZED HEALTH CARE EDUCATION/TRAINING PROGRAM

## 2023-12-10 PROCEDURE — 2000000000 HC ICU R&B

## 2023-12-10 PROCEDURE — 82962 GLUCOSE BLOOD TEST: CPT

## 2023-12-10 PROCEDURE — P9016 RBC LEUKOCYTES REDUCED: HCPCS

## 2023-12-10 PROCEDURE — 85014 HEMATOCRIT: CPT

## 2023-12-10 PROCEDURE — 71045 X-RAY EXAM CHEST 1 VIEW: CPT

## 2023-12-10 PROCEDURE — 85007 BL SMEAR W/DIFF WBC COUNT: CPT

## 2023-12-10 PROCEDURE — 83735 ASSAY OF MAGNESIUM: CPT

## 2023-12-10 PROCEDURE — 87040 BLOOD CULTURE FOR BACTERIA: CPT

## 2023-12-10 PROCEDURE — 82248 BILIRUBIN DIRECT: CPT

## 2023-12-10 PROCEDURE — 85027 COMPLETE CBC AUTOMATED: CPT

## 2023-12-10 PROCEDURE — 6370000000 HC RX 637 (ALT 250 FOR IP): Performed by: NURSE PRACTITIONER

## 2023-12-10 PROCEDURE — 83540 ASSAY OF IRON: CPT

## 2023-12-10 PROCEDURE — 84145 PROCALCITONIN (PCT): CPT

## 2023-12-10 PROCEDURE — 82728 ASSAY OF FERRITIN: CPT

## 2023-12-10 PROCEDURE — 85018 HEMOGLOBIN: CPT

## 2023-12-10 PROCEDURE — 86900 BLOOD TYPING SEROLOGIC ABO: CPT

## 2023-12-10 PROCEDURE — 83935 ASSAY OF URINE OSMOLALITY: CPT

## 2023-12-10 RX ORDER — POLYETHYLENE GLYCOL 3350 17 G/17G
17 POWDER, FOR SOLUTION ORAL DAILY PRN
Status: DISCONTINUED | OUTPATIENT
Start: 2023-12-10 | End: 2023-12-13

## 2023-12-10 RX ORDER — SODIUM CHLORIDE 0.9 % (FLUSH) 0.9 %
5-40 SYRINGE (ML) INJECTION PRN
Status: DISCONTINUED | OUTPATIENT
Start: 2023-12-10 | End: 2023-12-13

## 2023-12-10 RX ORDER — LORAZEPAM 2 MG/ML
3 INJECTION INTRAMUSCULAR
Status: DISCONTINUED | OUTPATIENT
Start: 2023-12-10 | End: 2023-12-10

## 2023-12-10 RX ORDER — SODIUM CHLORIDE 9 MG/ML
INJECTION, SOLUTION INTRAVENOUS CONTINUOUS
Status: DISPENSED | OUTPATIENT
Start: 2023-12-10 | End: 2023-12-11

## 2023-12-10 RX ORDER — MAGNESIUM SULFATE IN WATER 40 MG/ML
2000 INJECTION, SOLUTION INTRAVENOUS PRN
Status: DISCONTINUED | OUTPATIENT
Start: 2023-12-10 | End: 2023-12-13

## 2023-12-10 RX ORDER — POTASSIUM CHLORIDE 29.8 MG/ML
20 INJECTION INTRAVENOUS PRN
Status: DISCONTINUED | OUTPATIENT
Start: 2023-12-10 | End: 2023-12-13

## 2023-12-10 RX ORDER — SODIUM CHLORIDE 9 MG/ML
INJECTION, SOLUTION INTRAVENOUS PRN
Status: DISCONTINUED | OUTPATIENT
Start: 2023-12-10 | End: 2023-12-10

## 2023-12-10 RX ORDER — LORAZEPAM 2 MG/ML
4 INJECTION INTRAMUSCULAR
Status: DISCONTINUED | OUTPATIENT
Start: 2023-12-10 | End: 2023-12-10

## 2023-12-10 RX ORDER — LORAZEPAM 2 MG/ML
1 INJECTION INTRAMUSCULAR
Status: DISCONTINUED | OUTPATIENT
Start: 2023-12-10 | End: 2023-12-10

## 2023-12-10 RX ORDER — PANTOPRAZOLE SODIUM 40 MG/10ML
80 INJECTION, POWDER, LYOPHILIZED, FOR SOLUTION INTRAVENOUS ONCE
Status: COMPLETED | OUTPATIENT
Start: 2023-12-10 | End: 2023-12-10

## 2023-12-10 RX ORDER — PHENOBARBITAL 32.4 MG/1
32.4 TABLET ORAL EVERY 24 HOURS
Status: COMPLETED | OUTPATIENT
Start: 2023-12-12 | End: 2023-12-12

## 2023-12-10 RX ORDER — SODIUM CHLORIDE 0.9 % (FLUSH) 0.9 %
5-40 SYRINGE (ML) INJECTION EVERY 12 HOURS SCHEDULED
Status: DISCONTINUED | OUTPATIENT
Start: 2023-12-10 | End: 2023-12-13

## 2023-12-10 RX ORDER — PHENOBARBITAL 32.4 MG/1
32.4 TABLET ORAL EVERY 12 HOURS
Status: COMPLETED | OUTPATIENT
Start: 2023-12-11 | End: 2023-12-12

## 2023-12-10 RX ORDER — LORAZEPAM 2 MG/ML
2 INJECTION INTRAMUSCULAR
Status: DISCONTINUED | OUTPATIENT
Start: 2023-12-10 | End: 2023-12-10

## 2023-12-10 RX ORDER — LORAZEPAM 1 MG/1
1 TABLET ORAL
Status: DISCONTINUED | OUTPATIENT
Start: 2023-12-10 | End: 2023-12-10

## 2023-12-10 RX ORDER — PROCHLORPERAZINE EDISYLATE 5 MG/ML
10 INJECTION INTRAMUSCULAR; INTRAVENOUS EVERY 6 HOURS PRN
Status: DISCONTINUED | OUTPATIENT
Start: 2023-12-10 | End: 2023-12-13

## 2023-12-10 RX ORDER — POTASSIUM CHLORIDE 7.45 MG/ML
10 INJECTION INTRAVENOUS PRN
Status: DISCONTINUED | OUTPATIENT
Start: 2023-12-10 | End: 2023-12-13

## 2023-12-10 RX ORDER — SODIUM CHLORIDE 0.9 % (FLUSH) 0.9 %
5-40 SYRINGE (ML) INJECTION EVERY 12 HOURS SCHEDULED
Status: DISCONTINUED | OUTPATIENT
Start: 2023-12-10 | End: 2023-12-10

## 2023-12-10 RX ORDER — ACETAMINOPHEN 325 MG/1
650 TABLET ORAL EVERY 6 HOURS PRN
Status: DISCONTINUED | OUTPATIENT
Start: 2023-12-10 | End: 2023-12-12

## 2023-12-10 RX ORDER — SODIUM CHLORIDE 9 MG/ML
INJECTION, SOLUTION INTRAVENOUS PRN
Status: DISCONTINUED | OUTPATIENT
Start: 2023-12-10 | End: 2023-12-13

## 2023-12-10 RX ORDER — LORAZEPAM 1 MG/1
3 TABLET ORAL
Status: DISCONTINUED | OUTPATIENT
Start: 2023-12-10 | End: 2023-12-10

## 2023-12-10 RX ORDER — DEXTROSE MONOHYDRATE 100 MG/ML
INJECTION, SOLUTION INTRAVENOUS CONTINUOUS PRN
Status: DISCONTINUED | OUTPATIENT
Start: 2023-12-10 | End: 2023-12-13

## 2023-12-10 RX ORDER — PHENOBARBITAL 32.4 MG/1
64.8 TABLET ORAL EVERY 12 HOURS
Status: COMPLETED | OUTPATIENT
Start: 2023-12-10 | End: 2023-12-11

## 2023-12-10 RX ORDER — LANOLIN ALCOHOL/MO/W.PET/CERES
100 CREAM (GRAM) TOPICAL DAILY
Status: DISCONTINUED | OUTPATIENT
Start: 2023-12-18 | End: 2023-12-13

## 2023-12-10 RX ORDER — LORAZEPAM 1 MG/1
2 TABLET ORAL
Status: DISCONTINUED | OUTPATIENT
Start: 2023-12-10 | End: 2023-12-10

## 2023-12-10 RX ORDER — SODIUM CHLORIDE 0.9 % (FLUSH) 0.9 %
5-40 SYRINGE (ML) INJECTION PRN
Status: DISCONTINUED | OUTPATIENT
Start: 2023-12-10 | End: 2023-12-10

## 2023-12-10 RX ORDER — LORAZEPAM 1 MG/1
4 TABLET ORAL
Status: DISCONTINUED | OUTPATIENT
Start: 2023-12-10 | End: 2023-12-10

## 2023-12-10 RX ORDER — ONDANSETRON 2 MG/ML
4 INJECTION INTRAMUSCULAR; INTRAVENOUS EVERY 6 HOURS PRN
Status: DISCONTINUED | OUTPATIENT
Start: 2023-12-10 | End: 2023-12-10

## 2023-12-10 RX ORDER — ONDANSETRON 4 MG/1
4 TABLET, ORALLY DISINTEGRATING ORAL EVERY 8 HOURS PRN
Status: DISCONTINUED | OUTPATIENT
Start: 2023-12-10 | End: 2023-12-10

## 2023-12-10 RX ORDER — GLUCAGON 1 MG/ML
1 KIT INJECTION PRN
Status: DISCONTINUED | OUTPATIENT
Start: 2023-12-10 | End: 2023-12-13

## 2023-12-10 RX ORDER — M-VIT,TX,IRON,MINS/CALC/FOLIC 27MG-0.4MG
1 TABLET ORAL DAILY
Status: DISCONTINUED | OUTPATIENT
Start: 2023-12-10 | End: 2023-12-13

## 2023-12-10 RX ORDER — PANTOPRAZOLE SODIUM 40 MG/10ML
40 INJECTION, POWDER, LYOPHILIZED, FOR SOLUTION INTRAVENOUS 2 TIMES DAILY
Status: DISCONTINUED | OUTPATIENT
Start: 2023-12-10 | End: 2023-12-13

## 2023-12-10 RX ADMIN — PANTOPRAZOLE SODIUM 80 MG: 40 INJECTION, POWDER, FOR SOLUTION INTRAVENOUS at 16:48

## 2023-12-10 RX ADMIN — SODIUM CHLORIDE, PRESERVATIVE FREE 10 ML: 5 INJECTION INTRAVENOUS at 21:36

## 2023-12-10 RX ADMIN — PHENOBARBITAL 64.8 MG: 32.4 TABLET ORAL at 21:35

## 2023-12-10 RX ADMIN — Medication 1 TABLET: at 21:35

## 2023-12-10 RX ADMIN — CEFTRIAXONE SODIUM 2000 MG: 2 INJECTION, POWDER, FOR SOLUTION INTRAMUSCULAR; INTRAVENOUS at 16:49

## 2023-12-10 RX ADMIN — LORAZEPAM 4 MG: 2 INJECTION INTRAMUSCULAR; INTRAVENOUS at 16:44

## 2023-12-10 RX ADMIN — SODIUM CHLORIDE: 9 INJECTION, SOLUTION INTRAVENOUS at 17:30

## 2023-12-10 NOTE — CARE COORDINATION
CM consulted for possibility of rehab. CM gathered resources and reviewed chart. Pt has history of ETOH abuse and on last admission in July pt declined any resources. CM went and spoke with pt. Pt reports he has been to inpt rehab before. Pt reports a place in [place], [place] where he only stayed 11 days before he left. A place in [place] where he was kicked out for bothering another pt and [employer] here in town. Pt states he is from home with wife. Wife and father both want pt to quit drinking and are supportive. CM went over resources with pt. Pt wants help but is unsure of which program. CM encourage pt to speak with spouse. Resources left with pt.

## 2023-12-10 NOTE — ED NOTES
[address] [place] [address] paged Dr Pedro Pablo Miranda covering Dr Ulisses Bosch.       Zhanna Guevara  12/10/23 1637    1641 Dr Pedro Pablo Miranda returned call      Zhanna Guevara  12/10/23 1641

## 2023-12-10 NOTE — ED NOTES
1800 paged critical care provider     Thomas Frank  12/10/23 1801    1808 critical care returned call      Thomas Frank  12/10/23 1800

## 2023-12-10 NOTE — ED NOTES
Unable to obtain temperature. Bear chiquitagger applied. Pt states he is a daily drinker of ETOH, has aprox. 12 beers. Pt having tremors, +3 pitting edema to abd, legs, and bilateral hands. Pt having shallow inhalations on intake.       Lv Cates, RN  12/10/23 2329

## 2023-12-10 NOTE — ED PROVIDER NOTES
Discharge Medication List        ED Provider Disposition Time  DISPOSITION Admitted 12/10/2023 06:37:37 PM            Diane Cook MD  Resident  12/11/23 6151

## 2023-12-11 ENCOUNTER — APPOINTMENT (OUTPATIENT)
Dept: INTERVENTIONAL RADIOLOGY/VASCULAR | Age: 43
DRG: 432 | End: 2023-12-11
Attending: SPECIALIST
Payer: COMMERCIAL

## 2023-12-11 LAB
ALBUMIN SERPL-MCNC: 2.9 GM/DL (ref 3.4–5)
ALP BLD-CCNC: 177 IU/L (ref 40–129)
ALT SERPL-CCNC: 29 U/L (ref 10–40)
AMMONIA: 71 UMOL/L (ref 16–60)
ANION GAP SERPL CALCULATED.3IONS-SCNC: 16 MMOL/L (ref 4–16)
ANION GAP SERPL CALCULATED.3IONS-SCNC: 16 MMOL/L (ref 4–16)
ANION GAP SERPL CALCULATED.3IONS-SCNC: 17 MMOL/L (ref 4–16)
ANION GAP SERPL CALCULATED.3IONS-SCNC: 18 MMOL/L (ref 4–16)
ANISOCYTOSIS: ABNORMAL
APTT: 68.6 SECONDS (ref 25.1–37.1)
AST SERPL-CCNC: 85 IU/L (ref 15–37)
BANDED NEUTROPHILS ABSOLUTE COUNT: 1.04 K/CU MM
BANDED NEUTROPHILS RELATIVE PERCENT: 7 % (ref 5–11)
BILIRUB SERPL-MCNC: 45.2 MG/DL (ref 0–1)
BILIRUBIN DIRECT: >10 MG/DL (ref 0–0.3)
BILIRUBIN, INDIRECT: ABNORMAL MG/DL (ref 0–0.7)
BUN SERPL-MCNC: 22 MG/DL (ref 6–23)
BUN SERPL-MCNC: 23 MG/DL (ref 6–23)
BUN SERPL-MCNC: 26 MG/DL (ref 6–23)
BUN SERPL-MCNC: 29 MG/DL (ref 6–23)
CALCIUM IONIZED: 3.92 MG/DL (ref 4.48–5.28)
CALCIUM SERPL-MCNC: 7.7 MG/DL (ref 8.3–10.6)
CALCIUM SERPL-MCNC: 7.7 MG/DL (ref 8.3–10.6)
CALCIUM SERPL-MCNC: 7.9 MG/DL (ref 8.3–10.6)
CALCIUM SERPL-MCNC: 8 MG/DL (ref 8.3–10.6)
CHLORIDE BLD-SCNC: 78 MMOL/L (ref 99–110)
CHLORIDE BLD-SCNC: 79 MMOL/L (ref 99–110)
CHLORIDE BLD-SCNC: 80 MMOL/L (ref 99–110)
CHLORIDE BLD-SCNC: 81 MMOL/L (ref 99–110)
CO2: 16 MMOL/L (ref 21–32)
CO2: 17 MMOL/L (ref 21–32)
CO2: 18 MMOL/L (ref 21–32)
CO2: 18 MMOL/L (ref 21–32)
CREAT SERPL-MCNC: 3 MG/DL (ref 0.9–1.3)
CREAT SERPL-MCNC: 3 MG/DL (ref 0.9–1.3)
CREAT SERPL-MCNC: 3.2 MG/DL (ref 0.9–1.3)
CREAT SERPL-MCNC: 3.5 MG/DL (ref 0.9–1.3)
DIFFERENTIAL TYPE: ABNORMAL
EKG ATRIAL RATE: 79 BPM
EKG DIAGNOSIS: NORMAL
EKG P AXIS: 52 DEGREES
EKG P-R INTERVAL: 202 MS
EKG Q-T INTERVAL: 460 MS
EKG QRS DURATION: 126 MS
EKG QTC CALCULATION (BAZETT): 527 MS
EKG R AXIS: 97 DEGREES
EKG T AXIS: 27 DEGREES
EKG VENTRICULAR RATE: 79 BPM
EOSINOPHILS ABSOLUTE: 0.4 K/CU MM
EOSINOPHILS RELATIVE PERCENT: 3 % (ref 0–3)
FIBRINOGEN LEVEL: 331 MG/DL (ref 170–540)
GFR SERPL CREATININE-BSD FRML MDRD: 22 ML/MIN/1.73M2
GFR SERPL CREATININE-BSD FRML MDRD: 24 ML/MIN/1.73M2
GFR SERPL CREATININE-BSD FRML MDRD: 26 ML/MIN/1.73M2
GFR SERPL CREATININE-BSD FRML MDRD: 26 ML/MIN/1.73M2
GLUCOSE BLD-MCNC: 103 MG/DL (ref 70–99)
GLUCOSE BLD-MCNC: 117 MG/DL (ref 70–99)
GLUCOSE SERPL-MCNC: 76 MG/DL (ref 70–99)
GLUCOSE SERPL-MCNC: 78 MG/DL (ref 70–99)
GLUCOSE SERPL-MCNC: 87 MG/DL (ref 70–99)
GLUCOSE SERPL-MCNC: 91 MG/DL (ref 70–99)
HCT VFR BLD CALC: 23.3 % (ref 42–52)
HEMOGLOBIN: 7.8 GM/DL (ref 13.5–18)
INR BLD: 2 INDEX
IONIZED CA: 0.98 MMOL/L (ref 1.12–1.32)
IRON: 189 UG/DL (ref 59–158)
LACTATE DEHYDROGENASE: 189 IU/L (ref 120–246)
LIPASE: 16 IU/L (ref 13–60)
LYMPHOCYTES ABSOLUTE: 0.7 K/CU MM
LYMPHOCYTES RELATIVE PERCENT: 5 % (ref 24–44)
MAGNESIUM: 1.6 MG/DL (ref 1.8–2.4)
MAGNESIUM: 1.8 MG/DL (ref 1.8–2.4)
MAGNESIUM: 1.8 MG/DL (ref 1.8–2.4)
MAGNESIUM: 2 MG/DL (ref 1.8–2.4)
MCH RBC QN AUTO: 32 PG (ref 27–31)
MCHC RBC AUTO-ENTMCNC: 33.5 % (ref 32–36)
MCV RBC AUTO: 95.5 FL (ref 78–100)
METAMYELOCYTES ABSOLUTE COUNT: 0.44 K/CU MM
METAMYELOCYTES PERCENT: 3 %
MONOCYTES ABSOLUTE: 0.9 K/CU MM
MONOCYTES RELATIVE PERCENT: 6 % (ref 0–4)
NUCLEATED RBC %: 0.7 %
OSMOLALITY UR: 248 MOS/L (ref 280–300)
PAPPENHEIMER BODIES: PRESENT
PCT TRANSFERRIN: ABNORMAL % (ref 10–44)
PDW BLD-RTO: 23.8 % (ref 11.7–14.9)
PHOSPHORUS: 5.3 MG/DL (ref 2.5–4.9)
PHOSPHORUS: 5.6 MG/DL (ref 2.5–4.9)
PHOSPHORUS: 5.8 MG/DL (ref 2.5–4.9)
PHOSPHORUS: 6.5 MG/DL (ref 2.5–4.9)
PLATELET # BLD: 42 K/CU MM (ref 140–440)
PMV BLD AUTO: 11 FL (ref 7.5–11.1)
POLYCHROMASIA: ABNORMAL
POTASSIUM SERPL-SCNC: 4.3 MMOL/L (ref 3.5–5.1)
POTASSIUM SERPL-SCNC: 4.3 MMOL/L (ref 3.5–5.1)
POTASSIUM SERPL-SCNC: 4.5 MMOL/L (ref 3.5–5.1)
POTASSIUM SERPL-SCNC: 4.8 MMOL/L (ref 3.5–5.1)
PRO-BNP: 1668 PG/ML
PROCALCITONIN SERPL-MCNC: 0.91 NG/ML
PROTHROMBIN TIME: 23.1 SECONDS (ref 11.7–14.5)
RBC # BLD: 2.44 M/CU MM (ref 4.6–6.2)
SEGMENTED NEUTROPHILS ABSOLUTE COUNT: 11.3 K/CU MM
SEGMENTED NEUTROPHILS RELATIVE PERCENT: 76 % (ref 36–66)
SMEAR REVIEW: NORMAL
SODIUM BLD-SCNC: 112 MMOL/L (ref 135–145)
SODIUM BLD-SCNC: 113 MMOL/L (ref 135–145)
SODIUM BLD-SCNC: 114 MMOL/L (ref 135–145)
SODIUM BLD-SCNC: 115 MMOL/L (ref 135–145)
TOTAL IRON BINDING CAPACITY: ABNORMAL UG/DL (ref 250–450)
TOTAL NUCLEATED RBC: 0.1 K/CU MM
TOTAL PROTEIN: 7.5 GM/DL (ref 6.4–8.2)
UNSATURATED IRON BINDING CAPACITY: <17 UG/DL (ref 110–370)
WBC # BLD: 14.8 K/CU MM (ref 4–10.5)

## 2023-12-11 PROCEDURE — 84100 ASSAY OF PHOSPHORUS: CPT

## 2023-12-11 PROCEDURE — P9047 ALBUMIN (HUMAN), 25%, 50ML: HCPCS | Performed by: NURSE PRACTITIONER

## 2023-12-11 PROCEDURE — P9016 RBC LEUKOCYTES REDUCED: HCPCS

## 2023-12-11 PROCEDURE — 82150 ASSAY OF AMYLASE: CPT

## 2023-12-11 PROCEDURE — 82140 ASSAY OF AMMONIA: CPT

## 2023-12-11 PROCEDURE — 6370000000 HC RX 637 (ALT 250 FOR IP): Performed by: NURSE PRACTITIONER

## 2023-12-11 PROCEDURE — 51702 INSERT TEMP BLADDER CATH: CPT

## 2023-12-11 PROCEDURE — 84157 ASSAY OF PROTEIN OTHER: CPT

## 2023-12-11 PROCEDURE — 87205 SMEAR GRAM STAIN: CPT

## 2023-12-11 PROCEDURE — C9113 INJ PANTOPRAZOLE SODIUM, VIA: HCPCS | Performed by: NURSE PRACTITIONER

## 2023-12-11 PROCEDURE — 2580000003 HC RX 258: Performed by: INTERNAL MEDICINE

## 2023-12-11 PROCEDURE — 6360000002 HC RX W HCPCS: Performed by: INTERNAL MEDICINE

## 2023-12-11 PROCEDURE — 85730 THROMBOPLASTIN TIME PARTIAL: CPT

## 2023-12-11 PROCEDURE — 80076 HEPATIC FUNCTION PANEL: CPT

## 2023-12-11 PROCEDURE — 6360000002 HC RX W HCPCS: Performed by: NURSE PRACTITIONER

## 2023-12-11 PROCEDURE — 85610 PROTHROMBIN TIME: CPT

## 2023-12-11 PROCEDURE — 83930 ASSAY OF BLOOD OSMOLALITY: CPT

## 2023-12-11 PROCEDURE — 36430 TRANSFUSION BLD/BLD COMPNT: CPT

## 2023-12-11 PROCEDURE — 2500000003 HC RX 250 WO HCPCS: Performed by: NURSE PRACTITIONER

## 2023-12-11 PROCEDURE — 80048 BASIC METABOLIC PNL TOTAL CA: CPT

## 2023-12-11 PROCEDURE — 85027 COMPLETE CBC AUTOMATED: CPT

## 2023-12-11 PROCEDURE — 82306 VITAMIN D 25 HYDROXY: CPT

## 2023-12-11 PROCEDURE — 89051 BODY FLUID CELL COUNT: CPT

## 2023-12-11 PROCEDURE — 93010 ELECTROCARDIOGRAM REPORT: CPT | Performed by: INTERNAL MEDICINE

## 2023-12-11 PROCEDURE — 6370000000 HC RX 637 (ALT 250 FOR IP): Performed by: SPECIALIST

## 2023-12-11 PROCEDURE — 83690 ASSAY OF LIPASE: CPT

## 2023-12-11 PROCEDURE — 82945 GLUCOSE OTHER FLUID: CPT

## 2023-12-11 PROCEDURE — 83735 ASSAY OF MAGNESIUM: CPT

## 2023-12-11 PROCEDURE — 85007 BL SMEAR W/DIFF WBC COUNT: CPT

## 2023-12-11 PROCEDURE — 82962 GLUCOSE BLOOD TEST: CPT

## 2023-12-11 PROCEDURE — C1751 CATH, INF, PER/CENT/MIDLINE: HCPCS

## 2023-12-11 PROCEDURE — 83615 LACTATE (LD) (LDH) ENZYME: CPT

## 2023-12-11 PROCEDURE — 76937 US GUIDE VASCULAR ACCESS: CPT

## 2023-12-11 PROCEDURE — 36569 INSJ PICC 5 YR+ W/O IMAGING: CPT

## 2023-12-11 PROCEDURE — 83880 ASSAY OF NATRIURETIC PEPTIDE: CPT

## 2023-12-11 PROCEDURE — 6360000002 HC RX W HCPCS: Performed by: STUDENT IN AN ORGANIZED HEALTH CARE EDUCATION/TRAINING PROGRAM

## 2023-12-11 PROCEDURE — 94761 N-INVAS EAR/PLS OXIMETRY MLT: CPT

## 2023-12-11 PROCEDURE — 2580000003 HC RX 258: Performed by: NURSE PRACTITIONER

## 2023-12-11 PROCEDURE — 2000000000 HC ICU R&B

## 2023-12-11 PROCEDURE — 2580000003 HC RX 258: Performed by: SPECIALIST

## 2023-12-11 PROCEDURE — 83010 ASSAY OF HAPTOGLOBIN QUANT: CPT

## 2023-12-11 PROCEDURE — 82330 ASSAY OF CALCIUM: CPT

## 2023-12-11 PROCEDURE — 85384 FIBRINOGEN ACTIVITY: CPT

## 2023-12-11 RX ORDER — LACTULOSE 10 G/15ML
20 SOLUTION ORAL 3 TIMES DAILY
Status: DISCONTINUED | OUTPATIENT
Start: 2023-12-11 | End: 2023-12-13

## 2023-12-11 RX ORDER — SODIUM CHLORIDE 0.9 % (FLUSH) 0.9 %
5-40 SYRINGE (ML) INJECTION EVERY 12 HOURS SCHEDULED
Status: DISCONTINUED | OUTPATIENT
Start: 2023-12-11 | End: 2023-12-13

## 2023-12-11 RX ORDER — 3% SODIUM CHLORIDE 3 G/100ML
50 INJECTION, SOLUTION INTRAVENOUS CONTINUOUS
Status: DISCONTINUED | OUTPATIENT
Start: 2023-12-11 | End: 2023-12-13

## 2023-12-11 RX ORDER — SODIUM CHLORIDE 9 MG/ML
INJECTION, SOLUTION INTRAVENOUS CONTINUOUS
Status: DISCONTINUED | OUTPATIENT
Start: 2023-12-11 | End: 2023-12-11

## 2023-12-11 RX ORDER — FUROSEMIDE 10 MG/ML
20 INJECTION INTRAMUSCULAR; INTRAVENOUS ONCE
Status: COMPLETED | OUTPATIENT
Start: 2023-12-11 | End: 2023-12-11

## 2023-12-11 RX ORDER — SODIUM CHLORIDE 9 MG/ML
INJECTION, SOLUTION INTRAVENOUS PRN
Status: DISCONTINUED | OUTPATIENT
Start: 2023-12-11 | End: 2023-12-13

## 2023-12-11 RX ORDER — LIDOCAINE HYDROCHLORIDE 10 MG/ML
5 INJECTION, SOLUTION INFILTRATION; PERINEURAL ONCE
Status: DISCONTINUED | OUTPATIENT
Start: 2023-12-11 | End: 2023-12-12

## 2023-12-11 RX ORDER — FUROSEMIDE 10 MG/ML
80 INJECTION INTRAMUSCULAR; INTRAVENOUS ONCE
Status: COMPLETED | OUTPATIENT
Start: 2023-12-11 | End: 2023-12-11

## 2023-12-11 RX ORDER — MIDODRINE HYDROCHLORIDE 5 MG/1
10 TABLET ORAL
Status: DISCONTINUED | OUTPATIENT
Start: 2023-12-11 | End: 2023-12-12

## 2023-12-11 RX ORDER — ALBUMIN (HUMAN) 12.5 G/50ML
25 SOLUTION INTRAVENOUS EVERY 6 HOURS
Status: COMPLETED | OUTPATIENT
Start: 2023-12-11 | End: 2023-12-13

## 2023-12-11 RX ORDER — SODIUM CHLORIDE 0.9 % (FLUSH) 0.9 %
5-40 SYRINGE (ML) INJECTION PRN
Status: DISCONTINUED | OUTPATIENT
Start: 2023-12-11 | End: 2023-12-13

## 2023-12-11 RX ADMIN — OCTREOTIDE ACETATE 25 MCG/HR: 500 INJECTION, SOLUTION INTRAVENOUS; SUBCUTANEOUS at 13:28

## 2023-12-11 RX ADMIN — THIAMINE HYDROCHLORIDE 500 MG: 100 INJECTION, SOLUTION INTRAMUSCULAR; INTRAVENOUS at 00:35

## 2023-12-11 RX ADMIN — FUROSEMIDE 80 MG: 10 INJECTION, SOLUTION INTRAMUSCULAR; INTRAVENOUS at 22:18

## 2023-12-11 RX ADMIN — MIDODRINE HYDROCHLORIDE 10 MG: 5 TABLET ORAL at 13:26

## 2023-12-11 RX ADMIN — SODIUM CHLORIDE 25 ML/HR: 3 INJECTION, SOLUTION INTRAVENOUS at 17:56

## 2023-12-11 RX ADMIN — SODIUM CHLORIDE, PRESERVATIVE FREE 10 ML: 5 INJECTION INTRAVENOUS at 07:42

## 2023-12-11 RX ADMIN — SODIUM CHLORIDE: 9 INJECTION, SOLUTION INTRAVENOUS at 08:55

## 2023-12-11 RX ADMIN — CALCIUM CHLORIDE 2000 MG: 100 INJECTION, SOLUTION INTRAVENOUS at 10:19

## 2023-12-11 RX ADMIN — PHENOBARBITAL 32.4 MG: 32.4 TABLET ORAL at 20:55

## 2023-12-11 RX ADMIN — MIDODRINE HYDROCHLORIDE 10 MG: 5 TABLET ORAL at 18:03

## 2023-12-11 RX ADMIN — LACTULOSE 20 G: 20 SOLUTION ORAL at 20:55

## 2023-12-11 RX ADMIN — MAGNESIUM SULFATE HEPTAHYDRATE 2000 MG: 40 INJECTION, SOLUTION INTRAVENOUS at 01:43

## 2023-12-11 RX ADMIN — FUROSEMIDE 20 MG: 10 INJECTION, SOLUTION INTRAMUSCULAR; INTRAVENOUS at 03:49

## 2023-12-11 RX ADMIN — THIAMINE HYDROCHLORIDE 500 MG: 100 INJECTION, SOLUTION INTRAMUSCULAR; INTRAVENOUS at 08:52

## 2023-12-11 RX ADMIN — PHENOBARBITAL 64.8 MG: 32.4 TABLET ORAL at 08:47

## 2023-12-11 RX ADMIN — ALBUMIN (HUMAN) 12.5 G: 0.25 INJECTION, SOLUTION INTRAVENOUS at 13:26

## 2023-12-11 RX ADMIN — LACTULOSE 20 G: 20 SOLUTION ORAL at 13:26

## 2023-12-11 RX ADMIN — CEFTRIAXONE 1000 MG: 1 INJECTION, POWDER, FOR SOLUTION INTRAMUSCULAR; INTRAVENOUS at 08:57

## 2023-12-11 RX ADMIN — THIAMINE HYDROCHLORIDE 500 MG: 100 INJECTION, SOLUTION INTRAMUSCULAR; INTRAVENOUS at 15:47

## 2023-12-11 RX ADMIN — PANTOPRAZOLE SODIUM 40 MG: 40 INJECTION, POWDER, FOR SOLUTION INTRAVENOUS at 08:46

## 2023-12-11 RX ADMIN — RIFAXIMIN 400 MG: 200 TABLET ORAL at 13:26

## 2023-12-11 RX ADMIN — SODIUM CHLORIDE: 9 INJECTION, SOLUTION INTRAVENOUS at 00:34

## 2023-12-11 RX ADMIN — Medication 1 TABLET: at 08:47

## 2023-12-11 RX ADMIN — SODIUM CHLORIDE, PRESERVATIVE FREE 10 ML: 5 INJECTION INTRAVENOUS at 20:55

## 2023-12-11 RX ADMIN — PANTOPRAZOLE SODIUM 40 MG: 40 INJECTION, POWDER, FOR SOLUTION INTRAVENOUS at 20:55

## 2023-12-11 RX ADMIN — RIFAXIMIN 400 MG: 200 TABLET ORAL at 21:55

## 2023-12-11 RX ADMIN — PANTOPRAZOLE SODIUM 40 MG: 40 INJECTION, POWDER, FOR SOLUTION INTRAVENOUS at 00:28

## 2023-12-11 RX ADMIN — ALBUMIN (HUMAN) 25 G: 0.25 INJECTION, SOLUTION INTRAVENOUS at 18:02

## 2023-12-11 NOTE — H&P
bolus, 250 mL, PRN  glucagon (rDNA), 1 mg, PRN  dextrose, , Continuous PRN        Data:     CBC:   Recent Labs     12/10/23  1605   WBC 18.7*   HGB 6.2*   PLT 49*   MCV 97.9   RDW 25.2*   BANDSPCT 6   LYMPHOPCT 3.0*   MONOPCT 6.0*   MONOSABS 1.1   LYMPHSABS 0.6   EOSABS 0.4     CMP:    Recent Labs     12/10/23  1605   *   K 3.9   CL 75*   CO2 18*   BUN 19   CREATININE 2.5*   GLUCOSE 109*   LABALBU 2.8*   CALCIUM 7.7*   BILITOT 42.8*   ALKPHOS 182*   AST 89*   ALT 31     Lipids:   Lab Results   Component Value Date/Time    CHOL 106 02/07/2022 10:46 AM    HDL 30 02/07/2022 10:46 AM    TRIG 91 02/07/2022 10:46 AM     Hemoglobin A1C:   Lab Results   Component Value Date/Time    LABA1C 5.1 07/20/2023 05:43 AM     TSH:   Lab Results   Component Value Date/Time    TSH 2.05 01/27/2020 03:11 PM     Troponin:   Lab Results   Component Value Date/Time    TROPONINT 0.014 07/31/2021 04:26 PM     BNP: No results for input(s): \"PROBNP\" in the last 72 hours. Lactic Acid: No results for input(s): \"LACTA\" in the last 72 hours.   UA:  Lab Results   Component Value Date/Time    NITRU NEGATIVE 07/19/2023 10:10 AM    COLORU YELLOW 07/19/2023 10:10 AM    WBCUA 1 07/19/2023 10:10 AM    RBCUA 1 07/19/2023 10:10 AM    MUCUS RARE 05/03/2022 12:03 PM    TRICHOMONAS NONE SEEN 07/19/2023 10:10 AM    YEAST RARE 05/03/2022 12:03 PM    BACTERIA NEGATIVE 07/19/2023 10:10 AM    CLARITYU CLEAR 07/19/2023 10:10 AM    SPECGRAV <1.005 07/19/2023 10:10 AM    LEUKOCYTESUR NEGATIVE 07/19/2023 10:10 AM    UROBILINOGEN 2.0 07/19/2023 10:10 AM    BILIRUBINUR LARGE NUMBER OR AMOUNT OBSERVED 07/19/2023 10:10 AM    BLOODU MODERATE NUMBER OR AMOUNT OBSERVED 07/19/2023 10:10 AM    KETUA NEGATIVE 07/19/2023 10:10 AM     Urine Cultures: No results found for: \"LABURIN\"  Blood Cultures: No results found for: \"BC\"  No results found for: \"BLOODCULT2\"  Organism: No results found for: \"ORG\"    Radiology results:  CT ABDOMEN PELVIS WO CONTRAST Additional Contrast?

## 2023-12-11 NOTE — PLAN OF CARE
Problem: Discharge Planning  Goal: Discharge to home or other facility with appropriate resources  Outcome: Progressing     Problem: Skin/Tissue Integrity  Goal: Absence of new skin breakdown  Description: 1. Monitor for areas of redness and/or skin breakdown  2. Assess vascular access sites hourly  3. Every 4-6 hours minimum:  Change oxygen saturation probe site  4. Every 4-6 hours:  If on nasal continuous positive airway pressure, respiratory therapy assess nares and determine need for appliance change or resting period.   Outcome: Progressing     Problem: Safety - Adult  Goal: Free from fall injury  Outcome: Progressing     Problem: Neurosensory - Adult  Goal: Achieves stable or improved neurological status  Outcome: Progressing  Goal: Absence of seizures  Outcome: Progressing     Problem: Metabolic/Fluid and Electrolytes - Adult  Goal: Electrolytes maintained within normal limits  Outcome: Progressing  Goal: Hemodynamic stability and optimal renal function maintained  Outcome: Progressing  Goal: Glucose maintained within prescribed range  Outcome: Progressing     Problem: Hematologic - Adult  Goal: Maintains hematologic stability  Outcome: Progressing

## 2023-12-12 ENCOUNTER — APPOINTMENT (OUTPATIENT)
Dept: INTERVENTIONAL RADIOLOGY/VASCULAR | Age: 43
DRG: 432 | End: 2023-12-12
Payer: COMMERCIAL

## 2023-12-12 ENCOUNTER — APPOINTMENT (OUTPATIENT)
Dept: GENERAL RADIOLOGY | Age: 43
DRG: 432 | End: 2023-12-12
Payer: COMMERCIAL

## 2023-12-12 LAB
25(OH)D3 SERPL-MCNC: 10.77 NG/ML
ALBUMIN SERPL-MCNC: 3.1 GM/DL (ref 3.4–5)
ALP BLD-CCNC: 139 IU/L (ref 40–129)
ALT SERPL-CCNC: 29 U/L (ref 10–40)
ANION GAP SERPL CALCULATED.3IONS-SCNC: 17 MMOL/L (ref 4–16)
ANION GAP SERPL CALCULATED.3IONS-SCNC: 18 MMOL/L (ref 4–16)
ANION GAP SERPL CALCULATED.3IONS-SCNC: 18 MMOL/L (ref 4–16)
ANION GAP SERPL CALCULATED.3IONS-SCNC: 20 MMOL/L (ref 4–16)
APTT: 74.5 SECONDS (ref 25.1–37.1)
AST SERPL-CCNC: 120 IU/L (ref 15–37)
BASOPHILS ABSOLUTE: 0 K/CU MM
BASOPHILS RELATIVE PERCENT: 0.3 % (ref 0–1)
BILIRUB SERPL-MCNC: 49.3 MG/DL (ref 0–1)
BILIRUBIN DIRECT: >10 MG/DL (ref 0–0.3)
BILIRUBIN, INDIRECT: ABNORMAL MG/DL (ref 0–0.7)
BUN SERPL-MCNC: 30 MG/DL (ref 6–23)
BUN SERPL-MCNC: 32 MG/DL (ref 6–23)
BUN SERPL-MCNC: 33 MG/DL (ref 6–23)
BUN SERPL-MCNC: 34 MG/DL (ref 6–23)
BUN SERPL-MCNC: 35 MG/DL (ref 6–23)
BUN SERPL-MCNC: 35 MG/DL (ref 6–23)
CALCIUM IONIZED: 3.88 MG/DL (ref 4.48–5.28)
CALCIUM SERPL-MCNC: 7.5 MG/DL (ref 8.3–10.6)
CALCIUM SERPL-MCNC: 7.6 MG/DL (ref 8.3–10.6)
CALCIUM SERPL-MCNC: 7.7 MG/DL (ref 8.3–10.6)
CALCIUM SERPL-MCNC: 7.8 MG/DL (ref 8.3–10.6)
CALCIUM SERPL-MCNC: 7.8 MG/DL (ref 8.3–10.6)
CALCIUM SERPL-MCNC: 8.1 MG/DL (ref 8.3–10.6)
CHLORIDE BLD-SCNC: 81 MMOL/L (ref 99–110)
CHLORIDE BLD-SCNC: 81 MMOL/L (ref 99–110)
CHLORIDE BLD-SCNC: 83 MMOL/L (ref 99–110)
CO2: 15 MMOL/L (ref 21–32)
CO2: 16 MMOL/L (ref 21–32)
CO2: 17 MMOL/L (ref 21–32)
CO2: 17 MMOL/L (ref 21–32)
CREAT SERPL-MCNC: 2.7 MG/DL (ref 0.9–1.3)
CREAT SERPL-MCNC: 3.3 MG/DL (ref 0.9–1.3)
CREAT SERPL-MCNC: 3.6 MG/DL (ref 0.9–1.3)
CREAT SERPL-MCNC: 3.8 MG/DL (ref 0.9–1.3)
CREAT SERPL-MCNC: 3.9 MG/DL (ref 0.9–1.3)
CREAT SERPL-MCNC: 4.2 MG/DL (ref 0.9–1.3)
DIFFERENTIAL TYPE: ABNORMAL
EOSINOPHILS ABSOLUTE: 0.1 K/CU MM
EOSINOPHILS RELATIVE PERCENT: 1.1 % (ref 0–3)
GFR SERPL CREATININE-BSD FRML MDRD: 17 ML/MIN/1.73M2
GFR SERPL CREATININE-BSD FRML MDRD: 19 ML/MIN/1.73M2
GFR SERPL CREATININE-BSD FRML MDRD: 20 ML/MIN/1.73M2
GFR SERPL CREATININE-BSD FRML MDRD: 21 ML/MIN/1.73M2
GFR SERPL CREATININE-BSD FRML MDRD: 23 ML/MIN/1.73M2
GFR SERPL CREATININE-BSD FRML MDRD: 29 ML/MIN/1.73M2
GLUCOSE BLD-MCNC: 112 MG/DL (ref 70–99)
GLUCOSE BLD-MCNC: 121 MG/DL (ref 70–99)
GLUCOSE BLD-MCNC: 126 MG/DL (ref 70–99)
GLUCOSE SERPL-MCNC: 104 MG/DL (ref 70–99)
GLUCOSE SERPL-MCNC: 104 MG/DL (ref 70–99)
GLUCOSE SERPL-MCNC: 96 MG/DL (ref 70–99)
GLUCOSE SERPL-MCNC: 96 MG/DL (ref 70–99)
GLUCOSE SERPL-MCNC: 97 MG/DL (ref 70–99)
GLUCOSE SERPL-MCNC: 97 MG/DL (ref 70–99)
HCT VFR BLD CALC: 19.3 % (ref 42–52)
HCT VFR BLD CALC: 20.3 % (ref 42–52)
HEMOGLOBIN: 6.4 GM/DL (ref 13.5–18)
HEMOGLOBIN: 6.7 GM/DL (ref 13.5–18)
IMMATURE NEUTROPHIL %: 2.8 % (ref 0–0.43)
INR BLD: 2.3 INDEX
IONIZED CA: 0.97 MMOL/L (ref 1.12–1.32)
LIPASE: 33 IU/L (ref 13–60)
LYMPHOCYTES ABSOLUTE: 0.5 K/CU MM
LYMPHOCYTES RELATIVE PERCENT: 4.5 % (ref 24–44)
MAGNESIUM: 1.8 MG/DL (ref 1.8–2.4)
MAGNESIUM: 1.8 MG/DL (ref 1.8–2.4)
MAGNESIUM: 1.9 MG/DL (ref 1.8–2.4)
MAGNESIUM: 1.9 MG/DL (ref 1.8–2.4)
MAGNESIUM: 2 MG/DL (ref 1.8–2.4)
MAGNESIUM: 2 MG/DL (ref 1.8–2.4)
MCH RBC QN AUTO: 31.8 PG (ref 27–31)
MCHC RBC AUTO-ENTMCNC: 33 % (ref 32–36)
MCV RBC AUTO: 96.2 FL (ref 78–100)
MONOCYTES ABSOLUTE: 0.6 K/CU MM
MONOCYTES RELATIVE PERCENT: 5.5 % (ref 0–4)
NUCLEATED RBC %: 1.1 %
PDW BLD-RTO: 24.8 % (ref 11.7–14.9)
PHOSPHORUS: 6.7 MG/DL (ref 2.5–4.9)
PHOSPHORUS: 6.7 MG/DL (ref 2.5–4.9)
PHOSPHORUS: 6.9 MG/DL (ref 2.5–4.9)
PHOSPHORUS: 7.2 MG/DL (ref 2.5–4.9)
PHOSPHORUS: 7.2 MG/DL (ref 2.5–4.9)
PHOSPHORUS: 7.4 MG/DL (ref 2.5–4.9)
PLATELET # BLD: 37 K/CU MM (ref 140–440)
PMV BLD AUTO: 12.5 FL (ref 7.5–11.1)
POTASSIUM SERPL-SCNC: 4.7 MMOL/L (ref 3.5–5.1)
POTASSIUM SERPL-SCNC: 4.7 MMOL/L (ref 3.5–5.1)
POTASSIUM SERPL-SCNC: 5 MMOL/L (ref 3.5–5.1)
POTASSIUM SERPL-SCNC: 5.1 MMOL/L (ref 3.5–5.1)
PROTHROMBIN TIME: 25.2 SECONDS (ref 11.7–14.5)
RBC # BLD: 2.11 M/CU MM (ref 4.6–6.2)
SEGMENTED NEUTROPHILS ABSOLUTE COUNT: 9.8 K/CU MM
SEGMENTED NEUTROPHILS RELATIVE PERCENT: 85.8 % (ref 36–66)
SODIUM BLD-SCNC: 114 MMOL/L (ref 135–145)
SODIUM BLD-SCNC: 114 MMOL/L (ref 135–145)
SODIUM BLD-SCNC: 117 MMOL/L (ref 135–145)
SODIUM BLD-SCNC: 119 MMOL/L (ref 135–145)
TOTAL IMMATURE NEUTOROPHIL: 0.32 K/CU MM
TOTAL NUCLEATED RBC: 0.1 K/CU MM
TOTAL PROTEIN: 7.3 GM/DL (ref 6.4–8.2)
WBC # BLD: 11.4 K/CU MM (ref 4–10.5)

## 2023-12-12 PROCEDURE — 36620 INSERTION CATHETER ARTERY: CPT

## 2023-12-12 PROCEDURE — 80076 HEPATIC FUNCTION PANEL: CPT

## 2023-12-12 PROCEDURE — 36592 COLLECT BLOOD FROM PICC: CPT

## 2023-12-12 PROCEDURE — 2500000003 HC RX 250 WO HCPCS: Performed by: STUDENT IN AN ORGANIZED HEALTH CARE EDUCATION/TRAINING PROGRAM

## 2023-12-12 PROCEDURE — 83690 ASSAY OF LIPASE: CPT

## 2023-12-12 PROCEDURE — 82248 BILIRUBIN DIRECT: CPT

## 2023-12-12 PROCEDURE — 71045 X-RAY EXAM CHEST 1 VIEW: CPT

## 2023-12-12 PROCEDURE — 6370000000 HC RX 637 (ALT 250 FOR IP): Performed by: PHYSICIAN ASSISTANT

## 2023-12-12 PROCEDURE — C9113 INJ PANTOPRAZOLE SODIUM, VIA: HCPCS | Performed by: NURSE PRACTITIONER

## 2023-12-12 PROCEDURE — 82962 GLUCOSE BLOOD TEST: CPT

## 2023-12-12 PROCEDURE — 80048 BASIC METABOLIC PNL TOTAL CA: CPT

## 2023-12-12 PROCEDURE — 85610 PROTHROMBIN TIME: CPT

## 2023-12-12 PROCEDURE — 85018 HEMOGLOBIN: CPT

## 2023-12-12 PROCEDURE — 6360000002 HC RX W HCPCS: Performed by: NURSE PRACTITIONER

## 2023-12-12 PROCEDURE — P9047 ALBUMIN (HUMAN), 25%, 50ML: HCPCS | Performed by: PHYSICIAN ASSISTANT

## 2023-12-12 PROCEDURE — 2000000000 HC ICU R&B

## 2023-12-12 PROCEDURE — 85025 COMPLETE CBC W/AUTO DIFF WBC: CPT

## 2023-12-12 PROCEDURE — 2500000003 HC RX 250 WO HCPCS: Performed by: INTERNAL MEDICINE

## 2023-12-12 PROCEDURE — 2580000003 HC RX 258: Performed by: STUDENT IN AN ORGANIZED HEALTH CARE EDUCATION/TRAINING PROGRAM

## 2023-12-12 PROCEDURE — 36430 TRANSFUSION BLD/BLD COMPNT: CPT

## 2023-12-12 PROCEDURE — 5A1D90Z PERFORMANCE OF URINARY FILTRATION, CONTINUOUS, GREATER THAN 18 HOURS PER DAY: ICD-10-PCS | Performed by: INTERNAL MEDICINE

## 2023-12-12 PROCEDURE — 36556 INSERT NON-TUNNEL CV CATH: CPT

## 2023-12-12 PROCEDURE — 84100 ASSAY OF PHOSPHORUS: CPT

## 2023-12-12 PROCEDURE — 6370000000 HC RX 637 (ALT 250 FOR IP): Performed by: INTERNAL MEDICINE

## 2023-12-12 PROCEDURE — 90945 DIALYSIS ONE EVALUATION: CPT

## 2023-12-12 PROCEDURE — 2709999900 HC NON-CHARGEABLE SUPPLY

## 2023-12-12 PROCEDURE — 6370000000 HC RX 637 (ALT 250 FOR IP): Performed by: SPECIALIST

## 2023-12-12 PROCEDURE — 6370000000 HC RX 637 (ALT 250 FOR IP): Performed by: NURSE PRACTITIONER

## 2023-12-12 PROCEDURE — 85014 HEMATOCRIT: CPT

## 2023-12-12 PROCEDURE — P9047 ALBUMIN (HUMAN), 25%, 50ML: HCPCS | Performed by: NURSE PRACTITIONER

## 2023-12-12 PROCEDURE — 2500000003 HC RX 250 WO HCPCS: Performed by: PHYSICIAN ASSISTANT

## 2023-12-12 PROCEDURE — 83735 ASSAY OF MAGNESIUM: CPT

## 2023-12-12 PROCEDURE — 6360000002 HC RX W HCPCS: Performed by: PHYSICIAN ASSISTANT

## 2023-12-12 PROCEDURE — 36569 INSJ PICC 5 YR+ W/O IMAGING: CPT

## 2023-12-12 PROCEDURE — 94761 N-INVAS EAR/PLS OXIMETRY MLT: CPT

## 2023-12-12 PROCEDURE — 85730 THROMBOPLASTIN TIME PARTIAL: CPT

## 2023-12-12 PROCEDURE — P9016 RBC LEUKOCYTES REDUCED: HCPCS

## 2023-12-12 PROCEDURE — 2580000003 HC RX 258: Performed by: NURSE PRACTITIONER

## 2023-12-12 PROCEDURE — 82330 ASSAY OF CALCIUM: CPT

## 2023-12-12 PROCEDURE — 05HB33Z INSERTION OF INFUSION DEVICE INTO RIGHT BASILIC VEIN, PERCUTANEOUS APPROACH: ICD-10-PCS | Performed by: STUDENT IN AN ORGANIZED HEALTH CARE EDUCATION/TRAINING PROGRAM

## 2023-12-12 PROCEDURE — 76937 US GUIDE VASCULAR ACCESS: CPT

## 2023-12-12 PROCEDURE — C1751 CATH, INF, PER/CENT/MIDLINE: HCPCS

## 2023-12-12 PROCEDURE — 80053 COMPREHEN METABOLIC PANEL: CPT

## 2023-12-12 PROCEDURE — 2580000003 HC RX 258: Performed by: SPECIALIST

## 2023-12-12 PROCEDURE — 05HN33Z INSERTION OF INFUSION DEVICE INTO LEFT INTERNAL JUGULAR VEIN, PERCUTANEOUS APPROACH: ICD-10-PCS | Performed by: SPECIALIST

## 2023-12-12 PROCEDURE — 2580000003 HC RX 258: Performed by: INTERNAL MEDICINE

## 2023-12-12 RX ORDER — SODIUM CHLORIDE 0.9 % (FLUSH) 0.9 %
5-40 SYRINGE (ML) INJECTION PRN
Status: DISCONTINUED | OUTPATIENT
Start: 2023-12-12 | End: 2023-12-13

## 2023-12-12 RX ORDER — SODIUM BICARBONATE 650 MG/1
650 TABLET ORAL 3 TIMES DAILY
Status: DISCONTINUED | OUTPATIENT
Start: 2023-12-12 | End: 2023-12-13

## 2023-12-12 RX ORDER — CALCIUM GLUCONATE 20 MG/ML
2000 INJECTION, SOLUTION INTRAVENOUS PRN
Status: DISCONTINUED | OUTPATIENT
Start: 2023-12-12 | End: 2023-12-13

## 2023-12-12 RX ORDER — SODIUM CHLORIDE 9 MG/ML
INJECTION, SOLUTION INTRAVENOUS PRN
Status: DISCONTINUED | OUTPATIENT
Start: 2023-12-12 | End: 2023-12-13

## 2023-12-12 RX ORDER — PHENOBARBITAL SODIUM 65 MG/ML
65 INJECTION INTRAMUSCULAR EVERY 8 HOURS PRN
Status: DISCONTINUED | OUTPATIENT
Start: 2023-12-12 | End: 2023-12-13

## 2023-12-12 RX ORDER — SODIUM CHLORIDE 0.9 % (FLUSH) 0.9 %
5-40 SYRINGE (ML) INJECTION EVERY 12 HOURS SCHEDULED
Status: DISCONTINUED | OUTPATIENT
Start: 2023-12-12 | End: 2023-12-13

## 2023-12-12 RX ORDER — NOREPINEPHRINE BITARTRATE 0.06 MG/ML
1-30 INJECTION, SOLUTION INTRAVENOUS CONTINUOUS
Status: DISCONTINUED | OUTPATIENT
Start: 2023-12-12 | End: 2023-12-13

## 2023-12-12 RX ORDER — CALCIUM GLUCONATE 20 MG/ML
1000 INJECTION, SOLUTION INTRAVENOUS PRN
Status: DISCONTINUED | OUTPATIENT
Start: 2023-12-12 | End: 2023-12-13

## 2023-12-12 RX ORDER — 0.9 % SODIUM CHLORIDE 0.9 %
1000 INTRAVENOUS SOLUTION INTRAVENOUS PRN
Status: DISCONTINUED | OUTPATIENT
Start: 2023-12-12 | End: 2023-12-13

## 2023-12-12 RX ORDER — MIDODRINE HYDROCHLORIDE 5 MG/1
10 TABLET ORAL EVERY 8 HOURS
Status: DISCONTINUED | OUTPATIENT
Start: 2023-12-12 | End: 2023-12-13

## 2023-12-12 RX ORDER — FENTANYL CITRATE 50 UG/ML
50 INJECTION, SOLUTION INTRAMUSCULAR; INTRAVENOUS ONCE
Status: COMPLETED | OUTPATIENT
Start: 2023-12-12 | End: 2023-12-12

## 2023-12-12 RX ORDER — POTASSIUM CHLORIDE 29.8 MG/ML
20 INJECTION INTRAVENOUS PRN
Status: DISCONTINUED | OUTPATIENT
Start: 2023-12-12 | End: 2023-12-13

## 2023-12-12 RX ORDER — DEXMEDETOMIDINE HYDROCHLORIDE 4 UG/ML
.1-.4 INJECTION, SOLUTION INTRAVENOUS CONTINUOUS
Status: DISCONTINUED | OUTPATIENT
Start: 2023-12-12 | End: 2023-12-13

## 2023-12-12 RX ORDER — PHENOBARBITAL SODIUM 65 MG/ML
65 INJECTION INTRAMUSCULAR EVERY 8 HOURS PRN
Status: DISCONTINUED | OUTPATIENT
Start: 2023-12-12 | End: 2023-12-12

## 2023-12-12 RX ORDER — MAGNESIUM SULFATE 1 G/100ML
1000 INJECTION INTRAVENOUS PRN
Status: DISCONTINUED | OUTPATIENT
Start: 2023-12-12 | End: 2023-12-13

## 2023-12-12 RX ADMIN — CALCIUM GLUCONATE 1000 MG: 20 INJECTION, SOLUTION INTRAVENOUS at 22:19

## 2023-12-12 RX ADMIN — MIDODRINE HYDROCHLORIDE 10 MG: 5 TABLET ORAL at 12:31

## 2023-12-12 RX ADMIN — PHENOBARBITAL 32.4 MG: 32.4 TABLET ORAL at 09:59

## 2023-12-12 RX ADMIN — SODIUM CHLORIDE 50 ML/HR: 3 INJECTION, SOLUTION INTRAVENOUS at 12:17

## 2023-12-12 RX ADMIN — Medication 1 TABLET: at 09:59

## 2023-12-12 RX ADMIN — MIDODRINE HYDROCHLORIDE 10 MG: 5 TABLET ORAL at 09:59

## 2023-12-12 RX ADMIN — SODIUM CHLORIDE, PRESERVATIVE FREE 10 ML: 5 INJECTION INTRAVENOUS at 21:55

## 2023-12-12 RX ADMIN — DEXMEDETOMIDINE HYDROCHLORIDE 0.2 MCG/KG/HR: 4 INJECTION, SOLUTION INTRAVENOUS at 22:23

## 2023-12-12 RX ADMIN — PANTOPRAZOLE SODIUM 40 MG: 40 INJECTION, POWDER, FOR SOLUTION INTRAVENOUS at 21:54

## 2023-12-12 RX ADMIN — SODIUM CHLORIDE: 9 INJECTION, SOLUTION INTRAVENOUS at 00:02

## 2023-12-12 RX ADMIN — THIAMINE HYDROCHLORIDE 500 MG: 100 INJECTION, SOLUTION INTRAMUSCULAR; INTRAVENOUS at 00:03

## 2023-12-12 RX ADMIN — THIAMINE HYDROCHLORIDE 500 MG: 100 INJECTION, SOLUTION INTRAMUSCULAR; INTRAVENOUS at 10:38

## 2023-12-12 RX ADMIN — MIDODRINE HYDROCHLORIDE 10 MG: 5 TABLET ORAL at 21:07

## 2023-12-12 RX ADMIN — RIFAXIMIN 400 MG: 200 TABLET ORAL at 09:59

## 2023-12-12 RX ADMIN — OCTREOTIDE ACETATE 25 MCG/HR: 500 INJECTION, SOLUTION INTRAVENOUS; SUBCUTANEOUS at 12:17

## 2023-12-12 RX ADMIN — PANTOPRAZOLE SODIUM 40 MG: 40 INJECTION, POWDER, FOR SOLUTION INTRAVENOUS at 09:59

## 2023-12-12 RX ADMIN — Medication 5 MCG/MIN: at 15:47

## 2023-12-12 RX ADMIN — Medication: at 16:00

## 2023-12-12 RX ADMIN — SODIUM CHLORIDE, PRESERVATIVE FREE 10 ML: 5 INJECTION INTRAVENOUS at 10:00

## 2023-12-12 RX ADMIN — SODIUM CHLORIDE: 9 INJECTION, SOLUTION INTRAVENOUS at 22:18

## 2023-12-12 RX ADMIN — LACTULOSE 20 G: 20 SOLUTION ORAL at 21:10

## 2023-12-12 RX ADMIN — SODIUM BICARBONATE 650 MG: 650 TABLET ORAL at 09:59

## 2023-12-12 RX ADMIN — PHENOBARBITAL 32.4 MG: 32.4 TABLET ORAL at 21:10

## 2023-12-12 RX ADMIN — ALBUMIN (HUMAN) 25 G: 0.25 INJECTION, SOLUTION INTRAVENOUS at 06:12

## 2023-12-12 RX ADMIN — ALBUMIN (HUMAN) 25 G: 0.25 INJECTION, SOLUTION INTRAVENOUS at 18:27

## 2023-12-12 RX ADMIN — Medication: at 17:46

## 2023-12-12 RX ADMIN — THIAMINE HYDROCHLORIDE 500 MG: 100 INJECTION, SOLUTION INTRAMUSCULAR; INTRAVENOUS at 16:53

## 2023-12-12 RX ADMIN — SODIUM BICARBONATE 650 MG: 650 TABLET ORAL at 21:10

## 2023-12-12 RX ADMIN — RIFAXIMIN 400 MG: 200 TABLET ORAL at 21:10

## 2023-12-12 RX ADMIN — ALBUMIN (HUMAN) 25 G: 0.25 INJECTION, SOLUTION INTRAVENOUS at 12:37

## 2023-12-12 RX ADMIN — CEFTRIAXONE 1000 MG: 1 INJECTION, POWDER, FOR SOLUTION INTRAMUSCULAR; INTRAVENOUS at 10:00

## 2023-12-12 RX ADMIN — LACTULOSE 20 G: 20 SOLUTION ORAL at 09:59

## 2023-12-12 RX ADMIN — FENTANYL CITRATE 50 MCG: 50 INJECTION, SOLUTION INTRAMUSCULAR; INTRAVENOUS at 11:41

## 2023-12-12 RX ADMIN — ALBUMIN (HUMAN) 25 G: 0.25 INJECTION, SOLUTION INTRAVENOUS at 00:01

## 2023-12-12 ASSESSMENT — PAIN SCALES - GENERAL
PAINLEVEL_OUTOF10: 0

## 2023-12-12 NOTE — PROCEDURES
Temporary hemodialysis catheter placement  Evolving hepatorenal syndrome acute kidney injury  Consent obtained from the patient's spouse  ASA score 3  Timeout was performed at 11:58 AM      Left internal jugular region was evaluated with ultrasound, sufficient venous target noted. The area was prepared with chlorhexidine. Sterile maximal barrier technique utilized throughout the procedure. 3 cc of 1% lidocaine instilled into the soft tissues for purposes of local anesthesia. Using ultrasound guidance sterile maximal barrier technique left internal jugular vein cannulated, guidewire placed, dilation performed x 2 and subsequently 20 cm trialysis catheter inserted without difficulty. All ports were flushed. The device was sutured into position. A sterile occlusive dressing applied. No untoward events, follow-up chest radiograph pending.     Mervin Kapadia  [phone]

## 2023-12-12 NOTE — CONSULTS
[address] [facility] [id number]
[address], [address] [place] [address]                                  CONSULTATION    PATIENT NAME: Juarez Brown                       :        1980  MED REC NO:   [id number]                          ROOM:       2128  ACCOUNT NO:   [de-identified]                           ADMIT DATE: 12/10/2023  PROVIDER:     Yo Alvarez MD    CONSULT DATE:  2023    CHIEF COMPLAINT:  1. History of advanced alcohol liver disease with anemia; rule out GI  bleeding, source upper versus lower gastrointestinal tract. 2.  Elevated blood ammonia level, rule out hepatic encephalopathy. 3.  Abdominal distention, rule out SBP. HISTORY OF PRESENT ILLNESS:  As follows: The patient is a 79-year-old  white male patient known to me from his multiple previous  hospitalization, last seen in consult on the 2023 with a  longstanding history of EtOH abuse with alcohol liver disease and  stigmata of portal hypertension, recreational drug use, hypertension,  diabetes mellitus, hyperlipidemia, and obesity. The patient presented  to the emergency room on the 12/10/2023 with generalized weakness. The  patient did complain of nausea but no vomiting, hematemesis, or melena. The patient, however, does give history of occasional episodes of bright  red blood per rectum. The patient upon examination in the ER was noted  to be obtunded and the blood workup showed a sodium of 110 and BUN was  19 and creatinine 2.5 and the patient's CBC showed a WBC count 18.7,  hemoglobin 6.2, and platelet count of 82,856. INR was 2.0. A CAT scan  of the abdomen and pelvis was done, which showed findings consistent  with cirrhosis of the liver with portal venous hypertension and  abdominal varices and ascites was noted along with gallstones. The  patient was admitted to the ICU for further workup and management.     The patient has been transfused with 2 units of packed
Consult completed. Indication for line: critical vesicant IV meds/infusions. Procedure/rationale explained to pt including benefits vs potential risks/complications; questions answered & consent obtained. #6Fr Triple Lumen ArrowG+silvana Blue Advance PICC initiated to RUE Basilic Vein using sterile, UltraSound-guided technique without difficulty/complications. Positioning verified via VPSg4 & 3CG with appropriate P-wave changes, US cathedral waves, and blue bullseye noted. Sterile dressing with SecurePortIV, SkinPrep, StatLock Securing Device, CHG gel DSSG, SwabCaps, and Limb Precautions band applied. Pt tolerated well and no other c/o or needs noted or reported. Memo Fuller, Primary RN notified.
Consult completed. Procedure/rationale explained to patient, including benefits vs potential risks; questions answered & consent signed by verbal consent, 2 RN witnesses. Patient verbalized understanding. Time out done @0345 on 12/12/2023. Indication for line: Hemodynamically unstable. Triple Lumen #6Fr ArrowG+silvana Blue Advance PICC initiated to RUE Brachial Vein using sterile procedure, UltraSound-guided technique without difficulty/complications. Positioning verified via VPSg4 & 3CG with appropriate P-wave changes noted. Bullseye not reported so portable chest xray ordered per protocol. All ports flush and draw blood with ease. Excessive bleeding at the puncture site, this PICC nurse held pressure for 45 minutes but the bleeding continued. pressure dressing was placed. Pt tolerated well & denies other c/o or needs. Izabella Elena, primary RN notified and he will continue to monitor the patient for bleeding. PICC placement not verified, waiting on chest x-ray to verify PICC placement. PICC line has been verified and is good to use. Impression: Right upper extremity PICC tip terminates at the caval atrial junction.
Critical Care Consult Note 23        NAME: Rajesh Edwards  : 1980  MRN: [id number]      Assessment/Plan:  Rajesh Edwards is a 41 y.o. male     Hyponatremia  Alcoholic cirrhosis decompensation  Encephalopathy, multifactorial (metabolic, hepatic, alcohol-related)  Acute kidney injury questionable hepatorenal syndrome  Acute blood loss anemia presumably secondary to lower GI bleed (hematochezia)  History of hypertension  History hyperlipidemia  History of diabetes mellitus type 2  Probable sleep apnea      Management of hyponatremia per nephrology service  GI evaluation consultation in reference to cirrhosis as well as lower gastrointestinal bleeding  Provision of management for questionable evolving hepatorenal syndrome (albumin, midodrine, octreotide)  PPI  SBP prophylaxis  Serial monitoring of renal function, electrolytes and urine output  Medical management to mitigate alcohol withdrawal  DVT prophylaxis  Glycemic control    Given the current MELD sodium score, approximate 50% mortality over the next 3 months    If further decline of clinical status, consider transfer to different facility for evaluation regarding hepatic failure transplantation candidacy      Complex decisions required for evaluation and management reviewed during critical care rounds    Bry Gee  [phone] / Reason for Consult:    As above    History of Present Illness: This 51-year-old male with active alcohol abuse was admitted to the hospital with complaint of fatigue progressive malaise as well as multiple episodes of bright red blood per rectum. Evaluation through the emergency room notable for significant hyponatremia. Given remains due to hyponatremia as well as patient's clinical status admitted to critical care unit setting.     This morning, the patient arouses answer some questions admits to active alcohol consumption uncertain as to when his last reported use of alcohol (per chart review,
Nephrology Service Consultation      [address], [facility], [address] [place] [address]  Phone: [phone]  Office Hours: 8:30AM - 4:30PM  Monday - Friday        MEDICAL DECISION MAKING and Recommendations     -DOREEN: cr 3 from normal baseline//no HN on CT A/P//ddx: ATN vs HRS vs intravascular volume depletion  -Severe hyponatremia of 322  -Alcoholic cirrhosis  -BLE edema  -Hyperbilirubinemia  -Thrombocytopenia    Suggest:  -Urine studies  -Will resume NS 70ml/hr for now as sodium level has been improving with it  -Please do not overdrink fluids  -Critically ill  -Eval for hemolysis          Patient Active Problem List    Diagnosis Date Noted    Alcohol withdrawal syndrome, with unspecified complication ([address]) 37/10/1723    WD-Charcot ankle, right 07/05/2022    Local infection of skin and subcutaneous tissue 05/13/2022    WD-Skin ulcer of right great toe with fat layer exposed ([address]) 08/10/2023    WD-Skin ulcer of third toe of right foot, limited to breakdown of skin ([address]) 08/10/2023    WD-Traumatic ulcer of right foot with fat layer exposed ([address]) 08/10/2023    Alcohol abuse 04/13/2022    Hyperbilirubinemia 04/13/2022    Cellulitis 02/19/2022    Leukocytosis     Hyponatremia     Hypokalemia     Isolated proteinuria with minor glomerular abnormality     Closed displaced Maisonneuve's fracture of left leg 10/20/2021    Alcohol withdrawal delirium ([address]) 07/31/2021    Hypercholesterolemia 02/18/2019    Anxiety 11/15/2018    Type 2 diabetes, controlled, with neuropathy ([address]) 06/06/2018    Obstructive sleep apnea 10/31/2016    Fatty liver 03/11/2016    HTN (hypertension) 02/11/2015         Patient:  Troy Vieira  MRN: [id number]  Consulting physician:  Lou Fothergill., *  Reason for Consult: sodium 110  PCP: Luisito Allen MD    HISTORY OF PRESENT ILLNESS:   The patient is a 41 y.o. male with cirrhosis and alcohol abuse, presented due to sodium 110 thus the renal consult  He was on NS 70ml/hr and
PHARMACY RENAL DOSING MONITORING SERVICE FOR CONTINUOUS RENAL REPLACEMENT THERAPY  Pharmacy consulted by Dr. Sindhu Olivera for monitoring and adjustment. Patient is currently managed on CRRT:  CVVHD-F   Filter: M100   BFR: 200 ml/min  Dialysate 4/2.5: 600 ml/hr  Replacement fluid PRE-filter 4/2.5: 500 mL/hr  Replacement fluid POST- filter 4/2.5: 500 mL/hr  Fluid removal: 100 mL/hr  Fluid removal goal: net negative  Effluent rate: <3L/hr    Pertinent Laboratory Values:   Recent Labs     12/12/23  0320 12/12/23  0850 12/12/23  1242   BUN 32* 33* 35*   CREATININE 2.7* 3.9* 4.2*       Intake/Output Summary (Last 24 hours) at 12/12/2023 1440  Last data filed at 12/12/2023 1434  Gross per 24 hour   Intake 2091.75 ml   Output 440 ml   Net 1651.75 ml       Medication requiring adjustment for CRRT:  none    Medications requiring monitoring while on CRRT  Electrolyte replacements  PO sodium bicarbonate, vasopressors, octreotide (hepatorenal syndrome?)    Thank you for the consult.   Areli Davis [facility]  12/12/2023 2:40 PM
Prelim note  Nephrology consult for hyponatremia and DOREEN  Chart reviewed  Labs reviewed  Clinical presentation and recommendations discussed with Dr Emily Concepcion in ED  41year old with history of alcoholic liver disease  NO mental status change  Awake alert and oriented  Weakness  Na 110 K 3.9 lAT Na in July was 124-130      Collected: 12/10/23 1605  Final result  Specimen: Blood      Sodium 110 Low Panic  MMOL/L  Glucose 109 High  MG/DL   Potassium 3.9 MMOL/L BUN 19 MG/DL   Chloride 75 Low  mMol/L Creatinine 2.5 High  MG/DL   CO2 18 Low  MMOL/L Est, Glom Filt Rate 32 Low  mL/min/1.73m2    Anion Gap 17 High  Calcium 7.7 Low  MG/DL            Hypovolemic hyponatremia a with DOREEN  DD include HRS  He was on lasix and aldactone  Suggest  Urine studies- including urine osm  Very gentle isostonic fluids 1ml/kg/hr for 10 hrs  Frequent Na checks  Do not over correct above 118  in the next 12 hrs   Dr Janel Rangel will follow in am
PELVIS WO CONTRAST Additional Contrast? None    Result Date: 12/10/2023  EXAMINATION: CT OF THE ABDOMEN AND PELVIS WITHOUT CONTRAST 12/10/2023 5:06 pm TECHNIQUE: CT of the abdomen and pelvis was performed without the administration of intravenous contrast. Multiplanar reformatted images are provided for review. Automated exposure control, iterative reconstruction, and/or weight based adjustment of the mA/kV was utilized to reduce the radiation dose to as low as reasonably achievable. COMPARISON: Right upper quadrant ultrasound 07/19/2023 and CT abdomen/pelvis 07/19/2023 HISTORY: ORDERING SYSTEM PROVIDED HISTORY: abd distension; jaundice TECHNOLOGIST PROVIDED HISTORY: Reason for exam:->abd distension; jaundice Additional Contrast?->None Release to patient - Note: Delayed release will only apply to this order. Orders that are changed or resulted outside of the EHR will not respect a delayed release to [facility].-> Decision Support Exception - unselect if not a suspected or confirmed emergency medical condition->Emergency Medical Condition (MA) Reason for Exam: abd distension; jaundice FINDINGS: Lower Chest: Trace bilateral pleural effusions. Mild bibasilar airspace disease, likely atelectasis. The heart is mildly enlarged. There is artifact from bubbles of air in the right ventricle. Organs: The liver is enlarged with prominent left and caudate lobes. Nodular contour of the liver suggests cirrhosis. Portal venous hypertension with splenomegaly, ascites and upper abdominal varices. Cholelithiasis and thick bile/sludge. No pericholecystic fluid. Bile ducts are not dilated. The spleen is enlarged and unchanged. There is fat stranding in the region the pancreatic tail. Pancreas is otherwise grossly normal.  Bilateral adrenal glands and kidneys are grossly with the limitations of a noncontrast study. GI/Bowel: No evidence of a bowel obstruction. No evidence of appendicitis. Pelvis:  The urinary bladder was not

## 2023-12-13 VITALS
BODY MASS INDEX: 41.31 KG/M2 | WEIGHT: 305 LBS | DIASTOLIC BLOOD PRESSURE: 56 MMHG | SYSTOLIC BLOOD PRESSURE: 133 MMHG | HEART RATE: 67 BPM | OXYGEN SATURATION: 95 % | TEMPERATURE: 97.3 F | RESPIRATION RATE: 17 BRPM | HEIGHT: 72 IN

## 2023-12-13 LAB
ABO/RH: NORMAL
ALBUMIN SERPL-MCNC: 3.5 GM/DL (ref 3.4–5)
ALP BLD-CCNC: 123 IU/L (ref 40–129)
ALT SERPL-CCNC: 35 U/L (ref 10–40)
ANION GAP SERPL CALCULATED.3IONS-SCNC: 15 MMOL/L (ref 4–16)
ANION GAP SERPL CALCULATED.3IONS-SCNC: 16 MMOL/L (ref 4–16)
ANION GAP SERPL CALCULATED.3IONS-SCNC: 17 MMOL/L (ref 4–16)
ANION GAP SERPL CALCULATED.3IONS-SCNC: 17 MMOL/L (ref 4–16)
ANION GAP SERPL CALCULATED.3IONS-SCNC: 18 MMOL/L (ref 4–16)
ANISOCYTOSIS: ABNORMAL
ANTIBODY SCREEN: NEGATIVE
APTT: 83.2 SECONDS (ref 25.1–37.1)
AST SERPL-CCNC: 170 IU/L (ref 15–37)
BANDED NEUTROPHILS ABSOLUTE COUNT: 1.72 K/CU MM
BANDED NEUTROPHILS RELATIVE PERCENT: 14 % (ref 5–11)
BILIRUB SERPL-MCNC: 58.3 MG/DL (ref 0–1)
BILIRUBIN DIRECT: >10 MG/DL (ref 0–0.3)
BILIRUBIN, INDIRECT: ABNORMAL MG/DL (ref 0–0.7)
BUN SERPL-MCNC: 27 MG/DL (ref 6–23)
BUN SERPL-MCNC: 30 MG/DL (ref 6–23)
BUN SERPL-MCNC: 31 MG/DL (ref 6–23)
BUN SERPL-MCNC: 32 MG/DL (ref 6–23)
BUN SERPL-MCNC: 33 MG/DL (ref 6–23)
CALCIUM IONIZED: 3.92 MG/DL (ref 4.48–5.28)
CALCIUM IONIZED: 3.96 MG/DL (ref 4.48–5.28)
CALCIUM IONIZED: 4.04 MG/DL (ref 4.48–5.28)
CALCIUM SERPL-MCNC: 7.6 MG/DL (ref 8.3–10.6)
CALCIUM SERPL-MCNC: 8 MG/DL (ref 8.3–10.6)
CALCIUM SERPL-MCNC: 8.1 MG/DL (ref 8.3–10.6)
CALCIUM SERPL-MCNC: 8.1 MG/DL (ref 8.3–10.6)
CALCIUM SERPL-MCNC: 8.2 MG/DL (ref 8.3–10.6)
CHLORIDE BLD-SCNC: 84 MMOL/L (ref 99–110)
CHLORIDE BLD-SCNC: 86 MMOL/L (ref 99–110)
CHLORIDE BLD-SCNC: 87 MMOL/L (ref 99–110)
CHLORIDE BLD-SCNC: 89 MMOL/L (ref 99–110)
CHLORIDE BLD-SCNC: 89 MMOL/L (ref 99–110)
CO2: 16 MMOL/L (ref 21–32)
CO2: 17 MMOL/L (ref 21–32)
CO2: 17 MMOL/L (ref 21–32)
CO2: 19 MMOL/L (ref 21–32)
CO2: 19 MMOL/L (ref 21–32)
COMPONENT: NORMAL
CREAT SERPL-MCNC: 2.5 MG/DL (ref 0.9–1.3)
CREAT SERPL-MCNC: 2.8 MG/DL (ref 0.9–1.3)
CREAT SERPL-MCNC: 2.9 MG/DL (ref 0.9–1.3)
CREAT SERPL-MCNC: 3.2 MG/DL (ref 0.9–1.3)
CREAT SERPL-MCNC: 3.3 MG/DL (ref 0.9–1.3)
CROSSMATCH RESULT: NORMAL
DIFFERENTIAL TYPE: ABNORMAL
EOSINOPHILS ABSOLUTE: 0.7 K/CU MM
EOSINOPHILS RELATIVE PERCENT: 6 % (ref 0–3)
GFR SERPL CREATININE-BSD FRML MDRD: 23 ML/MIN/1.73M2
GFR SERPL CREATININE-BSD FRML MDRD: 24 ML/MIN/1.73M2
GFR SERPL CREATININE-BSD FRML MDRD: 27 ML/MIN/1.73M2
GFR SERPL CREATININE-BSD FRML MDRD: 28 ML/MIN/1.73M2
GFR SERPL CREATININE-BSD FRML MDRD: 32 ML/MIN/1.73M2
GLUCOSE SERPL-MCNC: 100 MG/DL (ref 70–99)
GLUCOSE SERPL-MCNC: 102 MG/DL (ref 70–99)
GLUCOSE SERPL-MCNC: 103 MG/DL (ref 70–99)
GLUCOSE SERPL-MCNC: 104 MG/DL (ref 70–99)
GLUCOSE SERPL-MCNC: 105 MG/DL (ref 70–99)
HAPTOGLOB SERPL-MCNC: 28 MG/DL (ref 30–200)
HBV SURFACE AB SERPL IA-ACNC: <3.5 M[IU]/ML
HBV SURFACE AG SERPL QL IA: NON REACTIVE
HCT VFR BLD CALC: 22.1 % (ref 42–52)
HCT VFR BLD CALC: 22.1 % (ref 42–52)
HEMOGLOBIN: 7.1 GM/DL (ref 13.5–18)
HEMOGLOBIN: 7.2 GM/DL (ref 13.5–18)
INR BLD: 2.2 INDEX
IONIZED CA: 0.98 MMOL/L (ref 1.12–1.32)
IONIZED CA: 0.99 MMOL/L (ref 1.12–1.32)
IONIZED CA: 1.01 MMOL/L (ref 1.12–1.32)
LIPASE: 241 IU/L (ref 13–60)
LYMPHOCYTES ABSOLUTE: 0.7 K/CU MM
LYMPHOCYTES RELATIVE PERCENT: 6 % (ref 24–44)
MAGNESIUM: 2 MG/DL (ref 1.8–2.4)
MAGNESIUM: 2.1 MG/DL (ref 1.8–2.4)
MAGNESIUM: 2.1 MG/DL (ref 1.8–2.4)
MAGNESIUM: 2.2 MG/DL (ref 1.8–2.4)
MAGNESIUM: 2.3 MG/DL (ref 1.8–2.4)
MCH RBC QN AUTO: 31 PG (ref 27–31)
MCHC RBC AUTO-ENTMCNC: 32.6 % (ref 32–36)
MCV RBC AUTO: 95.3 FL (ref 78–100)
METAMYELOCYTES ABSOLUTE COUNT: 0.25 K/CU MM
METAMYELOCYTES PERCENT: 2 %
MONOCYTES ABSOLUTE: 1.1 K/CU MM
MONOCYTES RELATIVE PERCENT: 9 % (ref 0–4)
MYELOCYTE PERCENT: 1 %
MYELOCYTES ABSOLUTE COUNT: 0.12 K/CU MM
NUCLEATED RED BLOOD CELLS: 4
PAPPENHEIMER BODIES: PRESENT
PDW BLD-RTO: 24.3 % (ref 11.7–14.9)
PHOSPHORUS: 4.9 MG/DL (ref 2.5–4.9)
PHOSPHORUS: 5.4 MG/DL (ref 2.5–4.9)
PHOSPHORUS: 5.4 MG/DL (ref 2.5–4.9)
PHOSPHORUS: 6.1 MG/DL (ref 2.5–4.9)
PHOSPHORUS: 6.2 MG/DL (ref 2.5–4.9)
PLATELET # BLD: 47 K/CU MM (ref 140–440)
PMV BLD AUTO: 10.8 FL (ref 7.5–11.1)
POTASSIUM SERPL-SCNC: 4.5 MMOL/L (ref 3.5–5.1)
POTASSIUM SERPL-SCNC: 4.7 MMOL/L (ref 3.5–5.1)
PROTHROMBIN TIME: 24.7 SECONDS (ref 11.7–14.5)
RBC # BLD: 2.32 M/CU MM (ref 4.6–6.2)
SEGMENTED NEUTROPHILS ABSOLUTE COUNT: 7.7 K/CU MM
SEGMENTED NEUTROPHILS RELATIVE PERCENT: 62 % (ref 36–66)
SODIUM BLD-SCNC: 118 MMOL/L (ref 135–145)
SODIUM BLD-SCNC: 120 MMOL/L (ref 135–145)
SODIUM BLD-SCNC: 121 MMOL/L (ref 135–145)
SODIUM BLD-SCNC: 123 MMOL/L (ref 135–145)
SODIUM BLD-SCNC: 124 MMOL/L (ref 135–145)
STATUS: NORMAL
TOTAL PROTEIN: 7.2 GM/DL (ref 6.4–8.2)
TRANSFUSION STATUS: NORMAL
UNIT DIVISION: 0
UNIT NUMBER: NORMAL
WBC # BLD: 12.3 K/CU MM (ref 4–10.5)

## 2023-12-13 PROCEDURE — P9047 ALBUMIN (HUMAN), 25%, 50ML: HCPCS | Performed by: PHYSICIAN ASSISTANT

## 2023-12-13 PROCEDURE — 2500000003 HC RX 250 WO HCPCS: Performed by: INTERNAL MEDICINE

## 2023-12-13 PROCEDURE — 2580000003 HC RX 258: Performed by: STUDENT IN AN ORGANIZED HEALTH CARE EDUCATION/TRAINING PROGRAM

## 2023-12-13 PROCEDURE — 83690 ASSAY OF LIPASE: CPT

## 2023-12-13 PROCEDURE — 6360000002 HC RX W HCPCS: Performed by: STUDENT IN AN ORGANIZED HEALTH CARE EDUCATION/TRAINING PROGRAM

## 2023-12-13 PROCEDURE — 6370000000 HC RX 637 (ALT 250 FOR IP): Performed by: PHYSICIAN ASSISTANT

## 2023-12-13 PROCEDURE — C9113 INJ PANTOPRAZOLE SODIUM, VIA: HCPCS | Performed by: NURSE PRACTITIONER

## 2023-12-13 PROCEDURE — 85730 THROMBOPLASTIN TIME PARTIAL: CPT

## 2023-12-13 PROCEDURE — 84100 ASSAY OF PHOSPHORUS: CPT

## 2023-12-13 PROCEDURE — 2500000003 HC RX 250 WO HCPCS: Performed by: STUDENT IN AN ORGANIZED HEALTH CARE EDUCATION/TRAINING PROGRAM

## 2023-12-13 PROCEDURE — 6360000002 HC RX W HCPCS: Performed by: NURSE PRACTITIONER

## 2023-12-13 PROCEDURE — 85014 HEMATOCRIT: CPT

## 2023-12-13 PROCEDURE — 94761 N-INVAS EAR/PLS OXIMETRY MLT: CPT

## 2023-12-13 PROCEDURE — 6360000002 HC RX W HCPCS: Performed by: PHYSICIAN ASSISTANT

## 2023-12-13 PROCEDURE — 86706 HEP B SURFACE ANTIBODY: CPT

## 2023-12-13 PROCEDURE — 85007 BL SMEAR W/DIFF WBC COUNT: CPT

## 2023-12-13 PROCEDURE — 2580000003 HC RX 258: Performed by: NURSE PRACTITIONER

## 2023-12-13 PROCEDURE — 87340 HEPATITIS B SURFACE AG IA: CPT

## 2023-12-13 PROCEDURE — 86704 HEP B CORE ANTIBODY TOTAL: CPT

## 2023-12-13 PROCEDURE — 80048 BASIC METABOLIC PNL TOTAL CA: CPT

## 2023-12-13 PROCEDURE — 85018 HEMOGLOBIN: CPT

## 2023-12-13 PROCEDURE — 82330 ASSAY OF CALCIUM: CPT

## 2023-12-13 PROCEDURE — 80076 HEPATIC FUNCTION PANEL: CPT

## 2023-12-13 PROCEDURE — 85027 COMPLETE CBC AUTOMATED: CPT

## 2023-12-13 PROCEDURE — 83735 ASSAY OF MAGNESIUM: CPT

## 2023-12-13 PROCEDURE — 85610 PROTHROMBIN TIME: CPT

## 2023-12-13 PROCEDURE — 2700000000 HC OXYGEN THERAPY PER DAY

## 2023-12-13 PROCEDURE — 2580000003 HC RX 258: Performed by: SPECIALIST

## 2023-12-13 PROCEDURE — 2500000003 HC RX 250 WO HCPCS: Performed by: PHYSICIAN ASSISTANT

## 2023-12-13 PROCEDURE — 2580000003 HC RX 258: Performed by: INTERNAL MEDICINE

## 2023-12-13 RX ORDER — DEXMEDETOMIDINE HYDROCHLORIDE 4 UG/ML
.1-.4 INJECTION, SOLUTION INTRAVENOUS CONTINUOUS
Status: DISCONTINUED | OUTPATIENT
Start: 2023-12-13 | End: 2023-12-14 | Stop reason: HOSPADM

## 2023-12-13 RX ORDER — MORPHINE SULFATE 2 MG/ML
2 INJECTION, SOLUTION INTRAMUSCULAR; INTRAVENOUS
Status: DISCONTINUED | OUTPATIENT
Start: 2023-12-13 | End: 2023-12-13

## 2023-12-13 RX ORDER — LORAZEPAM 2 MG/ML
0.5 INJECTION INTRAMUSCULAR
Status: DISCONTINUED | OUTPATIENT
Start: 2023-12-13 | End: 2023-12-13

## 2023-12-13 RX ADMIN — Medication: at 07:56

## 2023-12-13 RX ADMIN — SODIUM CHLORIDE: 9 INJECTION, SOLUTION INTRAVENOUS at 03:06

## 2023-12-13 RX ADMIN — Medication: at 12:12

## 2023-12-13 RX ADMIN — ALBUMIN (HUMAN) 25 G: 0.25 INJECTION, SOLUTION INTRAVENOUS at 06:47

## 2023-12-13 RX ADMIN — Medication: at 00:21

## 2023-12-13 RX ADMIN — SODIUM CHLORIDE 25 ML/HR: 9 INJECTION, SOLUTION INTRAVENOUS at 15:04

## 2023-12-13 RX ADMIN — SODIUM CHLORIDE: 9 INJECTION, SOLUTION INTRAVENOUS at 07:17

## 2023-12-13 RX ADMIN — Medication: at 13:08

## 2023-12-13 RX ADMIN — OCTREOTIDE ACETATE 25 MCG/HR: 500 INJECTION, SOLUTION INTRAVENOUS; SUBCUTANEOUS at 09:59

## 2023-12-13 RX ADMIN — DEXMEDETOMIDINE HYDROCHLORIDE 0.2 MCG/KG/HR: 4 INJECTION, SOLUTION INTRAVENOUS at 07:07

## 2023-12-13 RX ADMIN — ALBUMIN (HUMAN) 25 G: 0.25 INJECTION, SOLUTION INTRAVENOUS at 01:31

## 2023-12-13 RX ADMIN — THIAMINE HYDROCHLORIDE 250 MG: 100 INJECTION, SOLUTION INTRAMUSCULAR; INTRAVENOUS at 02:29

## 2023-12-13 RX ADMIN — CALCIUM GLUCONATE 1000 MG: 20 INJECTION, SOLUTION INTRAVENOUS at 07:20

## 2023-12-13 RX ADMIN — CALCIUM GLUCONATE 1000 MG: 20 INJECTION, SOLUTION INTRAVENOUS at 15:05

## 2023-12-13 RX ADMIN — VASOPRESSIN 0.03 UNITS/MIN: 0.2 INJECTION INTRAVENOUS at 10:46

## 2023-12-13 RX ADMIN — Medication: at 02:02

## 2023-12-13 RX ADMIN — PANTOPRAZOLE SODIUM 40 MG: 40 INJECTION, POWDER, FOR SOLUTION INTRAVENOUS at 09:53

## 2023-12-13 RX ADMIN — Medication: at 17:04

## 2023-12-13 RX ADMIN — SODIUM CHLORIDE, PRESERVATIVE FREE 10 ML: 5 INJECTION INTRAVENOUS at 10:00

## 2023-12-13 RX ADMIN — SODIUM CHLORIDE: 9 INJECTION, SOLUTION INTRAVENOUS at 06:41

## 2023-12-13 RX ADMIN — VASOPRESSIN 0.03 UNITS/MIN: 0.2 INJECTION INTRAVENOUS at 02:14

## 2023-12-13 RX ADMIN — MIDODRINE HYDROCHLORIDE 10 MG: 5 TABLET ORAL at 06:33

## 2023-12-13 RX ADMIN — Medication 30 MCG/MIN: at 11:57

## 2023-12-13 RX ADMIN — CALCIUM GLUCONATE 1000 MG: 20 INJECTION, SOLUTION INTRAVENOUS at 03:07

## 2023-12-13 RX ADMIN — CEFTRIAXONE 1000 MG: 1 INJECTION, POWDER, FOR SOLUTION INTRAMUSCULAR; INTRAVENOUS at 10:05

## 2023-12-13 ASSESSMENT — PAIN SCALES - GENERAL
PAINLEVEL_OUTOF10: 0

## 2023-12-13 NOTE — FLOWSHEET NOTE
Received call from [facility] in regards to pt transfer to [place]. Wife, Ming Damon, is at bedside and states she does not want pt transferred to [place], prefers to keep pt here at this point.  Peggy Cortez from [facility] also on phone and made aware of decision

## 2023-12-13 NOTE — CARE COORDINATION
Pt now   Now DNR-CCA  Meds if arrest only  Plan is to keep patient here. No longer planned to go to [facility].

## 2023-12-13 NOTE — FLOWSHEET NOTE
Notified PA, Daxa, of pt Rass -2 with precedex infusing at 0.4mcg/kg/hr, however, pt is agitated and restless with rass score +2 when infusing at 0.2mcg/kg/hr. Received modified order to wean/titrate by increments of 0.1mcg/kg/hr.  see eMAR

## 2023-12-13 NOTE — ACP (ADVANCE CARE PLANNING)
Patient is critically ill, updated patient's wife over the phone. [facility] GI request we initiated transfer to a facility where there is hepatology and liver transplant available, [address] [place] [address] center called back, provided details. discussed CODE STATUS with patient's wife, given the critical nature of the condition patient's wife changed the CODE STATUS to limited code-patient is comfort care addressed with no CPR, no defibrillation/shocks, okay with intubation/mechanical ventilator and resuscitative medications. CODE STATUS updated on the chart accordingly.     Notified Dr. Simone Kapadia

## 2023-12-13 NOTE — SIGNIFICANT EVENT
Patient's wife Fidel Vera, mother and father and rest of the family members requested the CODE STATUS be changed to DNR comfort care, discontinue all the medical management including CRRT, vasopressors, antibiotics, all the labs, and just focus on the patient's comfort. CODE STATUS changed accordingly, placed orders for comfort care. Discontinuing CRRT and vasopressors.   Placed nursing communication order and communicated with the nurse    Notified Dr. Robert Mcadams

## 2023-12-13 NOTE — PLAN OF CARE
Problem: Discharge Planning  Goal: Discharge to home or other facility with appropriate resources  Outcome: Progressing     Problem: Skin/Tissue Integrity  Goal: Absence of new skin breakdown  Description: 1. Monitor for areas of redness and/or skin breakdown  2. Assess vascular access sites hourly  3. Every 4-6 hours minimum:  Change oxygen saturation probe site  4. Every 4-6 hours:  If on nasal continuous positive airway pressure, respiratory therapy assess nares and determine need for appliance change or resting period. Outcome: Progressing     Problem: Safety - Adult  Goal: Free from fall injury  Outcome: Progressing     Problem: Neurosensory - Adult  Goal: Achieves stable or improved neurological status  Outcome: Progressing  Goal: Absence of seizures  Outcome: Progressing     Problem: Metabolic/Fluid and Electrolytes - Adult  Goal: Electrolytes maintained within normal limits  Outcome: Progressing  Goal: Hemodynamic stability and optimal renal function maintained  Outcome: Progressing  Goal: Glucose maintained within prescribed range  Outcome: Progressing     Problem: Hematologic - Adult  Goal: Maintains hematologic stability  Outcome: Progressing     Problem: Chronic Conditions and Co-morbidities  Goal: Patient's chronic conditions and co-morbidity symptoms are monitored and maintained or improved  Outcome: Progressing     Problem: ABCDS Injury Assessment  Goal: Absence of physical injury  Outcome: Progressing     Problem: Safety - Medical Restraint  Goal: Remains free of injury from restraints (Restraint for Interference with Medical Device)  Description: INTERVENTIONS:  1. Determine that other, less restrictive measures have been tried or would not be effective before applying the restraint  2. Evaluate the patient's condition at the time of restraint application  3. Inform patient/family regarding the reason for restraint  4.  Q2H: Monitor safety, psychosocial status, comfort, nutrition and

## 2023-12-13 NOTE — SIGNIFICANT EVENT
Called transfer center at beginning of shift for updates. They stated that we are awaiting a call back from the Hepatologist at [facility]. Informed that per signout, [facility] had called with questions. AURY Jean stated that [facility] was 2nd preference so never contacted by the transfer center and that they will follow up with [address] again at 9:45pm. Per transfer center, patient accepted to [facility], pending ICU bed availability.

## 2023-12-13 NOTE — ACP (ADVANCE CARE PLANNING)
Bedside and Verbal shift change report given to Kuldeep Alvarez RN (oncoming nurse) by Marleen Rodrigues RN (offgoing nurse). Report included the following information SBAR, Kardex, Intake/Output, MAR, and Recent Results. Pt seen at bedside. Less alert than last night. States that he wants to go home to lay down on his couch; that he feels strapped like a dog. Tried explaining his severity of sickness to him - he stated that his other doctor told him that was not true and said that his 'liver and kidney are sick but that he would be OK if he ate right and took his medications'. Met with Juan's wife Rosie Ramey), mother, father and his significant other. Provided update about his current condition, including but not limited to liver failure and kidney failure. Informed that a transfer request was initiated for OLT evaluation. Wife and mother (RN by profession) state that he will not be a candidate for that given his active drinking. Discussed about likely need to intubate, possibly even overnight, given his waxing and waning mental status. Rosie Ramey stated that he would not want to be on machines. His father expressed wanting him to be comfortable. Allowed family to discuss amongst themselves and let us know if they would like his focus of care to be modified from what it is now. Family stated no to intubation or resuscitation. Code status changed to DNR/DNI.

## 2023-12-14 LAB — HBV CORE AB SERPL QL IA: NEGATIVE

## 2023-12-14 NOTE — DISCHARGE SUMMARY
Apneic, pulseless, pronounced dead 1952 hours, 2023    Expand All Collapse All          Inpatient Critical Care Progress Note 2023           Nickolas Yang  1980  [id number]        Assessment/Plan:    46 yo male, active ETOH abuse, admitted in transfer from [facility] 12/10/2023:     Hyponatremia  Alcoholic cirrhosis decompensation  Encephalopathy, multifactorial (metabolic, hepatic, alcohol-related)  Acute kidney injury questionable hepatorenal syndrome  Acute blood loss anemia presumably secondary to lower GI bleed (hematochezia)  Hypotension, currently pressor reliant  History of hypertension  History hyperlipidemia  History of diabetes mellitus type 2  Probable sleep apnea        Hospital course marked by development of progressive (worsening) encephalopathy, renal failure necessitating CRRT, pressor reliant hemodynamic instability     Given the current MELD sodium score, approximate 50% mortality over the next 3 months. I suspect the patient may be best served by transfer to center for transplantation evaluation but defer this decision to GI service however given development of renal failure, deemed non-candidate for evaluation. The family does not wish to transfer the patient for \"an opinion\" regarding transplant candidacy and in fact the patient's spouse (per my conversation with the patient's mother this morning), an given the patient's overall clinical status, decided to withdraw care (spouse and all family members supportive)     Following withdrawal of pressors, CRRT, patient  on date, time as noted above.     Heather Cunningham  [phone]

## 2023-12-14 NOTE — PROGRESS NOTES
1 Unit PRBC started at this time
24 hour central line rounding completed. No dressing change indicated. Dressing is clean, dry, and intact. Patient continues to meet the following criteria for central vascular access: Hemodynamically unstable, requiring monitoring lines, vasopressors, or volume resuscitation, irritant/vesicant medication (Levophed and Vasopressin). Consult the Vascular Access Team for questions, concerns, or change in patient's condition.
3% NS order set to  at noon today despite minimal improvement in pt Na labs. Dr. Devyn Tomas notified. Verbal order to extend 3% continuous infusion order for another 24 hours. MD to review orders and need for continuous 3%NS infusion at time of successful vascath placement and CRRT initiation.
3% sodium chloride ordered for pt Na 114. Pt only has 1 IV with inability to run more continuous infusions due to lack of data on compatability via [employer]. Currently there is an octreotide gtt running continuously. PICC order is in. I consulted Elisha Wong CNP about which continuous gtt to prioritize over the other until additional IV access is obtained. Her response was \"I truly do not care at this point\". Further, I am continuing the octreotide gtt before starting the 3% sodium chloride until additional IV access is obtained. Will continue to monitor.
4 Eyes Skin Assessment     NAME:  Arash Apodaca  YOB: 1980  MEDICAL RECORD NUMBER:  [id number]    The patient is being assessed for  Admission    I agree that at least one RN has performed a thorough Head to Toe Skin Assessment on the patient. ALL assessment sites listed below have been assessed. Areas assessed by both nurses:    Head, Face, Ears, Shoulders, Back, Chest, Arms, Elbows, Hands, Sacrum. Buttock, Coccyx, Ischium, Legs. Feet and Heels, and Under Medical Devices         Does the Patient have a Wound? Yes wound(s) were present on assessment.  LDA wound assessment was Initiated and completed by RN     Pt has jaundice, broken R foot, cellulitis,edema, rectal bleeding, and scattered redness/bruising  Bon Prevention initiated by RN: No  Wound Care Orders initiated by RN: No    Pressure Injury (Stage 3,4, Unstageable, DTI, NWPT, and Complex wounds) if present, place Wound referral order by RN under : No    New Ostomies, if present place, Ostomy referral order under : No     Nurse 1 eSignature: Electronically signed by Carol Craig RN on 12/11/23 at 6:58 AM EST    **SHARE this note so that the co-signing nurse can place an eSignature**    Nurse 2 eSignature: {Esignature:[id number]}
Access center contacted this RN about an update with the transfer process. Patient has been accepted to [facility], however no bed is available at this time. Family currently at the bedside have been updated on current events.
Blood transfusion handoff with Taty Tracey of .
CRRT started at this time.
Ccp sent
Discusses with Dr. Leandro Quintana about patient not being able to leave medical equipment alone.  Orders for  placed
Dr Zhen Wolff on telehealth at this time
Dr. Dav Rider notified about new orders placed by Dr. Dafne Castillo. Also updated about UOP for this shift.
Dr. Janell Smith notified of successful vascath placement. [facility] place [facility]
Dr. Ortiz Vanegas at bedside with this RN. Discussed plans and to try cpap for the patient. RT notified about Dr. Caba and notified to bring a bipap to room.
Dr. Parker Huddleston notified about Hgb level.  New orders for blood placed
Dr. Velia Dye notified via [employer] about Na level trending back down. New orders placed in the eMAR. Also aware that the patient has not had any UOP this shift. Replied that the patient might need dialysis.
Family at bedside for terminal wean. Patient passed at 200. Dr. Rahman Anastacio aware and following hospitalists informed.
Inpatient Critical Care Progress Note 12/13/2023        Serena Telles  1980  [id number]      Assessment/Plan:    Hyponatremia  Alcoholic cirrhosis decompensation  Encephalopathy, multifactorial (metabolic, hepatic, alcohol-related)  Acute kidney injury questionable hepatorenal syndrome  Acute blood loss anemia presumably secondary to lower GI bleed (hematochezia)  Hypotension, currently pressor reliant  History of hypertension  History hyperlipidemia  History of diabetes mellitus type 2  Probable sleep apnea        Management of hyponatremia per nephrology service, continue CRRT initiated 12/12/2023 given worsening renal function  Hemodynamic support pressor  GI evaluation consultation in reference to cirrhosis as well as lower gastrointestinal bleeding, reviewed  PPI, Octreotide  SBP prophylaxis  Serial monitoring of renal function, electrolytes and urine output  Medical management to mitigate alcohol withdrawal  DVT prophylaxis  Glycemic control     Given the current MELD sodium score, approximate 50% mortality over the next 3 months. I suspect the patient may be best served by transfer to center for transplantation evaluation but defer this decision to GI service. The family does not wish to transfer the patient for \"an opinion\" regarding transplant candidacy and in fact the patient's spouse (per my conversation with the patient's mother this morning), may decide to withdraw care        Complex decisions required for evaluation and management reviewed during critical care rounds    The patient suffers from critical illness, encephalopathy, liver failure concurrent renal failure. Without continued medical measures and interventions, progressive decline of organ function, untoward mortality. 40 minutes of critical care time required for evaluation, management independent of procedure time.     Note change of code status to DNR CCA DNI overnight    E Zulay  [phone]    Subjective:    Remains
Lab contacted this RN about HgB level. Dr. Green Finely aware and new orders being placed at this time.
Nephrology  Dialysis Note        [address] [facility], [address] [facility], [address] [place] [address]  Phone: [phone]  Office Hours: 8:30AM - 4:30PM  Monday - Friday          PROCEDURE:  Patient seen during CVVHDF      PHYSICIAN:  ELLIOTT      INDICATION:  Acute tubular necrosis      RX:  See dialysis flowsheet for specifics on access, blood flow rate, dialysate baths, duration of dialysis, anticoagulation and other technical information.       COMMENTS:   --CRRT INITIATED ON 12/12/23  --SODIUM LEVEL BETTER ; INCREASE DIALYSATE TO 1000ML/HR TODAY, CONTINUE TO MONITOR SODIUM LEVEL THROUGH CRITICAL CARE PANEL  --CRITICALLY ILL    Electronically signed by Burgess Meka DO on 12/13/2023 at 7:01 AM    ADDENDUM 6;27PM:  -AS OF 6:27PM, PT IS COMFORT CARE AND CRRT AND ALL OTHER AGGRESSIVE CARE MEASURES ARE BEING DISCONTINUED  -I AM SIGNING OFF  THANK YOU    [facility]  Jeannine Buckley MD  [address] [facility], DO  [address]  PHONE: [phone]  FAX: [phone]
Nephrology Progress Note        [address], [address] [facility], [address] [place] [address]  Phone: [phone]  Office Hours: 8:30AM - 4:30PM  Monday - Friday 12/12/2023 7:20 AM  Subjective:   Admit Date: 12/10/2023  PCP: Stephanie Fam MD  Interval History:    Only 150ml urine after lasix 80mg iv last night    Diet: Diet NPO Exceptions are: Sips of Water with Meds      Data:   Scheduled Meds:   lidocaine  5 mL IntraDERmal Once    sodium chloride flush  5-40 mL IntraVENous 2 times per day    sodium bicarbonate  650 mg Oral TID    lactulose  20 g Oral TID    rifAXIMin  400 mg Oral TID    midodrine  10 mg Oral TID WC    albumin human 25%  25 g IntraVENous Q6H    lidocaine  5 mL IntraDERmal Once    sodium chloride flush  5-40 mL IntraVENous 2 times per day    sodium chloride flush  5-40 mL IntraVENous 2 times per day    cefTRIAXone (ROCEPHIN) IV  1,000 mg IntraVENous Q24H    thiamine (B-1) 500 mg in sodium chloride 0.9 % 50 mL IVPB  500 mg IntraVENous Q8H    Followed by    [START ON 12/13/2023] thiamine (B-1) 250 mg in sodium chloride 0.9 % 50 mL IVPB  250 mg IntraVENous Q24H    Followed by    Precious Howard ON 12/18/2023] thiamine  100 mg Oral Daily    multivitamin  1 tablet Oral Daily    PHENobarbital  32.4 mg Oral Q12H    Followed by    PHENobarbital  32.4 mg Oral Q24H    pantoprazole  40 mg IntraVENous BID     Continuous Infusions:   sodium chloride      sodium chloride      octreotide (SANDOSTATIN) 500 mcg in sodium chloride 0.9 % 100 mL infusion 25 mcg/hr (12/12/23 0650)    3% sodium chloride 50 mL/hr (12/11/23 2152)    sodium chloride 100 mL/hr at 12/12/23 0002    sodium chloride      sodium chloride Stopped (12/11/23 0121)    dextrose      sodium chloride       PRN Meds:sodium chloride flush, sodium chloride, sodium chloride, sodium chloride flush, sodium chloride, sodium chloride, sodium chloride flush, sodium chloride, potassium chloride **OR** potassium chloride, magnesium sulfate, polyethylene
Notified Dr Talya Mccauley about NaCl gtt being d/c. Stated to keep it off and give a push of lasix.  See emar for details
PT DOING POORLY OBTUNDED NO GROSS GIT BLEEDING TO BE STARTED ON DIALYSIS LVP NOT DONE BECAUSE NOT ENOUGH FLUID  VITALS NOTED   LABS--T.  BILI 49.3 INR 2.3    PT WITH ALD  WITH MULTIPLE ORGAN FAILURE   D/W INTENSIVIST ONCE INFECTION R/O MIGHT GIVE TRIAL WITH 40 MG PREDNISONE ALSO TO CONSIDER TRANSFER TO [facility] TO BE EVALUATED FOR POSSIBLE OLT   F/U LABS OVERALL PROGNOSIS POOR D/W BEDSIDE AURY Dunbar AS WELL
PT DOING POORLY PT WITH MULTIPLE ORGAN FAILURE PURSUING INEXORABLE DOWNHILL COURSE  TRANSFER TO [facility] CANCELLED PT MADE  DNR-CCA  VITALS NOTED ON 2 PRESSORS   LABS NOTED   D/W MULTIPLE FAMILY MEMBERS AND RN TIANA  OVERALL PROGNOSIS POOR WILL CPM
Paracentesis to be done by Dr Chay Davison. IR to complete consult.
Physician Progress Note      Mike Hale  [facility] #:                  [id number]  :                       1980  ADMIT DATE:       12/10/2023 3:29 PM  [address] [facility] DATE:  RESPONDING  PROVIDER #:        Edna Flynn DO          QUERY TEXT:    Nephrology,    Pt admitted with decompensated cirrhosis, DOREEN. Pt noted to have HRS vs ATN   documented. If possible, please document in the progress notes and discharge   summary clarification regarding if ATN and/or HRS was confirmed or ruled out. The medical record reflects the following:  Risk Factors: cirrhosis with ascites  Clinical Indicators: Nephrology documents: \"DOREEN: cr 3 from normal baseline//no   HN on CT A/P// HRS vs ATN. Hyperbilirubinemia/jaundice. He is oliguric   despite diuretic challenge and acidotic, showing signs of fluid retention. \"  Treatment: labs, imaging, I&O, plan to initiate CRRT, supportive care    Thank you,  Gloria Gomez RN CDS  [phone]    Defined by Kidney Disease Improving Global Outcomes (KDIGO) clinical practice   guideline for acute kidney injury:  -Increase in SCr by greater than or equal to 0.3 mg/dl within 48 hours; or  -Increase or decrease in SCr to greater than or equal to 1.5 times baseline,   which is known or presumed to have occurred within the prior 7 days; or  -Urine volume < 0.5ml/kg/h for 6 hours  Options provided:  -- Acute kidney failure with acute tubular necrosis  -- HRS confirmed  -- Other - I will add my own diagnosis  -- Disagree - Not applicable / Not valid  -- Disagree - Clinically unable to determine / Unknown  -- Refer to Clinical Documentation Reviewer    PROVIDER RESPONSE TEXT:    This patient is in HRS.     Query created by: Gloria Gomez on 2023 9:52 AM      Electronically signed by:  Edna Flynn DO 2023 10:18 AM
Pt intermittently becomes restless and needs to ne reminded to stop pulling on all lines, cords, and taking everything off. On assessment pt is answering questions appropriately.
Pt is constantly pulling at CRRT tubing, a-line tubing, removing oxygen sat and attempting to remove PICC and parada. Restraints placed at this time per Daxa PA for safety of patient.
Pt spouse at bedside. RN provided update on pt current plan of care and events of this morning. Pt spouse verbalized understanding. Dr. Lenin Anton at bedside to also provide spouse with update and address any concerns.
Pt stated he had to use the bed pan. When this RN came back with one, he had pulled his PICC line out retirement. Steffanie Kawasaki from [facility] team notified at this time.  She replied that \"I will be there\"
Pt's code status was changed to [facility] per family's request/wishes. CRRT stopped as well as Levophed and Vaso gtts per family's wishes to do so. Pt's wife stated they would like to withdraw care on Pt and keep him comfortable.
Pt's wife requested to talk with Critical Care PA, Adxa, in regards to changing Pt's code status to a DNR-CC and discontinuing CRRT and all of his medications and keeping him comfortable. Daxa stated she was place orders for all of these things next.
RN contacted by IR RN  to follow up on consult for possible paracentesis for pt placed on 12/11. Dr. Aston Adrian notified and at bedside to evaluate pt. Per MD, procedure is currently not indicated for pt based on volume of fluid noted by MD on abdominal ultrasound. Per MD, [address] [place] [address] intensivist will perform procedure when indicated or place new consult to IR.  RN verbalized understanding
Restraint type: Soft restraint:  right wrist and left wrist  Reason for restraints: Poor safety judgment:  Impaired recall and forgetfulness to use of call light  Duration: 24 hours    Restraints must be removed when an alternative is available and effective and/or patient no longer meets criteria. Orders must be renewed every calendar day or when discontinued.     The MD must conduct a face to face assessment within 1 calendar day of initiation when initial restraint order is verbal.
Restraint type: Soft restraint:  right wrist and left wrist  Reason for restraints: Poor safety judgment:  Impaired recall and forgetfulness to use of call light and Pulling out devices:  Removal of equipment  Duration: 24 hours    Restraints must be removed when an alternative is available and effective and/or patient no longer meets criteria. Orders must be renewed every calendar day or when discontinued.     The MD must conduct a face to face assessment within 1 calendar day of initiation when initial restraint order is verbal.
This RN and John at bedside. Verified blood running for pt for handoff.
Unsuccessful attempt to place temporary dialysis catheter  by LILY Mittal due to patient increasing agitation. Dr Kostas Dale also at bedside. Decision to revisit vas cath placement later today.
V2.0  [facility] [place]      Name:  Suma Disla /Age/Sex: 1980  (41 y.o. male)   MRN & CSN:  [id number] & [id number] Encounter Date/Time: 2023 2:52 PM EST    Location:  -A PCP: Stephanie Fam MD       Hospital Day: 4    Assessment and Plan:   Patient is a 70-year-old male who presented with fatigue. # Acute on chronic hyponatremia  - Initial Na of 110. Has significant beer consumption. - ED provider discussed with Nephrology, nephrology on board  - slowly improving    #Encephalopathy: Multifactorial in the setting of liver cirrhosis, and metabolic, worsening    # Acute on chronic anemia  - Endorsed few episodes of small-volume BRBPR and regular use of NSAIDs for right foot pain. No EGD/colonoscopy on file. - Hg 6.2, from 7-8.7 recently, with elevated RDW and normal MCV (likely mixed picture). BUN:Cr not elevated to suggest UGIB.   - Received x1 u pRBC in ED. PPI BID for now. # Decompensated alcohol cirrhosis  - Endorsed increase abdominal and LE swelling.   - Has abdominal varices, thrombocytopenia, INR 2.0  - Empirically started on Rocephin pending SBP rule-out      # DOREEN  - likely 2/2 hepato renal syndrome remained oliguric, on CRRT as per nephrology  # Alcohol abuse  - Hx of drinking 10-12 beers daily for many years. Has hx of withdrawals in the past but no seizures. Last drink .  - CONNIE negative%. Advised on importance of limited alcohol intake. - CIWA protocol with Phenobarbital.       Comment: Please note this report has been produced using speech recognition software and may contain errors related to that system including errors in grammar, punctuation, and spelling, as well as words and phrases that may be inappropriate. If there are any questions or concerns please feel free to contact the dictating provider for clarification.    Diet Diet NPO Exceptions are: Sips of Water with Meds   DVT Prophylaxis [] Lovenox, []  Heparin, [] SCDs, []
12/11/23  8:52 PM   Result Value Ref Range    POC Glucose 117 (H) 70 - 99 MG/DL   Critical Care Panel    Collection Time: 12/11/23 11:37 PM   Result Value Ref Range    Sodium 114 (LL) 135 - 145 MMOL/L    Potassium 5.0 3.5 - 5.1 MMOL/L    Chloride 81 (L) 99 - 110 mMol/L    CO2 16 (L) 21 - 32 MMOL/L    Anion Gap 17 (H) 4 - 16    Glucose 97 70 - 99 MG/DL    BUN 30 (H) 6 - 23 MG/DL    Creatinine 3.3 (H) 0.9 - 1.3 MG/DL    Est, Glom Filt Rate 23 (L) >60 mL/min/1.73m2    Calcium 7.7 (L) 8.3 - 10.6 MG/DL    Phosphorus 6.7 (H) 2.5 - 4.9 MG/DL    Magnesium 1.8 1.8 - 2.4 mg/dl   Critical Care Panel    Collection Time: 12/12/23  3:20 AM   Result Value Ref Range    Sodium 114 (LL) 135 - 145 MMOL/L    Potassium 5.0 3.5 - 5.1 MMOL/L    Chloride 81 (L) 99 - 110 mMol/L    CO2 15 (L) 21 - 32 MMOL/L    Anion Gap 18 (H) 4 - 16    Glucose 97 70 - 99 MG/DL    BUN 32 (H) 6 - 23 MG/DL    Creatinine 2.7 (H) 0.9 - 1.3 MG/DL    Est, Glom Filt Rate 29 (L) >60 mL/min/1.73m2    Calcium 8.1 (L) 8.3 - 10.6 MG/DL    Phosphorus 6.9 (H) 2.5 - 4.9 MG/DL    Magnesium 1.8 1.8 - 2.4 mg/dl   CBC with Auto Differential    Collection Time: 12/12/23  3:20 AM   Result Value Ref Range    WBC 11.4 (H) 4.0 - 10.5 K/CU MM    RBC 2.11 (L) 4.6 - 6.2 M/CU MM    Hemoglobin 6.7 (LL) 13.5 - 18.0 GM/DL    Hematocrit 20.3 (L) 42 - 52 %    MCV 96.2 78 - 100 FL    MCH 31.8 (H) 27 - 31 PG    MCHC 33.0 32.0 - 36.0 %    RDW 24.8 (H) 11.7 - 14.9 %    Platelets 37 (L) 653 - 440 K/CU MM    MPV 12.5 (H) 7.5 - 11.1 FL    Differential Type AUTOMATED DIFFERENTIAL     Segs Relative 85.8 (H) 36 - 66 %    Lymphocytes % 4.5 (L) 24 - 44 %    Monocytes % 5.5 (H) 0 - 4 %    Eosinophils % 1.1 0 - 3 %    Basophils % 0.3 0 - 1 %    Segs Absolute 9.8 K/CU MM    Lymphocytes Absolute 0.5 K/CU MM    Monocytes Absolute 0.6 K/CU MM    Eosinophils Absolute 0.1 K/CU MM    Basophils Absolute 0.0 K/CU MM    Nucleated RBC % 1.1 %    Total Nucleated RBC 0.1 K/CU MM    Total Immature Neutrophil 0.32 K/CU
Relative 1 (H) 0.0 %    Bands Relative 6 5 - 11 %    Segs Relative 82.0 (H) 36 - 66 %    Eosinophils % 2.0 0 - 3 %    Lymphocytes % 3.0 (L) 24 - 44 %    Monocytes % 6.0 (H) 0 - 4 %    Metamyelocytes Absolute 0.19 K/CU MM    Bands Absolute 1.12 K/CU MM    Segs Absolute 15.3 K/CU MM    Eosinophils Absolute 0.4 K/CU MM    Lymphocytes Absolute 0.6 K/CU MM    Monocytes Absolute 1.1 K/CU MM    Differential Type MANUAL DIFFERENTIAL     Anisocytosis 2+     Polychromasia 1+     Pappenheimer Bodies PRESENT     PLT Morphology DECREASED    Hepatic Function Panel    Collection Time: 12/10/23  4:05 PM   Result Value Ref Range    Albumin 2.8 (L) 3.4 - 5.0 GM/DL    Total Bilirubin 42.8 (H) 0.0 - 1.0 MG/DL    Bilirubin, Direct >10.0 (H) 0.0 - 0.3 MG/DL    Bilirubin, Indirect Can not be calculated 0 - 0.7 MG/DL    Alkaline Phosphatase 182 (H) 40 - 129 IU/L    AST 89 (H) 15 - 37 IU/L    ALT 31 10 - 40 U/L    Total Protein 7.9 6.4 - 8.2 GM/DL   ETOH    Collection Time: 12/10/23  4:05 PM   Result Value Ref Range    Alcohol Scrn <0.01 <0.01 %WT/VOL   Lipase    Collection Time: 12/10/23  4:05 PM   Result Value Ref Range    Lipase 18 13 - 60 IU/L   Magnesium    Collection Time: 12/10/23  4:05 PM   Result Value Ref Range    Magnesium 1.6 (L) 1.8 - 2.4 mg/dl   Protime-INR    Collection Time: 12/10/23  4:05 PM   Result Value Ref Range    Protime 22.5 (H) 11.7 - 14.5 SECONDS    INR 2.0 INDEX   TSH    Collection Time: 12/10/23  4:05 PM   Result Value Ref Range    TSH, High Sensitivity 2.290 0.270 - 4.20 uIu/ml   T4, Free    Collection Time: 12/10/23  4:05 PM   Result Value Ref Range    T4 Free 0.74 (L) 0.9 - 1.8 NG/DL   Cortisol AM, Total    Collection Time: 12/10/23  4:05 PM   Result Value Ref Range    Cortisol - AM 17.9 6.0 - 18.4 UG/DL   Ferritin    Collection Time: 12/10/23  4:05 PM   Result Value Ref Range    Ferritin 984 (H) 30 - 400 NG/ML   Iron and TIBC    Collection Time: 12/10/23  4:05 PM   Result Value Ref Range    Iron 189 (H) 59 -
Bilirubin, Indirect Can not be calculated 0 - 0.7 MG/DL    Alkaline Phosphatase 139 (H) 40 - 129 IU/L     (H) 15 - 37 IU/L    ALT 29 10 - 40 U/L    Total Protein 7.3 6.4 - 8.2 GM/DL   Lipase    Collection Time: 12/12/23  3:20 AM   Result Value Ref Range    Lipase 33 13 - 60 IU/L   Critical Care Panel    Collection Time: 12/12/23  8:50 AM   Result Value Ref Range    Sodium 117 (LL) 135 - 145 MMOL/L    Potassium 4.7 3.5 - 5.1 MMOL/L    Chloride 83 (L) 99 - 110 mMol/L    CO2 17 (L) 21 - 32 MMOL/L    Anion Gap 17 (H) 4 - 16    Glucose 96 70 - 99 MG/DL    BUN 33 (H) 6 - 23 MG/DL    Creatinine 3.9 (H) 0.9 - 1.3 MG/DL    Est, Glom Filt Rate 19 (L) >60 mL/min/1.73m2    Calcium 7.8 (L) 8.3 - 10.6 MG/DL    Phosphorus 7.2 (H) 2.5 - 4.9 MG/DL    Magnesium 1.9 1.8 - 2.4 mg/dl       Electronically signed by Julio C Anderson DO on 12/12/2023 at 9:39 AM
lung bases. Basal pneumonias cannot be excluded.        Electronically signed by Nanda Headley MD on 12/13/2023 at 11:27 AM
pancreatic tail. Pancreas is otherwise grossly normal.  Bilateral adrenal glands and kidneys are grossly with the limitations of a noncontrast study. GI/Bowel: No evidence of a bowel obstruction. No evidence of appendicitis. Pelvis: The urinary bladder was not well distended but appeared grossly. Peritoneum/Retroperitoneum: There is a small volume of ascites. No free air. Small nonspecific upper abdominal and retroperitoneal lymph nodes. Bones/Soft Tissues: There is diffuse subcutaneous edema. There is no acute bone finding. Degenerative disc disease with vacuum phenomenon at L5-S1. There is a large protuberant abdomen. There is an umbilical hernia containing fat, venous collaterals/varices from a recanalized umbilical vein and small bowel with no evidence of incarceration. Hepatomegaly with liver morphology suggesting cirrhosis. Portal venous hypertension with abdominal varices, recanalized umbilical vein, ascites and splenomegaly. Cholelithiasis and thick bile/sludge. Bile ducts are not dilated. Fat stranding in the region of the pancreatic tail. Localized pancreatitis should be considered. Diffuse subcutaneous edema of anasarca. Small umbilical hernia containing fat, venous collaterals from the recanalized umbilical vein and small bowel. No evidence of incarceration. XR CHEST PORTABLE    Result Date: 12/10/2023  EXAMINATION: ONE XRAY VIEW OF THE CHEST 12/10/2023 4:19 pm COMPARISON: 11/16/2022 HISTORY: ORDERING SYSTEM PROVIDED HISTORY: fatigue TECHNOLOGIST PROVIDED HISTORY: Reason for exam:->fatigue Reason for Exam: fatigue, jaundice FINDINGS: There is a calcified granuloma in the right upper lobe. There is pulmonary vascular congestion and cardiomegaly suggestive of CHF. Basal pneumonia cannot be excluded. Bony structures are unremarkable. Pulmonary vascular congestion and cardiomegaly in keeping with CHF. Increased markings in both lung bases. Basal pneumonias cannot be excluded.

## 2023-12-15 LAB
CULTURE: NORMAL
CULTURE: NORMAL
Lab: NORMAL
Lab: NORMAL
SPECIMEN: NORMAL
SPECIMEN: NORMAL